# Patient Record
Sex: FEMALE | Race: WHITE | NOT HISPANIC OR LATINO | Employment: UNEMPLOYED | URBAN - METROPOLITAN AREA
[De-identification: names, ages, dates, MRNs, and addresses within clinical notes are randomized per-mention and may not be internally consistent; named-entity substitution may affect disease eponyms.]

---

## 2023-01-16 ENCOUNTER — HOSPITAL ENCOUNTER (INPATIENT)
Facility: HOSPITAL | Age: 53
LOS: 3 days | Discharge: OTHER INSTITUTION | End: 2023-01-19
Attending: EMERGENCY MEDICINE | Admitting: INTERNAL MEDICINE
Payer: COMMERCIAL

## 2023-01-16 DIAGNOSIS — K29.70 GASTRITIS: Primary | ICD-10-CM

## 2023-01-16 DIAGNOSIS — D53.9 MACROCYTIC ANEMIA: ICD-10-CM

## 2023-01-16 DIAGNOSIS — K92.1 GASTROINTESTINAL HEMORRHAGE WITH MELENA: ICD-10-CM

## 2023-01-16 DIAGNOSIS — R19.7 NAUSEA VOMITING AND DIARRHEA: ICD-10-CM

## 2023-01-16 DIAGNOSIS — R42 POSTURAL DIZZINESS: ICD-10-CM

## 2023-01-16 DIAGNOSIS — K92.2 GASTROINTESTINAL HEMORRHAGE, UNSPECIFIED GASTROINTESTINAL HEMORRHAGE TYPE: ICD-10-CM

## 2023-01-16 DIAGNOSIS — R11.2 NAUSEA VOMITING AND DIARRHEA: ICD-10-CM

## 2023-01-16 DIAGNOSIS — K80.11 CALCULUS OF GALLBLADDER WITH CHOLECYSTITIS WITH BILIARY OBSTRUCTION, UNSPECIFIED CHOLECYSTITIS ACUITY: ICD-10-CM

## 2023-01-16 DIAGNOSIS — R74.01 TRANSAMINITIS: ICD-10-CM

## 2023-01-16 PROBLEM — F10.10 ALCOHOL ABUSE: Status: ACTIVE | Noted: 2023-01-16

## 2023-01-16 LAB
ALBUMIN SERPL BCP-MCNC: 2.9 G/DL (ref 3.5–5)
ALP SERPL-CCNC: 274 U/L (ref 46–116)
ALT SERPL W P-5'-P-CCNC: 65 U/L (ref 12–78)
ANION GAP SERPL CALCULATED.3IONS-SCNC: 9 MMOL/L (ref 4–13)
AST SERPL W P-5'-P-CCNC: 233 U/L (ref 5–45)
ATRIAL RATE: 82 BPM
BASOPHILS # BLD AUTO: 0.02 THOUSANDS/ΜL (ref 0–0.1)
BASOPHILS NFR BLD AUTO: 0 % (ref 0–1)
BILIRUB SERPL-MCNC: 1.7 MG/DL (ref 0.2–1)
BUN SERPL-MCNC: 5 MG/DL (ref 5–25)
CALCIUM ALBUM COR SERPL-MCNC: 10.3 MG/DL (ref 8.3–10.1)
CALCIUM SERPL-MCNC: 9.4 MG/DL (ref 8.3–10.1)
CHLORIDE SERPL-SCNC: 100 MMOL/L (ref 96–108)
CO2 SERPL-SCNC: 28 MMOL/L (ref 21–32)
CREAT SERPL-MCNC: 0.59 MG/DL (ref 0.6–1.3)
EOSINOPHIL # BLD AUTO: 0.07 THOUSAND/ΜL (ref 0–0.61)
EOSINOPHIL NFR BLD AUTO: 1 % (ref 0–6)
ERYTHROCYTE [DISTWIDTH] IN BLOOD BY AUTOMATED COUNT: 13.7 % (ref 11.6–15.1)
GFR SERPL CREATININE-BSD FRML MDRD: 104 ML/MIN/1.73SQ M
GLUCOSE SERPL-MCNC: 115 MG/DL (ref 65–140)
HCT VFR BLD AUTO: 32.1 % (ref 34.8–46.1)
HGB BLD-MCNC: 10.4 G/DL (ref 11.5–15.4)
IMM GRANULOCYTES # BLD AUTO: 0.02 THOUSAND/UL (ref 0–0.2)
IMM GRANULOCYTES NFR BLD AUTO: 0 % (ref 0–2)
LIPASE SERPL-CCNC: 280 U/L (ref 73–393)
LYMPHOCYTES # BLD AUTO: 0.88 THOUSANDS/ΜL (ref 0.6–4.47)
LYMPHOCYTES NFR BLD AUTO: 16 % (ref 14–44)
MCH RBC QN AUTO: 34.8 PG (ref 26.8–34.3)
MCHC RBC AUTO-ENTMCNC: 32.4 G/DL (ref 31.4–37.4)
MCV RBC AUTO: 107 FL (ref 82–98)
MONOCYTES # BLD AUTO: 0.69 THOUSAND/ΜL (ref 0.17–1.22)
MONOCYTES NFR BLD AUTO: 12 % (ref 4–12)
NEUTROPHILS # BLD AUTO: 3.98 THOUSANDS/ΜL (ref 1.85–7.62)
NEUTS SEG NFR BLD AUTO: 71 % (ref 43–75)
NRBC BLD AUTO-RTO: 0 /100 WBCS
P AXIS: 75 DEGREES
PLATELET # BLD AUTO: 143 THOUSANDS/UL (ref 149–390)
PMV BLD AUTO: 10.3 FL (ref 8.9–12.7)
POTASSIUM SERPL-SCNC: 3.6 MMOL/L (ref 3.5–5.3)
PR INTERVAL: 138 MS
PROT SERPL-MCNC: 7.8 G/DL (ref 6.4–8.4)
QRS AXIS: 72 DEGREES
QRSD INTERVAL: 82 MS
QT INTERVAL: 384 MS
QTC INTERVAL: 448 MS
RBC # BLD AUTO: 2.99 MILLION/UL (ref 3.81–5.12)
SODIUM SERPL-SCNC: 137 MMOL/L (ref 135–147)
T WAVE AXIS: 74 DEGREES
VENTRICULAR RATE: 82 BPM
WBC # BLD AUTO: 5.66 THOUSAND/UL (ref 4.31–10.16)

## 2023-01-16 PROCEDURE — 82728 ASSAY OF FERRITIN: CPT | Performed by: INTERNAL MEDICINE

## 2023-01-16 PROCEDURE — 85025 COMPLETE CBC W/AUTO DIFF WBC: CPT | Performed by: EMERGENCY MEDICINE

## 2023-01-16 PROCEDURE — 99285 EMERGENCY DEPT VISIT HI MDM: CPT | Performed by: EMERGENCY MEDICINE

## 2023-01-16 PROCEDURE — 99285 EMERGENCY DEPT VISIT HI MDM: CPT

## 2023-01-16 PROCEDURE — C9113 INJ PANTOPRAZOLE SODIUM, VIA: HCPCS | Performed by: EMERGENCY MEDICINE

## 2023-01-16 PROCEDURE — 93005 ELECTROCARDIOGRAM TRACING: CPT

## 2023-01-16 PROCEDURE — 93010 ELECTROCARDIOGRAM REPORT: CPT | Performed by: INTERNAL MEDICINE

## 2023-01-16 PROCEDURE — 96374 THER/PROPH/DIAG INJ IV PUSH: CPT

## 2023-01-16 PROCEDURE — 83550 IRON BINDING TEST: CPT | Performed by: INTERNAL MEDICINE

## 2023-01-16 PROCEDURE — 36415 COLL VENOUS BLD VENIPUNCTURE: CPT

## 2023-01-16 PROCEDURE — 80053 COMPREHEN METABOLIC PANEL: CPT | Performed by: EMERGENCY MEDICINE

## 2023-01-16 PROCEDURE — 83690 ASSAY OF LIPASE: CPT | Performed by: EMERGENCY MEDICINE

## 2023-01-16 PROCEDURE — 83540 ASSAY OF IRON: CPT | Performed by: INTERNAL MEDICINE

## 2023-01-16 RX ORDER — ONDANSETRON 4 MG/1
4 TABLET, ORALLY DISINTEGRATING ORAL ONCE
Status: COMPLETED | OUTPATIENT
Start: 2023-01-16 | End: 2023-01-16

## 2023-01-16 RX ADMIN — SODIUM CHLORIDE 1000 ML: 0.9 INJECTION, SOLUTION INTRAVENOUS at 23:14

## 2023-01-16 RX ADMIN — SODIUM CHLORIDE 80 MG: 9 INJECTION, SOLUTION INTRAVENOUS at 23:27

## 2023-01-16 RX ADMIN — ONDANSETRON 4 MG: 4 TABLET, ORALLY DISINTEGRATING ORAL at 18:05

## 2023-01-17 ENCOUNTER — APPOINTMENT (INPATIENT)
Dept: ULTRASOUND IMAGING | Facility: HOSPITAL | Age: 53
End: 2023-01-17
Payer: COMMERCIAL

## 2023-01-17 ENCOUNTER — APPOINTMENT (INPATIENT)
Dept: GASTROENTEROLOGY | Facility: HOSPITAL | Age: 53
End: 2023-01-17
Payer: COMMERCIAL

## 2023-01-17 ENCOUNTER — ANESTHESIA EVENT (INPATIENT)
Dept: GASTROENTEROLOGY | Facility: HOSPITAL | Age: 53
End: 2023-01-17

## 2023-01-17 ENCOUNTER — ANESTHESIA (INPATIENT)
Dept: GASTROENTEROLOGY | Facility: HOSPITAL | Age: 53
End: 2023-01-17

## 2023-01-17 PROBLEM — I10 ESSENTIAL HYPERTENSION: Status: ACTIVE | Noted: 2023-01-17

## 2023-01-17 PROBLEM — F32.A DEPRESSION: Status: ACTIVE | Noted: 2023-01-17

## 2023-01-17 PROBLEM — R74.01 TRANSAMINITIS: Status: ACTIVE | Noted: 2023-01-17

## 2023-01-17 PROBLEM — L03.119 CELLULITIS OF HAND: Status: ACTIVE | Noted: 2023-01-17

## 2023-01-17 LAB
ALBUMIN SERPL BCP-MCNC: 2.4 G/DL (ref 3.5–5)
ALP SERPL-CCNC: 223 U/L (ref 46–116)
ALT SERPL W P-5'-P-CCNC: 55 U/L (ref 12–78)
ANION GAP SERPL CALCULATED.3IONS-SCNC: 8 MMOL/L (ref 4–13)
AST SERPL W P-5'-P-CCNC: 171 U/L (ref 5–45)
BASOPHILS # BLD AUTO: 0.01 THOUSANDS/ΜL (ref 0–0.1)
BASOPHILS NFR BLD AUTO: 0 % (ref 0–1)
BILIRUB SERPL-MCNC: 1.4 MG/DL (ref 0.2–1)
BUN SERPL-MCNC: 4 MG/DL (ref 5–25)
CALCIUM ALBUM COR SERPL-MCNC: 9.4 MG/DL (ref 8.3–10.1)
CALCIUM SERPL-MCNC: 8.1 MG/DL (ref 8.3–10.1)
CHLORIDE SERPL-SCNC: 106 MMOL/L (ref 96–108)
CO2 SERPL-SCNC: 27 MMOL/L (ref 21–32)
CREAT SERPL-MCNC: 0.55 MG/DL (ref 0.6–1.3)
EOSINOPHIL # BLD AUTO: 0.05 THOUSAND/ΜL (ref 0–0.61)
EOSINOPHIL NFR BLD AUTO: 1 % (ref 0–6)
ERYTHROCYTE [DISTWIDTH] IN BLOOD BY AUTOMATED COUNT: 13.6 % (ref 11.6–15.1)
FERRITIN SERPL-MCNC: 560 NG/ML (ref 8–388)
GFR SERPL CREATININE-BSD FRML MDRD: 107 ML/MIN/1.73SQ M
GLUCOSE SERPL-MCNC: 90 MG/DL (ref 65–140)
HAV IGM SER QL: NORMAL
HBV CORE IGM SER QL: NORMAL
HBV SURFACE AG SER QL: NORMAL
HCT VFR BLD AUTO: 26.4 % (ref 34.8–46.1)
HCV AB SER QL: NORMAL
HGB BLD-MCNC: 10.9 G/DL (ref 11.5–15.4)
HGB BLD-MCNC: 8.6 G/DL (ref 11.5–15.4)
IMM GRANULOCYTES # BLD AUTO: 0.02 THOUSAND/UL (ref 0–0.2)
IMM GRANULOCYTES NFR BLD AUTO: 1 % (ref 0–2)
INR PPP: 0.91 (ref 0.84–1.19)
LYMPHOCYTES # BLD AUTO: 0.78 THOUSANDS/ΜL (ref 0.6–4.47)
LYMPHOCYTES NFR BLD AUTO: 22 % (ref 14–44)
MCH RBC QN AUTO: 34.1 PG (ref 26.8–34.3)
MCHC RBC AUTO-ENTMCNC: 32.6 G/DL (ref 31.4–37.4)
MCV RBC AUTO: 105 FL (ref 82–98)
MONOCYTES # BLD AUTO: 0.5 THOUSAND/ΜL (ref 0.17–1.22)
MONOCYTES NFR BLD AUTO: 14 % (ref 4–12)
NEUTROPHILS # BLD AUTO: 2.12 THOUSANDS/ΜL (ref 1.85–7.62)
NEUTS SEG NFR BLD AUTO: 62 % (ref 43–75)
NRBC BLD AUTO-RTO: 0 /100 WBCS
PLATELET # BLD AUTO: 145 THOUSANDS/UL (ref 149–390)
PMV BLD AUTO: 10.2 FL (ref 8.9–12.7)
POTASSIUM SERPL-SCNC: 3.4 MMOL/L (ref 3.5–5.3)
PROT SERPL-MCNC: 6.5 G/DL (ref 6.4–8.4)
PROTHROMBIN TIME: 12.9 SECONDS (ref 11.6–14.5)
RBC # BLD AUTO: 2.52 MILLION/UL (ref 3.81–5.12)
SODIUM SERPL-SCNC: 141 MMOL/L (ref 135–147)
WBC # BLD AUTO: 3.48 THOUSAND/UL (ref 4.31–10.16)

## 2023-01-17 PROCEDURE — C9113 INJ PANTOPRAZOLE SODIUM, VIA: HCPCS | Performed by: INTERNAL MEDICINE

## 2023-01-17 PROCEDURE — 88305 TISSUE EXAM BY PATHOLOGIST: CPT | Performed by: PATHOLOGY

## 2023-01-17 PROCEDURE — 80053 COMPREHEN METABOLIC PANEL: CPT | Performed by: INTERNAL MEDICINE

## 2023-01-17 PROCEDURE — 85018 HEMOGLOBIN: CPT | Performed by: INTERNAL MEDICINE

## 2023-01-17 PROCEDURE — 0W3P8ZZ CONTROL BLEEDING IN GASTROINTESTINAL TRACT, VIA NATURAL OR ARTIFICIAL OPENING ENDOSCOPIC: ICD-10-PCS | Performed by: INTERNAL MEDICINE

## 2023-01-17 PROCEDURE — 76705 ECHO EXAM OF ABDOMEN: CPT

## 2023-01-17 PROCEDURE — 99223 1ST HOSP IP/OBS HIGH 75: CPT | Performed by: INTERNAL MEDICINE

## 2023-01-17 PROCEDURE — 80074 ACUTE HEPATITIS PANEL: CPT | Performed by: NURSE PRACTITIONER

## 2023-01-17 PROCEDURE — 43239 EGD BIOPSY SINGLE/MULTIPLE: CPT | Performed by: INTERNAL MEDICINE

## 2023-01-17 PROCEDURE — 43255 EGD CONTROL BLEEDING ANY: CPT | Performed by: INTERNAL MEDICINE

## 2023-01-17 PROCEDURE — 0DB98ZX EXCISION OF DUODENUM, VIA NATURAL OR ARTIFICIAL OPENING ENDOSCOPIC, DIAGNOSTIC: ICD-10-PCS | Performed by: INTERNAL MEDICINE

## 2023-01-17 PROCEDURE — 0DB68ZX EXCISION OF STOMACH, VIA NATURAL OR ARTIFICIAL OPENING ENDOSCOPIC, DIAGNOSTIC: ICD-10-PCS | Performed by: INTERNAL MEDICINE

## 2023-01-17 PROCEDURE — 85610 PROTHROMBIN TIME: CPT | Performed by: NURSE PRACTITIONER

## 2023-01-17 PROCEDURE — 0DB78ZX EXCISION OF STOMACH, PYLORUS, VIA NATURAL OR ARTIFICIAL OPENING ENDOSCOPIC, DIAGNOSTIC: ICD-10-PCS | Performed by: INTERNAL MEDICINE

## 2023-01-17 PROCEDURE — 85025 COMPLETE CBC W/AUTO DIFF WBC: CPT | Performed by: INTERNAL MEDICINE

## 2023-01-17 PROCEDURE — NC001 PR NO CHARGE: Performed by: INTERNAL MEDICINE

## 2023-01-17 RX ORDER — FOLIC ACID 1 MG/1
1 TABLET ORAL DAILY
Status: DISCONTINUED | OUTPATIENT
Start: 2023-01-17 | End: 2023-01-19 | Stop reason: HOSPADM

## 2023-01-17 RX ORDER — IBUPROFEN 400 MG/1
400 TABLET ORAL EVERY 6 HOURS PRN
Status: DISCONTINUED | OUTPATIENT
Start: 2023-01-17 | End: 2023-01-18

## 2023-01-17 RX ORDER — LOSARTAN POTASSIUM 25 MG/1
25 TABLET ORAL DAILY
Status: DISCONTINUED | OUTPATIENT
Start: 2023-01-17 | End: 2023-01-17

## 2023-01-17 RX ORDER — LANOLIN ALCOHOL/MO/W.PET/CERES
100 CREAM (GRAM) TOPICAL DAILY
Status: DISCONTINUED | OUTPATIENT
Start: 2023-01-17 | End: 2023-01-19 | Stop reason: HOSPADM

## 2023-01-17 RX ORDER — MIRTAZAPINE 15 MG/1
15 TABLET, FILM COATED ORAL
Status: DISCONTINUED | OUTPATIENT
Start: 2023-01-17 | End: 2023-01-19 | Stop reason: HOSPADM

## 2023-01-17 RX ORDER — CLINDAMYCIN HYDROCHLORIDE 150 MG/1
300 CAPSULE ORAL 4 TIMES DAILY
Status: DISPENSED | OUTPATIENT
Start: 2023-01-17 | End: 2023-01-18

## 2023-01-17 RX ORDER — MULTIVIT-MIN/IRON FUM/FOLIC AC 7.5 MG-4
1 TABLET ORAL DAILY
COMMUNITY

## 2023-01-17 RX ORDER — PANTOPRAZOLE SODIUM 40 MG/10ML
40 INJECTION, POWDER, LYOPHILIZED, FOR SOLUTION INTRAVENOUS EVERY 12 HOURS SCHEDULED
Status: DISCONTINUED | OUTPATIENT
Start: 2023-01-17 | End: 2023-01-19 | Stop reason: HOSPADM

## 2023-01-17 RX ORDER — LIDOCAINE HYDROCHLORIDE 10 MG/ML
INJECTION, SOLUTION EPIDURAL; INFILTRATION; INTRACAUDAL; PERINEURAL AS NEEDED
Status: DISCONTINUED | OUTPATIENT
Start: 2023-01-17 | End: 2023-01-17

## 2023-01-17 RX ORDER — GABAPENTIN 100 MG/1
200 CAPSULE ORAL
Status: DISCONTINUED | OUTPATIENT
Start: 2023-01-17 | End: 2023-01-19 | Stop reason: HOSPADM

## 2023-01-17 RX ORDER — CHLORDIAZEPOXIDE HYDROCHLORIDE 5 MG/1
5 CAPSULE, GELATIN COATED ORAL EVERY 8 HOURS SCHEDULED
Status: DISCONTINUED | OUTPATIENT
Start: 2023-01-17 | End: 2023-01-19 | Stop reason: HOSPADM

## 2023-01-17 RX ORDER — ESCITALOPRAM OXALATE 20 MG/1
20 TABLET ORAL DAILY
COMMUNITY

## 2023-01-17 RX ORDER — GABAPENTIN 100 MG/1
200 CAPSULE ORAL
COMMUNITY

## 2023-01-17 RX ORDER — SODIUM CHLORIDE 9 MG/ML
75 INJECTION, SOLUTION INTRAVENOUS CONTINUOUS
Status: DISCONTINUED | OUTPATIENT
Start: 2023-01-17 | End: 2023-01-17

## 2023-01-17 RX ORDER — POTASSIUM CHLORIDE 14.9 MG/ML
20 INJECTION INTRAVENOUS ONCE
Status: COMPLETED | OUTPATIENT
Start: 2023-01-17 | End: 2023-01-17

## 2023-01-17 RX ORDER — LANOLIN ALCOHOL/MO/W.PET/CERES
3 CREAM (GRAM) TOPICAL
Status: DISCONTINUED | OUTPATIENT
Start: 2023-01-17 | End: 2023-01-19 | Stop reason: HOSPADM

## 2023-01-17 RX ORDER — LANOLIN ALCOHOL/MO/W.PET/CERES
100 CREAM (GRAM) TOPICAL DAILY
COMMUNITY

## 2023-01-17 RX ORDER — MIRTAZAPINE 15 MG/1
15 TABLET, FILM COATED ORAL
COMMUNITY

## 2023-01-17 RX ORDER — VALSARTAN 40 MG/1
40 TABLET ORAL DAILY
COMMUNITY

## 2023-01-17 RX ORDER — PROPOFOL 10 MG/ML
INJECTION, EMULSION INTRAVENOUS AS NEEDED
Status: DISCONTINUED | OUTPATIENT
Start: 2023-01-17 | End: 2023-01-17

## 2023-01-17 RX ORDER — POTASSIUM CHLORIDE 20 MEQ/1
40 TABLET, EXTENDED RELEASE ORAL ONCE
Status: DISCONTINUED | OUTPATIENT
Start: 2023-01-17 | End: 2023-01-17

## 2023-01-17 RX ORDER — ONDANSETRON 2 MG/ML
4 INJECTION INTRAMUSCULAR; INTRAVENOUS EVERY 6 HOURS PRN
Status: DISCONTINUED | OUTPATIENT
Start: 2023-01-17 | End: 2023-01-17

## 2023-01-17 RX ORDER — ONDANSETRON 2 MG/ML
4 INJECTION INTRAMUSCULAR; INTRAVENOUS EVERY 4 HOURS PRN
Status: DISCONTINUED | OUTPATIENT
Start: 2023-01-17 | End: 2023-01-19 | Stop reason: HOSPADM

## 2023-01-17 RX ORDER — CLINDAMYCIN HYDROCHLORIDE 300 MG/1
300 CAPSULE ORAL 4 TIMES DAILY
COMMUNITY
Start: 2023-01-13 | End: 2023-01-19

## 2023-01-17 RX ADMIN — LIDOCAINE HYDROCHLORIDE 50 MG: 10 INJECTION, SOLUTION EPIDURAL; INFILTRATION; INTRACAUDAL; PERINEURAL at 13:27

## 2023-01-17 RX ADMIN — PANTOPRAZOLE SODIUM 40 MG: 40 INJECTION, POWDER, FOR SOLUTION INTRAVENOUS at 21:31

## 2023-01-17 RX ADMIN — ONDANSETRON 4 MG: 2 INJECTION INTRAMUSCULAR; INTRAVENOUS at 04:03

## 2023-01-17 RX ADMIN — FOLIC ACID 1 MG: 1 TABLET ORAL at 08:15

## 2023-01-17 RX ADMIN — Medication 3 MG: at 00:38

## 2023-01-17 RX ADMIN — ONDANSETRON 4 MG: 2 INJECTION INTRAMUSCULAR; INTRAVENOUS at 08:15

## 2023-01-17 RX ADMIN — CLINDAMYCIN HYDROCHLORIDE 300 MG: 150 CAPSULE ORAL at 03:22

## 2023-01-17 RX ADMIN — PROPOFOL 50 MG: 10 INJECTION, EMULSION INTRAVENOUS at 13:32

## 2023-01-17 RX ADMIN — PROPOFOL 70 MG: 10 INJECTION, EMULSION INTRAVENOUS at 13:29

## 2023-01-17 RX ADMIN — CHLORDIAZEPOXIDE HYDROCHLORIDE 5 MG: 5 CAPSULE ORAL at 08:15

## 2023-01-17 RX ADMIN — SODIUM CHLORIDE 8 MG/HR: 9 INJECTION, SOLUTION INTRAVENOUS at 03:06

## 2023-01-17 RX ADMIN — Medication 100 MG: at 08:15

## 2023-01-17 RX ADMIN — SODIUM CHLORIDE 75 ML/HR: 0.9 INJECTION, SOLUTION INTRAVENOUS at 03:06

## 2023-01-17 RX ADMIN — CLINDAMYCIN HYDROCHLORIDE 300 MG: 150 CAPSULE ORAL at 17:15

## 2023-01-17 RX ADMIN — MIRTAZAPINE 15 MG: 15 TABLET, FILM COATED ORAL at 03:22

## 2023-01-17 RX ADMIN — CLINDAMYCIN HYDROCHLORIDE 300 MG: 150 CAPSULE ORAL at 21:31

## 2023-01-17 RX ADMIN — POTASSIUM CHLORIDE 20 MEQ: 14.9 INJECTION, SOLUTION INTRAVENOUS at 09:22

## 2023-01-17 RX ADMIN — PROPOFOL 100 MG: 10 INJECTION, EMULSION INTRAVENOUS at 13:27

## 2023-01-17 RX ADMIN — GABAPENTIN 200 MG: 100 CAPSULE ORAL at 03:22

## 2023-01-17 RX ADMIN — SODIUM CHLORIDE 8 MG/HR: 9 INJECTION, SOLUTION INTRAVENOUS at 12:14

## 2023-01-17 RX ADMIN — PROPOFOL 30 MG: 10 INJECTION, EMULSION INTRAVENOUS at 13:28

## 2023-01-17 RX ADMIN — MULTIPLE VITAMINS W/ MINERALS TAB 1 TABLET: TAB ORAL at 08:15

## 2023-01-17 RX ADMIN — GABAPENTIN 200 MG: 100 CAPSULE ORAL at 21:31

## 2023-01-17 RX ADMIN — CHLORDIAZEPOXIDE HYDROCHLORIDE 5 MG: 5 CAPSULE ORAL at 21:31

## 2023-01-17 RX ADMIN — CHLORDIAZEPOXIDE HYDROCHLORIDE 5 MG: 5 CAPSULE ORAL at 14:38

## 2023-01-17 RX ADMIN — Medication 3 MG: at 21:31

## 2023-01-17 RX ADMIN — MIRTAZAPINE 15 MG: 15 TABLET, FILM COATED ORAL at 21:31

## 2023-01-17 NOTE — CONSULTS
Consultation - GI   Alicia Gottlieb 47 y o  female MRN: [id number]  Unit/Bed#: -01 Encounter: [id number]      Assessment/Plan     1  Nausea and vomiting  1 to 2-week history of anorexia, nausea and vomiting since alcohol cessation 1/10/2023  Usually related to food consumption  Denies dysphagia  No hematemesis  With history of heavy alcohol use concern for esophagitis, Castro's, PUD, gastritis, possible Roxy-Conklin tear  Low suspicion for bleeding esophageal varices   Recommend proceeding with EGD for further evaluation  -Plan for EGD today  -Continue to trend hemoglobin and transfuse to keep greater than 7 0  -Continue Protonix infusion for now  -N p o  for now    2  Diarrhea/melena  Chronic loose stools which patient attributes to regular alcohol use  Now with 2-week history of watery diarrhea and black stools  Denies rectal bleeding or bright red blood  Recent antibiotic use due to left hand trauma-currently on clindamycin  -Stool cultures to rule out C  Difficile  -Plan for EGD today to evaluate for source of bleeding  -Trend hemoglobin, transfuse to keep greater than 7 0    3  Elevated LFTs  4  History of alcohol abuse  LFTs elevated on admission- none prior for comparison  Platelet count slightly decreased, INR pending  Patient denies known liver disease or cirrhosis, no history of hepatitis  Most likely due to recent heavy alcohol use  Plan for work-up to evaluate for cirrhosis  -Check hepatitis panel  -Check abdominal ultrasound  -Continue to trend LFTs      Inpatient consult to gastroenterology  Consult performed by: KAREN Yoo  Consult ordered by: Obi Cuellar PA-C          Physician Requesting Consult: Glenys Seth MD  Reason for Consult / Principal Problem: Nausea, vomiting, diarrhea    HPI: Alicia Gottlieb is a 47y o  year old female with longstanding history of alcohol abuse, depression  Admits to binge drinking, can drink 1-1/2 pints of vodka daily at time    Stop drinking 1/10/2023 and admitted to [place] [address] for alcohol rehab 1/12/2023  Experiencing diarrhea/loose stools chronically which she attributes to alcohol use however over the past 1 month, patient reports liquid watery stools with any food consumption  Also reporting dark black bowel movement and associated lower abdominal cramping  Also experiencing anorexia, frequent nausea with food ingestion and intermittent vomiting of partially digested food  Denies any hematemesis    Yesterday after eating breakfast at [place] [address], she vomited and had dark black liquid stool promptly after eating and was referred to ED  Stool heme positive in the ED  Labs reviewed- hemoglobin initially 10 4, decreased to 8 6 this a m  with elevated   WBCs within normal limits  Platelet count 004  LFTs elevated with , ALT 55, alkaline phosphatase 223, total bilirubin initially 1 7, 1 4 this a m  She denies history of PUD, prior GI bleed or GERD  Denies history of liver disease   Denies regular NSAID use  Recent antibiotic use after left hand trauma requiring sutures -clindamycin since 1/10/2023  Colonoscopy at age 47 was normal, 10-year recall    Review of Systems   Constitutional: Positive for appetite change and unexpected weight change  Reports 25 pound weight loss over the past 4 months   HENT: Negative  Respiratory: Negative  Cardiovascular: Negative  Gastrointestinal: Positive for abdominal pain, diarrhea, nausea and vomiting  Chronic loose stools, reports black stools x1 month   Genitourinary: Negative  Musculoskeletal:        Recent left hand trauma with laceration at the palm and lateral aspect left fifth finger   Neurological: Positive for weakness  Hematological: Negative  Psychiatric/Behavioral: Negative  Historical Information   History reviewed  No pertinent past medical history  History reviewed  No pertinent surgical history    Social History   Social History Substance and Sexual Activity   Alcohol Use Not Currently     Social History     Substance and Sexual Activity   Drug Use Never     Social History     Tobacco Use   Smoking Status Never   Smokeless Tobacco Never     History reviewed  No pertinent family history  Meds/Allergies   Current Facility-Administered Medications   Medication Dose Route Frequency   • chlordiazePOXIDE (LIBRIUM) capsule 5 mg  5 mg Oral Q8H [address]   • clindamycin (CLEOCIN) capsule 300 mg  300 mg Oral 4x Daily   • folic acid (FOLVITE) tablet 1 mg  1 mg Oral Daily   • gabapentin (NEURONTIN) capsule 200 mg  200 mg Oral HS   • ibuprofen (MOTRIN) tablet 400 mg  400 mg Oral Q6H PRN   • melatonin tablet 3 mg  3 mg Oral HS   • mirtazapine (REMERON) tablet 15 mg  15 mg Oral HS   • multivitamin-minerals (CENTRUM) tablet 1 tablet  1 tablet Oral Daily   • ondansetron (ZOFRAN) injection 4 mg  4 mg Intravenous Q4H PRN   • pantoprazole (PROTONIX) 80 mg in sodium chloride 0 9 % 100 mL infusion  8 mg/hr Intravenous Continuous   • potassium chloride 20 mEq IVPB (premix)  20 mEq Intravenous Once   • sodium chloride 0 9 % infusion  75 mL/hr Intravenous Continuous   • thiamine tablet 100 mg  100 mg Oral Daily     Medications Prior to Admission   Medication   • clindamycin (CLEOCIN) 300 MG capsule   • escitalopram (LEXAPRO) 20 mg tablet   • gabapentin (NEURONTIN) 100 mg capsule   • mirtazapine (REMERON) 15 mg tablet   • Multiple Vitamins-Minerals (multivitamin with minerals) tablet   • valsartan (DIOVAN) 40 mg tablet   • thiamine 100 MG tablet       No Known Allergies        Physical Exam   Constitutional: Alert and oriented x3, conversing appropriately, NAD  HENT: WNL  Head: Normocephalic and atraumatic  Eyes: Anicteric  Neck: Normal range of motion  Neck supple  Cardiovascular: Normal rate and regular rhythm    S1 and S2 normal, no murmur rub or gallop  Pulmonary/Chest: Effort normal and breath sounds normal    Abdominal: Soft, nondistended, mild tenderness with palpation at right upper quadrant  Bowel sounds are normal   No BM or diarrhea since admission  Musculoskeletal: Normal range of motion  Extremities:  No edema, recent left hand laceration healing well  Neurological:  alert and oriented to person, place, and time  Skin: Skin is warm and dry  Psychiatric:  normal mood and affect         Lab Results:   Admission on 01/16/2023   Component Date Value   • WBC 01/16/2023 5 66    • RBC 01/16/2023 2 99 (L)    • Hemoglobin 01/16/2023 10 4 (L)    • Hematocrit 01/16/2023 32 1 (L)    • MCV 01/16/2023 107 (H)    • MCH 01/16/2023 34 8 (H)    • MCHC 01/16/2023 32 4    • RDW 01/16/2023 13 7    • MPV 01/16/2023 10 3    • Platelets 77/27/6668 143 (L)    • nRBC 01/16/2023 0    • Neutrophils Relative 01/16/2023 71    • Immat GRANS % 01/16/2023 0    • Lymphocytes Relative 01/16/2023 16    • Monocytes Relative 01/16/2023 12    • Eosinophils Relative 01/16/2023 1    • Basophils Relative 01/16/2023 0    • Neutrophils Absolute 01/16/2023 3 98    • Immature Grans Absolute 01/16/2023 0 02    • Lymphocytes Absolute 01/16/2023 0 88    • Monocytes Absolute 01/16/2023 0 69    • Eosinophils Absolute 01/16/2023 0 07    • Basophils Absolute 01/16/2023 0 02    • Sodium 01/16/2023 137    • Potassium 01/16/2023 3 6    • Chloride 01/16/2023 100    • CO2 01/16/2023 28    • ANION GAP 01/16/2023 9    • BUN 01/16/2023 5    • Creatinine 01/16/2023 0 59 (L)    • Glucose 01/16/2023 115    • Calcium 01/16/2023 9 4    • Corrected Calcium 01/16/2023 10 3 (H)    • AST 01/16/2023 233 (H)    • ALT 01/16/2023 65    • Alkaline Phosphatase 01/16/2023 274 (H)    • Total Protein 01/16/2023 7 8    • Albumin 01/16/2023 2 9 (L)    • Total Bilirubin 01/16/2023 1 70 (H)    • eGFR 01/16/2023 104    • Lipase 01/16/2023 280    • Ventricular Rate 01/16/2023 82    • Atrial Rate 01/16/2023 82    • RI Interval 01/16/2023 138    • QRSD Interval 01/16/2023 82    • QT Interval 01/16/2023 384    • QTC Interval 01/16/2023 448 • P Axis 01/16/2023 75    • QRS Axis 01/16/2023 72    • T Wave Axis 01/16/2023 74    • Ferritin 01/16/2023 560 (H)    • WBC 01/17/2023 3 48 (L)    • RBC 01/17/2023 2 52 (L)    • Hemoglobin 01/17/2023 8 6 (L)    • Hematocrit 01/17/2023 26 4 (L)    • MCV 01/17/2023 105 (H)    • MCH 01/17/2023 34 1    • MCHC 01/17/2023 32 6    • RDW 01/17/2023 13 6    • MPV 01/17/2023 10 2    • Platelets 75/68/5454 145 (L)    • nRBC 01/17/2023 0    • Neutrophils Relative 01/17/2023 62    • Immat GRANS % 01/17/2023 1    • Lymphocytes Relative 01/17/2023 22    • Monocytes Relative 01/17/2023 14 (H)    • Eosinophils Relative 01/17/2023 1    • Basophils Relative 01/17/2023 0    • Neutrophils Absolute 01/17/2023 2 12    • Immature Grans Absolute 01/17/2023 0 02    • Lymphocytes Absolute 01/17/2023 0 78    • Monocytes Absolute 01/17/2023 0 50    • Eosinophils Absolute 01/17/2023 0 05    • Basophils Absolute 01/17/2023 0 01    • Sodium 01/17/2023 141    • Potassium 01/17/2023 3 4 (L)    • Chloride 01/17/2023 106    • CO2 01/17/2023 27    • ANION GAP 01/17/2023 8    • BUN 01/17/2023 4 (L)    • Creatinine 01/17/2023 0 55 (L)    • Glucose 01/17/2023 90    • Calcium 01/17/2023 8 1 (L)    • Corrected Calcium 01/17/2023 9 4    • AST 01/17/2023 171 (H)    • ALT 01/17/2023 55    • Alkaline Phosphatase 01/17/2023 223 (H)    • Total Protein 01/17/2023 6 5    • Albumin 01/17/2023 2 4 (L)    • Total Bilirubin 01/17/2023 1 40 (H)    • eGFR 01/17/2023 107      Imaging Studies: I have personally reviewed pertinent reports  EKG, Pathology, and Other Studies: I have personally reviewed pertinent reports  Counseling / Coordination of Care  Total floor / unit time spent today 45 minutes

## 2023-01-17 NOTE — ASSESSMENT & PLAN NOTE
· Reports ongoing alcohol abuse for approximately 10 years  Stopped drinking on 1/10/2023  Was admitted to [facility] on 1/12  · While there had withdrawal symptoms: Nausea, vomiting, diarrhea, hallucinations (only on 1/14)  · Had received Serax which was stopped on 1/16  Currently without tremors, hallucinations or agitation     · Placed on CIWA  · Thiamine, folic acid, multivitamin  · Encourage continued cessation
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
## 2023-01-17 NOTE — UTILIZATION REVIEW
Initial Clinical Review    Admission: Date/Time/Statement:   Admission Orders (From admission, onward)     Ordered        01/16/23 2335  INPATIENT ADMISSION  Once                      Orders Placed This Encounter   Procedures   • Inpatient Admission     Inpatient order started on 1/16/23     Standing Status:   Standing     Number of Occurrences:   1     Order Specific Question:   Level of Care     Answer:   Med Surg [16]     Order Specific Question:   Estimated length of stay     Answer:   More than 2 Midnights     Order Specific Question:   Certification     Answer:   I certify that inpatient services are medically necessary for this patient for a duration of greater than two midnights  See H&P and MD Progress Notes for additional information about the patient's course of treatment  ED Arrival Information     Expected   -    Arrival   1/16/2023 17:39    Acuity   Urgent            Means of arrival   Ambulance    Escorted by   [facility])    Service   Hospitalist    Admission type   Emergency            Arrival complaint   vomiting           Chief Complaint   Patient presents with   • Vomiting     Pt reports she is at detox at [facility]  Pt c o nausea, vomiting and diarrhea  Pt reports what sent in for evaluation  Pt last alcoholic drink was last Tuesday  Initial Presentation: 47 y o  female from Detox facility to ED via ems admitted inpatient due to GI Bleed/nausea, vomiting & Diarrhea/transaminitis/alcohol abuse  PMH of alcohol abuse, depression, cellulitis of hand on clindamycin, hpt  Presented due to nausea, vomiting and diarrhea starting week prior to arrival   Stopped alcohol 1/10/23 and went into rehab 2 days later  Vomits after eats, stool black and liquid   + dizziness upon standing  Has 20 lb weight loss over the last 2 months  On exam: mucous membranes dry  Abdominal tenderness in epigastric area  Guaiac positive  Anxious    Albumin 2 9  Total bilirubin 1 70  H&h 10 4/32  1  Platelets 831  In the ED given 80 mg IV Protonix, 1 Liter IVF bolus and  Zofran ODT  Plan includes:  Consult GI, trend H&H, IV Protonix drip, CIWA on Librium, thiamine and folic acid  Complete OP clindamycin course  Hold home Cozaar  Continue IVF, antiemetics as needed  Date: 1/17/23    Day 2:  Admits to black stools last months, 25 lb weight loss last 4 months   + weakness  No diarrhea since admission  + nausea requiring IV Zofran  On exam: alert and oriented  Abdomen soft, mild tenderness with palpation at RUQ   K 3 4    Albumin 2 4  Total bilirubin 1 40  Wbc 4 38   H&H 8 6/26  4  platelets 834  IVF and Protonix continue  1/17/23 per GI - Patient with anorexia, nausea and vomiting since stopped drinking alcohol 1/10/23 and relates to food intake  Differential is esophagitis, Castro's, PUD, gastritis, possible Roxy-Conklin tear  Plan is egd, trend hgb, continue Protonix infusion  NPO   Check stool C diff  Suspect elevated LFTs related to alcohol abuse  Check hepatitis panel, abdominal US and trend LFTs  Procedure 1/17/23 egd Normal esophagus  No varices  Erosive gastritis in the antrum  1 bleeding vessel treated with resolution clip with hemostasis achieved  Antral biopsy for H  pylori  Mild duodenitis of the bulb   Recommendation Transition from PPI drip to IV twice daily  Start diet as tolerated    Continue antiemetics as needed  Await biopsies  Repeat hemoglobin in the morning    ED Triage Vitals   Temperature Pulse Respirations Blood Pressure SpO2   01/16/23 1802 01/16/23 1802 01/16/23 1802 01/16/23 1802 01/16/23 1802   98 1 °F (36 7 °C) 89 20 105/90 98 %      Temp Source Heart Rate Source Patient Position - Orthostatic VS BP Location FiO2 (%)   01/17/23 0214 01/16/23 1802 01/16/23 2315 01/17/23 0300 --   Oral Monitor Lying Right arm       Pain Score       01/16/23 1802       8          Wt Readings from Last 1 Encounters:   01/17/23 52 2 kg (115 lb) Additional Vital Signs:   01/17/23 0802 98 1 °F (36 7 °C) 73 12 92/53 64 Abnormal  98 % None (Room air) Lying   01/17/23 0300 98 1 °F (36 7 °C) 73 18 111/65 -- -- -- Lying   01/17/23 0214 98 1 °F (36 7 °C) 73 18 111/65 -- -- -- --   01/17/23 0000 -- 77 -- 108/65 -- 100 % None (Room air) Lying   01/16/23 2315 -- 79 -- 108/63 -- 98 % None (Room air)      CIWA  1/17/23:  0 at 0719    0 at 0300  Pertinent Labs/Diagnostic Test Results:   US abdomen limited   Final Result by Brendon Blackwell MD (01/17 1111)      Cholelithiasis and gallbladder sludge with gallbladder wall thickening and gallbladder wall edema suggesting acute cholecystitis  Workstation performed: [id number]           1/16/23 ecg Normal sinus rhythm   Cannot rule out Anterior infarct , age undetermined   Abnormal ECG   When compared with ECG of 17-JUL-2014 15:14,   Vent   rate has decreased BY  40 BPM   Nonspecific T wave abnormality no longer evident in Inferior leads   Nonspecific T wave abnormality, improved in Anterolateral leads  Results from last 7 days   Lab Units 01/17/23  0605 01/16/23  1817   WBC Thousand/uL 3 48* 5 66   HEMOGLOBIN g/dL 8 6* 10 4*   HEMATOCRIT % 26 4* 32 1*   PLATELETS Thousands/uL 145* 143*   NEUTROS ABS Thousands/µL 2 12 3 98     Results from last 7 days   Lab Units 01/17/23  0605 01/16/23  1817   SODIUM mmol/L 141 137   POTASSIUM mmol/L 3 4* 3 6   CHLORIDE mmol/L 106 100   CO2 mmol/L 27 28   ANION GAP mmol/L 8 9   BUN mg/dL 4* 5   CREATININE mg/dL 0 55* 0 59*   EGFR ml/min/1 73sq m 107 104   CALCIUM mg/dL 8 1* 9 4     Results from last 7 days   Lab Units 01/17/23  0605 01/16/23  1817   AST U/L 171* 233*   ALT U/L 55 65   ALK PHOS U/L 223* 274*   TOTAL PROTEIN g/dL 6 5 7 8   ALBUMIN g/dL 2 4* 2 9*   TOTAL BILIRUBIN mg/dL 1 40* 1 70*     Results from last 7 days   Lab Units 01/17/23  0605 01/16/23  1817   GLUCOSE RANDOM mg/dL 90 115     Results from last 7 days   Lab Units 01/16/23  1817   FERRITIN ng/mL 560* Results from last 7 days   Lab Units 01/16/23  1817   LIPASE u/L 280       ED Treatment:   Medication Administration from 01/16/2023 1738 to 01/17/2023 0209       Date/Time Order Dose Route Action Comments     01/16/2023 1805 EST ondansetron (ZOFRAN-ODT) dispersible tablet 4 mg 4 mg Oral Given --     01/16/2023 2314 EST sodium chloride 0 9 % bolus 1,000 mL 1,000 mL Intravenous New Bag --     01/16/2023 2327 EST pantoprazole (PROTONIX) 80 mg in sodium chloride 0 9 % 100 mL IVPB 80 mg Intravenous New Bag --     01/17/2023 0038 EST melatonin tablet 3 mg 3 mg Oral Given --        History reviewed  No pertinent past medical history  Present on Admission:  **None**      Admitting Diagnosis: Gastritis [K29 70]  Vomiting [R11 10]  Transaminitis [R74 01]  Postural dizziness [R42]  Macrocytic anemia [D53 9]  Gastrointestinal hemorrhage with melena [K92 1]  Gastrointestinal hemorrhage, unspecified gastrointestinal hemorrhage type [K92 2]  Age/Sex: 47 y o  female  Admission Orders:  Scheduled Medications:  chlordiazePOXIDE, 5 mg, Oral, Q8H RAY  clindamycin, 300 mg, Oral, 4x Daily  folic acid, 1 mg, Oral, Daily  gabapentin, 200 mg, Oral, HS  melatonin, 3 mg, Oral, HS  mirtazapine, 15 mg, Oral, HS  multivitamin-minerals, 1 tablet, Oral, Daily  potassium chloride, 20 mEq, Intravenous, Once  thiamine, 100 mg, Oral, Daily    potassium chloride 20 mEq IVPB (premix)  Dose: 20 mEq  Freq:  Once Route: IV  Last Dose: 20 mEq (01/17    Continuous IV Infusions:  pantoprozole (PROTONIX) infusion (Continuous), 8 mg/hr, Intravenous, Continuous  sodium chloride, 75 mL/hr, Intravenous, Continuous      PRN Meds:  ibuprofen, 400 mg, Oral, Q6H PRN  ondansetron, 4 mg, Intravenous, Q4H PRN x 2 1/17/23     CIWA  Continuous pulse oximetry   NPO    IP CONSULT TO GASTROENTEROLOGY      Network Utilization Review Department  ATTENTION: Please call with any questions or concerns to [phone] and carefully listen to the prompts so that you are directed to the right person  All voicemails are confidential   Lia Zaina all requests for admission clinical reviews, approved or denied determinations and any other requests to dedicated fax number below belonging to the campus where the patient is receiving treatment   List of dedicated fax numbers for the Facilities:  [address] DENIALS (Administrative/Medical Necessity) [phone]   [address] (Maternity/NICU/Pediatrics) [phone]   917 Jacki Hudson [phone]   [facility] [address] [phone]   [address] [place] [address] Jhon Torres [facility] 28 [phone]   155 [facility] Debbie Camp [facility] 134 815 [facility] [phone]

## 2023-01-17 NOTE — ASSESSMENT & PLAN NOTE
· , ALT 65, albumin 2 9, total bilirubin 1 70  · Denies abdominal pain  · Known alcohol abuse  · GI consulted  · Continue to monitor on CMP

## 2023-01-17 NOTE — PLAN OF CARE
Problem: Nutrition/Hydration-ADULT  Goal: Nutrient/Hydration intake appropriate for improving, restoring or maintaining nutritional needs  Description: Monitor and assess patient's nutrition/hydration status for malnutrition  Collaborate with interdisciplinary team and initiate plan and interventions as ordered  Monitor patient's weight and dietary intake as ordered or per policy  Utilize nutrition screening tool and intervene as necessary  Determine patient's food preferences and provide high-protein, high-caloric foods as appropriate       INTERVENTIONS:  - Monitor oral intake, urinary output, labs, and treatment plans  - Assess nutrition and hydration status and recommend course of action  - Evaluate amount of meals eaten  - Assist patient with eating if necessary   - Allow adequate time for meals  - Recommend/ encourage appropriate diets, oral nutritional supplements, and vitamin/mineral supplements  - Order, calculate, and assess calorie counts as needed  - Recommend, monitor, and adjust tube feedings and TPN/PPN based on assessed needs  - Assess need for intravenous fluids  - Provide specific nutrition/hydration education as appropriate  - Include patient/family/caregiver in decisions related to nutrition  Outcome: Progressing     Problem: Potential for Falls  Goal: Patient will remain free of falls  Description: INTERVENTIONS:  - Educate patient/family on patient safety including physical limitations  - Instruct patient to call for assistance with activity   - Consult OT/PT to assist with strengthening/mobility   - Keep Call bell within reach  - Keep bed low and locked with side rails adjusted as appropriate  - Keep care items and personal belongings within reach  - Initiate and maintain comfort rounds  - Make Fall Risk Sign visible to staff  - Offer Toileting every 2 Hours, in advance of need  - Initiate/Maintain bed/chair alarm  - Obtain necessary fall risk management equipment: n/a  - Apply yellow socks and bracelet for high fall risk patients  - Consider moving patient to room near nurses station  Outcome: Progressing

## 2023-01-17 NOTE — OP NOTE
EGD IMPRESSION:  Normal esophagus  No varices    Erosive gastritis in the antrum  1 bleeding vessel treated with resolution clip with hemostasis achieved  Antral biopsy for H  pylori    Mild duodenitis of the bulb    RECOMMENDATION:  Transition from PPI drip to IV twice daily  Start diet as tolerated    Continue antiemetics as needed  Await biopsies  Repeat hemoglobin in the morning

## 2023-01-17 NOTE — ANESTHESIA POSTPROCEDURE EVALUATION
Post-Op Assessment Note    CV Status:  Stable  Pain Score: 0    Pain management: adequate     Mental Status:  Awake   Hydration Status:  Euvolemic   PONV Controlled:  None   Airway Patency:  Patent      Post Op Vitals Reviewed: Yes      Staff: CRNA         No notable events documented      BP 93/55 (01/17/23 1338)    Temp      Pulse 88 (01/17/23 1338)   Resp 18 (01/17/23 1338)    SpO2 98 % (01/17/23 1338)

## 2023-01-17 NOTE — ASSESSMENT & PLAN NOTE
· Reports ongoing nausea, vomiting and diarrhea over the past week  · Per patient symptoms began once she stopped drinking alcohol on 1/10  · Vitals WNL, WBC WNL  Creatinine 0 59 and potassium 3 6     · Stool studies  · Antiemetics  · Continue to monitor

## 2023-01-17 NOTE — ED PROVIDER NOTES
History  Chief Complaint   Patient presents with   • Vomiting     Pt reports she is at detox at [facility]  Pt c o nausea, vomiting and diarrhea  Pt reports what sent in for evaluation  Pt last alcoholic drink was last Tuesday  79-year-old female presents for evaluation from [facility] for nausea, vomiting and diarrhea for the past week  Patient states that she had stopped drinking on 1/10/2023 was admitted to [facility] 2 days later  She states that despite feeling hungry, she becomes nauseated and vomits after eating  She denies any abdominal pain with the exception of lower abdominal cramping prior to bowel movements  Bowel movements have been black and liquid in nature  She states that she has been feeling dizzy which typically occurs while standing  No loss of consciousness  No fevers, chills, cough or congestion  No chest pain or shortness of breath  Prior colonoscopy at age 47 which was reported to be normal per patient  No prior endoscopy  Patient reports approximately 20 pound weight loss over the past 2 months which she had attributed to poor diet  None       History reviewed  No pertinent past medical history  History reviewed  No pertinent surgical history  History reviewed  No pertinent family history  I have reviewed and agree with the history as documented  E-Cigarette/Vaping     E-Cigarette/Vaping Substances     Social History     Tobacco Use   • Smoking status: Never   • Smokeless tobacco: Never   Substance Use Topics   • Alcohol use: Not Currently   • Drug use: Never       Review of Systems    Physical Exam  Physical Exam  Vitals and nursing note reviewed  Constitutional:       Appearance: Normal appearance  HENT:      Head: Normocephalic and atraumatic  Mouth/Throat:      Mouth: Mucous membranes are dry  Eyes:      Conjunctiva/sclera: Conjunctivae normal    Cardiovascular:      Rate and Rhythm: Normal rate and regular rhythm  Pulses: Normal pulses  Pulmonary:      Effort: Pulmonary effort is normal  No respiratory distress  Abdominal:      Palpations: Abdomen is soft  Tenderness: There is abdominal tenderness in the epigastric area  There is no guarding or rebound  Genitourinary:     Rectum: Guaiac result positive (insufficient sample; however, trace positive)  No external hemorrhoid or internal hemorrhoid  Skin:     General: Skin is warm and dry  Neurological:      Mental Status: She is alert  Psychiatric:         Mood and Affect: Mood is anxious           Vital Signs  ED Triage Vitals   Temperature Pulse Respirations Blood Pressure SpO2   01/16/23 1802 01/16/23 1802 01/16/23 1802 01/16/23 1802 01/16/23 1802   98 1 °F (36 7 °C) 89 20 105/90 98 %      Temp src Heart Rate Source Patient Position - Orthostatic VS BP Location FiO2 (%)   -- 01/16/23 1802 01/16/23 2315 -- --    Monitor Lying        Pain Score       01/16/23 1802       8           Vitals:    01/16/23 1802 01/16/23 2315   BP: 105/90 108/63   Pulse: 89 79   Patient Position - Orthostatic VS:  Lying         Visual Acuity      ED Medications  Medications   sodium chloride 0 9 % bolus 1,000 mL (1,000 mL Intravenous New Bag 1/16/23 2314)   pantoprazole (PROTONIX) 80 mg in sodium chloride 0 9 % 100 mL IVPB (80 mg Intravenous New Bag 1/16/23 2327)   ondansetron (ZOFRAN-ODT) dispersible tablet 4 mg (4 mg Oral Given 1/16/23 1805)       Diagnostic Studies  Results Reviewed     Procedure Component Value Units Date/Time    Comprehensive metabolic panel [[id number]]  (Abnormal) Collected: 01/16/23 1817    Lab Status: Final result Specimen: Blood from Arm, Right Updated: 01/16/23 1847     Sodium 137 mmol/L      Potassium 3 6 mmol/L      Chloride 100 mmol/L      CO2 28 mmol/L      ANION GAP 9 mmol/L      BUN 5 mg/dL      Creatinine 0 59 mg/dL      Glucose 115 mg/dL      Calcium 9 4 mg/dL      Corrected Calcium 10 3 mg/dL       U/L      ALT 65 U/L      Alkaline Phosphatase 274 U/L      Total Protein 7 8 g/dL      Albumin 2 9 g/dL      Total Bilirubin 1 70 mg/dL      eGFR 104 ml/min/1 73sq m     Narrative:      [place] guidelines for Chronic Kidney Disease (CKD):   •  Stage 1 with normal or high GFR (GFR > 90 mL/min/1 73 square meters)  •  Stage 2 Mild CKD (GFR = 60-89 mL/min/1 73 square meters)  •  Stage 3A Moderate CKD (GFR = 45-59 mL/min/1 73 square meters)  •  Stage 3B Moderate CKD (GFR = 30-44 mL/min/1 73 square meters)  •  Stage 4 Severe CKD (GFR = 15-29 mL/min/1 73 square meters)  •  Stage 5 End Stage CKD (GFR <15 mL/min/1 73 square meters)  Note: GFR calculation is accurate only with a steady state creatinine    Lipase [[id number]]  (Normal) Collected: 01/16/23 1817    Lab Status: Final result Specimen: Blood from Arm, Right Updated: 01/16/23 1847     Lipase 280 u/L     CBC and differential [[id number]]  (Abnormal) Collected: 01/16/23 1817    Lab Status: Final result Specimen: Blood from Arm, Right Updated: 01/16/23 1822     WBC 5 66 Thousand/uL      RBC 2 99 Million/uL      Hemoglobin 10 4 g/dL      Hematocrit 32 1 %       fL      MCH 34 8 pg      MCHC 32 4 g/dL      RDW 13 7 %      MPV 10 3 fL      Platelets 349 Thousands/uL      nRBC 0 /100 WBCs      Neutrophils Relative 71 %      Immat GRANS % 0 %      Lymphocytes Relative 16 %      Monocytes Relative 12 %      Eosinophils Relative 1 %      Basophils Relative 0 %      Neutrophils Absolute 3 98 Thousands/µL      Immature Grans Absolute 0 02 Thousand/uL      Lymphocytes Absolute 0 88 Thousands/µL      Monocytes Absolute 0 69 Thousand/µL      Eosinophils Absolute 0 07 Thousand/µL      Basophils Absolute 0 02 Thousands/µL                  No orders to display              Procedures  ECG 12 Lead Documentation Only    Date/Time: 1/16/2023 6:32 PM  Performed by: Richa Zhu MD  Authorized by: Richa Zhu MD     Indications / Diagnosis:  Dizziness  ECG reviewed by me, the ED Provider: yes    Patient location:  ED  Previous ECG:     Previous ECG:  Unavailable  Interpretation:     Interpretation: normal    Rate:     ECG rate:  82    ECG rate assessment: normal    Rhythm:     Rhythm: sinus rhythm    Ectopy:     Ectopy: none    QRS:     QRS axis:  Normal    QRS intervals:  Normal  Conduction:     Conduction: normal    ST segments:     ST segments:  Normal  T waves:     T waves: inverted      Inverted:  AVL             ED Course  ED Course as of 01/16/23 2336   Mon Jan 16, 2023   2303 Hemoglobin(!): 10 4  13 on 10/20/22; however, 10 9 on 9/20/22 at [facility]   2303 AST(!): 233  394 on 9/21/22 at [facility]  Negative hepatitis panel on 9/21/20 2303 Alkaline Phosphatase(!): 274  232 on 9/21/22 at [place] [address] Making  58-year-old female presents for evaluation of nausea, vomiting and diarrhea associated with early satiety and black liquid stools  Insufficient sample on SAKINA; however, trace heme positive  Anemia on labs  Epigastric tenderness on exam   Suspect gastritis versus gastric ulcer  80 mg IV Protonix  Transaminitis likely secondary to alcohol abuse  Nonreactive hepatitis panel in 2020  Case discussed with GI  Patient admitted for further evaluation and management  Gastritis: complicated acute illness or injury  Macrocytic anemia: complicated acute illness or injury  Postural dizziness: complicated acute illness or injury  Transaminitis: complicated acute illness or injury  Amount and/or Complexity of Data Reviewed  Labs: ordered  Decision-making details documented in ED Course  Risk  Prescription drug management            Disposition  Final diagnoses:   Macrocytic anemia   Postural dizziness   Transaminitis   Gastritis     Time reflects when diagnosis was documented in both MDM as applicable and the Disposition within this note     Time User Action Codes Description Comment    1/16/2023 11:11 PM Clista Potter J Add [D53 9] Macrocytic anemia     1/16/2023 11:11 PM Rhoda, Mae Ruth J Add [R42] Postural dizziness     1/16/2023 11:11 PM Clista Potter J Add [R74 01] Transaminitis     1/16/2023 11:12 PM Clista Potter J Add [K29 70] Gastritis     1/16/2023 11:20 PM Clista Potter J Modify [D53 9] Macrocytic anemia     1/16/2023 11:20 PM Pennelope Moulds Modify [K29 70] Gastritis       ED Disposition     ED Disposition   Admit    Condition   Stable    Date/Time   Mon Jan 16, 2023 11:35 PM    Comment   Case was discussed with TIGRE and the patient's admission status was agreed to be Admission Status: inpatient status to the service of Dr Deepak Drummond   Follow-up Information    None         Patient's Medications    No medications on file       No discharge procedures on file      PDMP Review     None          ED Provider  Electronically Signed by           Deanna Garcia MD  01/16/23 5261

## 2023-01-17 NOTE — ASSESSMENT & PLAN NOTE
· Presents from [facility] due to N/V/D  Diarrhea noted to be black in color  Concern for possible GI bleed  Trace heme positive stools on SAKINA per ED  · Minimal stool output while in the ER  · Hemoglobin 10 4  · Monitor closely  Transfuse if less than 7  · Per patient normal colonoscopy at age 47  · History of alcohol abuse    Denies prior GI bleed history  · NPO/IVF  · Protonix drip  · Consult GI

## 2023-01-17 NOTE — ANESTHESIA PREPROCEDURE EVALUATION
Procedure:  EGD    Relevant Problems   CARDIO   (+) Essential hypertension      GI/HEPATIC   (+) GI bleed      NEURO/PSYCH   (+) Depression      Other   (+) Alcohol abuse   (+) Transaminitis        Physical Exam    Airway    Mallampati score: II  TM Distance: >3 FB  Neck ROM: full     Dental   Comment: Ground down dentition upper and lower,     Cardiovascular      Pulmonary      Other Findings        Anesthesia Plan  ASA Score- 3     Anesthesia Type- IV sedation with anesthesia with ASA Monitors  Additional Monitors:   Airway Plan:           Plan Factors-    Chart reviewed  Existing labs reviewed  Patient summary reviewed  Patient is not a current smoker  Induction- intravenous  Postoperative Plan-     Informed Consent- Anesthetic plan and risks discussed with patient  I personally reviewed this patient with the CRNA  Discussed and agreed on the Anesthesia Plan with the CRNA  Joe Rodriguez
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
## 2023-01-18 PROBLEM — K80.20 CHOLELITHIASES: Status: ACTIVE | Noted: 2023-01-18

## 2023-01-18 LAB
ALBUMIN SERPL BCP-MCNC: 2.5 G/DL (ref 3.5–5)
ALP SERPL-CCNC: 245 U/L (ref 46–116)
ALT SERPL W P-5'-P-CCNC: 63 U/L (ref 12–78)
ANION GAP SERPL CALCULATED.3IONS-SCNC: 5 MMOL/L (ref 4–13)
AST SERPL W P-5'-P-CCNC: 225 U/L (ref 5–45)
BASOPHILS # BLD AUTO: 0.02 THOUSANDS/ΜL (ref 0–0.1)
BASOPHILS NFR BLD AUTO: 1 % (ref 0–1)
BILIRUB SERPL-MCNC: 1.4 MG/DL (ref 0.2–1)
BUN SERPL-MCNC: 3 MG/DL (ref 5–25)
CALCIUM ALBUM COR SERPL-MCNC: 10.1 MG/DL (ref 8.3–10.1)
CALCIUM SERPL-MCNC: 8.9 MG/DL (ref 8.3–10.1)
CHLORIDE SERPL-SCNC: 111 MMOL/L (ref 96–108)
CO2 SERPL-SCNC: 28 MMOL/L (ref 21–32)
CREAT SERPL-MCNC: 0.62 MG/DL (ref 0.6–1.3)
EOSINOPHIL # BLD AUTO: 0.08 THOUSAND/ΜL (ref 0–0.61)
EOSINOPHIL NFR BLD AUTO: 2 % (ref 0–6)
ERYTHROCYTE [DISTWIDTH] IN BLOOD BY AUTOMATED COUNT: 13.9 % (ref 11.6–15.1)
GFR SERPL CREATININE-BSD FRML MDRD: 103 ML/MIN/1.73SQ M
GLUCOSE SERPL-MCNC: 86 MG/DL (ref 65–140)
HCT VFR BLD AUTO: 28.9 % (ref 34.8–46.1)
HGB BLD-MCNC: 9.2 G/DL (ref 11.5–15.4)
HGB BLD-MCNC: 9.2 G/DL (ref 11.5–15.4)
IMM GRANULOCYTES # BLD AUTO: 0.02 THOUSAND/UL (ref 0–0.2)
IMM GRANULOCYTES NFR BLD AUTO: 1 % (ref 0–2)
LYMPHOCYTES # BLD AUTO: 0.95 THOUSANDS/ΜL (ref 0.6–4.47)
LYMPHOCYTES NFR BLD AUTO: 24 % (ref 14–44)
MAGNESIUM SERPL-MCNC: 1.8 MG/DL (ref 1.6–2.6)
MCH RBC QN AUTO: 33.6 PG (ref 26.8–34.3)
MCHC RBC AUTO-ENTMCNC: 31.8 G/DL (ref 31.4–37.4)
MCV RBC AUTO: 106 FL (ref 82–98)
MONOCYTES # BLD AUTO: 0.66 THOUSAND/ΜL (ref 0.17–1.22)
MONOCYTES NFR BLD AUTO: 16 % (ref 4–12)
NEUTROPHILS # BLD AUTO: 2.3 THOUSANDS/ΜL (ref 1.85–7.62)
NEUTS SEG NFR BLD AUTO: 56 % (ref 43–75)
NRBC BLD AUTO-RTO: 0 /100 WBCS
PLATELET # BLD AUTO: 161 THOUSANDS/UL (ref 149–390)
PMV BLD AUTO: 9.9 FL (ref 8.9–12.7)
POTASSIUM SERPL-SCNC: 4.1 MMOL/L (ref 3.5–5.3)
PROT SERPL-MCNC: 7 G/DL (ref 6.4–8.4)
RBC # BLD AUTO: 2.74 MILLION/UL (ref 3.81–5.12)
SODIUM SERPL-SCNC: 144 MMOL/L (ref 135–147)
WBC # BLD AUTO: 4.03 THOUSAND/UL (ref 4.31–10.16)

## 2023-01-18 PROCEDURE — 80053 COMPREHEN METABOLIC PANEL: CPT | Performed by: INTERNAL MEDICINE

## 2023-01-18 PROCEDURE — 99232 SBSQ HOSP IP/OBS MODERATE 35: CPT | Performed by: INTERNAL MEDICINE

## 2023-01-18 PROCEDURE — 99254 IP/OBS CNSLTJ NEW/EST MOD 60: CPT | Performed by: PHYSICIAN ASSISTANT

## 2023-01-18 PROCEDURE — 85025 COMPLETE CBC W/AUTO DIFF WBC: CPT | Performed by: INTERNAL MEDICINE

## 2023-01-18 PROCEDURE — C9113 INJ PANTOPRAZOLE SODIUM, VIA: HCPCS | Performed by: INTERNAL MEDICINE

## 2023-01-18 PROCEDURE — 83735 ASSAY OF MAGNESIUM: CPT | Performed by: INTERNAL MEDICINE

## 2023-01-18 PROCEDURE — 85018 HEMOGLOBIN: CPT | Performed by: INTERNAL MEDICINE

## 2023-01-18 RX ADMIN — Medication 3 MG: at 20:57

## 2023-01-18 RX ADMIN — GABAPENTIN 200 MG: 100 CAPSULE ORAL at 20:57

## 2023-01-18 RX ADMIN — FOLIC ACID 1 MG: 1 TABLET ORAL at 09:00

## 2023-01-18 RX ADMIN — CHLORDIAZEPOXIDE HYDROCHLORIDE 5 MG: 5 CAPSULE ORAL at 14:01

## 2023-01-18 RX ADMIN — CLINDAMYCIN HYDROCHLORIDE 300 MG: 150 CAPSULE ORAL at 09:00

## 2023-01-18 RX ADMIN — MULTIPLE VITAMINS W/ MINERALS TAB 1 TABLET: TAB ORAL at 09:00

## 2023-01-18 RX ADMIN — Medication 100 MG: at 09:00

## 2023-01-18 RX ADMIN — PANTOPRAZOLE SODIUM 40 MG: 40 INJECTION, POWDER, FOR SOLUTION INTRAVENOUS at 09:00

## 2023-01-18 RX ADMIN — CLINDAMYCIN HYDROCHLORIDE 300 MG: 150 CAPSULE ORAL at 17:48

## 2023-01-18 RX ADMIN — CHLORDIAZEPOXIDE HYDROCHLORIDE 5 MG: 5 CAPSULE ORAL at 20:57

## 2023-01-18 RX ADMIN — CLINDAMYCIN HYDROCHLORIDE 300 MG: 150 CAPSULE ORAL at 12:26

## 2023-01-18 RX ADMIN — CHLORDIAZEPOXIDE HYDROCHLORIDE 5 MG: 5 CAPSULE ORAL at 05:32

## 2023-01-18 RX ADMIN — PANTOPRAZOLE SODIUM 40 MG: 40 INJECTION, POWDER, FOR SOLUTION INTRAVENOUS at 20:57

## 2023-01-18 RX ADMIN — MIRTAZAPINE 15 MG: 15 TABLET, FILM COATED ORAL at 20:57
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
## 2023-01-18 NOTE — ASSESSMENT & PLAN NOTE
· Reports ongoing alcohol abuse for approximately 10 years  Stopped drinking on 1/10/2023  Was admitted to [facility] on 1/12  · While there had withdrawal symptoms: Nausea, vomiting, diarrhea, hallucinations (only on 1/14)  · Had received Serax which was stopped on 1/16  Currently without tremors, hallucinations or agitation     · Placed on CIWA  · Thiamine, folic acid, multivitamin  · Continue on Librium and wean  · Encourage continued cessation

## 2023-01-18 NOTE — ASSESSMENT & PLAN NOTE
Ultrasound gallbladder showed "Cholelithiasis and gallbladder sludge with gallbladder wall thickening and gallbladder wall edema suggesting acute cholecystitis"  Patient with transaminitis    Currently denies any abdominal pain  Will request surgical input  Trend CMP

## 2023-01-18 NOTE — ASSESSMENT & PLAN NOTE
· , ALT 65, albumin 2 9, total bilirubin 1 70  · Denies abdominal pain  · Known alcohol abuse  · GI consult appreciated  · Continue to monitor CMP

## 2023-01-18 NOTE — CONSULTS
Consultation - General Surgery   Julio Cesar Ruth 47 y o  female MRN: [id number]  Unit/Bed#: -01 Encounter: [id number]    Assessment/Plan   Abnormal U/S results  -RUQ U/S ordered for elevated LFTs with findings as below:  Cholelithiasis and gallbladder sludge with gallbladder wall thickening and gallbladder wall edema suggesting acute cholecystitis  -no clinical findings to suggest acute cholecystitis  -patient with no RUQ pain, elena diet, no h/o difficulty with fatty meals  -no leukocytosis, VSS  -findings likely 2/2 erosive gastritis and duodenitis  -no indication for surgical intervention  -continue diet as tolerated    Elevated LFTs  -Hepatocellular pattern with AST greater than ALT most likely related to alcohol abuse  -Continue alcohol cessation  -GI follow-up    GI bleed  -Admitted with chronic diarrhea, black stools, nausea,  vomiting and anemia  -EGD yesterday with findings of erosive gastritis with bleeding vessel which was clipped and mild duodenitis  -Continue PPI  -continue to trend H&H  -Okay to advance diet per GI    ETOH abuse  -Patient to return to [facility] for rehab  -Continue abstinence      History of Present Illness     HPI:  Julio Cesar Ruth is a 47 y o  female who presents with longstanding history of alcohol abuse who was recently admitted to [facility] for rehab on 1/12/2023  Patient was transferred to ED for evaluation with 1 week history of nausea, vomiting, and diarrhea  Patient admitted to history of chronic diarrhea but also noted 1 month history of black stools and lower abdominal cramping  2-week history of increased appetite and intermittent vomiting  Work-up revealed anemia  Patient underwent EGD yesterday with findings of erosive gastritis with a bleeding vessel which was able to be clipped mild duodenitis  She does have elevated LFTs on  with a  hepatocellular pattern with AST greater than ALT  This is consistent with alcohol abuse    However a right upper quadrant ultrasound was ordered and was read with findings of cholelithiasis with gallbladder wall thickening and edema  Consulted for these findings and possible acute cholecystitis  Today patient denies any right upper quadrant abdominal pain  She was able to tolerate a regular diet yesterday  No previous history of right upper quadrant pain even with fatty meals  No known history of gallbladder problems  Her mother did have " gallbladder issues"  Consults    Review of Systems   Constitutional: Positive for appetite change  Negative for fever and unexpected weight change  HENT: Negative  Eyes: Negative  Respiratory: Negative  Negative for cough and shortness of breath  Cardiovascular: Negative  Negative for chest pain and palpitations  Gastrointestinal: Positive for abdominal pain, blood in stool, diarrhea, nausea and vomiting  Negative for constipation  Endocrine: Negative  Genitourinary: Negative  Negative for difficulty urinating and dysuria  Musculoskeletal: Negative  Skin: Negative  Negative for rash and wound  Allergic/Immunologic: Negative  Neurological: Negative  Hematological: Negative  Psychiatric/Behavioral: Negative  All other systems reviewed and are negative  Historical Information   History reviewed  No pertinent past medical history  History reviewed  No pertinent surgical history    Social History   Social History     Substance and Sexual Activity   Alcohol Use Not Currently     Social History     Substance and Sexual Activity   Drug Use Never     E-Cigarette/Vaping   • E-Cigarette Use Never User      E-Cigarette/Vaping Substances     Social History     Tobacco Use   Smoking Status Never   Smokeless Tobacco Never     Family History: non-contributory    Meds/Allergies   all current active meds have been reviewed  No Known Allergies    Objective   First Vitals:   Blood Pressure: 105/90 (01/16/23 1802)  Pulse: 89 (01/16/23 1802)  Temperature: 98 1 °F (36 7 °C) (01/16/23 1802)  Temp Source: Oral (01/17/23 0214)  Respirations: 20 (01/16/23 1802)  Height: 5' (152 4 cm) (01/17/23 0214)  Weight - Scale: 52 2 kg (115 lb) (01/17/23 0214)  SpO2: 98 % (01/16/23 1802)    Current Vitals:   Blood Pressure: 95/56 (01/18/23 0729)  Pulse: 77 (01/18/23 0729)  Temperature: 98 °F (36 7 °C) (01/18/23 0729)  Temp Source: Oral (01/18/23 0729)  Respirations: 17 (01/18/23 0729)  Height: 5' (152 4 cm) (01/17/23 0214)  Weight - Scale: 52 2 kg (115 lb) (01/17/23 0214)  SpO2: 98 % (01/18/23 0729)      Intake/Output Summary (Last 24 hours) at 1/18/2023 1203  Last data filed at 1/17/2023 1334  Gross per 24 hour   Intake 200 ml   Output --   Net 200 ml       Invasive Devices     Peripheral Intravenous Line  Duration           Peripheral IV 01/16/23 Left Forearm 1 day    Peripheral IV 01/17/23 Dorsal (posterior); Right Hand 1 day                Physical Exam  Constitutional:       General: She is not in acute distress  Appearance: She is well-developed  She is not diaphoretic  HENT:      Head: Normocephalic and atraumatic  Mouth/Throat:      Pharynx: No oropharyngeal exudate  Eyes:      General: No scleral icterus  Right eye: No discharge  Left eye: No discharge  Neck:      Thyroid: No thyromegaly  Vascular: No JVD  Trachea: No tracheal deviation  Comments: Trachea midline, no JVD  Cardiovascular:      Rate and Rhythm: Normal rate and regular rhythm  Heart sounds: Normal heart sounds  No murmur heard  Pulmonary:      Effort: Pulmonary effort is normal  No respiratory distress  Breath sounds: Normal breath sounds  No wheezing  Abdominal:      General: Bowel sounds are normal  There is no distension  Palpations: Abdomen is soft  Tenderness: There is no abdominal tenderness  Musculoskeletal:         General: No deformity  Normal range of motion  Cervical back: Normal range of motion and neck supple     Skin:     General: Skin is warm and dry  Coloration: Skin is pale  Findings: No rash  Neurological:      Mental Status: She is alert and oriented to person, place, and time  Comments: No focal deficits   Psychiatric:         Behavior: Behavior normal          Lab Results:   I have personally reviewed pertinent lab results  , CBC:   Lab Results   Component Value Date    WBC 4 03 (L) 01/18/2023    HGB 9 2 (L) 01/18/2023    HGB 9 2 (L) 01/18/2023    HCT 28 9 (L) 01/18/2023     (H) 01/18/2023     01/18/2023    MCH 33 6 01/18/2023    MCHC 31 8 01/18/2023    RDW 13 9 01/18/2023    MPV 9 9 01/18/2023    NRBC 0 01/18/2023   , CMP:   Lab Results   Component Value Date    SODIUM 144 01/18/2023    K 4 1 01/18/2023     (H) 01/18/2023    CO2 28 01/18/2023    BUN 3 (L) 01/18/2023    CREATININE 0 62 01/18/2023    CALCIUM 8 9 01/18/2023     (H) 01/18/2023    ALT 63 01/18/2023    ALKPHOS 245 (H) 01/18/2023    EGFR 103 01/18/2023   , Coagulation: No results found for: PT, INR, APTT, Urinalysis: No results found for: COLORU, CLARITYU, SPECGRAV, PHUR, LEUKOCYTESUR, NITRITE, PROTEINUA, GLUCOSEU, KETONESU, BILIRUBINUR, BLOODU, Amylase: No results found for: AMYLASE, Lipase: No results found for: LIPASE  Imaging: I have personally reviewed pertinent reports  EKG, Pathology, and Other Studies: I have personally reviewed pertinent reports  Counseling / Coordination of Care  Total floor / unit time spent today 30 minutes  Greater than 50% of total time was spent with the patient and / or family counseling and / or coordination of care    A description of the counseling / coordination of care    [facility]

## 2023-01-18 NOTE — PLAN OF CARE
Problem: PAIN - ADULT  Goal: Verbalizes/displays adequate comfort level or baseline comfort level  Description: Interventions:  - Encourage patient to monitor pain and request assistance  - Assess pain using appropriate pain scale  - Administer analgesics based on type and severity of pain and evaluate response  - Implement non-pharmacological measures as appropriate and evaluate response  - Consider cultural and social influences on pain and pain management  - Notify physician/advanced practitioner if interventions unsuccessful or patient reports new pain  Outcome: Progressing     Problem: INFECTION - ADULT  Goal: Absence or prevention of progression during hospitalization  Description: INTERVENTIONS:  - Assess and monitor for signs and symptoms of infection  - Monitor lab/diagnostic results  - Monitor all insertion sites, i e  indwelling lines, tubes, and drains  - Monitor endotracheal if appropriate and nasal secretions for changes in amount and color  - [place] appropriate cooling/warming therapies per order  - Administer medications as ordered  - Instruct and encourage patient and family to use good hand hygiene technique  - Identify and instruct in appropriate isolation precautions for identified infection/condition  Outcome: Progressing  Goal: Absence of fever/infection during neutropenic period  Description: INTERVENTIONS:  - Monitor WBC    Outcome: Progressing     Problem: DISCHARGE PLANNING  Goal: Discharge to home or other facility with appropriate resources  Description: INTERVENTIONS:  - Identify barriers to discharge w/patient and caregiver  - Arrange for needed discharge resources and transportation as appropriate  - Identify discharge learning needs (meds, wound care, etc )  - Arrange for interpretive services to assist at discharge as needed  - Refer to Case Management Department for coordinating discharge planning if the patient needs post-hospital services based on physician/advanced practitioner order or complex needs related to functional status, cognitive ability, or social support system  Outcome: Progressing     Problem: Knowledge Deficit  Goal: Patient/family/caregiver demonstrates understanding of disease process, treatment plan, medications, and discharge instructions  Description: Complete learning assessment and assess knowledge base    Interventions:  - Provide teaching at level of understanding  - Provide teaching via preferred learning methods  Outcome: Progressing

## 2023-01-18 NOTE — ASSESSMENT & PLAN NOTE
· Presents from [facility] due to N/V/D  Diarrhea noted to be black in color  Concern for possible GI bleed  Trace heme positive stools on SAKINA per ED  · Minimal stool output while in the ER  · Hemoglobin 10 4  · Monitor closely  Transfuse if less than 7  · Per patient normal colonoscopy at age 47  · History of alcohol abuse  Denies prior GI bleed history  · Patient underwent EGD which showed-" Normal esophagus  No varices  Erosive gastritis in the antrum  1 bleeding vessel treated with resolution clip with hemostasis achieved  Antral biopsy for H  Pylori   Mild duodenitis of the bulb"  · GI switched patient Protonix 40 mg IV twice daily  · On clear liquid diet and advance diet as tolerated  · Avoid NSAIDs

## 2023-01-18 NOTE — MALNUTRITION/BMI
This medical record reflects one or more clinical indicators suggestive of malnutrition and/or morbid obesity  Malnutrition Findings:   Adult Malnutrition type: Chronic illness  Adult Degree of Malnutrition: Other severe protein calorie malnutrition                     360 Statement: Pt presents with severe protein calorie malnutrition due to excessive ETOH intake as evidenced by <75% po intake > 1 month and 14% wt loss in 4 months (9/2/22 130 lbs, 1/18/23 115 lbs)  Treat with Non Ulcergenic diet and Supplements BID  BMI Findings: Body mass index is 22 46 kg/m²  See Nutrition note dated 1/18/23  for additional details  Completed nutrition assessment is viewable in the nutrition documentation

## 2023-01-18 NOTE — ASSESSMENT & PLAN NOTE
· Reports ongoing nausea, vomiting and diarrhea over the past week  · Per patient symptoms began once she stopped drinking alcohol on 1/10  · Vitals WNL, WBC WNL  Creatinine 0 59 and potassium 3 6 on admission  · Stool studies pending    Patient has no further diarrhea since admission  · Antiemetics  · Continue to monitor

## 2023-01-18 NOTE — PROGRESS NOTES
Progress note - Gastroenterology   Yashira Todd 47 y o  female MRN: [id number]  Unit/Bed#: -01 Encounter: [id number]    ASSESSMENT and PLAN  1  Anemia  2  Nausea/vomiting  50-year-old female with long history of alcohol abuse, stopped drinking 1/10 and was admitted to [place] [address] for rehab 1/12/2023   Referred to ED from [place] [address] with 1 week history of nausea, vomiting and diarrhea  Reports chronic diarrhea but now with 1 month history of black stool and lower abdominal cramping  2-week history of nausea, decreased appetite and intermittent vomiting  She underwent EGD yesterday 1/17 which showed erosive gastritis with bleeding vessel which was clipped, mild duodenitis  No further nausea or vomiting today, no bowel movement since admission  Hemoglobin stable at 9 2, was 10 4 on admission, macrocytic  -Continue to trend H&H  -Continue IV pantoprazole 40 mg twice daily  -Okay to advance diet    3  Alcohol abuse  4  Elevated LFTs  Hepatocellular pattern with AST greater than ALT  Abdominal ultrasound shows hepatomegaly without mass or cirrhosis  -Reinforced need for alcohol cessation  -Continue rehab at [place] [address]  -Follow-up LFTs as outpatient in 2-week    Chief Complaint   Patient presents with   • Vomiting     Pt reports she is at detox at [place] [address]  Pt c o nausea, vomiting and diarrhea  Pt reports what sent in for evaluation  Pt last alcoholic drink was last Tuesday  SUBJECTIVE/HPI   EGD 1/17/2023 showed normal esophagus, no varices, erosive gastritis, 1 bleeding vessel treated with clip with hemostasis, mild duodenitis  Was able to tolerate solid foods last night  No further nausea or vomiting  No bowel movement or diarrhea since admission  She denies abdominal pain and no longer tender at right upper quadrant with palpation    Ultrasound of the abdomen showed normal liver but noted to have gallstones and sludge, mild gallbladder wall thickening    BP 95/56 (BP Location: Right arm)   Pulse 77   Temp 98 °F (36 7 °C) (Oral)   Resp 17   Ht 5' (1 524 m)   Wt 52 2 kg (115 lb)   SpO2 98%   BMI 22 46 kg/m²     PHYSICALEXAM  General appearance: alert, comfortable this a m , NAD  Eyes: no icterus   Head: Normocephalic, without obvious abnormality, atraumatic  Lungs: clear to auscultation bilaterally  Heart: regular rate and rhythm, S1, S2 normal, no murmur, click, rub or gallop  Abdomen: soft, nondistended, non-tender with palpation at right upper quadrant today; bowel sounds normal, no BM or diarrhea since admission  Extremities: extremities normal, atraumatic, no cyanosis or edema  Neurologic: Grossly normal, no signs of withdrawal    Lab Results   Component Value Date    GLUCOSE 90 10/01/2015    CALCIUM 8 9 01/18/2023     09/30/2015    K 4 1 01/18/2023    CO2 28 01/18/2023     (H) 01/18/2023    BUN 3 (L) 01/18/2023    CREATININE 0 62 01/18/2023     Lab Results   Component Value Date    WBC 4 03 (L) 01/18/2023    HGB 9 2 (L) 01/18/2023    HGB 9 2 (L) 01/18/2023    HCT 28 9 (L) 01/18/2023     (H) 01/18/2023     01/18/2023     Lab Results   Component Value Date    ALT 63 01/18/2023     (H) 01/18/2023    ALKPHOS 245 (H) 01/18/2023    BILITOT 0 53 09/30/2015       Lab Results   Component Value Date    LIPASE 280 01/16/2023     Lab Results   Component Value Date    FERRITIN 560 (H) 01/16/2023     Lab Results   Component Value Date    INR 0 91 01/17/2023

## 2023-01-18 NOTE — PLAN OF CARE
Problem: Nutrition/Hydration-ADULT  Goal: Nutrient/Hydration intake appropriate for improving, restoring or maintaining nutritional needs  Description: Monitor and assess patient's nutrition/hydration status for malnutrition  Collaborate with interdisciplinary team and initiate plan and interventions as ordered  Monitor patient's weight and dietary intake as ordered or per policy  Utilize nutrition screening tool and intervene as necessary  Determine patient's food preferences and provide high-protein, high-caloric foods as appropriate       INTERVENTIONS:  - Monitor oral intake, urinary output, labs, and treatment plans  - Assess nutrition and hydration status and recommend course of action  - Evaluate amount of meals eaten  - Assist patient with eating if necessary   - Allow adequate time for meals  - Recommend/ encourage appropriate diets, oral nutritional supplements, and vitamin/mineral supplements  - Order, calculate, and assess calorie counts as needed  - Recommend, monitor, and adjust tube feedings and TPN/PPN based on assessed needs  - Assess need for intravenous fluids  - Provide specific nutrition/hydration education as appropriate  - Include patient/family/caregiver in decisions related to nutrition  Outcome: Progressing     Problem: Potential for Falls  Goal: Patient will remain free of falls  Description: INTERVENTIONS:  - Educate patient/family on patient safety including physical limitations  - Instruct patient to call for assistance with activity   - Consult OT/PT to assist with strengthening/mobility   - Keep Call bell within reach  - Keep bed low and locked with side rails adjusted as appropriate  - Keep care items and personal belongings within reach  - Initiate and maintain comfort rounds  - Make Fall Risk Sign visible to staff  - Offer Toileting every 2 Hours, in advance of need  - Initiate/Maintain bed/chair alarm  - Obtain necessary fall risk management equipment: n/a  - Apply yellow socks and bracelet for high fall risk patients  - Consider moving patient to room near nurses station  Outcome: Progressing     Problem: PAIN - ADULT  Goal: Verbalizes/displays adequate comfort level or baseline comfort level  Description: Interventions:  - Encourage patient to monitor pain and request assistance  - Assess pain using appropriate pain scale  - Administer analgesics based on type and severity of pain and evaluate response  - Implement non-pharmacological measures as appropriate and evaluate response  - Consider cultural and social influences on pain and pain management  - Notify physician/advanced practitioner if interventions unsuccessful or patient reports new pain  Outcome: Progressing     Problem: INFECTION - ADULT  Goal: Absence or prevention of progression during hospitalization  Description: INTERVENTIONS:  - Assess and monitor for signs and symptoms of infection  - Monitor lab/diagnostic results  - Monitor all insertion sites, i e  indwelling lines, tubes, and drains  - Monitor endotracheal if appropriate and nasal secretions for changes in amount and color  - [place] appropriate cooling/warming therapies per order  - Administer medications as ordered  - Instruct and encourage patient and family to use good hand hygiene technique  - Identify and instruct in appropriate isolation precautions for identified infection/condition  Outcome: Progressing  Goal: Absence of fever/infection during neutropenic period  Description: INTERVENTIONS:  - Monitor WBC    Outcome: Progressing     Problem: SAFETY ADULT  Goal: Patient will remain free of falls  Description: INTERVENTIONS:  - Educate patient/family on patient safety including physical limitations  - Instruct patient to call for assistance with activity   - Consult OT/PT to assist with strengthening/mobility   - Keep Call bell within reach  - Keep bed low and locked with side rails adjusted as appropriate  - Keep care items and personal belongings within reach  - Initiate and maintain comfort rounds  - Make Fall Risk Sign visible to staff  - Offer Toileting every 2 Hours, in advance of need  - Initiate/Maintain bed/chair alarm  - Obtain necessary fall risk management equipment: n/a  - Apply yellow socks and bracelet for high fall risk patients  - Consider moving patient to room near nurses station  Outcome: Progressing  Goal: Maintain or return to baseline ADL function  Description: INTERVENTIONS:  -  Assess patient's ability to carry out ADLs; assess patient's baseline for ADL function and identify physical deficits which impact ability to perform ADLs (bathing, care of mouth/teeth, toileting, grooming, dressing, etc )  - Assess/evaluate cause of self-care deficits   - Assess range of motion  - Assess patient's mobility; develop plan if impaired  - Assess patient's need for assistive devices and provide as appropriate  - Encourage maximum independence but intervene and supervise when necessary  - Involve family in performance of ADLs  - Assess for home care needs following discharge   - Consider OT consult to assist with ADL evaluation and planning for discharge  - Provide patient education as appropriate  Outcome: Progressing  Goal: Maintains/Returns to pre admission functional level  Description: INTERVENTIONS:  - Perform BMAT or MOVE assessment daily    - Set and communicate daily mobility goal to care team and patient/family/caregiver  - Collaborate with rehabilitation services on mobility goals if consulted  - Perform Range of Motion 3 times a day  - Reposition patient every 2 hours    - Dangle patient 3 times a day  - Stand patient 3 times a day  - Ambulate patient 3 times a day  - Out of bed to chair 3 times a day   - Out of bed for meals 3 times a day  - Out of bed for toileting  - Record patient progress and toleration of activity level   Outcome: Progressing     Problem: DISCHARGE PLANNING  Goal: Discharge to home or other facility with appropriate resources  Description: INTERVENTIONS:  - Identify barriers to discharge w/patient and caregiver  - Arrange for needed discharge resources and transportation as appropriate  - Identify discharge learning needs (meds, wound care, etc )  - Arrange for interpretive services to assist at discharge as needed  - Refer to Case Management Department for coordinating discharge planning if the patient needs post-hospital services based on physician/advanced practitioner order or complex needs related to functional status, cognitive ability, or social support system  Outcome: Progressing     Problem: Knowledge Deficit  Goal: Patient/family/caregiver demonstrates understanding of disease process, treatment plan, medications, and discharge instructions  Description: Complete learning assessment and assess knowledge base    Interventions:  - Provide teaching at level of understanding  - Provide teaching via preferred learning methods  Outcome: Progressing
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
## 2023-01-18 NOTE — ASSESSMENT & PLAN NOTE
· Home regimen: Mirtazapine 15 mg daily at bedtime and Lexapro  · Per [facility] paperwork Lexapro was discontinued on 1/13

## 2023-01-19 VITALS
SYSTOLIC BLOOD PRESSURE: 98 MMHG | OXYGEN SATURATION: 100 % | HEART RATE: 80 BPM | HEIGHT: 60 IN | RESPIRATION RATE: 18 BRPM | BODY MASS INDEX: 22.58 KG/M2 | TEMPERATURE: 98.6 F | WEIGHT: 115 LBS | DIASTOLIC BLOOD PRESSURE: 54 MMHG

## 2023-01-19 PROBLEM — E43 SEVERE PROTEIN-CALORIE MALNUTRITION (HCC): Status: ACTIVE | Noted: 2023-01-19

## 2023-01-19 LAB
ALBUMIN SERPL BCP-MCNC: 2.5 G/DL (ref 3.5–5)
ALBUMIN SERPL BCP-MCNC: 2.8 G/DL (ref 3.5–5)
ALP SERPL-CCNC: 224 U/L (ref 46–116)
ALP SERPL-CCNC: 259 U/L (ref 46–116)
ALT SERPL W P-5'-P-CCNC: 59 U/L (ref 12–78)
ALT SERPL W P-5'-P-CCNC: 61 U/L (ref 12–78)
ANION GAP SERPL CALCULATED.3IONS-SCNC: 7 MMOL/L (ref 4–13)
ANION GAP SERPL CALCULATED.3IONS-SCNC: 8 MMOL/L (ref 4–13)
AST SERPL W P-5'-P-CCNC: 189 U/L (ref 5–45)
AST SERPL W P-5'-P-CCNC: 202 U/L (ref 5–45)
BASOPHILS # BLD AUTO: 0.02 THOUSANDS/ΜL (ref 0–0.1)
BASOPHILS # BLD AUTO: 0.04 THOUSANDS/ÂΜL (ref 0–0.1)
BASOPHILS NFR BLD AUTO: 1 % (ref 0–1)
BASOPHILS NFR BLD AUTO: 1 % (ref 0–1)
BILIRUB SERPL-MCNC: 1 MG/DL (ref 0.2–1)
BILIRUB SERPL-MCNC: 1.2 MG/DL (ref 0.2–1)
BUN SERPL-MCNC: 11 MG/DL (ref 5–25)
BUN SERPL-MCNC: 3 MG/DL (ref 5–25)
CALCIUM ALBUM COR SERPL-MCNC: 10.2 MG/DL (ref 8.3–10.1)
CALCIUM ALBUM COR SERPL-MCNC: 9.9 MG/DL (ref 8.3–10.1)
CALCIUM SERPL-MCNC: 8.9 MG/DL (ref 8.3–10.1)
CALCIUM SERPL-MCNC: 9 MG/DL (ref 8.3–10.1)
CARDIAC TROPONIN I PNL SERPL HS: 5 NG/L
CHLORIDE SERPL-SCNC: 103 MMOL/L (ref 96–108)
CHLORIDE SERPL-SCNC: 106 MMOL/L (ref 96–108)
CO2 SERPL-SCNC: 28 MMOL/L (ref 21–32)
CO2 SERPL-SCNC: 30 MMOL/L (ref 21–32)
CREAT SERPL-MCNC: 0.62 MG/DL (ref 0.6–1.3)
CREAT SERPL-MCNC: 0.63 MG/DL (ref 0.6–1.3)
EOSINOPHIL # BLD AUTO: 0.07 THOUSAND/ΜL (ref 0–0.61)
EOSINOPHIL # BLD AUTO: 0.08 THOUSAND/ÂΜL (ref 0–0.61)
EOSINOPHIL NFR BLD AUTO: 1 % (ref 0–6)
EOSINOPHIL NFR BLD AUTO: 2 % (ref 0–6)
ERYTHROCYTE [DISTWIDTH] IN BLOOD BY AUTOMATED COUNT: 13.5 % (ref 11.6–15.1)
ERYTHROCYTE [DISTWIDTH] IN BLOOD BY AUTOMATED COUNT: 13.7 % (ref 11.6–15.1)
GFR SERPL CREATININE-BSD FRML MDRD: 102 ML/MIN/1.73SQ M
GFR SERPL CREATININE-BSD FRML MDRD: 103 ML/MIN/1.73SQ M
GLUCOSE SERPL-MCNC: 125 MG/DL (ref 65–140)
GLUCOSE SERPL-MCNC: 94 MG/DL (ref 65–140)
HCT VFR BLD AUTO: 27.4 % (ref 34.8–46.1)
HCT VFR BLD AUTO: 31 % (ref 34.8–46.1)
HGB BLD-MCNC: 10 G/DL (ref 11.5–15.4)
HGB BLD-MCNC: 9 G/DL (ref 11.5–15.4)
IMM GRANULOCYTES # BLD AUTO: 0.02 THOUSAND/UL (ref 0–0.2)
IMM GRANULOCYTES # BLD AUTO: 0.03 THOUSAND/UL (ref 0–0.2)
IMM GRANULOCYTES NFR BLD AUTO: 0 % (ref 0–2)
IMM GRANULOCYTES NFR BLD AUTO: 1 % (ref 0–2)
LIPASE SERPL-CCNC: 347 U/L (ref 73–393)
LYMPHOCYTES # BLD AUTO: 0.97 THOUSANDS/ΜL (ref 0.6–4.47)
LYMPHOCYTES # BLD AUTO: 1.58 THOUSANDS/ÂΜL (ref 0.6–4.47)
LYMPHOCYTES NFR BLD AUTO: 22 % (ref 14–44)
LYMPHOCYTES NFR BLD AUTO: 23 % (ref 14–44)
MAGNESIUM SERPL-MCNC: 1.6 MG/DL (ref 1.6–2.6)
MCH RBC QN AUTO: 34.7 PG (ref 26.8–34.3)
MCH RBC QN AUTO: 34.8 PG (ref 26.8–34.3)
MCHC RBC AUTO-ENTMCNC: 32.3 G/DL (ref 31.4–37.4)
MCHC RBC AUTO-ENTMCNC: 32.8 G/DL (ref 31.4–37.4)
MCV RBC AUTO: 106 FL (ref 82–98)
MCV RBC AUTO: 108 FL (ref 82–98)
MONOCYTES # BLD AUTO: 0.9 THOUSAND/ΜL (ref 0.17–1.22)
MONOCYTES # BLD AUTO: 1.4 THOUSAND/ÂΜL (ref 0.17–1.22)
MONOCYTES NFR BLD AUTO: 19 % (ref 4–12)
MONOCYTES NFR BLD AUTO: 21 % (ref 4–12)
NEUTROPHILS # BLD AUTO: 2.33 THOUSANDS/ΜL (ref 1.85–7.62)
NEUTROPHILS # BLD AUTO: 4.15 THOUSANDS/ÂΜL (ref 1.85–7.62)
NEUTS SEG NFR BLD AUTO: 52 % (ref 43–75)
NEUTS SEG NFR BLD AUTO: 57 % (ref 43–75)
NRBC BLD AUTO-RTO: 0 /100 WBCS
NRBC BLD AUTO-RTO: 0 /100 WBCS
PLATELET # BLD AUTO: 181 THOUSANDS/UL (ref 149–390)
PLATELET # BLD AUTO: 236 THOUSANDS/UL (ref 149–390)
PMV BLD AUTO: 10.2 FL (ref 8.9–12.7)
PMV BLD AUTO: 9.9 FL (ref 8.9–12.7)
POTASSIUM SERPL-SCNC: 3.9 MMOL/L (ref 3.5–5.3)
POTASSIUM SERPL-SCNC: 4 MMOL/L (ref 3.5–5.3)
PROT SERPL-MCNC: 7.3 G/DL (ref 6.4–8.4)
PROT SERPL-MCNC: 8.6 G/DL (ref 6.4–8.4)
RBC # BLD AUTO: 2.59 MILLION/UL (ref 3.81–5.12)
RBC # BLD AUTO: 2.87 MILLION/UL (ref 3.81–5.12)
SODIUM SERPL-SCNC: 138 MMOL/L (ref 135–147)
SODIUM SERPL-SCNC: 144 MMOL/L (ref 135–147)
WBC # BLD AUTO: 4.31 THOUSAND/UL (ref 4.31–10.16)
WBC # BLD AUTO: 7.28 THOUSAND/UL (ref 4.31–10.16)

## 2023-01-19 PROCEDURE — 80053 COMPREHEN METABOLIC PANEL: CPT | Performed by: INTERNAL MEDICINE

## 2023-01-19 PROCEDURE — 83735 ASSAY OF MAGNESIUM: CPT | Performed by: INTERNAL MEDICINE

## 2023-01-19 PROCEDURE — 99232 SBSQ HOSP IP/OBS MODERATE 35: CPT | Performed by: INTERNAL MEDICINE

## 2023-01-19 PROCEDURE — 85025 COMPLETE CBC W/AUTO DIFF WBC: CPT | Performed by: INTERNAL MEDICINE

## 2023-01-19 PROCEDURE — C9113 INJ PANTOPRAZOLE SODIUM, VIA: HCPCS | Performed by: INTERNAL MEDICINE

## 2023-01-19 PROCEDURE — 99239 HOSP IP/OBS DSCHRG MGMT >30: CPT | Performed by: INTERNAL MEDICINE

## 2023-01-19 RX ORDER — PANTOPRAZOLE SODIUM 40 MG/1
40 TABLET, DELAYED RELEASE ORAL DAILY
Qty: 30 TABLET | Refills: 0
Start: 2023-01-19 | End: 2023-02-18

## 2023-01-19 RX ADMIN — Medication 100 MG: at 09:10

## 2023-01-19 RX ADMIN — MULTIPLE VITAMINS W/ MINERALS TAB 1 TABLET: TAB ORAL at 09:10

## 2023-01-19 RX ADMIN — CHLORDIAZEPOXIDE HYDROCHLORIDE 5 MG: 5 CAPSULE ORAL at 13:52

## 2023-01-19 RX ADMIN — CHLORDIAZEPOXIDE HYDROCHLORIDE 5 MG: 5 CAPSULE ORAL at 05:43

## 2023-01-19 RX ADMIN — FOLIC ACID 1 MG: 1 TABLET ORAL at 09:11

## 2023-01-19 RX ADMIN — PANTOPRAZOLE SODIUM 40 MG: 40 INJECTION, POWDER, FOR SOLUTION INTRAVENOUS at 09:10

## 2023-01-19 NOTE — ASSESSMENT & PLAN NOTE
· Presents from [facility] due to N/V/D  Diarrhea noted to be black in color  Concern for possible GI bleed  Trace heme positive stools on SAKINA per ED  · Minimal stool output while in the ER  · Hemoglobin 10 4  · Monitor closely  Transfuse if less than 7  · Per patient normal colonoscopy at age 47  · History of alcohol abuse  Denies prior GI bleed history  · Patient underwent EGD which showed-" Normal esophagus  No varices  Erosive gastritis in the antrum  1 bleeding vessel treated with resolution clip with hemostasis achieved  Antral biopsy for H  Pylori  Mild duodenitis of the bulb"  · GI switched patient Protonix 40 mg IV twice daily  · On clear liquid diet and advance diet as tolerated  · Avoid NSAIDs  · On Protonix at discharge    Patient is cleared by GI and will be discharged back to [facility] and follow-up with GI as outpatient

## 2023-01-19 NOTE — PLAN OF CARE
Problem: PAIN - ADULT  Goal: Verbalizes/displays adequate comfort level or baseline comfort level  Description: Interventions:  - Encourage patient to monitor pain and request assistance  - Assess pain using appropriate pain scale  - Administer analgesics based on type and severity of pain and evaluate response  - Implement non-pharmacological measures as appropriate and evaluate response  - Consider cultural and social influences on pain and pain management  - Notify physician/advanced practitioner if interventions unsuccessful or patient reports new pain  Outcome: Progressing     Problem: INFECTION - ADULT  Goal: Absence or prevention of progression during hospitalization  Description: INTERVENTIONS:  - Assess and monitor for signs and symptoms of infection  - Monitor lab/diagnostic results  - Monitor all insertion sites, i e  indwelling lines, tubes, and drains  - Monitor endotracheal if appropriate and nasal secretions for changes in amount and color  - [place] appropriate cooling/warming therapies per order  - Administer medications as ordered  - Instruct and encourage patient and family to use good hand hygiene technique  - Identify and instruct in appropriate isolation precautions for identified infection/condition  Outcome: Progressing     Problem: DISCHARGE PLANNING  Goal: Discharge to home or other facility with appropriate resources  Description: INTERVENTIONS:  - Identify barriers to discharge w/patient and caregiver  - Arrange for needed discharge resources and transportation as appropriate  - Identify discharge learning needs (meds, wound care, etc )  - Arrange for interpretive services to assist at discharge as needed  - Refer to Case Management Department for coordinating discharge planning if the patient needs post-hospital services based on physician/advanced practitioner order or complex needs related to functional status, cognitive ability, or social support system  Outcome: Progressing Problem: Knowledge Deficit  Goal: Patient/family/caregiver demonstrates understanding of disease process, treatment plan, medications, and discharge instructions  Description: Complete learning assessment and assess knowledge base    Interventions:  - Provide teaching at level of understanding  - Provide teaching via preferred learning methods  Outcome: Progressing

## 2023-01-19 NOTE — ASSESSMENT & PLAN NOTE
· Reports ongoing nausea, vomiting and diarrhea over the past week  · Per patient symptoms began once she stopped drinking alcohol on 1/10  · Vitals WNL, WBC WNL  Creatinine 0 59 and potassium 3 6 on admission  · Stool studies pending  Patient has no further diarrhea since admission  · Antiemetics  · Continue to monitor  · Resolved

## 2023-01-19 NOTE — CASE MANAGEMENT
Case Management Progress Note    Patient name Mg Moran  Location /-01 MRN [id number]  : 1970 Date 2023       LOS (days): 3  Geometric Mean LOS (GMLOS) (days): 3 00  Days to GMLOS:0 5        OBJECTIVE:        Current admission status: Inpatient  Preferred Pharmacy:   [address] [place] [address], [address] [place] [id number]  Phone: [phone] Fax: [phone]    Primary Care Provider: Florian Temple DO    Primary Insurance: [place] [employer]  Secondary Insurance:     PROGRESS NOTE: Precert completed for return to [facility] 3 7 level of care  Cm spoke with Mariam Kwan RN at [facility] to call Yuliana Nicholson once pt arrives at [phone] ext [phone]

## 2023-01-19 NOTE — ASSESSMENT & PLAN NOTE
· , ALT 65, albumin 2 9, total bilirubin 1 70  · Denies abdominal pain  · Known alcohol abuse  · GI consult appreciated  · Improving    Outpatient follow-up with GI

## 2023-01-19 NOTE — CASE MANAGEMENT
Case Management Progress Note    [facility]  Location /-06 MRN [id number]  : 1970 Date 2023       LOS (days): 3  Geometric Mean LOS (GMLOS) (days): 3 00  Days to GMLOS:0 4        OBJECTIVE:        Current admission status: Inpatient  Preferred Pharmacy:   [facility] [facility] [address]  Phone: [phone] Fax: [phone]    Primary Care Provider: Dragan Harris DO    Primary Insurance: [place] [employer] FIRST  Secondary Insurance:     PROGRESS NOTE: Cm spoke with pts nurse  After now a second call to give report [employer] is now requesting that the pt be a 3 5 advanced LOC  This was never communicated and had [employer] done so the right LOC would have been obtained the first call to [facility]  Cm spoke with Evens Flower again from [facility] and pt is now 3 5 Advanced LOC for 16 days

## 2023-01-19 NOTE — DISCHARGE INSTR - AVS FIRST PAGE
Follow-up with PCP in 1 week  Follow-up with GI as outpatient  Follow-up with surgery as outpatient for cholelithiasis  Continue on Protonix at discharge  Continue diet as tolerated  Continue alcohol abstinence

## 2023-01-19 NOTE — ASSESSMENT & PLAN NOTE
· Home regimen: Mirtazapine 15 mg daily at bedtime and Lexapro  · Per [facility] paperwork Lexapro was discontinued on 1/13

## 2023-01-19 NOTE — ASSESSMENT & PLAN NOTE
· Reports ongoing alcohol abuse for approximately 10 years  Stopped drinking on 1/10/2023  Was admitted to [place] [address] on 1/12  · While there had withdrawal symptoms: Nausea, vomiting, diarrhea, hallucinations (only on 1/14)  · Had received Serax which was stopped on 1/16  Currently without tremors, hallucinations or agitation     · Placed on CIWA  · Thiamine, folic acid, multivitamin  · Will discontinue Librium  · Encourage continued cessation

## 2023-01-19 NOTE — PROGRESS NOTES
Progress Note - Becky Campos 47 y o  female MRN: [id number]    Unit/Bed#: -01 Encounter: [id number]    Assessment and Plan:    58-year-old female with history of alcohol abuse presenting with with nausea, vomiting, melena found to have anemia secondary to upper GI bleed  1) Melena and blood loss anemia: EGD 1/17 showed erosive gastritis with 1 bleeding vessel treated with clip  Hemostasis was achieved  Hemoglobin remained stable 9 0  She has not required blood transfusion  Vital signs stable  No further signs of overt GI bleeding     -Continue Protonix 40 mg IV twice daily  -Transition to Protonix 40 mg PO daily upon discharge  -Monitor hemoglobin and stool color  -Continue regular nonulcerogenic diet as tolerated  -Follow-up biopsies to rule out H  Pylori    2) Elevated LFTs  3) Alcohol abuse    She presented to the hospital from alcohol rehab and has been sober since 1/10  Bilirubin and liver enzymes elevated on admission, currently improving , ALT 59, alk phos 224, total bili 1 20  Acute hepatitis panel negative  Ultrasound shows hepatomegaly and gallstones but no evidence of biliary obstruction     -Discriminant function low, does not meet criteria for steroids  -Return to [facility] for further rehab upon discharge  -She would like to follow-up in the outpatient GI office    She is stable for discharge from GI standpoint  Please call with questions or concerns  Subjective:     Patient seen and examined at bedside  She denies abdominal pain  She denies nausea and vomiting  She is eating well  She denies melena  Objective:     Vitals: Blood pressure 97/55, pulse 68, temperature 98 °F (36 7 °C), temperature source Oral, resp  rate 17, height 5' (1 524 m), weight 52 2 kg (115 lb), SpO2 99 %  ,Body mass index is 22 46 kg/m²      No intake or output data in the 24 hours ending 01/19/23 0849    Physical Exam:     General Appearance: Alert and oriented x3, appears stated age and cooperative  Lungs: Clear to auscultation bilaterally, no rales or rhonchi, no labored breathing/accessory muscle use  Heart: Regular rate and rhythm, S1, S2 normal, no murmur, click, rub or gallop  Abdomen: Soft, non-tender, non-distended; bowel sounds normal  Extremities: No cyanosis, edema    Invasive Devices     Peripheral Intravenous Line  Duration           Peripheral IV 01/16/23 Left Forearm 2 days    Peripheral IV 01/17/23 Dorsal (posterior); Right Hand 1 day                Lab Results:  Results from last 7 days   Lab Units 01/19/23  0542   WBC Thousand/uL 4 31   HEMOGLOBIN g/dL 9 0*   HEMATOCRIT % 27 4*   PLATELETS Thousands/uL 181   NEUTROS PCT % 52   LYMPHS PCT % 23   MONOS PCT % 21*   EOS PCT % 2     Results from last 7 days   Lab Units 01/19/23  0542   POTASSIUM mmol/L 3 9   CHLORIDE mmol/L 106   CO2 mmol/L 30   BUN mg/dL 3*   CREATININE mg/dL 0 63   CALCIUM mg/dL 9 0   ALK PHOS U/L 224*   ALT U/L 59   AST U/L 189*     Results from last 7 days   Lab Units 01/17/23  1145   INR  0 91     Results from last 7 days   Lab Units 01/16/23  1817   LIPASE u/L 280       Imaging Studies: I have personally reviewed pertinent imaging studies  EGD    Result Date: 1/17/2023  Impression: Normal esophagus  No varices Erosive gastritis in the antrum  1 bleeding vessel treated with resolution clip with hemostasis achieved  Antral biopsy for H  pylori Mild duodenitis of the bulb RECOMMENDATION: Transition from PPI drip to IV twice daily Start diet as tolerated  Continue antiemetics as needed Await biopsies Repeat hemoglobin in the morning   Leigha Carson, DO     US abdomen limited    Result Date: 1/17/2023  Impression: Cholelithiasis and gallbladder sludge with gallbladder wall thickening and gallbladder wall edema suggesting acute cholecystitis   Workstation performed: [id number]
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
## 2023-01-19 NOTE — ASSESSMENT & PLAN NOTE
Malnutrition Findings:   Adult Malnutrition type: Chronic illness  Adult Degree of Malnutrition: Other severe protein calorie malnutrition                     360 Statement: Pt presents with severe protein calorie malnutrition due to excessive ETOH intake as evidenced by <75% po intake > 1 month and 14% wt loss in 4 months (9/2/22 130 lbs, 1/18/23 115 lbs)  Treat with Non Ulcergenic diet and Supplements BID  BMI Findings: Body mass index is 22 46 kg/m²     Nutrition consult and recommendations appreciated
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
## 2023-01-19 NOTE — ASSESSMENT & PLAN NOTE
Ultrasound gallbladder showed "Cholelithiasis and gallbladder sludge with gallbladder wall thickening and gallbladder wall edema suggesting acute cholecystitis"  Patient with transaminitis  Currently denies any abdominal pain  Surgical consult appreciated  As per surgery patient does not have any clinical findings of acute cholecystitis  Outpatient follow-up with them

## 2023-01-20 ENCOUNTER — APPOINTMENT (EMERGENCY)
Dept: CT IMAGING | Facility: HOSPITAL | Age: 53
End: 2023-01-20

## 2023-01-20 ENCOUNTER — HOSPITAL ENCOUNTER (EMERGENCY)
Facility: HOSPITAL | Age: 53
Discharge: HOME | End: 2023-01-20
Attending: EMERGENCY MEDICINE

## 2023-01-20 ENCOUNTER — APPOINTMENT (OUTPATIENT)
Dept: NUCLEAR MEDICINE | Facility: HOSPITAL | Age: 53
End: 2023-01-20

## 2023-01-20 ENCOUNTER — APPOINTMENT (EMERGENCY)
Dept: RADIOLOGY | Facility: HOSPITAL | Age: 53
End: 2023-01-20

## 2023-01-20 VITALS
HEART RATE: 79 BPM | OXYGEN SATURATION: 100 % | SYSTOLIC BLOOD PRESSURE: 111 MMHG | TEMPERATURE: 97.8 F | RESPIRATION RATE: 16 BRPM | DIASTOLIC BLOOD PRESSURE: 62 MMHG

## 2023-01-20 DIAGNOSIS — R10.9 ABDOMINAL PAIN: Primary | ICD-10-CM

## 2023-01-20 LAB
2HR DELTA HS TROPONIN: -1 NG/L
BILIRUB UR QL STRIP: NEGATIVE
CARDIAC TROPONIN I PNL SERPL HS: 4 NG/L
CLARITY UR: CLEAR
COLOR UR: YELLOW
FLUAV RNA RESP QL NAA+PROBE: NEGATIVE
FLUBV RNA RESP QL NAA+PROBE: NEGATIVE
GLUCOSE UR STRIP-MCNC: NEGATIVE MG/DL
HGB UR QL STRIP.AUTO: NEGATIVE
IRON SATN MFR SERPL: 18 % (ref 15–50)
IRON SERPL-MCNC: 36 UG/DL (ref 50–170)
KETONES UR STRIP-MCNC: NEGATIVE MG/DL
LEUKOCYTE ESTERASE UR QL STRIP: NEGATIVE
NITRITE UR QL STRIP: NEGATIVE
PH UR STRIP.AUTO: 7 [PH]
PROT UR STRIP-MCNC: NEGATIVE MG/DL
RSV RNA RESP QL NAA+PROBE: NEGATIVE
SARS-COV-2 RNA RESP QL NAA+PROBE: NEGATIVE
SP GR UR STRIP.AUTO: <1.005 (ref 1–1.03)
TIBC SERPL-MCNC: 200 UG/DL (ref 250–450)
UROBILINOGEN UR STRIP-ACNC: <2 MG/DL

## 2023-01-20 PROCEDURE — 88305 TISSUE EXAM BY PATHOLOGIST: CPT | Performed by: PATHOLOGY

## 2023-01-20 RX ORDER — ONDANSETRON 2 MG/ML
4 INJECTION INTRAMUSCULAR; INTRAVENOUS ONCE
Status: COMPLETED | OUTPATIENT
Start: 2023-01-20 | End: 2023-01-20

## 2023-01-20 RX ORDER — PANTOPRAZOLE SODIUM 40 MG/10ML
40 INJECTION, POWDER, LYOPHILIZED, FOR SOLUTION INTRAVENOUS ONCE
Status: COMPLETED | OUTPATIENT
Start: 2023-01-20 | End: 2023-01-20

## 2023-01-20 RX ADMIN — SODIUM CHLORIDE 1000 ML: 0.9 INJECTION, SOLUTION INTRAVENOUS at 03:03

## 2023-01-20 RX ADMIN — ONDANSETRON 4 MG: 2 INJECTION INTRAMUSCULAR; INTRAVENOUS at 02:42

## 2023-01-20 RX ADMIN — PANTOPRAZOLE SODIUM 40 MG: 40 INJECTION, POWDER, FOR SOLUTION INTRAVENOUS at 02:42

## 2023-01-20 RX ADMIN — IOHEXOL 100 ML: 350 INJECTION, SOLUTION INTRAVENOUS at 03:05

## 2023-01-20 NOTE — ED NOTES
Spoke with [employer] to arrange pickup for patient to return back to facility  Report given       Jennifer Gutierres RN  01/20/23 4254

## 2023-01-20 NOTE — UTILIZATION REVIEW
NOTIFICATION OF ADMISSION DISCHARGE   This is a Notification of Discharge from [facility]  Please be advised that this patient has been discharge from our facility  Below you will find the admission and discharge date and time including the patient’s disposition  UTILIZATION REVIEW CONTACT:  Norma Castano  Utilization   Network Utilization Review Department  Phone: [phone] carefully listen to the prompts  All voicemails are confidential   Email: [email or web address]     ADMISSION INFORMATION  PRESENTATION DATE: 1/16/2023 10:43 PM  OBERVATION ADMISSION DATE:   INPATIENT ADMISSION DATE: 1/16/23 11:35 PM   DISCHARGE DATE: 1/19/2023  4:03 PM   DISPOSITION:Non [facility] SNF/TCU/SNU    IMPORTANT INFORMATION:  Send all requests for admission clinical reviews, approved or denied determinations and any other requests to dedicated fax number below belonging to the campus where the patient is receiving treatment   List of dedicated fax numbers:  [address] DENIALS (Administrative/Medical Necessity) [phone]   [address] (Maternity/NICU/Pediatrics) [phone]   [facility] [phone]   [facility] 87 [phone]   [address] [phone]   220 [facility] [phone] 90 [facility] [phone]   [facility] [phone]   [facility]  [phone]   [facility] [phone]   412 [facility] 850 [facility] [phone]

## 2023-01-20 NOTE — CONSULTS
Consultation - General Surgery   Cristy Saleh 47 y o  female MRN: [id number]  Unit/Bed#: ED 06 Encounter: [id number]    Assessment/Plan     Biliary colic  -Episode of nausea, hot flash, dizziness, fever 101, midline abdominal pain starting about 1 hr after eating dinner last night  Tender RUQ  -Just discharged yesterday following duodenitis and GI bleed that was clipped by gastroenterology  RUQ US at that time showed cholelithiasis and GB sludge w/wall thickening and edema suggesting cholecystitis  Per surgery consult note she had no clinical findings of cholecystitis at that time  -WBC 7  -CT: Cholelithiasis with mild diffuse gallbladder wall thickening  No pericholecystic inflammatory changes  The findings are equivocal for acute cholecystitis  A hepatobiliary scan could be performed for further evaluation   -Discussed with Dr Isaac Souza, who recommended HIDA scan- pending  -NPO  -Pain, nausea control prn    Elevated LFTs  -AST/ALT elevated similar to during last admission, attributed to alcohol abuse  -T bili normal today    ETOH abuse  -Patient to return to [facility] for rehab  -Continue abstinence    History of Present Illness     HPI:  Cristy Saleh is a 47 y o  female who presented to ED with nausea, dizziness, fever of 101, abdominal pain that started about 1 hour after eating ham and cheese last night  She was discharged from the hospital yesterday after being admitted with GI bleed and elevated LFTs  An EGD performed during admission showed erosive gastritis with bleeding vessel that was clipped and mild duodenitis  She is currently residing at [facility] for rehab from alcohol abuse  Also reports headache  Denies chest pain, shortness of breath, vision changes, cough/cold symptoms, sore throat, or other symptoms at this time  She has not had a bowel movement since EGD to assess for bleeding      Inpatient consult to Acute Care Surgery  Consult performed by: Issa Elder PA-C  Consult ordered by: Kim Pino DO          Review of Systems   Constitutional: Positive for chills and fever  Negative for appetite change  Respiratory: Negative for shortness of breath  Cardiovascular: Negative for chest pain  Gastrointestinal: Positive for abdominal pain and nausea  Negative for blood in stool (recent dark stools, found to have GI bleed on endoscope that was clipped, no BM since) and vomiting  Genitourinary: Negative for difficulty urinating and flank pain  Neurological: Positive for dizziness  All other systems reviewed and are negative  Historical Information   History reviewed  No pertinent past medical history  History reviewed  No pertinent surgical history  Social History   Social History     Substance and Sexual Activity   Alcohol Use Not Currently     Social History     Substance and Sexual Activity   Drug Use Never     E-Cigarette/Vaping   • E-Cigarette Use Never User      E-Cigarette/Vaping Substances     Social History     Tobacco Use   Smoking Status Never   Smokeless Tobacco Never     Family History: non-contributory    Meds/Allergies   PTA meds:   Prior to Admission Medications   Prescriptions Last Dose Informant Patient Reported? Taking?    Multiple Vitamins-Minerals (multivitamin with minerals) tablet   Yes No   Sig: Take 1 tablet by mouth daily   clindamycin (CLEOCIN) 300 MG capsule   Yes No   Sig: Take 300 mg by mouth 4 (four) times a day   escitalopram (LEXAPRO) 20 mg tablet   Yes No   Sig: Take 20 mg by mouth daily DC by barbara on 1/13   gabapentin (NEURONTIN) 100 mg capsule   Yes No   Sig: Take 200 mg by mouth daily at bedtime   mirtazapine (REMERON) 15 mg tablet   Yes No   Sig: Take 15 mg by mouth daily at bedtime   pantoprazole (PROTONIX) 40 mg tablet   No No   Sig: Take 1 tablet (40 mg total) by mouth daily   thiamine 100 MG tablet   Yes No   Sig: Take 100 mg by mouth daily   valsartan (DIOVAN) 40 mg tablet   Yes No   Sig: Take 40 mg by mouth daily Facility-Administered Medications: None     No Known Allergies    Objective   First Vitals:   Blood Pressure: 116/58 (01/19/23 2258)  Pulse: 96 (01/19/23 2258)  Temperature: 98 7 °F (37 1 °C) (01/19/23 2258)  Temp Source: Oral (01/19/23 2258)  Respirations: 18 (01/19/23 2258)  SpO2: 100 % (01/19/23 2258)    Current Vitals:   Blood Pressure: 106/54 (01/20/23 0407)  Pulse: 89 (01/20/23 0407)  Temperature: 97 8 °F (36 6 °C) (01/20/23 0407)  Temp Source: Temporal (01/20/23 0407)  Respirations: 17 (01/20/23 0407)  SpO2: 99 % (01/20/23 0407)    No intake or output data in the 24 hours ending 01/20/23 0606    Invasive Devices     Peripheral Intravenous Line  Duration           Peripheral IV 01/20/23 Right Antecubital <1 day                Physical Exam  Vitals reviewed  Constitutional:       General: She is not in acute distress  Appearance: Normal appearance  She is not ill-appearing, toxic-appearing or diaphoretic  HENT:      Head: Normocephalic and atraumatic  Mouth/Throat:      Mouth: Mucous membranes are moist    Eyes:      General: No scleral icterus  Conjunctiva/sclera: Conjunctivae normal    Cardiovascular:      Rate and Rhythm: Normal rate and regular rhythm  Heart sounds: Normal heart sounds  No murmur heard  No friction rub  No gallop  Pulmonary:      Effort: Pulmonary effort is normal  No respiratory distress  Breath sounds: Normal breath sounds  No stridor  No wheezing, rhonchi or rales  Abdominal:      General: Abdomen is flat  Bowel sounds are normal  There is no distension  Palpations: Abdomen is soft  Tenderness: There is abdominal tenderness (RUQ)  There is right CVA tenderness  There is no left CVA tenderness, guarding or rebound  Musculoskeletal:      Right lower leg: No edema  Left lower leg: No edema  Skin:     General: Skin is warm and dry  Neurological:      General: No focal deficit present        Mental Status: She is alert and oriented to person, place, and time  Psychiatric:         Mood and Affect: Mood normal          Behavior: Behavior normal          Thought Content: Thought content normal          Judgment: Judgment normal          Lab Results:   I have personally reviewed pertinent lab results  , CBC:   Lab Results   Component Value Date    WBC 7 28 01/19/2023    HGB 10 0 (L) 01/19/2023    HCT 31 0 (L) 01/19/2023     (H) 01/19/2023     01/19/2023    MCH 34 8 (H) 01/19/2023    MCHC 32 3 01/19/2023    RDW 13 5 01/19/2023    MPV 10 2 01/19/2023    NRBC 0 01/19/2023   , CMP:   Lab Results   Component Value Date    SODIUM 138 01/19/2023    K 4 0 01/19/2023     01/19/2023    CO2 28 01/19/2023    BUN 11 01/19/2023    CREATININE 0 62 01/19/2023    CALCIUM 8 9 01/19/2023     (H) 01/19/2023    ALT 61 01/19/2023    ALKPHOS 259 (H) 01/19/2023    EGFR 103 01/19/2023   , Lipase:   Lab Results   Component Value Date    LIPASE 347 01/19/2023     Imaging: I have personally reviewed pertinent reports  EKG, Pathology, and Other Studies: I have personally reviewed pertinent reports

## 2023-01-20 NOTE — ED CARE HANDOFF
Emergency Department Sign Out Note        Sign out and transfer of care from Dr Adolfo Vela  See Separate Emergency Department note  The patient, Adonis Crandall, was evaluated by the previous provider for abdominal pain  Workup Completed:  Labs at baseline  Evaluated by general surgery  Awaiting HIDA scan  ED Course / Workup Pending (followup): HIDA scan unremarkable  GI follow up  ED Course as of 01/20/23 1134   Fri Jan 20, 2023   1117 Per general surgery, no criteria for admission based on HIDA scan  GI follow up  Procedures  MDM        Disposition  Final diagnoses:   Abdominal pain     Time reflects when diagnosis was documented in both MDM as applicable and the Disposition within this note     Time User Action Codes Description Comment    1/20/2023  5:41 AM Lisa Callahan [R10 9] Abdominal pain       ED Disposition     ED Disposition   Discharge    Condition   Stable    Date/Time   Fri Jan 20, 2023 11:31 AM    Comment   Adonis Crandall discharge to home/self care  Follow-up Information     Follow up With Specialties Details Why Contact Info Additional [address]  [facility] Gastroenterology Schedule an appointment as soon as possible for a visit in 1 week for re-evaluation of your abdominal pain if symptoms do not resolve [address] [facility] [place] [address]  [address] [facility] [place] [phone]     [address] Emergency Department Emergency Medicine Go to  If symptoms worsen [address] [place], [address]  [address] [facility] Emergency Department, [address] [facility]        Patient's Medications   Discharge Prescriptions    No medications on file     No discharge procedures on file  ED Provider  Electronically Signed by     Marie Dhillon MD  01/20/23 8088

## 2023-01-20 NOTE — ED PROVIDER NOTES
History  Chief Complaint   Patient presents with   • Fever - 9 weeks to 74 years     Patient states " I was discharged today and went back to [facility], I woke up about a hour ago with a fever of 101 and arabella been nauseous "     26-year-old female with past medical history of alcohol abuse currently at [facility] was just discharged back to that facility yesterday afternoon presents for reevaluation of fever, lightheadedness and vertigo, nausea and abdominal pain  Patient states that she has been having vertigo sensation for the last few months, today she ate a sandwich and after that became nauseous and felt very unsteady and also was having mid abdominal pain  Patient did have an EGD during her admission on 117, notes for the procedure reviewed appears the patient did have gastritis with a small ulcer with stigmata of recent bleeding which was clipped  She also had an ultrasound of the abdomen which was concerning for cholecystitis and had minimal transaminitis with minimal T bili elevation and normal white blood cell count, surgery evaluated patient and felt that her symptoms were not related to cholecystitis  In the facility she had a temperature of T-max of 103  Currently afebrile in no acute distress  No medications taken prior to arrival          Prior to Admission Medications   Prescriptions Last Dose Informant Patient Reported? Taking?    Multiple Vitamins-Minerals (multivitamin with minerals) tablet   Yes No   Sig: Take 1 tablet by mouth daily   clindamycin (CLEOCIN) 300 MG capsule   Yes No   Sig: Take 300 mg by mouth 4 (four) times a day   escitalopram (LEXAPRO) 20 mg tablet   Yes No   Sig: Take 20 mg by mouth daily DC by barbara on 1/13   gabapentin (NEURONTIN) 100 mg capsule   Yes No   Sig: Take 200 mg by mouth daily at bedtime   mirtazapine (REMERON) 15 mg tablet   Yes No   Sig: Take 15 mg by mouth daily at bedtime   pantoprazole (PROTONIX) 40 mg tablet   No No   Sig: Take 1 tablet (40 mg total) by mouth daily   thiamine 100 MG tablet   Yes No   Sig: Take 100 mg by mouth daily   valsartan (DIOVAN) 40 mg tablet   Yes No   Sig: Take 40 mg by mouth daily      Facility-Administered Medications: None       History reviewed  No pertinent past medical history  History reviewed  No pertinent surgical history  History reviewed  No pertinent family history  I have reviewed and agree with the history as documented  E-Cigarette/Vaping   • E-Cigarette Use Never User      E-Cigarette/Vaping Substances     Social History     Tobacco Use   • Smoking status: Never   • Smokeless tobacco: Never   Vaping Use   • Vaping Use: Never used   Substance Use Topics   • Alcohol use: Not Currently   • Drug use: Never       Review of Systems    Physical Exam  Physical Exam  Vitals and nursing note reviewed  Constitutional:       Appearance: She is well-developed  HENT:      Head: Normocephalic and atraumatic  Eyes:      Pupils: Pupils are equal, round, and reactive to light  Cardiovascular:      Rate and Rhythm: Normal rate and regular rhythm  Heart sounds: No murmur heard  No friction rub  No gallop  Pulmonary:      Effort: Pulmonary effort is normal       Breath sounds: No wheezing or rales  Chest:      Chest wall: No tenderness  Abdominal:      General: There is no distension  Palpations: Abdomen is soft  There is no mass  Tenderness: There is abdominal tenderness  There is no guarding or rebound  Comments: Tenderness in the right upper quadrant with localized guarding   Musculoskeletal:      Cervical back: Normal range of motion and neck supple  Skin:     General: Skin is warm and dry  Neurological:      Mental Status: She is alert and oriented to person, place, and time           Vital Signs  ED Triage Vitals   Temperature Pulse Respirations Blood Pressure SpO2   01/19/23 2258 01/19/23 2258 01/19/23 2258 01/19/23 2258 01/19/23 2258   98 7 °F (37 1 °C) 96 18 116/58 100 % Temp Source Heart Rate Source Patient Position - Orthostatic VS BP Location FiO2 (%)   01/19/23 2258 01/20/23 0259 01/20/23 0259 01/20/23 0259 --   Oral Monitor Sitting Left arm       Pain Score       01/20/23 0259       4           Vitals:    01/19/23 2258 01/20/23 0259 01/20/23 0407 01/20/23 1141   BP: 116/58 92/50 106/54 111/62   Pulse: 96 88 89 79   Patient Position - Orthostatic VS:  Sitting Lying Sitting         Visual Acuity      ED Medications  Medications   ondansetron (ZOFRAN) injection 4 mg (4 mg Intravenous Given 1/20/23 0242)   sodium chloride 0 9 % bolus 1,000 mL (0 mL Intravenous Stopped 1/20/23 1100)   pantoprazole (PROTONIX) injection 40 mg (40 mg Intravenous Given 1/20/23 0242)   iohexol (OMNIPAQUE) 350 MG/ML injection (SINGLE-DOSE) 100 mL (100 mL Intravenous Given 1/20/23 0305)       Diagnostic Studies  Results Reviewed     Procedure Component Value Units Date/Time    UA w Reflex to Microscopic w Reflex to Culture [[id number]]  (Abnormal) Collected: 01/20/23 0411    Lab Status: Final result Specimen: Urine, Clean Catch Updated: 01/20/23 0417     Color, UA Yellow     Clarity, UA Clear     Specific Gravity, UA <1 005     pH, UA 7 0     Leukocytes, UA Negative     Nitrite, UA Negative     Protein, UA Negative mg/dl      Glucose, UA Negative mg/dl      Ketones, UA Negative mg/dl      Urobilinogen, UA <2 0 mg/dl      Bilirubin, UA Negative     Occult Blood, UA Negative    FLU/RSV/COVID - if FLU/RSV clinically relevant [[id number]]  (Normal) Collected: 01/20/23 0242    Lab Status: Final result Specimen: Nares from Nose Updated: 01/20/23 0342     SARS-CoV-2 Negative     INFLUENZA A PCR Negative     INFLUENZA B PCR Negative     RSV PCR Negative    Narrative:      FOR PEDIATRIC PATIENTS - copy/paste COVID Guidelines URL to browser: [email or web address]    SARS-CoV-2 assay is a Nucleic Acid Amplification assay intended for the  qualitative detection of nucleic acid from SARS-CoV-2 in nasopharyngeal  swabs  Results are for the presumptive identification of SARS-CoV-2 RNA  Positive results are indicative of infection with SARS-CoV-2, the virus  causing COVID-19, but do not rule out bacterial infection or co-infection  with other viruses  Laboratories within the [place] and its  territories are required to report all positive results to the appropriate  public health authorities  Negative results do not preclude SARS-CoV-2  infection and should not be used as the sole basis for treatment or other  patient management decisions  Negative results must be combined with  clinical observations, patient history, and epidemiological information  This test has not been FDA cleared or approved  This test has been authorized by FDA under an Emergency Use Authorization  (EUA)  This test is only authorized for the duration of time the  declaration that circumstances exist justifying the authorization of the  emergency use of an in vitro diagnostic tests for detection of SARS-CoV-2  virus and/or diagnosis of COVID-19 infection under section 564(b)(1) of  the Act, 21 U  S C  273DRP-9(I)(9), unless the authorization is terminated  or revoked sooner  The test has been validated but independent review by FDA  and CLIA is pending  Test performed using [employer] GeneXpert: This RT-PCR assay targets N2,  a region unique to SARS-CoV-2  A conserved region in the E-gene was chosen  for pan-Sarbecovirus detection which includes SARS-CoV-2  According to CMS-2020-01-R, this platform meets the definition of high-throughput technology      HS Troponin I 2hr [[id number]]  (Normal) Collected: 01/20/23 0242    Lab Status: Final result Specimen: Blood from Arm, Right Updated: 01/20/23 0336     hs TnI 2hr 4 ng/L      Delta 2hr hsTnI -1 ng/L     Lipase [[id number]]  (Normal) Collected: 01/19/23 2307    Lab Status: Final result Specimen: Blood from Arm, Right Updated: 01/19/23 8349 Lipase 347 u/L     HS Troponin 0hr (reflex protocol) [[id number]]  (Normal) Collected: 01/19/23 2307    Lab Status: Final result Specimen: Blood from Arm, Right Updated: 01/19/23 2335     hs TnI 0hr 5 ng/L     Comprehensive metabolic panel [[id number]]  (Abnormal) Collected: 01/19/23 2307    Lab Status: Final result Specimen: Blood from Arm, Right Updated: 01/19/23 2333     Sodium 138 mmol/L      Potassium 4 0 mmol/L      Chloride 103 mmol/L      CO2 28 mmol/L      ANION GAP 7 mmol/L      BUN 11 mg/dL      Creatinine 0 62 mg/dL      Glucose 125 mg/dL      Calcium 8 9 mg/dL      Corrected Calcium 9 9 mg/dL       U/L      ALT 61 U/L      Alkaline Phosphatase 259 U/L      Total Protein 8 6 g/dL      Albumin 2 8 g/dL      Total Bilirubin 1 00 mg/dL      eGFR 103 ml/min/1 73sq m     Narrative:      [place] guidelines for Chronic Kidney Disease (CKD):   •  Stage 1 with normal or high GFR (GFR > 90 mL/min/1 73 square meters)  •  Stage 2 Mild CKD (GFR = 60-89 mL/min/1 73 square meters)  •  Stage 3A Moderate CKD (GFR = 45-59 mL/min/1 73 square meters)  •  Stage 3B Moderate CKD (GFR = 30-44 mL/min/1 73 square meters)  •  Stage 4 Severe CKD (GFR = 15-29 mL/min/1 73 square meters)  •  Stage 5 End Stage CKD (GFR <15 mL/min/1 73 square meters)  Note: GFR calculation is accurate only with a steady state creatinine    CBC and differential [[id number]]  (Abnormal) Collected: 01/19/23 2307    Lab Status: Final result Specimen: Blood from Arm, Right Updated: 01/19/23 2314     WBC 7 28 Thousand/uL      RBC 2 87 Million/uL      Hemoglobin 10 0 g/dL      Hematocrit 31 0 %       fL      MCH 34 8 pg      MCHC 32 3 g/dL      RDW 13 5 %      MPV 10 2 fL      Platelets 246 Thousands/uL      nRBC 0 /100 WBCs      Neutrophils Relative 57 %      Immat GRANS % 0 %      Lymphocytes Relative 22 %      Monocytes Relative 19 %      Eosinophils Relative 1 %      Basophils Relative 1 %      Neutrophils Absolute 4 15 Thousands/µL      Immature Grans Absolute 0 03 Thousand/uL      Lymphocytes Absolute 1 58 Thousands/µL      Monocytes Absolute 1 40 Thousand/µL      Eosinophils Absolute 0 08 Thousand/µL      Basophils Absolute 0 04 Thousands/µL                  NM hepatobiliary   Final Result by Zain Yip MD (01/20 1122)      1  Visualization of the gallbladder consistent with patent cystic duct  No scintigraphic evidence for acute cholecystitis  2   Patent common bile duct  Workstation performed: [id number]         CT abdomen pelvis with contrast   Final Result by Margarita Chavez MD (01/20 0321)      Cholelithiasis with mild diffuse gallbladder wall thickening  No pericholecystic inflammatory changes  The findings are equivocal for acute cholecystitis  A hepatobiliary scan could be performed for further evaluation  Mildly enlarged fatty liver  Small amount of pelvic free fluid  Workstation performed: [id number]         XR chest portable   ED Interpretation by Becca Flores DO (01/20 0300)   This study was ordered and independently reviewed by me    No acute findings noted         Final Result by Isadora Taylor DO (01/20 1056)      No acute cardiopulmonary disease                    Workstation performed: [id number]                    Procedures  Procedures         ED Course  ED Course as of 01/23/23 2214   Thu Jan 19, 2023   2322 Hemoglobin(!): 10 0  baseline   Fri Jan 20, 2023   0204 WBC: 7 28   0216 AST(!): 202  Transaminitis with AST and alk phos minimally elevated fromLabs 2 days ago, improving T bili   0404 Contacted general surgery  for evaluation given CT findings   0542 Surgery requests HIDA scan prior to making admission decision, afebrile here, in no acute distress                                             Medical Decision Making  1year-old female with abdominal pain, nausea, lightheadedness, vertigo symptoms which have been intermittent for the last 9 months, neurologic exam intact currently no vertigo symptoms  Did have postprandial abdominal pain and nausea, tenderness in the right upper quadrant of the abdomen with recent imaging concerning for cholecystitis we will repeat imaging to evaluate for intra-abdominal pathology, lab work reviewed showing a white blood cell count though increased from 4 to 7  LFTs similar to previous  If concerning imaging finding will talk to surgery for further evaluation    Amount and/or Complexity of Data Reviewed  Labs: ordered  Decision-making details documented in ED Course  Radiology: ordered  Risk  Prescription drug management  Disposition  Final diagnoses:   Abdominal pain     Time reflects when diagnosis was documented in both MDM as applicable and the Disposition within this note     Time User Action Codes Description Comment    1/20/2023  5:41 AM Kareem David Add [R10 9] Abdominal pain       ED Disposition     ED Disposition   Discharge    Condition   Stable    Date/Time   Fri Jan 20, 2023 11:31 AM    Comment   Jorge Warren discharge to home/self care                 Follow-up Information     Follow up With Specialties Details Why Contact Info Additional [address]  [facility] Gastroenterology Schedule an appointment as soon as possible for a visit in 1 week for re-evaluation of your abdominal pain if symptoms do not resolve [address] [place] [phone] [address] [facility] [place] [address], [place] 30[facility]  [phone]     [address] Emergency Department Emergency Medicine Go to  If symptoms worsen [address] [place], [address]  [address] [facility] Emergency Department, [address] [facility], [facility] [address]          Discharge Medication List as of 1/20/2023 11:32 AM CONTINUE these medications which have NOT CHANGED    Details   escitalopram (LEXAPRO) 20 mg tablet Take 20 mg by mouth daily DC by barbara on 1/13, Historical Med      gabapentin (NEURONTIN) 100 mg capsule Take 200 mg by mouth daily at bedtime, Historical Med      mirtazapine (REMERON) 15 mg tablet Take 15 mg by mouth daily at bedtime, Historical Med      Multiple Vitamins-Minerals (multivitamin with minerals) tablet Take 1 tablet by mouth daily, Historical Med      pantoprazole (PROTONIX) 40 mg tablet Take 1 tablet (40 mg total) by mouth daily, Starting Thu 1/19/2023, Until Sat 2/18/2023, No Print      thiamine 100 MG tablet Take 100 mg by mouth daily, Historical Med      valsartan (DIOVAN) 40 mg tablet Take 40 mg by mouth daily, Historical Med         STOP taking these medications       clindamycin (CLEOCIN) 300 MG capsule Comments:   Reason for Stopping:               No discharge procedures on file      PDMP Review       Value Time User    PDMP Reviewed  Yes 1/19/2023  2:33 PM Glenys Seth MD          ED Provider  Electronically Signed by           Tabitha Velasquez DO  01/23/23 6467

## 2023-01-20 NOTE — QUICK NOTE
HIDA scan completed  Discussed with nuclear medicine  Gallbladder visualized  No findings of acute cholecystitis  No indication for surgical admission  If symptoms continue would reach out to internal medicine  Otherwise patient will need follow-up with gastroenterology  No prior history of right upper quadrant pain      Caitlyn Remy

## 2023-01-26 ENCOUNTER — OFFICE VISIT (OUTPATIENT)
Dept: GASTROENTEROLOGY | Facility: CLINIC | Age: 53
End: 2023-01-26

## 2023-01-26 VITALS
WEIGHT: 121 LBS | DIASTOLIC BLOOD PRESSURE: 68 MMHG | SYSTOLIC BLOOD PRESSURE: 98 MMHG | BODY MASS INDEX: 22.84 KG/M2 | HEIGHT: 61 IN

## 2023-01-26 DIAGNOSIS — K25.4 GASTRIC ULCER WITH HEMORRHAGE, UNSPECIFIED CHRONICITY: Primary | ICD-10-CM

## 2023-01-26 DIAGNOSIS — F10.10 ALCOHOL ABUSE: ICD-10-CM

## 2023-01-26 DIAGNOSIS — K29.70 GASTRITIS: ICD-10-CM

## 2023-01-26 DIAGNOSIS — D50.0 BLOOD LOSS ANEMIA: ICD-10-CM

## 2023-01-26 DIAGNOSIS — R79.89 ELEVATED LFTS: ICD-10-CM

## 2023-01-26 RX ORDER — NALTREXONE HYDROCHLORIDE 50 MG/1
TABLET, FILM COATED ORAL
COMMUNITY
Start: 2023-01-13

## 2023-01-26 NOTE — PROGRESS NOTES
[facility] - Outpatient Consultation  Jani Joseph 47 y o  female MRN: [id number]  Encounter: [id number]    ASSESSMENT AND PLAN:      1  Gastric ulcer with hemorrhage, unspecified chronicity  4  Blood loss anemia  5  Gastritis  Found on EGD on 1/17/2023  Likely in the setting of alcohol use and gastritis  Status posttreatment with clipping  On pantoprazole 40 mg once daily  Now with resolved bowel movements  Hemoglobin last was 10 0  We will check hemoglobin 1 more time  Biopsies were negative for H  pylori  No recall recommended  2  Elevated LFTs  3  Alcohol abuse  Likely in the setting of alcohol use  Counseled on alcohol cessation  We will recheck LFTs to trend them down  - Hepatic function panel; Future  - Hepatic function panel      Follow up Appointment: 3 to 6 months    Chief Complaint   Patient presents with   • here to f/u procedure at  [facility] last week       HPI:   Patient is a 48-year-old female past medical history of alcohol abuse, depression who presents for follow-up after hospitalization in January 2023 with nausea vomiting, diarrhea melena and elevated LFTs  Patient had EGD on 1/17 which showed erosive gastritis with 1 bleeding vessel treated with a clip  Biopsies were negative for H  Pylori  stable hemoglobin of 9 0  Was transitioned  pantoprazole 40 mg once daily  Patient had AST of 189 ALT of 59 alk phos of 224T bili of 1 2 with negative acute hepatitis panel  Ultrasound with hepatomegaly and gallstones without any biliary obstruction  Elevated LFTs likely in the setting of alcohol use prior to her hospitalization  Patient stated no reported lightheadedness, dizziness, nausea, vomiting, dysphagia, heartburn, SOB, CP, abdominal pain, changes in bowel movement with diarrhea or constipation, GI bleeding, or unintentional weight loss  Historical Information   No past medical history on file  No past surgical history on file    Social History     Substance and Sexual Activity   Alcohol Use Not Currently     Social History     Substance and Sexual Activity   Drug Use Never     Social History     Tobacco Use   Smoking Status Never   Smokeless Tobacco Never     No family history on file  Meds/Allergies     Current Outpatient Medications:   •  escitalopram (LEXAPRO) 20 mg tablet  •  gabapentin (NEURONTIN) 100 mg capsule  •  mirtazapine (REMERON) 15 mg tablet  •  Multiple Vitamins-Minerals (multivitamin with minerals) tablet  •  naltrexone (REVIA) 50 mg tablet  •  pantoprazole (PROTONIX) 40 mg tablet  •  thiamine 100 MG tablet  •  valsartan (DIOVAN) 40 mg tablet    No Known Allergies    PHYSICAL EXAM:    Blood pressure 98/68, height 5' 1" (1 549 m), weight 54 9 kg (121 lb)  Body mass index is 22 86 kg/m²  General Appearance: NAD, cooperative, alert  Eyes: Anicteric  GI:  Soft, non-tender, non-distended; normal bowel sounds; no masses, no organomegaly   Rectal: Deferred  Musculoskeletal: No edema    Skin:  No jaundice    Lab Results:   Lab Results   Component Value Date    WBC 7 28 01/19/2023    WBC 4 31 01/19/2023    WBC 4 03 (L) 01/18/2023    HGB 10 0 (L) 01/19/2023    HGB 9 0 (L) 01/19/2023    HGB 9 2 (L) 01/18/2023    HGB 9 2 (L) 01/18/2023     (H) 01/19/2023     01/19/2023     01/19/2023     01/18/2023    INR 0 91 01/17/2023    INR 0 90 03/25/2015     Lab Results   Component Value Date     09/30/2015    K 4 0 01/19/2023     01/19/2023    CO2 28 01/19/2023    ANIONGAP 14 (H) 09/30/2015    BUN 11 01/19/2023    CREATININE 0 62 01/19/2023    GLUCOSE 90 10/01/2015    CALCIUM 8 9 01/19/2023    CORRECTEDCA 9 9 01/19/2023     (H) 01/19/2023     (H) 01/19/2023     (H) 01/18/2023    ALT 61 01/19/2023    ALT 59 01/19/2023    ALT 63 01/18/2023    ALKPHOS 259 (H) 01/19/2023    ALKPHOS 224 (H) 01/19/2023    ALKPHOS 245 (H) 01/18/2023    PROT 8 5 (H) 09/30/2015    BILITOT 0 53 09/30/2015    BILITOT 0 3 03/25/2015    BILITOT 0 3 02/06/2015    EGFR 103 01/19/2023     Lab Results   Component Value Date    IRON 36 (L) 01/16/2023    TIBC 200 (L) 01/16/2023    FERRITIN 560 (H) 01/16/2023     Lab Results   Component Value Date    LIPASE 347 01/19/2023       Radiology Results:   EGD    Result Date: 1/17/2023  Narrative: Table formatting from the original result was not included  [id number] [facility] [address] [id number] [phone] DATE OF SERVICE: 1/17/23 PHYSICIAN(S): Attending: Shanita Pagan DO Fellow: No Staff Documented INDICATION: Nausea vomiting and diarrhea, Gastrointestinal hemorrhage with melena POST-OP DIAGNOSIS: See the impression below  PREPROCEDURE: Informed consent was obtained for the procedure, including sedation  Risks of perforation, hemorrhage, adverse drug reaction and aspiration were discussed  The patient was placed in the left lateral decubitus position  Patient was explained about the risks and benefits of the procedure  Risks including but not limited to bleeding, infection, and perforation were explained in detail  Also explained about less than 100% sensitivity with the exam and other alternatives  PROCEDURE: EGD DETAILS OF PROCEDURE: Patient was taken to the procedure room where a time out was performed to confirm correct patient and correct procedure  The patient underwent monitored anesthesia care, which was administered by an anesthesia professional  The patient's blood pressure, heart rate, level of consciousness, respirations and oxygen were monitored throughout the procedure  The scope was advanced to the third part of the duodenum  Retroflexion was performed in the fundus  The patient experienced no blood loss  The procedure was not difficult  The patient tolerated the procedure well  There were no apparent complications   ANESTHESIA INFORMATION: ASA: III Anesthesia Type: IV Sedation with Anesthesia MEDICATIONS: sodium chloride 0 9 % infusion 200 mL*  *From user-documented volume (Totals for administrations occurring from 1319 to 1338 on 01/17/23) FINDINGS: The esophagus appeared normal  Moderate, generalized erythematous and friable mucosa with erosion in the antrum; there was stigmata of recent hemorrhage; performed cold forceps biopsy; placed 1 clip successfully (clip is MRI compatible)  Biopsies obtained to assess for H pylori  Mild edematous and erythematous mucosa in the duodenum; performed cold forceps biopsy  Biopsies obtained to assess for H pylori  SPECIMENS: ID Type Source Tests Collected by Time Destination 1 :  Tissue Stomach TISSUE EXAM Norma Carreon DO 1/17/2023  1:32 PM      Impression: Normal esophagus  No varices Erosive gastritis in the antrum  1 bleeding vessel treated with resolution clip with hemostasis achieved  Antral biopsy for H  pylori Mild duodenitis of the bulb RECOMMENDATION: Transition from PPI drip to IV twice daily Start diet as tolerated  Continue antiemetics as needed Await biopsies Repeat hemoglobin in the morning   Norma Carreon DO     XR chest portable    Result Date: 1/20/2023  Narrative: CHEST INDICATION:  58-year-old female with past medical history of alcohol abuse currently at  was just discharged back to that facility yesterday afternoon presents for reevaluation of fever, lightheadedness, vertigo, nausea and abdominal pain  Patient states she has been having vertigo sensation for the last few months, today she ate a sandwich and after that became nauseous and felt very unsteady and also was having mid abdominal pain  COMPARISON:  Obstruction series 7/25/2006 EXAM PERFORMED/VIEWS:  XR CHEST PORTABLE FINDINGS: Cardiomediastinal silhouette appears unremarkable  The lungs are clear  No pneumothorax or pleural effusion  Osseous structures appear within normal limits for patient age  Impression: No acute cardiopulmonary disease   Workstation performed: [id number]     NM hepatobiliary    Result Date: 1/20/2023  Narrative: HEPATOBILIARY SCAN INDICATION: RUQ pain, equivocal findings of acute cholecystitis on CT  Right upper quadrant pain COMPARISON:  Comparison is made to the gallbladder on coronal CT of abdomen and pelvis dated 1/20/2023 TECHNIQUE:   Following the intravenous administration of 5 2 mCi Tc-99m labeled mebrofenin, dynamic abdominal imaging was obtained in the anterior projection over a 60 minute time period  FINDINGS:  There is prompt, uniform accumulation with normal clearance of the radiopharmaceutical by the liver  There is normal filling of the intrahepatic ducts, common bile duct and gallbladder with normal excretion of the radiopharmaceutical into the duodenum  Impression: 1  Visualization of the gallbladder consistent with patent cystic duct  No scintigraphic evidence for acute cholecystitis  2   Patent common bile duct  Workstation performed: [id number]     US abdomen limited    Result Date: 1/17/2023  Narrative: RIGHT UPPER QUADRANT ULTRASOUND INDICATION:    Elevated LFTs  COMPARISON:  12/23/2008 TECHNIQUE:   Real-time ultrasound of the right upper quadrant was performed with a curvilinear transducer with both volumetric sweeps and still imaging techniques  FINDINGS: PANCREAS:  Visualized portions of the pancreas are within normal limits  AORTA AND IVC:  Visualized portions are normal for patient age  LIVER: Size:  Enlarged  The liver measures 18 1 cm in the midclavicular line  Contour:  Surface contour is smooth  Parenchyma:  Echogenicity and echotexture are within normal limits  Limited imaging of the main portal vein shows it to be patent and hepatopetal  No liver mass identified  BILIARY: Cholelithiasis and gallbladder sludge is noted  Gallbladder wall thickening is noted  Gallbladder wall edema is present  No focal tenderness was elicited  No intrahepatic biliary dilatation  CBD measures 2 0 mm  No choledocholithiasis   KIDNEY: Right kidney measures 9 9 SAG x 4 3 AP x 4 6 TRV cm  Volume 102 3 mL Kidney within normal limits  ASCITES:   None  Impression: Cholelithiasis and gallbladder sludge with gallbladder wall thickening and gallbladder wall edema suggesting acute cholecystitis  Workstation performed: [id number]     CT abdomen pelvis with contrast    Result Date: 1/20/2023  Narrative: CT ABDOMEN AND PELVIS WITH IV CONTRAST INDICATION:   Abdominal pain, acute, nonlocalized abnormal US of abdomen, fever  COMPARISON:  CT abdomen pelvis dated December 23, 2008  TECHNIQUE:  CT examination of the abdomen and pelvis was performed  Axial, sagittal, and coronal 2D reformatted images were created from the source data and submitted for interpretation  Radiation dose length product (DLP) for this visit:  455 08 mGy-cm   This examination, like all CT scans performed in the [facility], was performed utilizing techniques to minimize radiation dose exposure, including the use of iterative  reconstruction and automated exposure control  IV Contrast:  100 mL of iohexol (OMNIPAQUE) Enteric Contrast:  Enteric contrast was not administered  FINDINGS: ABDOMEN LOWER CHEST:  Atelectatic changes are noted at the lung bases  LIVER/BILIARY TREE:  Liver is diffusely decreased in density consistent with fatty change  No CT evidence of suspicious hepatic mass  Normal hepatic contours  No biliary dilatation  There is a stable subcentimeter parenchymal calcification at the anterior hepatic dome  The liver is enlarged measuring 21 3 cm in craniocaudad dimension  GALLBLADDER:  There are gallstone(s) within the gallbladder, without pericholecystic inflammatory changes  There is mild diffuse gallbladder wall thickening  SPLEEN:  There is a 9 mm cyst at the anterior spleen  Another 6 mm cyst is noted at the inferior pole of the spleen  PANCREAS:  Unremarkable  ADRENAL GLANDS:  Unremarkable  KIDNEYS/URETERS:  No hydronephrosis or urinary tract calculus    One or more sharply circumscribed subcentimeter renal hypodensities are present, too small to accurately characterize, and statistically most likely benign findings  According to recent literature (Radiology 2019) no further workup of these findings is recommended  STOMACH AND BOWEL:  Unremarkable  APPENDIX:  No findings to suggest appendicitis  ABDOMINOPELVIC CAVITY:  There is a small volume of free pelvic fluid, likely physiologic given the patient's age and gender  No pneumoperitoneum  No lymphadenopathy  VESSELS:  Unremarkable for patient's age  PELVIS REPRODUCTIVE ORGANS:  Unremarkable for patient's age  URINARY BLADDER:  Unremarkable  ABDOMINAL WALL/INGUINAL REGIONS:  Unremarkable  OSSEOUS STRUCTURES:  No acute fracture or destructive osseous lesion  Impression: Cholelithiasis with mild diffuse gallbladder wall thickening  No pericholecystic inflammatory changes  The findings are equivocal for acute cholecystitis  A hepatobiliary scan could be performed for further evaluation  Mildly enlarged fatty liver  Small amount of pelvic free fluid   Workstation performed: [id number]

## 2023-05-25 ENCOUNTER — OFFICE VISIT (OUTPATIENT)
Dept: GASTROENTEROLOGY | Facility: CLINIC | Age: 53
End: 2023-05-25

## 2023-05-25 VITALS
HEIGHT: 62 IN | DIASTOLIC BLOOD PRESSURE: 62 MMHG | WEIGHT: 115 LBS | SYSTOLIC BLOOD PRESSURE: 110 MMHG | BODY MASS INDEX: 21.16 KG/M2

## 2023-05-25 DIAGNOSIS — D62 ACUTE BLOOD LOSS ANEMIA: ICD-10-CM

## 2023-05-25 DIAGNOSIS — K80.20 CALCULUS OF GALLBLADDER WITHOUT CHOLECYSTITIS WITHOUT OBSTRUCTION: ICD-10-CM

## 2023-05-25 DIAGNOSIS — K29.91 GASTROINTESTINAL HEMORRHAGE ASSOCIATED WITH GASTRODUODENITIS: ICD-10-CM

## 2023-05-25 DIAGNOSIS — R94.31 LONG QT INTERVAL: ICD-10-CM

## 2023-05-25 DIAGNOSIS — R79.89 LFT ELEVATION: ICD-10-CM

## 2023-05-25 DIAGNOSIS — Z11.59 SCREENING FOR VIRAL DISEASE: ICD-10-CM

## 2023-05-25 DIAGNOSIS — K27.9 PEPTIC ULCER DISEASE: Primary | ICD-10-CM

## 2023-05-25 DIAGNOSIS — Z12.11 COLON CANCER SCREENING: ICD-10-CM

## 2023-05-25 DIAGNOSIS — F10.21 ALCOHOL USE DISORDER, MODERATE, IN EARLY REMISSION (HCC): ICD-10-CM

## 2023-05-25 PROBLEM — F10.10 ALCOHOL ABUSE: Status: RESOLVED | Noted: 2023-01-16 | Resolved: 2023-05-25

## 2023-05-25 RX ORDER — PANTOPRAZOLE SODIUM 40 MG/1
40 TABLET, DELAYED RELEASE ORAL DAILY
Qty: 30 TABLET | Refills: 3 | Status: SHIPPED | OUTPATIENT
Start: 2023-05-25 | End: 2023-06-24

## 2023-05-25 RX ORDER — TRAZODONE HYDROCHLORIDE 100 MG/1
100 TABLET ORAL
COMMUNITY
Start: 2023-05-10

## 2023-05-25 NOTE — PATIENT INSTRUCTIONS
[facility] - Outpatient Follow-up Note  Nicolasa Painting 46 y o  female MRN: [id number]  Encounter: [id number]    ASSESSMENT AND PLAN:      1  Peptic ulcer disease  ---With a history of bleeding peptic ulcer disease  Patient had significant drop in hemoglobin and melena in January of this year  Should have follow-up examination  Dropped her hemoglobin to the 9 g range  No further melena  She has been off pantoprazole for about the last 2 months without significant GI symptoms  We will plan on EGD in the near future  Exam to be done at the hospital been history of long QT syndrome  - EGD; Future  - pantoprazole (PROTONIX) 40 mg tablet; Take 1 tablet (40 mg total) by mouth daily  Dispense: 30 tablet; Refill: 3    2  Gastrointestinal hemorrhage associated with gastroduodenitis  --Further episodes of bleeding    3  Acute blood loss anemia  --Not had lab work since her hospitalization  Recheck CBC  Patient does not have excessive fatigue    - CBC and differential; Future  - Iron Panel (Includes Ferritin, Iron Sat%, Iron, and TIBC); Future  - Ferritin; Future  -Does look pale    Blood work was obtained post visit and hemoglobin noted to be abnormally low at 7 4  MCV normal at 95-ferritin normal iron panel pending  -We will repeat labs and check stool for occult blood  -Begin iron and obtain hematology referral  -Arrange for EGD as soon as possible  -Begin iron - 325 mg daily OTC      4  Colon cancer screening  --- Reports up-to-date with colonoscopy screening  Last examination at age 46 years ago at [address] for GI health  Told that she did not need to come back for 10 years    5  LFT elevation--with AST greater than ALT while in the hospital   This would be consistent with alcohol related injury  Needs repeat LFTs    Will review records to see if she has had hepatitis screening  -Dissipate improvement of her liver function studies given abstinence from alcohol at this time    -Post office visit LFTs AST 92 ALT 18 bilirubin 0 7  -Much improved compared to LFTs in January but still abnormal  -Check hepatitis profile- smooth muscle antibody ect         6  Alcohol use disorder, moderate, in early remission [facility])  --Patient has been sober over the last 5 months  Continues in remission  Receiving counseling and doing quite well    7  Long QT interval  --Follows with cardiology [facility]  Had previous syncopal episode  This was about 2 years ago    Will do her follow-up EGD in the hospital setting      Followup Appointment: 3-4 mo

## 2023-05-25 NOTE — H&P (VIEW-ONLY)
[facility] - Outpatient Follow-up Note  Angle Powell 46 y o  female MRN: [id number]  Encounter: [id number]    ASSESSMENT AND PLAN:      1  Peptic ulcer disease  ---With a history of bleeding peptic ulcer disease  Patient had significant drop in hemoglobin and melena in January of this year  Should have follow-up examination  Dropped her hemoglobin to the 9 g range  No further melena  She has been off pantoprazole for about the last 2 months without significant GI symptoms  We will plan on EGD in the near future  Exam to be done at the hospital been history of long QT syndrome  - EGD; Future  - pantoprazole (PROTONIX) 40 mg tablet; Take 1 tablet (40 mg total) by mouth daily  Dispense: 30 tablet; Refill: 3    2  Gastrointestinal hemorrhage associated with gastroduodenitis  --Further episodes of bleeding    3  Acute blood loss anemia  --Not had lab work since her hospitalization  Recheck CBC  Patient does not have excessive fatigue    - CBC and differential; Future  - Iron Panel (Includes Ferritin, Iron Sat%, Iron, and TIBC); Future  - Ferritin; Future  -Does look pale    Blood work was obtained post visit and hemoglobin noted to be abnormally low at 7 4  MCV normal at 95-ferritin normal iron panel pending  -We will repeat labs and check stool for occult blood  -Begin iron and obtain hematology referral  -Arrange for EGD as soon as possible  -Begin iron - 325 mg daily OTC        4  Colon cancer screening    --- Reports up-to-date with colonoscopy screening  Last examination at age 46 years ago [address] for GI health  Told that she did not need to come back for 10 years    5  LFT elevation--  with AST greater than ALT while in the hospital   This would be consistent with alcohol related injury  Needs repeat LFTs    Will review records to see if she has had hepatitis screening  -Dissipate improvement of her liver function studies given abstinence from alcohol at this time    -Post office visit LFTs AST 92 ALT 18 bilirubin 0 7  -Much improved compared to LFTs in January but still abnormal  -Check hepatitis profile- smooth muscle antibody ect         6  Alcohol use disorder, moderate, in early remission [facility])  --Patient has been sober over the last 5 months  Continues in remission  Receiving counseling and doing quite well    7  Long QT interval  --Follows with cardiology [facility]  Had previous syncopal episode  This was about 2 years ago  Will do her follow-up EGD in the hospital setting    8  Cholelithiasis -patient with abdominal pain when she presented in January to the hospital with GI bleeding and surgery was consulted for cholelithiasis and thickened gallbladder wall  Ultimately it was decided she did not have acute cholecystitis and surgery was not recommended          Followup Appointment: 3-4 mo   ______________________________________________________________________    Chief Complaint   Patient presents with   • Follow-up     HPI:  The patient returns to the office for routine follow-up posthospitalization for acute GI bleeding  Patient was in alcohol rehab but then experienced melena and weakness  She was discovered to have a low hemoglobin but did not require transfusion  Her hemoglobin nadired around the 9 to 10 g range  She did have a small gastric ulcer with stigmata of bleeding which was treated with Endo Clip with good hemostasis  She experienced no further bleeding  Patient had significant elevation of her transaminases with her AST up over 200  She also had concomitant abdominal pain nausea and vomiting  Gallbladder imaging revealed cholelithiasis and a thickened gallbladder and concern for acute cholecystitis  Surgery was consulted and eventually a HIDA scan was performed which showed visualization of the gallbladder so it was felt that she did not have acute cholecystitis    Imaging also was revealing for hepatomegaly and mild fatty infiltration of the liver  Patient had been sent back to rehab subsequently  She had been maintained on Protonix but has been off the medication  She does not take nonsteroidal anti-inflammatories  She has been off the Protonix for about 2 to 3 months  Patient reports she does generally feel well  She has not had follow-up lab studies since January  Patient does have a history of alcohol abuse disorder  She is in remission at this time having not had a drink since January  She is on receiving naltrexone but is on not on this medication presently  Also pertinent with respect to patient's history is that she has a history of a long T interview and had episodes of syncope  She follows with the [facility] for this problem      Historical Information   Past Medical History:   Diagnosis Date   • Alcoholism ([facility] [address] ) 10/1/2010   • Fatty liver 01/20/2023   • Gallstones    • Long Q-T syndrome      Past Surgical History:   Procedure Laterality Date   • COLONOSCOPY  4/29/2019    Clear   • TUBAL LIGATION  07/14/2005   • UPPER GASTROINTESTINAL ENDOSCOPY  1/20/2023    Burst ulcer     Social History     Substance and Sexual Activity   Alcohol Use Never    Comment: stopped January 2023     Social History     Substance and Sexual Activity   Drug Use Never     Social History     Tobacco Use   Smoking Status Never   Smokeless Tobacco Never     Family History   Problem Relation Age of Onset   • Cancer Mother          Current Outpatient Medications:   •  escitalopram (LEXAPRO) 20 mg tablet  •  gabapentin (NEURONTIN) 100 mg capsule  •  mirtazapine (REMERON) 15 mg tablet  •  Multiple Vitamins-Minerals (multivitamin with minerals) tablet  •  pantoprazole (PROTONIX) 40 mg tablet  •  valsartan (DIOVAN) 40 mg tablet  •  naltrexone (REVIA) 50 mg tablet  •  thiamine 100 MG tablet  •  traZODone (DESYREL) 100 mg tablet  No Known Allergies  Reviewed medications and allergies and updated as indicated    PHYSICAL "EXAM:    Blood pressure 110/62, height 5' 2\" (1 575 m), weight 52 2 kg (115 lb)  Body mass index is 21 03 kg/m²  General Appearance: NAD, cooperative, alert-thin  Eyes: Anicteric, conjunctiva pale  ENT:  Normocephalic, atraumatic, normal mucosa  Neck:  Supple, symmetrical, trachea midline  Resp:  Clear to auscultation bilaterally; no rales, rhonchi or wheezing; respirations unlabored   CV:  S1 S2, Regular rate and rhythm; no murmur, rub, or gallop  GI:  Soft, non-tender, non-distended; normal bowel sounds; no masses, no organomegaly   Rectal: Deferred  Musculoskeletal: No cyanosis, clubbing or edema  Normal ROM    Skin:  No jaundice, rashes, or lesions   Heme/Lymph: No palpable cervical lymphadenopathy  Psych: Normal affect, good eye contact  Neuro: No gross deficits, AAOx3    Lab Results:   Lab Results   Component Value Date    HCT 22 6 (L) 05/26/2023    HGB 7 4 (L) 05/26/2023    MCV 95 05/26/2023     05/26/2023    WBC 8 1 05/26/2023     Lab Results   Component Value Date    ALKPHOS 259 (H) 01/19/2023    ALT 18 05/26/2023    ANIONGAP 14 (H) 09/30/2015    AST 92 (H) 05/26/2023    BILITOT 0 53 09/30/2015    BUN 14 05/26/2023    CALCIUM 8 9 01/19/2023     05/26/2023    CO2 21 05/26/2023    CORRECTEDCA 9 9 01/19/2023    CREATININE 0 57 05/26/2023    EGFR 108 05/26/2023    GLUCOSE 90 10/01/2015    K 4 0 05/26/2023     09/30/2015    PROT 8 5 (H) 09/30/2015     Lab Results   Component Value Date    FERRITIN 48 05/26/2023    IRON 36 (L) 01/16/2023    TIBC 200 (L) 01/16/2023     Lab Results   Component Value Date    LIPASE 347 01/19/2023       "

## 2023-05-25 NOTE — PROGRESS NOTES
[facility] - Outpatient Follow-up Note  Winter Caldwell 46 y o  female MRN: [id number]  Encounter: [id number]    ASSESSMENT AND PLAN:      1  Peptic ulcer disease  ---With a history of bleeding peptic ulcer disease  Patient had significant drop in hemoglobin and melena in January of this year  Should have follow-up examination  Dropped her hemoglobin to the 9 g range  No further melena  She has been off pantoprazole for about the last 2 months without significant GI symptoms  We will plan on EGD in the near future  Exam to be done at the hospital been history of long QT syndrome  - EGD; Future  - pantoprazole (PROTONIX) 40 mg tablet; Take 1 tablet (40 mg total) by mouth daily  Dispense: 30 tablet; Refill: 3    2  Gastrointestinal hemorrhage associated with gastroduodenitis  --Further episodes of bleeding    3  Acute blood loss anemia  --Not had lab work since her hospitalization  Recheck CBC  Patient does not have excessive fatigue    - CBC and differential; Future  - Iron Panel (Includes Ferritin, Iron Sat%, Iron, and TIBC); Future  - Ferritin; Future  -Does look pale    Blood work was obtained post visit and hemoglobin noted to be abnormally low at 7 4  MCV normal at 95-ferritin normal iron panel pending  -We will repeat labs and check stool for occult blood  -Begin iron and obtain hematology referral  -Arrange for EGD as soon as possible  -Begin iron - 325 mg daily OTC        4  Colon cancer screening    --- Reports up-to-date with colonoscopy screening  Last examination at age 46 years ago at [address] for GI health  Told that she did not need to come back for 10 years    5  LFT elevation--  with AST greater than ALT while in the hospital   This would be consistent with alcohol related injury  Needs repeat LFTs    Will review records to see if she has had hepatitis screening  -Dissipate improvement of her liver function studies given abstinence from alcohol at this time    -Post office visit LFTs AST 92 ALT 18 bilirubin 0 7  -Much improved compared to LFTs in January but still abnormal  -Check hepatitis profile- smooth muscle antibody ect         6  Alcohol use disorder, moderate, in early remission [facility])  --Patient has been sober over the last 5 months  Continues in remission  Receiving counseling and doing quite well    7  Long QT interval  --Follows with cardiology [facility]  Had previous syncopal episode  This was about 2 years ago  Will do her follow-up EGD in the hospital setting    8  Cholelithiasis -patient with abdominal pain when she presented in January to the hospital with GI bleeding and surgery was consulted for cholelithiasis and thickened gallbladder wall  Ultimately it was decided she did not have acute cholecystitis and surgery was not recommended          Followup Appointment: 3-4 mo   ______________________________________________________________________    Chief Complaint   Patient presents with   • Follow-up     HPI:  The patient returns to the office for routine follow-up posthospitalization for acute GI bleeding  Patient was in alcohol rehab but then experienced melena and weakness  She was discovered to have a low hemoglobin but did not require transfusion  Her hemoglobin nadired around the 9 to 10 g range  She did have a small gastric ulcer with stigmata of bleeding which was treated with Endo Clip with good hemostasis  She experienced no further bleeding  Patient had significant elevation of her transaminases with her AST up over 200  She also had concomitant abdominal pain nausea and vomiting  Gallbladder imaging revealed cholelithiasis and a thickened gallbladder and concern for acute cholecystitis  Surgery was consulted and eventually a HIDA scan was performed which showed visualization of the gallbladder so it was felt that she did not have acute cholecystitis    Imaging also was revealing for hepatomegaly and mild fatty infiltration of the liver  Patient had been sent back to rehab subsequently  She had been maintained on Protonix but has been off the medication  She does not take nonsteroidal anti-inflammatories  She has been off the Protonix for about 2 to 3 months  Patient reports she does generally feel well  She has not had follow-up lab studies since January  Patient does have a history of alcohol abuse disorder  She is in remission at this time having not had a drink since January  She is on receiving naltrexone but is on not on this medication presently  Also pertinent with respect to patient's history is that she has a history of a long T interview and had episodes of syncope  She follows with the [facility] for this problem      Historical Information   Past Medical History:   Diagnosis Date   • Alcoholism ([address] ) 10/1/2010   • Fatty liver 01/20/2023   • Gallstones    • Long Q-T syndrome      Past Surgical History:   Procedure Laterality Date   • COLONOSCOPY  4/29/2019    Clear   • TUBAL LIGATION  07/14/2005   • UPPER GASTROINTESTINAL ENDOSCOPY  1/20/2023    Burst ulcer     Social History     Substance and Sexual Activity   Alcohol Use Never    Comment: stopped January 2023     Social History     Substance and Sexual Activity   Drug Use Never     Social History     Tobacco Use   Smoking Status Never   Smokeless Tobacco Never     Family History   Problem Relation Age of Onset   • Cancer Mother          Current Outpatient Medications:   •  escitalopram (LEXAPRO) 20 mg tablet  •  gabapentin (NEURONTIN) 100 mg capsule  •  mirtazapine (REMERON) 15 mg tablet  •  Multiple Vitamins-Minerals (multivitamin with minerals) tablet  •  pantoprazole (PROTONIX) 40 mg tablet  •  valsartan (DIOVAN) 40 mg tablet  •  naltrexone (REVIA) 50 mg tablet  •  thiamine 100 MG tablet  •  traZODone (DESYREL) 100 mg tablet  No Known Allergies  Reviewed medications and allergies and updated as indicated    PHYSICAL "EXAM:    Blood pressure 110/62, height 5' 2\" (1 575 m), weight 52 2 kg (115 lb)  Body mass index is 21 03 kg/m²  General Appearance: NAD, cooperative, alert-thin  Eyes: Anicteric, conjunctiva pale  ENT:  Normocephalic, atraumatic, normal mucosa  Neck:  Supple, symmetrical, trachea midline  Resp:  Clear to auscultation bilaterally; no rales, rhonchi or wheezing; respirations unlabored   CV:  S1 S2, Regular rate and rhythm; no murmur, rub, or gallop  GI:  Soft, non-tender, non-distended; normal bowel sounds; no masses, no organomegaly   Rectal: Deferred  Musculoskeletal: No cyanosis, clubbing or edema  Normal ROM    Skin:  No jaundice, rashes, or lesions   Heme/Lymph: No palpable cervical lymphadenopathy  Psych: Normal affect, good eye contact  Neuro: No gross deficits, AAOx3    Lab Results:   Lab Results   Component Value Date    HCT 22 6 (L) 05/26/2023    HGB 7 4 (L) 05/26/2023    MCV 95 05/26/2023     05/26/2023    WBC 8 1 05/26/2023     Lab Results   Component Value Date    ALKPHOS 259 (H) 01/19/2023    ALT 18 05/26/2023    ANIONGAP 14 (H) 09/30/2015    AST 92 (H) 05/26/2023    BILITOT 0 53 09/30/2015    BUN 14 05/26/2023    CALCIUM 8 9 01/19/2023     05/26/2023    CO2 21 05/26/2023    CORRECTEDCA 9 9 01/19/2023    CREATININE 0 57 05/26/2023    EGFR 108 05/26/2023    GLUCOSE 90 10/01/2015    K 4 0 05/26/2023     09/30/2015    PROT 8 5 (H) 09/30/2015     Lab Results   Component Value Date    FERRITIN 48 05/26/2023    IRON 36 (L) 01/16/2023    TIBC 200 (L) 01/16/2023     Lab Results   Component Value Date    LIPASE 347 01/19/2023       "

## 2023-05-26 ENCOUNTER — TELEPHONE (OUTPATIENT)
Age: 53
End: 2023-05-26

## 2023-05-26 NOTE — TELEPHONE ENCOUNTER
Scheduled date of EGD(as of today): 7/27/2023  Physician performing EGD:  Dr Willie Gloria  Location of EGD: [facility]  Instructions reviewed with patient by: Gave pt instructions packet  Clearances: n/a

## 2023-05-27 LAB
ALBUMIN SERPL-MCNC: 3.4 G/DL (ref 3.8–4.9)
ALBUMIN/GLOB SERPL: 0.7 {RATIO} (ref 1.2–2.2)
ALP SERPL-CCNC: 155 IU/L (ref 44–121)
ALT SERPL-CCNC: 18 IU/L (ref 0–32)
AST SERPL-CCNC: 92 IU/L (ref 0–40)
BASOPHILS # BLD AUTO: 0 X10E3/UL (ref 0–0.2)
BASOPHILS NFR BLD AUTO: 0 %
BILIRUB SERPL-MCNC: 0.7 MG/DL (ref 0–1.2)
BUN SERPL-MCNC: 14 MG/DL (ref 6–24)
BUN/CREAT SERPL: 25 (ref 9–23)
CALCIUM SERPL-MCNC: 9.1 MG/DL (ref 8.7–10.2)
CHLORIDE SERPL-SCNC: 101 MMOL/L (ref 96–106)
CO2 SERPL-SCNC: 21 MMOL/L (ref 20–29)
CREAT SERPL-MCNC: 0.57 MG/DL (ref 0.57–1)
EGFR: 108 ML/MIN/1.73
EOSINOPHIL # BLD AUTO: 0 X10E3/UL (ref 0–0.4)
EOSINOPHIL NFR BLD AUTO: 0 %
ERYTHROCYTE [DISTWIDTH] IN BLOOD BY AUTOMATED COUNT: 14.8 % (ref 11.7–15.4)
FERRITIN SERPL-MCNC: 48 NG/ML (ref 15–150)
GLOBULIN SER-MCNC: 5.2 G/DL (ref 1.5–4.5)
GLUCOSE SERPL-MCNC: 129 MG/DL (ref 70–99)
HCT VFR BLD AUTO: 22.6 % (ref 34–46.6)
HGB BLD-MCNC: 7.4 G/DL (ref 11.1–15.9)
IMM GRANULOCYTES # BLD: 0 X10E3/UL (ref 0–0.1)
IMM GRANULOCYTES NFR BLD: 0 %
LYMPHOCYTES # BLD AUTO: 1 X10E3/UL (ref 0.7–3.1)
LYMPHOCYTES NFR BLD AUTO: 12 %
MCH RBC QN AUTO: 31.2 PG (ref 26.6–33)
MCHC RBC AUTO-ENTMCNC: 32.7 G/DL (ref 31.5–35.7)
MCV RBC AUTO: 95 FL (ref 79–97)
MONOCYTES # BLD AUTO: 0.5 X10E3/UL (ref 0.1–0.9)
MONOCYTES NFR BLD AUTO: 6 %
NEUTROPHILS # BLD AUTO: 6.6 X10E3/UL (ref 1.4–7)
NEUTROPHILS NFR BLD AUTO: 82 %
PLATELET # BLD AUTO: 354 X10E3/UL (ref 150–450)
POTASSIUM SERPL-SCNC: 4 MMOL/L (ref 3.5–5.2)
PROT SERPL-MCNC: 8.6 G/DL (ref 6–8.5)
RBC # BLD AUTO: 2.37 X10E6/UL (ref 3.77–5.28)
SODIUM SERPL-SCNC: 137 MMOL/L (ref 134–144)
WBC # BLD AUTO: 8.1 X10E3/UL (ref 3.4–10.8)

## 2023-05-30 ENCOUNTER — TELEPHONE (OUTPATIENT)
Dept: GASTROENTEROLOGY | Facility: CLINIC | Age: 53
End: 2023-05-30

## 2023-05-30 ENCOUNTER — TELEPHONE (OUTPATIENT)
Dept: HEMATOLOGY ONCOLOGY | Facility: CLINIC | Age: 53
End: 2023-05-30

## 2023-05-30 DIAGNOSIS — D64.9 ANEMIA, UNSPECIFIED TYPE: Primary | ICD-10-CM

## 2023-05-30 NOTE — TELEPHONE ENCOUNTER
I just spoke to the patient and verified her appt date, time and location  She also knows to have labs done prior to this appointment

## 2023-05-30 NOTE — TELEPHONE ENCOUNTER
With severe anemia  Referring to hematology for possible IV iron  Iron panel still pending  Repeat CBC and reticulocyte count  Patient with dyspnea on exertion  No overt bleeding  We will get her in for EGD ASAP

## 2023-05-30 NOTE — TELEPHONE ENCOUNTER
Left detailed message letting patient know that we are ordering an iron panel, b12, and folate to be drawn prior to her appointment on 6/6  Left my teams number in case she needs to call me back directly

## 2023-05-30 NOTE — TELEPHONE ENCOUNTER
I called Kentrell Quiroz in response to a referral that was received for patient to establish care with Hematology  Outreach was made to schedule a consultation     I left a voicemail explaining the reason for my call and advised patient to call [facility] at [phone]  Another attempt will be made to contact patient

## 2023-05-30 NOTE — TELEPHONE ENCOUNTER
5/24/2018    [address] [place] [address] Gerri  1938  [id number]        Assessment  Resolved urinary retention      Discussion  We discussed her postvoid residual of 200 cc in the office today  This was performed approximately 2 hr after her last void  The patient is comfortable with her voiding pattern  I recommend follow-up in 6 months with an additional postvoid residual assessment  The patient and her daughter were instructed to call sooner if she is unable to urinate  History of Present Illness  [de-identified] y o  female with a history of a recent hospitalization where she went into urinary retention  She was evaluated by Dr Deidra Brizuela  She returns in follow-up today  Her Fisher catheter has been removed  She states she is voiding well with an adequate urinary stream and she feels that she empties to completion  In the office today are PVR was 200 cc, however, the patient had not voided for 2 hr prior to the measurement  Her daughter is present with her in the office today  She denies seen gross hematuria  AUA Symptom Score      Review of Systems  Review of Systems   Constitutional: Negative  HENT: Negative  Eyes: Negative  Respiratory: Negative  Cardiovascular: Negative  Gastrointestinal: Negative  Endocrine: Negative  Genitourinary:        Resolved retention   Musculoskeletal: Negative  Skin: Negative  Allergic/Immunologic: Negative  Neurological: Negative  Hematological: Negative  Psychiatric/Behavioral: Negative            Past Medical History  Past Medical History:   Diagnosis Date    Asthma     CAD (coronary artery disease) of artery bypass graft     Cardiac disease     Dementia     Depression     Diabetes mellitus (HCC)     Hyperlipidemia     Hypertension     MI [facility]myocardial infarction) ([facility] [address]     Neuropathy     BRANT (obstructive sleep apnea)        Past Social History  Past Surgical History:   Procedure Laterality Date    APPENDECTOMY      CATARACT Called patient and LVM with Consult appointment date time and location  LVM with hope line # provided if the patient needed to reschedule the appointment  EXTRACTION      EGD AND COLONOSCOPY         Past Family History  Family History   Problem Relation Age of Onset    Diabetes Mother     Cancer Sister     Cancer Brother        Past Social history  Social History     Social History    Marital status: /Civil Union     Spouse name: N/A    Number of children: N/A    Years of education: N/A     Occupational History    Not on file       Social History Main Topics    Smoking status: Never Smoker    Smokeless tobacco: Never Used    Alcohol use No    Drug use: No    Sexual activity: Not on file     Other Topics Concern    Not on file     Social History Narrative    No narrative on file       Current Medications  Current Outpatient Prescriptions   Medication Sig Dispense Refill    albuterol (PROVENTIL HFA,VENTOLIN HFA) 90 mcg/act inhaler Inhale 2 puffs every 4 (four) hours as needed for wheezing or shortness of breath 1 Inhaler 0    amLODIPine (NORVASC) 5 mg tablet TAKE 1 TABLET TWICE DAILY 180 tablet 3    aspirin 81 mg chewable tablet Chew 1 tablet (81 mg total) daily 30 tablet 0    atorvastatin (LIPITOR) 40 mg tablet TAKE 1 TABLET BY MOUTH EVERY DAY 30 tablet 11    calcium carbonate-vitamin D (OSCAL-D) 500 mg-200 units per tablet Take 1 tablet by mouth 2 (two) times a day with meals 60 tablet 0    docusate sodium (COLACE) 100 mg capsule Take 1 capsule (100 mg total) by mouth 2 (two) times a day 10 capsule 0    furosemide (LASIX) 40 mg tablet Take 1 5 tablets (60 mg total) by mouth daily for 30 days 45 tablet 0    Insulin Disposable Pump (V-GO 20) KIT V-Go 20 KIT  Use  daily as directed   Quantity: 3;  Refills: 3      Irma Vazquez ;  Start 14-Jan-2016  Active  30 Kit Box      Insulin Lispro (HUMALOG) 100 UNIT/ML SOCT Inject 20 Units under the skin      isosorbide mononitrate (IMDUR) 60 mg 24 hr tablet Take by mouth      losartan (COZAAR) 100 MG tablet Take by mouth      metoprolol tartrate (LOPRESSOR) 25 mg tablet Take by mouth      nitroglycerin (NITROSTAT) 0 4 mg SL tablet Place under the tongue      ONE TOUCH ULTRA TEST test strip       pantoprazole (PROTONIX) 40 mg tablet Take by mouth      senna (SENOKOT) 8 6 mg Take 1 tablet (8 6 mg total) by mouth daily 120 each 0     No current facility-administered medications for this visit  Allergies  Allergies   Allergen Reactions    Ace Inhibitors     Chlorpromazine     Latex     Lisinopril     Namenda [Memantine]     Penicillins     Propoxyphene     Stadol [Butorphanol]        Past Medical History, Social History, Family History, medications and allergies were reviewed  Vitals  Vitals:    05/24/18 0952   BP: 128/76   BP Location: Left arm   Patient Position: Sitting   Cuff Size: Adult   Pulse: 70   Weight: 90 7 kg (200 lb)   Height: 5' 8" (1 727 m)       Physical Exam  Physical Exam  On examination she is in no acute distress  Her abdomen is soft nontender nondistended  The bladder is nonpalpable  Skin is warm  Extremities without edema  Neurologic is grossly intact and nonfocal   Gait is slow with the assistance of a walker    Affect    Normal       Results  No results found for: PSA  Lab Results   Component Value Date    GLUCOSE 199 (H) 04/28/2018    CALCIUM 8 6 04/28/2018     04/28/2018    K 4 0 04/28/2018    CO2 33 (H) 04/28/2018    CL 99 (L) 04/28/2018    BUN 38 (H) 04/28/2018    CREATININE 1 08 04/28/2018     Lab Results   Component Value Date    WBC 10 19 (H) 04/24/2018    HGB 12 5 04/24/2018    HCT 36 9 04/24/2018    MCV 88 04/24/2018     04/24/2018         Office Urine Dip  No results found for this or any previous visit (from the past 1 hour(s)) ]

## 2023-05-31 ENCOUNTER — HOSPITAL ENCOUNTER (OUTPATIENT)
Dept: GASTROENTEROLOGY | Facility: HOSPITAL | Age: 53
Setting detail: OUTPATIENT SURGERY
Discharge: HOME | End: 2023-05-31
Attending: INTERNAL MEDICINE | Admitting: INTERNAL MEDICINE
Payer: COMMERCIAL

## 2023-05-31 ENCOUNTER — ANESTHESIA (OUTPATIENT)
Dept: GASTROENTEROLOGY | Facility: HOSPITAL | Age: 53
End: 2023-05-31

## 2023-05-31 ENCOUNTER — ANESTHESIA EVENT (OUTPATIENT)
Dept: GASTROENTEROLOGY | Facility: HOSPITAL | Age: 53
End: 2023-05-31

## 2023-05-31 VITALS
RESPIRATION RATE: 16 BRPM | TEMPERATURE: 98.2 F | SYSTOLIC BLOOD PRESSURE: 108 MMHG | DIASTOLIC BLOOD PRESSURE: 56 MMHG | OXYGEN SATURATION: 100 % | HEART RATE: 73 BPM

## 2023-05-31 DIAGNOSIS — K27.9 PEPTIC ULCER DISEASE: ICD-10-CM

## 2023-05-31 DIAGNOSIS — I85.00 IDIOPATHIC ESOPHAGEAL VARICES WITHOUT BLEEDING (HCC): Primary | ICD-10-CM

## 2023-05-31 PROBLEM — D64.9 ANEMIA: Status: ACTIVE | Noted: 2023-05-31

## 2023-05-31 PROCEDURE — 88342 IMHCHEM/IMCYTCHM 1ST ANTB: CPT | Performed by: SPECIALIST

## 2023-05-31 PROCEDURE — 43239 EGD BIOPSY SINGLE/MULTIPLE: CPT | Performed by: INTERNAL MEDICINE

## 2023-05-31 PROCEDURE — 88305 TISSUE EXAM BY PATHOLOGIST: CPT | Performed by: SPECIALIST

## 2023-05-31 RX ORDER — PROPOFOL 10 MG/ML
INJECTION, EMULSION INTRAVENOUS AS NEEDED
Status: DISCONTINUED | OUTPATIENT
Start: 2023-05-31 | End: 2023-05-31

## 2023-05-31 RX ORDER — BIOTIN 10 MG
10 TABLET ORAL DAILY
COMMUNITY

## 2023-05-31 RX ORDER — LANOLIN ALCOHOL/MO/W.PET/CERES
400 CREAM (GRAM) TOPICAL DAILY
COMMUNITY
End: 2023-06-06 | Stop reason: SDUPTHER

## 2023-05-31 RX ORDER — SODIUM CHLORIDE, SODIUM LACTATE, POTASSIUM CHLORIDE, CALCIUM CHLORIDE 600; 310; 30; 20 MG/100ML; MG/100ML; MG/100ML; MG/100ML
INJECTION, SOLUTION INTRAVENOUS CONTINUOUS PRN
Status: DISCONTINUED | OUTPATIENT
Start: 2023-05-31 | End: 2023-05-31

## 2023-05-31 RX ORDER — NADOLOL 20 MG/1
40 TABLET ORAL DAILY
Status: DISCONTINUED | OUTPATIENT
Start: 2023-05-31 | End: 2023-05-31

## 2023-05-31 RX ORDER — NADOLOL 20 MG/1
20 TABLET ORAL DAILY
Qty: 30 TABLET | Refills: 5 | Status: SHIPPED | OUTPATIENT
Start: 2023-05-31

## 2023-05-31 RX ORDER — PHENOL 1.4 %
10 AEROSOL, SPRAY (ML) MUCOUS MEMBRANE
COMMUNITY

## 2023-05-31 RX ORDER — FERROUS SULFATE 325(65) MG
325 TABLET ORAL
COMMUNITY

## 2023-05-31 RX ADMIN — PROPOFOL 50 MG: 10 INJECTION, EMULSION INTRAVENOUS at 11:35

## 2023-05-31 RX ADMIN — PROPOFOL 100 MG: 10 INJECTION, EMULSION INTRAVENOUS at 11:33

## 2023-05-31 RX ADMIN — SODIUM CHLORIDE, SODIUM LACTATE, POTASSIUM CHLORIDE, AND CALCIUM CHLORIDE: .6; .31; .03; .02 INJECTION, SOLUTION INTRAVENOUS at 11:18

## 2023-05-31 RX ADMIN — PROPOFOL 100 MG: 10 INJECTION, EMULSION INTRAVENOUS at 11:29

## 2023-05-31 NOTE — ANESTHESIA PREPROCEDURE EVALUATION
Procedure:  EGD    Relevant Problems   CARDIO   (+) Essential hypertension      GI/HEPATIC   (+) GI bleed      HEMATOLOGY   (+) Anemia      NEURO/PSYCH   (+) Depression      Digestive   (+) Nausea vomiting and diarrhea      Other   (+) Severe protein-calorie malnutrition (HCC)        Physical Exam    Airway    Mallampati score: II  TM Distance: >3 FB  Neck ROM: full     Dental       Cardiovascular      Pulmonary      Other Findings        Anesthesia Plan  ASA Score- 3     Anesthesia Type- IV sedation with anesthesia with ASA Monitors  Additional Monitors:   Airway Plan:           Plan Factors-Exercise tolerance (METS): >4 METS  Chart reviewed  EKG reviewed  Imaging results reviewed  Existing labs reviewed  Patient summary reviewed  Patient is not a current smoker  Induction- intravenous  Postoperative Plan-     Informed Consent- Anesthetic plan and risks discussed with patient  I personally reviewed this patient with the CRNA  Discussed and agreed on the Anesthesia Plan with the CRNA  Deny Barkley

## 2023-05-31 NOTE — ANESTHESIA POSTPROCEDURE EVALUATION
Post-Op Assessment Note    CV Status:  Stable  Pain Score: 0    Pain management: adequate     Mental Status:  Alert and awake   Hydration Status:  Euvolemic   PONV Controlled:  Controlled   Airway Patency:  Patent      Post Op Vitals Reviewed: Yes      Staff: CRNA         No notable events documented      BP   98/56   Temp   98   Pulse  80   Resp   16   SpO2   100

## 2023-05-31 NOTE — TELEPHONE ENCOUNTER
Spoke to patient  She will be getting labs done  Also gave her directions to our office  No other needs at this time

## 2023-05-31 NOTE — INTERVAL H&P NOTE
H&P reviewed  After examining the patient I find no changes in the patients condition since the H&P had been written      Vitals:    05/31/23 1059   BP: 106/51   Pulse: 79   Resp: 18   Temp: 98 2 °F (36 8 °C)   SpO2: 100%

## 2023-06-01 ENCOUNTER — PREP FOR PROCEDURE (OUTPATIENT)
Dept: GASTROENTEROLOGY | Facility: CLINIC | Age: 53
End: 2023-06-01

## 2023-06-01 ENCOUNTER — TELEPHONE (OUTPATIENT)
Dept: GASTROENTEROLOGY | Facility: CLINIC | Age: 53
End: 2023-06-01

## 2023-06-01 DIAGNOSIS — Z12.11 COLON CANCER SCREENING: Primary | ICD-10-CM

## 2023-06-01 DIAGNOSIS — D62 ACUTE BLOOD LOSS ANEMIA: ICD-10-CM

## 2023-06-01 DIAGNOSIS — Z12.11 SCREENING FOR COLON CANCER: ICD-10-CM

## 2023-06-01 NOTE — TELEPHONE ENCOUNTER
Scheduled date of COLONOSCOPY(as of today):06/14/2023  Physician performing COLONOSCOPY: [facility]  Location of COLONOSCOPY:[facility]  Instructions reviewed with patient by:TK EMAILED COLYTE  Clearances: NO
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
## 2023-06-04 LAB
ALBUMIN SERPL-MCNC: 3 G/DL (ref 3.8–4.9)
ALP SERPL-CCNC: 159 IU/L (ref 44–121)
ALT SERPL-CCNC: 23 IU/L (ref 0–32)
AST SERPL-CCNC: 145 IU/L (ref 0–40)
BASOPHILS # BLD AUTO: 0 X10E3/UL (ref 0–0.2)
BASOPHILS # BLD AUTO: 0.1 X10E3/UL (ref 0–0.2)
BASOPHILS NFR BLD AUTO: 1 %
BASOPHILS NFR BLD AUTO: 1 %
BILIRUB DIRECT SERPL-MCNC: 0.22 MG/DL (ref 0–0.4)
BILIRUB SERPL-MCNC: 0.5 MG/DL (ref 0–1.2)
EOSINOPHIL # BLD AUTO: 0.1 X10E3/UL (ref 0–0.4)
EOSINOPHIL # BLD AUTO: 0.1 X10E3/UL (ref 0–0.4)
EOSINOPHIL NFR BLD AUTO: 1 %
EOSINOPHIL NFR BLD AUTO: 1 %
ERYTHROCYTE [DISTWIDTH] IN BLOOD BY AUTOMATED COUNT: 14.4 % (ref 11.7–15.4)
ERYTHROCYTE [DISTWIDTH] IN BLOOD BY AUTOMATED COUNT: 14.6 % (ref 11.7–15.4)
FERRITIN SERPL-MCNC: 66 NG/ML (ref 15–150)
FOLATE SERPL-MCNC: >20 NG/ML
HCT VFR BLD AUTO: 23.7 % (ref 34–46.6)
HCT VFR BLD AUTO: 24 % (ref 34–46.6)
HGB BLD-MCNC: 7.8 G/DL (ref 11.1–15.9)
HGB BLD-MCNC: 8 G/DL (ref 11.1–15.9)
IMM GRANULOCYTES # BLD: 0 X10E3/UL (ref 0–0.1)
IMM GRANULOCYTES # BLD: 0 X10E3/UL (ref 0–0.1)
IMM GRANULOCYTES NFR BLD: 0 %
IMM GRANULOCYTES NFR BLD: 0 %
IRON SATN MFR SERPL: 34 % (ref 15–55)
IRON SERPL-MCNC: 85 UG/DL (ref 27–159)
LYMPHOCYTES # BLD AUTO: 1.6 X10E3/UL (ref 0.7–3.1)
LYMPHOCYTES # BLD AUTO: 1.6 X10E3/UL (ref 0.7–3.1)
LYMPHOCYTES NFR BLD AUTO: 19 %
LYMPHOCYTES NFR BLD AUTO: 19 %
MCH RBC QN AUTO: 31 PG (ref 26.6–33)
MCH RBC QN AUTO: 31.3 PG (ref 26.6–33)
MCHC RBC AUTO-ENTMCNC: 32.9 G/DL (ref 31.5–35.7)
MCHC RBC AUTO-ENTMCNC: 33.3 G/DL (ref 31.5–35.7)
MCV RBC AUTO: 94 FL (ref 79–97)
MCV RBC AUTO: 94 FL (ref 79–97)
MONOCYTES # BLD AUTO: 0.5 X10E3/UL (ref 0.1–0.9)
MONOCYTES # BLD AUTO: 0.6 X10E3/UL (ref 0.1–0.9)
MONOCYTES NFR BLD AUTO: 6 %
MONOCYTES NFR BLD AUTO: 7 %
NEUTROPHILS # BLD AUTO: 6.1 X10E3/UL (ref 1.4–7)
NEUTROPHILS # BLD AUTO: 6.3 X10E3/UL (ref 1.4–7)
NEUTROPHILS NFR BLD AUTO: 72 %
NEUTROPHILS NFR BLD AUTO: 73 %
PLATELET # BLD AUTO: 293 X10E3/UL (ref 150–450)
PLATELET # BLD AUTO: 311 X10E3/UL (ref 150–450)
PROT SERPL-MCNC: 7.7 G/DL (ref 6–8.5)
RBC # BLD AUTO: 2.52 X10E6/UL (ref 3.77–5.28)
RBC # BLD AUTO: 2.56 X10E6/UL (ref 3.77–5.28)
TIBC SERPL-MCNC: 249 UG/DL (ref 250–450)
UIBC SERPL-MCNC: 164 UG/DL (ref 131–425)
VIT B12 SERPL-MCNC: 603 PG/ML (ref 232–1245)
WBC # BLD AUTO: 8.4 X10E3/UL (ref 3.4–10.8)
WBC # BLD AUTO: 8.6 X10E3/UL (ref 3.4–10.8)

## 2023-06-05 LAB
ACTIN IGG SERPL-ACNC: 12 UNITS (ref 0–19)
MITOCHONDRIA M2 IGG SER-ACNC: <20 UNITS (ref 0–20)

## 2023-06-05 PROCEDURE — 88342 IMHCHEM/IMCYTCHM 1ST ANTB: CPT | Performed by: SPECIALIST

## 2023-06-05 PROCEDURE — 88305 TISSUE EXAM BY PATHOLOGIST: CPT | Performed by: SPECIALIST

## 2023-06-05 NOTE — RESULT ENCOUNTER NOTE
Called the patient and reviewed labs - seeing Hematology for consult tomorrow -- patient didn't get instructions on heme testing stools -please assist thanks

## 2023-06-06 ENCOUNTER — TELEPHONE (OUTPATIENT)
Age: 53
End: 2023-06-06

## 2023-06-06 ENCOUNTER — CONSULT (OUTPATIENT)
Age: 53
End: 2023-06-06

## 2023-06-06 VITALS
DIASTOLIC BLOOD PRESSURE: 49 MMHG | OXYGEN SATURATION: 96 % | HEART RATE: 76 BPM | SYSTOLIC BLOOD PRESSURE: 104 MMHG | TEMPERATURE: 98.1 F | RESPIRATION RATE: 18 BRPM | HEIGHT: 62 IN | WEIGHT: 115 LBS | BODY MASS INDEX: 21.16 KG/M2

## 2023-06-06 DIAGNOSIS — K92.2 GASTROINTESTINAL HEMORRHAGE, UNSPECIFIED GASTROINTESTINAL HEMORRHAGE TYPE: ICD-10-CM

## 2023-06-06 DIAGNOSIS — D62 ACUTE BLOOD LOSS ANEMIA: ICD-10-CM

## 2023-06-06 DIAGNOSIS — E43 SEVERE PROTEIN-CALORIE MALNUTRITION (HCC): ICD-10-CM

## 2023-06-06 DIAGNOSIS — D64.9 ANEMIA, UNSPECIFIED TYPE: Primary | ICD-10-CM

## 2023-06-06 DIAGNOSIS — D50.0 IRON DEFICIENCY ANEMIA DUE TO CHRONIC BLOOD LOSS: ICD-10-CM

## 2023-06-06 RX ORDER — GABAPENTIN 100 MG/1
200 CAPSULE ORAL DAILY
COMMUNITY
Start: 2022-12-26

## 2023-06-06 RX ORDER — FOLIC ACID 1 MG/1
1 TABLET ORAL DAILY
Qty: 30 TABLET | Refills: 1 | Status: SHIPPED | OUTPATIENT
Start: 2023-06-06

## 2023-06-06 RX ORDER — SODIUM CHLORIDE 9 MG/ML
20 INJECTION, SOLUTION INTRAVENOUS ONCE
OUTPATIENT
Start: 2023-06-13

## 2023-06-06 RX ORDER — CYANOCOBALAMIN/FOLIC ACID 1MG-400MCG
TABLET, SUBLINGUAL SUBLINGUAL
COMMUNITY
Start: 2023-05-13

## 2023-06-06 RX ORDER — NALTREXONE 380 MG
KIT INTRAMUSCULAR
COMMUNITY
Start: 2023-06-01

## 2023-06-06 RX ORDER — CYANOCOBALAMIN 1000 UG/ML
1000 INJECTION, SOLUTION INTRAMUSCULAR; SUBCUTANEOUS ONCE
OUTPATIENT
Start: 2023-06-13 | End: 2023-06-06

## 2023-06-20 ENCOUNTER — HOSPITAL ENCOUNTER (OUTPATIENT)
Dept: INFUSION CENTER | Facility: HOSPITAL | Age: 53
Discharge: HOME | End: 2023-06-20
Attending: INTERNAL MEDICINE
Payer: COMMERCIAL

## 2023-06-20 VITALS
HEART RATE: 68 BPM | TEMPERATURE: 98.2 F | RESPIRATION RATE: 16 BRPM | SYSTOLIC BLOOD PRESSURE: 98 MMHG | OXYGEN SATURATION: 100 % | DIASTOLIC BLOOD PRESSURE: 52 MMHG

## 2023-06-20 DIAGNOSIS — K92.2 GASTROINTESTINAL HEMORRHAGE, UNSPECIFIED GASTROINTESTINAL HEMORRHAGE TYPE: ICD-10-CM

## 2023-06-20 DIAGNOSIS — E43 SEVERE PROTEIN-CALORIE MALNUTRITION (HCC): Primary | ICD-10-CM

## 2023-06-20 DIAGNOSIS — D64.9 ANEMIA, UNSPECIFIED TYPE: ICD-10-CM

## 2023-06-20 PROCEDURE — 96365 THER/PROPH/DIAG IV INF INIT: CPT

## 2023-06-20 PROCEDURE — 96372 THER/PROPH/DIAG INJ SC/IM: CPT

## 2023-06-20 RX ORDER — CYANOCOBALAMIN 1000 UG/ML
1000 INJECTION, SOLUTION INTRAMUSCULAR; SUBCUTANEOUS ONCE
Status: COMPLETED | OUTPATIENT
Start: 2023-06-20 | End: 2023-06-20

## 2023-06-20 RX ORDER — SODIUM CHLORIDE 9 MG/ML
20 INJECTION, SOLUTION INTRAVENOUS ONCE
Status: CANCELLED | OUTPATIENT
Start: 2023-06-23

## 2023-06-20 RX ORDER — SODIUM CHLORIDE 9 MG/ML
20 INJECTION, SOLUTION INTRAVENOUS ONCE
Status: COMPLETED | OUTPATIENT
Start: 2023-06-20 | End: 2023-06-20

## 2023-06-20 RX ORDER — CYANOCOBALAMIN 1000 UG/ML
1000 INJECTION, SOLUTION INTRAMUSCULAR; SUBCUTANEOUS ONCE
Status: CANCELLED | OUTPATIENT
Start: 2023-06-27 | End: 2023-06-21

## 2023-06-20 RX ADMIN — CYANOCOBALAMIN 1000 MCG: 1000 INJECTION, SOLUTION INTRAMUSCULAR at 14:05

## 2023-06-20 RX ADMIN — IRON SUCROSE 200 MG: 20 INJECTION, SOLUTION INTRAVENOUS at 14:03

## 2023-06-20 RX ADMIN — SODIUM CHLORIDE 20 ML/HR: 9 INJECTION, SOLUTION INTRAVENOUS at 14:03

## 2023-06-20 NOTE — PLAN OF CARE
Problem: Knowledge Deficit  Goal: Patient/family/caregiver demonstrates understanding of disease process, treatment plan, medications, and discharge instructions  Description: Complete learning assessment and assess knowledge base    Interventions:  - Provide teaching at level of understanding  - Provide teaching via preferred learning methods  6/20/2023 1530 by Rasheeda Islas RN  Outcome: Progressing  6/20/2023 1439 by Rasheeda Islas RN  Outcome: Progressing

## 2023-06-23 ENCOUNTER — HOSPITAL ENCOUNTER (OUTPATIENT)
Dept: INFUSION CENTER | Facility: HOSPITAL | Age: 53
End: 2023-06-23
Attending: INTERNAL MEDICINE
Payer: COMMERCIAL

## 2023-06-23 VITALS
RESPIRATION RATE: 14 BRPM | HEART RATE: 65 BPM | SYSTOLIC BLOOD PRESSURE: 97 MMHG | OXYGEN SATURATION: 100 % | DIASTOLIC BLOOD PRESSURE: 67 MMHG | TEMPERATURE: 97.7 F

## 2023-06-23 DIAGNOSIS — D64.9 ANEMIA, UNSPECIFIED TYPE: ICD-10-CM

## 2023-06-23 DIAGNOSIS — E43 SEVERE PROTEIN-CALORIE MALNUTRITION (HCC): Primary | ICD-10-CM

## 2023-06-23 DIAGNOSIS — K92.2 GASTROINTESTINAL HEMORRHAGE, UNSPECIFIED GASTROINTESTINAL HEMORRHAGE TYPE: ICD-10-CM

## 2023-06-23 PROCEDURE — 96365 THER/PROPH/DIAG IV INF INIT: CPT

## 2023-06-23 RX ORDER — SODIUM CHLORIDE 9 MG/ML
20 INJECTION, SOLUTION INTRAVENOUS ONCE
Status: COMPLETED | OUTPATIENT
Start: 2023-06-23 | End: 2023-06-23

## 2023-06-23 RX ORDER — SODIUM CHLORIDE 9 MG/ML
20 INJECTION, SOLUTION INTRAVENOUS ONCE
Status: CANCELLED | OUTPATIENT
Start: 2023-06-27

## 2023-06-23 RX ORDER — CYANOCOBALAMIN 1000 UG/ML
1000 INJECTION, SOLUTION INTRAMUSCULAR; SUBCUTANEOUS ONCE
Status: CANCELLED | OUTPATIENT
Start: 2023-06-27 | End: 2023-06-27

## 2023-06-23 RX ADMIN — SODIUM CHLORIDE 20 ML/HR: 9 INJECTION, SOLUTION INTRAVENOUS at 14:30

## 2023-06-23 RX ADMIN — IRON SUCROSE 200 MG: 20 INJECTION, SOLUTION INTRAVENOUS at 14:33

## 2023-06-25 ENCOUNTER — TELEPHONE (OUTPATIENT)
Dept: GASTROENTEROLOGY | Facility: CLINIC | Age: 53
End: 2023-06-25

## 2023-06-25 NOTE — TELEPHONE ENCOUNTER
----- Message from Ambrocio Estrella sent at 6/1/2023  1:27 PM EDT -----  Regarding: FW: Rx for Protonix  Contact: [phone]    ----- Message -----  From: Jil Mason  Sent: 6/1/2023   1:23 PM EDT  To: #  Subject: Rx for Protonix                                  Ok will stay on Rx and I did start the Nadolol yesterday  Will wait to hear from you office    Have a great weekend  Brigitte Simons

## 2023-06-25 NOTE — TELEPHONE ENCOUNTER
It doesn't look like patient was scheduled for a colonoscopy -please arrange    --  Thanks -need to do at the hospital - hx of cardiac arrhythmia     TINO

## 2023-06-26 NOTE — TELEPHONE ENCOUNTER
Dr Mae Fraga was scheduled to have colon done 6/14-had to cancel  Has had three deaths in her family, she broke her kneecap and is currently having infusions done   She does intend to reschedule but has had a lot going on

## 2023-06-27 ENCOUNTER — HOSPITAL ENCOUNTER (OUTPATIENT)
Dept: INFUSION CENTER | Facility: HOSPITAL | Age: 53
Discharge: HOME | End: 2023-06-27
Attending: INTERNAL MEDICINE
Payer: COMMERCIAL

## 2023-06-27 ENCOUNTER — TELEPHONE (OUTPATIENT)
Age: 53
End: 2023-06-27

## 2023-06-27 VITALS — TEMPERATURE: 99.2 F | OXYGEN SATURATION: 99 % | HEART RATE: 70 BPM

## 2023-06-27 DIAGNOSIS — E43 SEVERE PROTEIN-CALORIE MALNUTRITION (HCC): Primary | ICD-10-CM

## 2023-06-27 DIAGNOSIS — D64.9 ANEMIA, UNSPECIFIED TYPE: ICD-10-CM

## 2023-06-27 DIAGNOSIS — K92.2 GASTROINTESTINAL HEMORRHAGE, UNSPECIFIED GASTROINTESTINAL HEMORRHAGE TYPE: ICD-10-CM

## 2023-06-27 RX ORDER — SODIUM CHLORIDE 9 MG/ML
20 INJECTION, SOLUTION INTRAVENOUS ONCE
Status: COMPLETED | OUTPATIENT
Start: 2023-06-27 | End: 2023-06-27

## 2023-06-27 RX ORDER — CYANOCOBALAMIN 1000 UG/ML
1000 INJECTION, SOLUTION INTRAMUSCULAR; SUBCUTANEOUS ONCE
Status: COMPLETED | OUTPATIENT
Start: 2023-06-27 | End: 2023-06-27

## 2023-06-27 RX ORDER — CYANOCOBALAMIN 1000 UG/ML
1000 INJECTION, SOLUTION INTRAMUSCULAR; SUBCUTANEOUS ONCE
Status: CANCELLED | OUTPATIENT
Start: 2023-07-06 | End: 2023-06-30

## 2023-06-27 RX ORDER — SODIUM CHLORIDE 9 MG/ML
20 INJECTION, SOLUTION INTRAVENOUS ONCE
Status: CANCELLED | OUTPATIENT
Start: 2023-06-29

## 2023-06-27 RX ADMIN — SODIUM CHLORIDE 20 ML/HR: 9 INJECTION, SOLUTION INTRAVENOUS at 14:35

## 2023-06-27 RX ADMIN — IRON SUCROSE 200 MG: 20 INJECTION, SOLUTION INTRAVENOUS at 14:36

## 2023-06-27 RX ADMIN — CYANOCOBALAMIN 1000 MCG: 1000 INJECTION, SOLUTION INTRAMUSCULAR at 14:38

## 2023-06-27 NOTE — PROGRESS NOTES
Patient venofer infusion completed without complication  B12 shot given without complication  Patient declined AVS at this time and confirmed next appointment  Patient discharged in stable condition to home via ambulation

## 2023-06-27 NOTE — TELEPHONE ENCOUNTER
Patient missed first 2 scheduled infusion, please add them to the end of her schedule, Nicole wants her to have all 8  Thank you

## 2023-06-29 ENCOUNTER — HOSPITAL ENCOUNTER (OUTPATIENT)
Dept: INFUSION CENTER | Facility: HOSPITAL | Age: 53
Discharge: HOME | End: 2023-06-29
Attending: INTERNAL MEDICINE
Payer: COMMERCIAL

## 2023-06-29 VITALS
OXYGEN SATURATION: 96 % | HEART RATE: 72 BPM | RESPIRATION RATE: 16 BRPM | DIASTOLIC BLOOD PRESSURE: 52 MMHG | TEMPERATURE: 96.8 F | SYSTOLIC BLOOD PRESSURE: 107 MMHG

## 2023-06-29 DIAGNOSIS — K92.2 GASTROINTESTINAL HEMORRHAGE, UNSPECIFIED GASTROINTESTINAL HEMORRHAGE TYPE: ICD-10-CM

## 2023-06-29 DIAGNOSIS — D64.9 ANEMIA, UNSPECIFIED TYPE: ICD-10-CM

## 2023-06-29 DIAGNOSIS — E43 SEVERE PROTEIN-CALORIE MALNUTRITION (HCC): Primary | ICD-10-CM

## 2023-06-29 PROCEDURE — 96365 THER/PROPH/DIAG IV INF INIT: CPT

## 2023-06-29 RX ORDER — SODIUM CHLORIDE 9 MG/ML
20 INJECTION, SOLUTION INTRAVENOUS ONCE
Status: CANCELLED | OUTPATIENT
Start: 2023-07-06

## 2023-06-29 RX ORDER — CYANOCOBALAMIN 1000 UG/ML
1000 INJECTION, SOLUTION INTRAMUSCULAR; SUBCUTANEOUS ONCE
Status: CANCELLED | OUTPATIENT
Start: 2023-07-06 | End: 2023-07-04

## 2023-06-29 RX ORDER — SODIUM CHLORIDE 9 MG/ML
20 INJECTION, SOLUTION INTRAVENOUS ONCE
Status: COMPLETED | OUTPATIENT
Start: 2023-06-29 | End: 2023-06-29

## 2023-06-29 RX ADMIN — SODIUM CHLORIDE 20 ML/HR: 9 INJECTION, SOLUTION INTRAVENOUS at 15:09

## 2023-06-29 RX ADMIN — IRON SUCROSE 200 MG: 20 INJECTION, SOLUTION INTRAVENOUS at 15:10

## 2023-06-30 ENCOUNTER — TELEPHONE (OUTPATIENT)
Dept: HEMATOLOGY ONCOLOGY | Facility: CLINIC | Age: 53
End: 2023-06-30

## 2023-06-30 ENCOUNTER — PREP FOR PROCEDURE (OUTPATIENT)
Age: 53
End: 2023-06-30

## 2023-06-30 DIAGNOSIS — K27.9 PEPTIC ULCER DISEASE: Primary | ICD-10-CM

## 2023-06-30 DIAGNOSIS — E43 SEVERE PROTEIN-CALORIE MALNUTRITION (HCC): Primary | ICD-10-CM

## 2023-07-06 ENCOUNTER — HOSPITAL ENCOUNTER (OUTPATIENT)
Dept: INFUSION CENTER | Facility: HOSPITAL | Age: 53
Discharge: HOME | End: 2023-07-06
Attending: INTERNAL MEDICINE

## 2023-07-06 ENCOUNTER — TELEPHONE (OUTPATIENT)
Dept: HEMATOLOGY ONCOLOGY | Facility: CLINIC | Age: 53
End: 2023-07-06

## 2023-07-06 NOTE — TELEPHONE ENCOUNTER
Call Transfer   Who are you speaking with? Patient   If it is not the patient, are they listed on an active communication consent form? N/A   Who is the patients HemOnc/SurgOnc provider? n/a   What is the reason for this call? Pt cannot make her infusion appt   Person/Department that the call was transferred to? Time that call was transferred? [facility] infusion    Your call will be transferred now. If you receive a voicemail, please leave a detailed message and a member of the team will return your call as soon as possible. Did you relay this information to the caller?   N/A

## 2023-07-11 ENCOUNTER — HOSPITAL ENCOUNTER (OUTPATIENT)
Dept: INFUSION CENTER | Facility: HOSPITAL | Age: 53
Discharge: HOME | End: 2023-07-11
Attending: INTERNAL MEDICINE
Payer: COMMERCIAL

## 2023-07-11 VITALS
RESPIRATION RATE: 16 BRPM | SYSTOLIC BLOOD PRESSURE: 112 MMHG | OXYGEN SATURATION: 100 % | DIASTOLIC BLOOD PRESSURE: 69 MMHG | HEART RATE: 62 BPM | TEMPERATURE: 97.2 F

## 2023-07-11 DIAGNOSIS — E43 SEVERE PROTEIN-CALORIE MALNUTRITION (HCC): Primary | ICD-10-CM

## 2023-07-11 DIAGNOSIS — K92.2 GASTROINTESTINAL HEMORRHAGE, UNSPECIFIED GASTROINTESTINAL HEMORRHAGE TYPE: ICD-10-CM

## 2023-07-11 DIAGNOSIS — D64.9 ANEMIA, UNSPECIFIED TYPE: ICD-10-CM

## 2023-07-11 PROCEDURE — 96372 THER/PROPH/DIAG INJ SC/IM: CPT

## 2023-07-11 PROCEDURE — 96365 THER/PROPH/DIAG IV INF INIT: CPT

## 2023-07-11 RX ORDER — CYANOCOBALAMIN 1000 UG/ML
1000 INJECTION, SOLUTION INTRAMUSCULAR; SUBCUTANEOUS ONCE
Status: CANCELLED | OUTPATIENT
Start: 2023-07-19 | End: 2023-07-13

## 2023-07-11 RX ORDER — CYANOCOBALAMIN 1000 UG/ML
1000 INJECTION, SOLUTION INTRAMUSCULAR; SUBCUTANEOUS ONCE
Status: COMPLETED | OUTPATIENT
Start: 2023-07-11 | End: 2023-07-11

## 2023-07-11 RX ORDER — SODIUM CHLORIDE 9 MG/ML
20 INJECTION, SOLUTION INTRAVENOUS ONCE
Status: CANCELLED | OUTPATIENT
Start: 2023-07-13

## 2023-07-11 RX ORDER — SODIUM CHLORIDE 9 MG/ML
20 INJECTION, SOLUTION INTRAVENOUS ONCE
Status: COMPLETED | OUTPATIENT
Start: 2023-07-11 | End: 2023-07-11

## 2023-07-11 RX ADMIN — SODIUM CHLORIDE 20 ML/HR: 9 INJECTION, SOLUTION INTRAVENOUS at 14:31

## 2023-07-11 RX ADMIN — IRON SUCROSE 200 MG: 20 INJECTION, SOLUTION INTRAVENOUS at 14:29

## 2023-07-11 RX ADMIN — CYANOCOBALAMIN 1000 MCG: 1000 INJECTION, SOLUTION INTRAMUSCULAR at 14:29

## 2023-07-11 NOTE — PROGRESS NOTES
Patient infusion completed without complications and K17 injection administered. Patient discharged in stable condition home. Discharge paperwork and education were declined. Patient ambulated off unit at this time.

## 2023-07-13 ENCOUNTER — HOSPITAL ENCOUNTER (OUTPATIENT)
Dept: INFUSION CENTER | Facility: HOSPITAL | Age: 53
Discharge: HOME | End: 2023-07-13
Attending: INTERNAL MEDICINE
Payer: COMMERCIAL

## 2023-07-13 ENCOUNTER — TELEPHONE (OUTPATIENT)
Dept: GASTROENTEROLOGY | Facility: CLINIC | Age: 53
End: 2023-07-13

## 2023-07-13 VITALS
DIASTOLIC BLOOD PRESSURE: 56 MMHG | RESPIRATION RATE: 16 BRPM | SYSTOLIC BLOOD PRESSURE: 103 MMHG | OXYGEN SATURATION: 99 % | HEART RATE: 77 BPM | TEMPERATURE: 98.6 F

## 2023-07-13 DIAGNOSIS — E43 SEVERE PROTEIN-CALORIE MALNUTRITION (HCC): Primary | ICD-10-CM

## 2023-07-13 DIAGNOSIS — K92.2 GASTROINTESTINAL HEMORRHAGE, UNSPECIFIED GASTROINTESTINAL HEMORRHAGE TYPE: ICD-10-CM

## 2023-07-13 DIAGNOSIS — D64.9 ANEMIA, UNSPECIFIED TYPE: ICD-10-CM

## 2023-07-13 PROCEDURE — 96365 THER/PROPH/DIAG IV INF INIT: CPT

## 2023-07-13 RX ORDER — SODIUM CHLORIDE 9 MG/ML
20 INJECTION, SOLUTION INTRAVENOUS ONCE
Status: CANCELLED | OUTPATIENT
Start: 2023-07-19

## 2023-07-13 RX ORDER — CYANOCOBALAMIN 1000 UG/ML
1000 INJECTION, SOLUTION INTRAMUSCULAR; SUBCUTANEOUS ONCE
Status: CANCELLED | OUTPATIENT
Start: 2023-07-19 | End: 2023-07-18

## 2023-07-13 RX ORDER — SODIUM CHLORIDE 9 MG/ML
20 INJECTION, SOLUTION INTRAVENOUS ONCE
Status: COMPLETED | OUTPATIENT
Start: 2023-07-13 | End: 2023-07-13

## 2023-07-13 RX ADMIN — IRON SUCROSE 200 MG: 20 INJECTION, SOLUTION INTRAVENOUS at 14:22

## 2023-07-13 RX ADMIN — SODIUM CHLORIDE 20 ML/HR: 9 INJECTION, SOLUTION INTRAVENOUS at 14:21

## 2023-07-13 NOTE — PROGRESS NOTES
Pt tolerated venofer infusion with no adverse reactions. Iv removed. Pt left ambulatory with steady gait.  Declined AVS

## 2023-07-13 NOTE — TELEPHONE ENCOUNTER
Scheduled date of COLONOSCOPY(as of today):8/24/2023  Physician performing COLONOSCOPY:Dr Jason Saenz  Location of :[facility]  Instructions reviewed with patient by:TIN  Clearances: no

## 2023-07-19 ENCOUNTER — HOSPITAL ENCOUNTER (OUTPATIENT)
Dept: INFUSION CENTER | Facility: HOSPITAL | Age: 53
Discharge: HOME | End: 2023-07-19
Attending: INTERNAL MEDICINE
Payer: COMMERCIAL

## 2023-07-19 VITALS
SYSTOLIC BLOOD PRESSURE: 149 MMHG | DIASTOLIC BLOOD PRESSURE: 76 MMHG | TEMPERATURE: 96.9 F | HEART RATE: 80 BPM | OXYGEN SATURATION: 98 % | RESPIRATION RATE: 16 BRPM

## 2023-07-19 DIAGNOSIS — D64.9 ANEMIA, UNSPECIFIED TYPE: ICD-10-CM

## 2023-07-19 DIAGNOSIS — K92.2 GASTROINTESTINAL HEMORRHAGE, UNSPECIFIED GASTROINTESTINAL HEMORRHAGE TYPE: ICD-10-CM

## 2023-07-19 DIAGNOSIS — E43 SEVERE PROTEIN-CALORIE MALNUTRITION (HCC): Primary | ICD-10-CM

## 2023-07-19 RX ORDER — CYANOCOBALAMIN 1000 UG/ML
1000 INJECTION, SOLUTION INTRAMUSCULAR; SUBCUTANEOUS ONCE
Status: COMPLETED | OUTPATIENT
Start: 2023-07-19 | End: 2023-07-19

## 2023-07-19 RX ORDER — SODIUM CHLORIDE 9 MG/ML
20 INJECTION, SOLUTION INTRAVENOUS ONCE
Status: COMPLETED | OUTPATIENT
Start: 2023-07-19 | End: 2023-07-19

## 2023-07-19 RX ORDER — SODIUM CHLORIDE 9 MG/ML
20 INJECTION, SOLUTION INTRAVENOUS ONCE
Status: CANCELLED | OUTPATIENT
Start: 2023-07-21

## 2023-07-19 RX ORDER — CYANOCOBALAMIN 1000 UG/ML
1000 INJECTION, SOLUTION INTRAMUSCULAR; SUBCUTANEOUS ONCE
Status: CANCELLED | OUTPATIENT
Start: 2023-07-20 | End: 2023-07-20

## 2023-07-19 RX ADMIN — CYANOCOBALAMIN 1000 MCG: 1000 INJECTION, SOLUTION INTRAMUSCULAR at 14:39

## 2023-07-19 RX ADMIN — IRON SUCROSE 200 MG: 20 INJECTION, SOLUTION INTRAVENOUS at 14:35

## 2023-07-19 RX ADMIN — SODIUM CHLORIDE 20 ML/HR: 9 INJECTION, SOLUTION INTRAVENOUS at 14:35

## 2023-07-19 NOTE — PROGRESS NOTES
Pt tolerated venofer infusion with no adverse reactions. Iv did infiltrate during flush. Iv was removed and pressure dressing applied. Pt left ambulatory with steady gait.  Declined AVS

## 2023-07-21 ENCOUNTER — HOSPITAL ENCOUNTER (OUTPATIENT)
Dept: INFUSION CENTER | Facility: HOSPITAL | Age: 53
End: 2023-07-21
Attending: INTERNAL MEDICINE
Payer: COMMERCIAL

## 2023-07-21 VITALS
OXYGEN SATURATION: 97 % | DIASTOLIC BLOOD PRESSURE: 66 MMHG | RESPIRATION RATE: 14 BRPM | HEART RATE: 89 BPM | TEMPERATURE: 98.6 F | SYSTOLIC BLOOD PRESSURE: 110 MMHG

## 2023-07-21 DIAGNOSIS — K92.2 GASTROINTESTINAL HEMORRHAGE, UNSPECIFIED GASTROINTESTINAL HEMORRHAGE TYPE: ICD-10-CM

## 2023-07-21 DIAGNOSIS — D64.9 ANEMIA, UNSPECIFIED TYPE: ICD-10-CM

## 2023-07-21 DIAGNOSIS — E43 SEVERE PROTEIN-CALORIE MALNUTRITION (HCC): Primary | ICD-10-CM

## 2023-07-21 PROCEDURE — 96365 THER/PROPH/DIAG IV INF INIT: CPT

## 2023-07-21 RX ORDER — SODIUM CHLORIDE 9 MG/ML
20 INJECTION, SOLUTION INTRAVENOUS ONCE
Status: COMPLETED | OUTPATIENT
Start: 2023-07-21 | End: 2023-07-21

## 2023-07-21 RX ORDER — SODIUM CHLORIDE 9 MG/ML
20 INJECTION, SOLUTION INTRAVENOUS ONCE
Status: CANCELLED | OUTPATIENT
Start: 2023-07-26

## 2023-07-21 RX ADMIN — IRON SUCROSE 200 MG: 20 INJECTION, SOLUTION INTRAVENOUS at 14:53

## 2023-07-21 RX ADMIN — SODIUM CHLORIDE 20 ML/HR: 9 INJECTION, SOLUTION INTRAVENOUS at 14:53

## 2023-07-21 NOTE — PROGRESS NOTES
Pt tolerated venofer infusion without difficulty. PIV removed and bandage applied. Pt aware of follow up with hematology. Left ambulatory for d/c.

## 2023-08-07 ENCOUNTER — TELEPHONE (OUTPATIENT)
Dept: GASTROENTEROLOGY | Facility: CLINIC | Age: 53
End: 2023-08-07

## 2023-08-07 DIAGNOSIS — D64.9 ANEMIA, UNSPECIFIED TYPE: ICD-10-CM

## 2023-08-07 RX ORDER — FOLIC ACID 1 MG/1
1000 TABLET ORAL DAILY
Qty: 30 TABLET | Refills: 1 | Status: SHIPPED | OUTPATIENT
Start: 2023-08-07

## 2023-08-07 NOTE — TELEPHONE ENCOUNTER
Procedure confirmed  Colonoscopy     Via: Proterroblanco    Instructions given: Given to Patient at Visit     Prep Given: Golytely    Call the office if there are any questions.

## 2023-08-14 ENCOUNTER — TELEPHONE (OUTPATIENT)
Dept: GASTROENTEROLOGY | Facility: CLINIC | Age: 53
End: 2023-08-14

## 2023-08-14 NOTE — TELEPHONE ENCOUNTER
Procedure confirmed  Colonoscopy     Via: Voice mail    Instructions given: Given to Patient at Visit     Prep Given: Golytely    Call the office if there are any questions.

## 2023-08-30 ENCOUNTER — DOCUMENTATION (OUTPATIENT)
Dept: HEMATOLOGY ONCOLOGY | Facility: CLINIC | Age: 53
End: 2023-08-30

## 2023-08-30 NOTE — PROGRESS NOTES
Oncology Finance Advocacy Intake and Intervention  Oncology Finance Counselor/Advocate placed call to patient. This writer informed patient that this writer is here to assist patient with billing questions, financial assistance, payment/payment plans, quotes, copayment assistance, insurance optimization, and insurance navigation. This writer conducted a thorough benefit review of copayment, deductible, and out of pocket cost. This information is documented below and has been reviewed with patient. Copayment: N/A  Deductible: N/A  Out of Pocket Cost: N/A  Insurance optimization (Limited benefit vs self-pay): N/A  Patient assistance status: N/A  Free Drug Applications: N/A  Interventions:  Pt has active [address] medicaid  [facility] to go over her benefits  Got her voicemail left her a message to call me back        Information above was review thoroughly with patient and patient was advise of possible assistance programs/interventions. If any question arise patient can contact this writer at below information. This information was given to patient at time of contact. Ethan Vasquez  Phone:[phone]  Email: [email or web address]

## 2023-09-06 ENCOUNTER — TELEPHONE (OUTPATIENT)
Age: 53
End: 2023-09-06

## 2023-09-06 ENCOUNTER — TELEPHONE (OUTPATIENT)
Dept: GASTROENTEROLOGY | Facility: CLINIC | Age: 53
End: 2023-09-06

## 2023-09-06 NOTE — TELEPHONE ENCOUNTER
Pt called to r/s colonoscopy. Colonoscopy rescheduled for 11/27- pt needs hospital setting. Sent golytely instructions on [place].

## 2023-09-07 ENCOUNTER — TELEPHONE (OUTPATIENT)
Dept: HEMATOLOGY ONCOLOGY | Facility: CLINIC | Age: 53
End: 2023-09-07

## 2023-09-07 RX ORDER — NALOXONE HYDROCHLORIDE 4 MG/.1ML
SPRAY NASAL
COMMUNITY
Start: 2023-08-07

## 2023-09-07 RX ORDER — AMOXICILLIN 500 MG/1
CAPSULE ORAL
COMMUNITY
Start: 2023-09-05

## 2023-09-07 RX ORDER — CHLORHEXIDINE GLUCONATE 0.12 MG/ML
RINSE ORAL
COMMUNITY
Start: 2023-09-05

## 2023-09-07 NOTE — TELEPHONE ENCOUNTER
Appointment Change  Cancel, Reschedule, Change to Virtual      Who are you speaking with? Patient   If it is not the patient, is the caller listed on the communication consent form? N/A   Which provider is the appointment scheduled with? KAREN Arredondo   When was the original appointment scheduled? Please list date and time 9/12/23 at 1:00pm   At which location is the appointment scheduled to take place? [place]   Was the appointment rescheduled? Was the appointment changed from an in person visit to a virtual visit? If so, please list the details of the change. 9/25/23 at 11:00am   What is the reason for the appointment change? Patient had a schedule conflict and needed to reschedule. Was STAR transport scheduled? No   Does STAR transport need to be scheduled for the new visit (if applicable) No   Does the patient need an infusion appointment rescheduled? No   Does the patient have an upcoming infusion appointment scheduled? If so, when? No   Is the patient undergoing chemotherapy? No   For appointments cancelled with less than 24 hours:  Was the no-show policy reviewed?  Yes

## 2023-09-08 ENCOUNTER — OFFICE VISIT (OUTPATIENT)
Age: 53
End: 2023-09-08
Payer: COMMERCIAL

## 2023-09-08 VITALS
DIASTOLIC BLOOD PRESSURE: 66 MMHG | WEIGHT: 120.2 LBS | HEIGHT: 61 IN | BODY MASS INDEX: 22.69 KG/M2 | SYSTOLIC BLOOD PRESSURE: 112 MMHG

## 2023-09-08 DIAGNOSIS — Z12.11 COLON CANCER SCREENING: ICD-10-CM

## 2023-09-08 DIAGNOSIS — K70.30 ALCOHOLIC CIRRHOSIS OF LIVER WITHOUT ASCITES (HCC): ICD-10-CM

## 2023-09-08 DIAGNOSIS — E53.8 VITAMIN B12 DEFICIENCY: ICD-10-CM

## 2023-09-08 DIAGNOSIS — D62 ACUTE BLOOD LOSS ANEMIA: Primary | ICD-10-CM

## 2023-09-08 DIAGNOSIS — I85.10 SECONDARY ESOPHAGEAL VARICES WITHOUT BLEEDING (HCC): ICD-10-CM

## 2023-09-08 DIAGNOSIS — D50.9 IRON DEFICIENCY ANEMIA, UNSPECIFIED IRON DEFICIENCY ANEMIA TYPE: ICD-10-CM

## 2023-09-08 PROCEDURE — 99214 OFFICE O/P EST MOD 30 MIN: CPT | Performed by: INTERNAL MEDICINE

## 2023-09-08 NOTE — PATIENT INSTRUCTIONS
Marquita Navarrete [facility] - Outpatient Follow-up Note  Yun Chris 46 y.o. female MRN: [id number]  Encounter: [id number]    ASSESSMENT AND PLAN:      1. Acute blood loss anemia  --Acute blood loss anemia has resolved. Etiology was unclear. Patient did have an upper endoscopy earlier this year x2  -Bleeding could have been from peptic disease. Ulcer had resolved on endoscopy in late May. She did have grade 2 esophageal varices however. Colonoscopy last performed about 4 years ago  -During hospitalization in January patient's hemoglobin was in the 9 g range on recheck in May and had dropped to 7.4  -Most recent hemoglobin 10.6 this week. Patient completed course of iron infusions earlier this summer  -Patient canceled colonoscopy which was scheduled for August with me because she had entered rehab again for alcohol use disorder    -Reschedule colonoscopy next available. Appears to be open appointment in mid October  -Exam scheduled for the hospital as patient does have a history of long QT syndrome and previous syncope        2. Alcoholic cirrhosis of liver without ascites ([address])  --Patient with probable cirrhosis on the basis of having esophageal varices and low platelet count.  -Screen for [address] every 6 months with ultrasound. Check other labs    - US abdomen complete; Future  - AFP tumor marker; Future  - Protime-INR; Future    4. Colon cancer screening  --Will be done for diagnostic purposes. Patient's last colonoscopy 4 years ago    5. Secondary esophageal varices without bleeding (HCC)  --Continues on nadolol daily- repeat EGD in 2024    6. Iron deficiency anemia, unspecified iron deficiency anemia type  --Has been seeing hematology. Has an appointment next week. Most recent hemoglobin 10.6 with normal MCV. Probably will be rechecking iron panel  Follows with HANNA Valladares     7. Vitamin B12 deficiency  --Has been getting supplemental B12 previously.   Again follow-up with hematology      Followup Appointment: 6 mo

## 2023-09-08 NOTE — PROGRESS NOTES
Marquita Navarrete [facility] - Outpatient Follow-up Note  Yudi Grullon 46 y.o. female MRN: [id number]  Encounter: [id number]    ASSESSMENT AND PLAN:      1. Acute blood loss anemia  --Acute blood loss anemia has resolved. Etiology was unclear. Patient did have an upper endoscopy earlier this year x2  -Bleeding could have been from peptic disease. Ulcer had resolved on endoscopy in late May. She did have grade 2 esophageal varices however. Colonoscopy last performed about 4 years ago  -During hospitalization in January patient's hemoglobin was in the 9 g range on recheck in May and had dropped to 7.4  -Most recent hemoglobin 10.6 this week. Patient completed course of iron infusions earlier this summer  -Patient canceled colonoscopy which was scheduled for August with me because she had entered rehab again for alcohol use disorder    -Reschedule colonoscopy next available. Appears to be open appointment in mid October  -Exam scheduled for the hospital as patient does have a history of long QT syndrome and previous syncope        2. Alcoholic cirrhosis of liver without ascites ([address])  --Patient with probable cirrhosis on the basis of having esophageal varices and low platelet count.  -Screen for [address] every 6 months with ultrasound. Check other labs    - US abdomen complete; Future  - AFP tumor marker; Future  - Protime-INR; Future    4. Colon cancer screening  --Will be done for diagnostic purposes. Patient's last colonoscopy 4 years ago    5. Secondary esophageal varices without bleeding (HCC)  --Continues on nadolol daily- repeat EGD in 2024    6. Iron deficiency anemia, unspecified iron deficiency anemia type  --Has been seeing hematology. Has an appointment next week. Most recent hemoglobin 10.6 with normal MCV. Probably will be rechecking iron panel  Follows with HANNA Lewis     7. Vitamin B12 deficiency  --Has been getting supplemental B12 previously.   Again follow-up with hematology      Followup Appointment: 6 mo   ______________________________________________________________________    Chief Complaint   Patient presents with   • Follow-up     Pt is not currently having any GI symptoms. Follow up from EGD and colonoscopy. HPI: Patient returns to the office for follow-up. We saw the patient back in May after hospitalization for GI bleeding in January 2023  She had a bleeding ulcer at that time which was treated with an Endo Clip hemoglobin at that time was in the 9 g range. I saw her for follow-up in May and patient was complaining of some fatigue and palpitations. Follow-up hemoglobin was 7.4. At that time patient had no overt bleeding. Follow-up endoscopy was subsequently arranged and revealed grade 2 varices without stigmata of bleeding. Colonoscopy was subsequently scheduled but patient could have a relapse of her alcohol use disorder and had to cancel the appointment which was last month. She is doing well now and has been out of rehab for 2 weeks. She is having active counseling. Overall feels well from a GI point of view. She completed iron infusions and B12 injections earlier this summer. Her hemoglobin came up to 10.6 in August and repeat hemoglobin on September 6 was 10.6 again. MCV was over 100. Overall patient is feeling well. There is no recent history of black stool or rectal bleeding.   We had scheduled the patient for colonoscopy at the hospital secondary to history of long QT syndrome    Historical Information   Past Medical History:   Diagnosis Date   • Alcoholism ([address] ) 10/1/2010   • Fatty liver 01/20/2023   • Gallstones    • Long Q-T syndrome      Past Surgical History:   Procedure Laterality Date   • COLONOSCOPY  4/29/2019    Clear   • TUBAL LIGATION  07/14/2005   • UPPER GASTROINTESTINAL ENDOSCOPY  1/20/2023    Burst ulcer     Social History     Substance and Sexual Activity   Alcohol Use Never    Comment: stopped January 2023     Social History Substance and Sexual Activity   Drug Use Never     Social History     Tobacco Use   Smoking Status Never   • Passive exposure: Never   Smokeless Tobacco Never     Family History   Problem Relation Age of Onset   • Cancer Mother          Current Outpatient Medications:   •  amoxicillin (AMOXIL) 500 mg capsule  •  Apple Cider Vinegar 188 MG CAPS  •  Biotin 10 MG TABS  •  chlorhexidine (PERIDEX) 0.12 % solution  •  escitalopram (LEXAPRO) 20 mg tablet  •  folic acid (FOLVITE) 1 mg tablet  •  gabapentin (NEURONTIN) 100 mg capsule  •  Melatonin 10 MG TABS  •  mirtazapine (REMERON) 15 mg tablet  •  Multiple Vitamins-Minerals (multivitamin with minerals) tablet  •  nadolol (CORGARD) 20 mg tablet  •  traZODone (DESYREL) 100 mg tablet  •  valsartan (DIOVAN) 40 mg tablet  •  Cobalamin Combinations (B-12) 1000-400 MCG SUBL  •  cyanocobalamin (VITAMIN B-12) 100 MCG tablet  •  ferrous sulfate 325 (65 Fe) mg tablet  •  gabapentin (NEURONTIN) 100 mg capsule  •  naloxone (NARCAN) 4 mg/0.1 mL nasal spray  •  pantoprazole (PROTONIX) 40 mg tablet  •  polyethylene glycol (GOLYTELY) 4000 mL solution  •  Vivitrol 380 MG SUSR  No Known Allergies  Reviewed medications and allergies and updated as indicated    PHYSICAL EXAM:    Blood pressure 112/66, height 5' 1" (1.549 m), weight 54.5 kg (120 lb 3.2 oz). Body mass index is 22.71 kg/m². General Appearance: NAD, cooperative, alert  Eyes: Anicteric, conjunctiva pink  ENT:  Normocephalic, atraumatic, normal mucosa. Neck:  Supple, symmetrical, trachea midline  Resp:  Clear to auscultation bilaterally; no rales, rhonchi or wheezing; respirations unlabored   CV:  S1 S2, Regular rate and rhythm; no murmur, rub, or gallop. GI:  Soft, non-tender, non-distended; normal bowel sounds; no masses, no organomegaly   Rectal: Deferred  Musculoskeletal: No cyanosis, clubbing . Normal ROM. --Positive for pedal edema slightly greater on the right than the left 1+  Skin:  No jaundice, rashes, or lesions Heme/Lymph: No palpable cervical lymphadenopathy  Psych: Normal affect, good eye contact  Neuro: No gross deficits, AAOx3      Lab work 9/6/2023 [facility] hemoglobin 10.6 hematocrit 32 2  platelet count 046 liver function studies bilirubin 0.8 alk phos 124 AST 90 ALT 28 albumin 3.6

## 2023-09-08 NOTE — LETTER
September 8, 2023     Lauri Samayoa MD  [address]    Patient: Tylor Hughes   YOB: 1970   Date of Visit: 9/8/2023       Dear Dr. Jim Connell: Thank you for referring Tylor Hughes to me for evaluation. Below are my notes for this consultation. If you have questions, please do not hesitate to call me. I look forward to following your patient along with you. Sincerely,        Rosales Garcia MD        CC: KAREN Browne MD  9/8/2023  5:57 PM  Sign when Signing Visit  [address] [facility] - Outpatient Follow-up Note  Tylor Hughes 46 y.o. female MRN: [id number]  Encounter: [id number]    ASSESSMENT AND PLAN:      1. Acute blood loss anemia  --Acute blood loss anemia has resolved. Etiology was unclear. Patient did have an upper endoscopy earlier this year x2  -Bleeding could have been from peptic disease. Ulcer had resolved on endoscopy in late May. She did have grade 2 esophageal varices however. Colonoscopy last performed about 4 years ago  -During hospitalization in January patient's hemoglobin was in the 9 g range on recheck in May and had dropped to 7.4  -Most recent hemoglobin 10.6 this week. Patient completed course of iron infusions earlier this summer  -Patient canceled colonoscopy which was scheduled for August with me because she had entered rehab again for alcohol use disorder    -Reschedule colonoscopy next available. Appears to be open appointment in mid October  -Exam scheduled for the hospital as patient does have a history of long QT syndrome and previous syncope        2. Alcoholic cirrhosis of liver without ascites ([address])  --Patient with probable cirrhosis on the basis of having esophageal varices and low platelet count.  -Screen for [address] every 6 months with ultrasound. Check other labs    - US abdomen complete; Future  - AFP tumor marker; Future  - Protime-INR; Future    4. Colon cancer screening  --Will be done for diagnostic purposes. Patient's last colonoscopy 4 years ago    5. Secondary esophageal varices without bleeding (HCC)  --Continues on nadolol daily- repeat EGD in 2024    6. Iron deficiency anemia, unspecified iron deficiency anemia type  --Has been seeing hematology. Has an appointment next week. Most recent hemoglobin 10.6 with normal MCV. Probably will be rechecking iron panel  Follows with NP Kameron Loya     7. Vitamin B12 deficiency  --Has been getting supplemental B12 previously. Again follow-up with hematology      Followup Appointment: 6 mo   ______________________________________________________________________    Chief Complaint   Patient presents with   • Follow-up     Pt is not currently having any GI symptoms. Follow up from EGD and colonoscopy. HPI: Patient returns to the office for follow-up. We saw the patient back in May after hospitalization for GI bleeding in January 2023  She had a bleeding ulcer at that time which was treated with an Endo Clip hemoglobin at that time was in the 9 g range. I saw her for follow-up in May and patient was complaining of some fatigue and palpitations. Follow-up hemoglobin was 7.4. At that time patient had no overt bleeding. Follow-up endoscopy was subsequently arranged and revealed grade 2 varices without stigmata of bleeding. Colonoscopy was subsequently scheduled but patient could have a relapse of her alcohol use disorder and had to cancel the appointment which was last month. She is doing well now and has been out of rehab for 2 weeks. She is having active counseling. Overall feels well from a GI point of view. She completed iron infusions and B12 injections earlier this summer. Her hemoglobin came up to 10.6 in August and repeat hemoglobin on September 6 was 10.6 again. MCV was over 100. Overall patient is feeling well. There is no recent history of black stool or rectal bleeding.   We had scheduled the patient for colonoscopy at the hospital secondary to history of long QT syndrome    Historical Information   Past Medical History:   Diagnosis Date   • Alcoholism ([address]) 10/1/2010   • Fatty liver 01/20/2023   • Gallstones    • Long Q-T syndrome      Past Surgical History:   Procedure Laterality Date   • COLONOSCOPY  4/29/2019    Clear   • TUBAL LIGATION  07/14/2005   • UPPER GASTROINTESTINAL ENDOSCOPY  1/20/2023    Burst ulcer     Social History     Substance and Sexual Activity   Alcohol Use Never    Comment: stopped January 2023     Social History     Substance and Sexual Activity   Drug Use Never     Social History     Tobacco Use   Smoking Status Never   • Passive exposure: Never   Smokeless Tobacco Never     Family History   Problem Relation Age of Onset   • Cancer Mother          Current Outpatient Medications:   •  amoxicillin (AMOXIL) 500 mg capsule  •  Apple Cider Vinegar 188 MG CAPS  •  Biotin 10 MG TABS  •  chlorhexidine (PERIDEX) 0.12 % solution  •  escitalopram (LEXAPRO) 20 mg tablet  •  folic acid (FOLVITE) 1 mg tablet  •  gabapentin (NEURONTIN) 100 mg capsule  •  Melatonin 10 MG TABS  •  mirtazapine (REMERON) 15 mg tablet  •  Multiple Vitamins-Minerals (multivitamin with minerals) tablet  •  nadolol (CORGARD) 20 mg tablet  •  traZODone (DESYREL) 100 mg tablet  •  valsartan (DIOVAN) 40 mg tablet  •  Cobalamin Combinations (B-12) 1000-400 MCG SUBL  •  cyanocobalamin (VITAMIN B-12) 100 MCG tablet  •  ferrous sulfate 325 (65 Fe) mg tablet  •  gabapentin (NEURONTIN) 100 mg capsule  •  naloxone (NARCAN) 4 mg/0.1 mL nasal spray  •  pantoprazole (PROTONIX) 40 mg tablet  •  polyethylene glycol (GOLYTELY) 4000 mL solution  •  Vivitrol 380 MG SUSR  No Known Allergies  Reviewed medications and allergies and updated as indicated    PHYSICAL EXAM:    Blood pressure 112/66, height 5' 1" (1.549 m), weight 54.5 kg (120 lb 3.2 oz). Body mass index is 22.71 kg/m².   General Appearance: NAD, cooperative, alert  Eyes: Anicteric, conjunctiva pink  ENT:  Normocephalic, atraumatic, normal mucosa. Neck:  Supple, symmetrical, trachea midline  Resp:  Clear to auscultation bilaterally; no rales, rhonchi or wheezing; respirations unlabored   CV:  S1 S2, Regular rate and rhythm; no murmur, rub, or gallop. GI:  Soft, non-tender, non-distended; normal bowel sounds; no masses, no organomegaly   Rectal: Deferred  Musculoskeletal: No cyanosis, clubbing . Normal ROM. --Positive for pedal edema slightly greater on the right than the left 1+  Skin:  No jaundice, rashes, or lesions   Heme/Lymph: No palpable cervical lymphadenopathy  Psych: Normal affect, good eye contact  Neuro: No gross deficits, AAOx3      Lab work 9/6/2023 [facility] hemoglobin 10.6 hematocrit 32 2  platelet count 700 liver function studies bilirubin 0.8 alk phos 124 AST 90 ALT 28 albumin 3.6

## 2023-09-08 NOTE — LETTER
September 8, 2023     Dennis Smith MD  [address]    Patient: Kd May   YOB: 1970   Date of Visit: 9/8/2023       Dear Dr. Story Form: Thank you for referring Kd May to me for evaluation. Below are my notes for this consultation. If you have questions, please do not hesitate to call me. I look forward to following your patient along with you. Sincerely,        Alhaji Jason MD        CC: KAREN Peterson MD  9/8/2023  5:55 PM  Incomplete  [address] [facility] - Outpatient Follow-up Note  Kd May 46 y.o. female MRN: [id number]  Encounter: [id number]    ASSESSMENT AND PLAN:      1. Acute blood loss anemia  --Acute blood loss anemia has resolved. Etiology was unclear. Patient did have an upper endoscopy earlier this year x2  -Bleeding could have been from peptic disease. Ulcer had resolved on endoscopy in late May. She did have grade 2 esophageal varices however. Colonoscopy last performed about 4 years ago  -During hospitalization in January patient's hemoglobin was in the 9 g range on recheck in May and had dropped to 7.4  -Most recent hemoglobin 10.6 this week. Patient completed course of iron infusions earlier this summer  -Patient canceled colonoscopy which was scheduled for August with me because she had entered rehab again for alcohol use disorder    -Reschedule colonoscopy next available. Appears to be open appointment in mid October  -Exam scheduled for the hospital as patient does have a history of long QT syndrome and previous syncope        2. Alcoholic cirrhosis of liver without ascites ([address])  --Patient with probable cirrhosis on the basis of having esophageal varices and low platelet count.  -Screen for [address] every 6 months with ultrasound. Check other labs    - US abdomen complete; Future  - AFP tumor marker; Future  - Protime-INR; Future    4.  Colon cancer screening  --Will be done for diagnostic purposes. Patient's last colonoscopy 4 years ago    5. Secondary esophageal varices without bleeding (HCC)  --Continues on nadolol daily- repeat EGD in 2024    6. Iron deficiency anemia, unspecified iron deficiency anemia type  --Has been seeing hematology. Has an appointment next week. Most recent hemoglobin 10.6 with normal MCV. Probably will be rechecking iron panel  Follows with HANNA Dutta     7. Vitamin B12 deficiency  --Has been getting supplemental B12 previously. Again follow-up with hematology      Followup Appointment: 6 mo   ______________________________________________________________________    Chief Complaint   Patient presents with   • Follow-up     Pt is not currently having any GI symptoms. Follow up from EGD and colonoscopy. HPI: Patient returns to the office for follow-up. We saw the patient back in May after hospitalization for GI bleeding in January 2023  She had a bleeding ulcer at that time which was treated with an Endo Clip hemoglobin at that time was in the 9 g range. I saw her for follow-up in May and patient was complaining of some fatigue and palpitations. Follow-up hemoglobin was 7.4. At that time patient had no overt bleeding. Follow-up endoscopy was subsequently arranged and revealed grade 2 varices without stigmata of bleeding. Colonoscopy was subsequently scheduled but patient could have a relapse of her alcohol use disorder and had to cancel the appointment which was last month. She is doing well now and has been out of rehab for 2 weeks. She is having active counseling. Overall feels well from a GI point of view. She completed iron infusions and B12 injections earlier this summer. Her hemoglobin came up to 10.6 in August and repeat hemoglobin on September 6 was 10.6 again. MCV was over 100. Overall patient is feeling well. There is no recent history of black stool or rectal bleeding.   We had scheduled the patient for colonoscopy at the hospital secondary to history of long QT syndrome    Historical Information   Past Medical History:   Diagnosis Date   • Alcoholism ([address]) 10/1/2010   • Fatty liver 01/20/2023   • Gallstones    • Long Q-T syndrome      Past Surgical History:   Procedure Laterality Date   • COLONOSCOPY  4/29/2019    Clear   • TUBAL LIGATION  07/14/2005   • UPPER GASTROINTESTINAL ENDOSCOPY  1/20/2023    Burst ulcer     Social History     Substance and Sexual Activity   Alcohol Use Never    Comment: stopped January 2023     Social History     Substance and Sexual Activity   Drug Use Never     Social History     Tobacco Use   Smoking Status Never   • Passive exposure: Never   Smokeless Tobacco Never     Family History   Problem Relation Age of Onset   • Cancer Mother          Current Outpatient Medications:   •  amoxicillin (AMOXIL) 500 mg capsule  •  Apple Cider Vinegar 188 MG CAPS  •  Biotin 10 MG TABS  •  chlorhexidine (PERIDEX) 0.12 % solution  •  escitalopram (LEXAPRO) 20 mg tablet  •  folic acid (FOLVITE) 1 mg tablet  •  gabapentin (NEURONTIN) 100 mg capsule  •  Melatonin 10 MG TABS  •  mirtazapine (REMERON) 15 mg tablet  •  Multiple Vitamins-Minerals (multivitamin with minerals) tablet  •  nadolol (CORGARD) 20 mg tablet  •  traZODone (DESYREL) 100 mg tablet  •  valsartan (DIOVAN) 40 mg tablet  •  Cobalamin Combinations (B-12) 1000-400 MCG SUBL  •  cyanocobalamin (VITAMIN B-12) 100 MCG tablet  •  ferrous sulfate 325 (65 Fe) mg tablet  •  gabapentin (NEURONTIN) 100 mg capsule  •  naloxone (NARCAN) 4 mg/0.1 mL nasal spray  •  pantoprazole (PROTONIX) 40 mg tablet  •  polyethylene glycol (GOLYTELY) 4000 mL solution  •  Vivitrol 380 MG SUSR  No Known Allergies  Reviewed medications and allergies and updated as indicated    PHYSICAL EXAM:    Blood pressure 112/66, height 5' 1" (1.549 m), weight 54.5 kg (120 lb 3.2 oz). Body mass index is 22.71 kg/m².   General Appearance: NAD, cooperative, alert  Eyes: Anicteric, conjunctiva pink  ENT:  Normocephalic, atraumatic, normal mucosa. Neck:  Supple, symmetrical, trachea midline  Resp:  Clear to auscultation bilaterally; no rales, rhonchi or wheezing; respirations unlabored   CV:  S1 S2, Regular rate and rhythm; no murmur, rub, or gallop. GI:  Soft, non-tender, non-distended; normal bowel sounds; no masses, no organomegaly   Rectal: Deferred  Musculoskeletal: No cyanosis, clubbing . Normal ROM. --Positive for pedal edema slightly greater on the right than the left 1+  Skin:  No jaundice, rashes, or lesions   Heme/Lymph: No palpable cervical lymphadenopathy  Psych: Normal affect, good eye contact  Neuro: No gross deficits, AAOx3      Lab work 9/6/2023 [facility] hemoglobin 10.6 hematocrit 32 2  platelet count 922 liver function studies bilirubin 0.8 alk phos 124 AST 90 ALT 28 albumin 3.6    Fernanda Marrero MD  9/8/2023  5:14 PM  Sign when Signing Visit  [address] [place] [address] [facility] - Outpatient Follow-up Note  Paulina Mckeon 46 y.o. female MRN: [id number]  Encounter: [id number]    ASSESSMENT AND PLAN:      1. Acute blood loss anemia  --Acute blood loss anemia has resolved. Etiology was unclear. Patient did have an upper endoscopy earlier this year x2  -Bleeding could have been from peptic disease. Ulcer had resolved on endoscopy in late May. She did have grade 2 esophageal varices however. Colonoscopy last performed about 4 years ago    - Colonoscopy        2. Alcoholic cirrhosis of liver without ascites ([address])  --Patient with probable cirrhosis on the basis of having esophageal varices and low platelet count.  -Screen for [address] every 6 months with ultrasound. Check other labs    - US abdomen complete; Future  - AFP tumor marker; Future  - Protime-INR; Future    4. Colon cancer screening  --Will be done for diagnostic purposes. Patient's last colonoscopy 4 years ago    5.  Secondary esophageal varices without bleeding (HCC)  --Continues on nadolol daily    6. Iron deficiency anemia, unspecified iron deficiency anemia type  --Has been seeing hematology. Has an appointment next week. Most recent hemoglobin 10.6 with normal MCV. Probably will be rechecking iron panel    7. Vitamin B12 deficiency  --Has been getting supplemental B12 previously. Again follow-up with hematology      Followup Appointment: 6 mo   ______________________________________________________________________    Chief Complaint   Patient presents with   • Follow-up     Pt is not currently having any GI symptoms. Follow up from EGD and colonoscopy.      HPI:  ***    Historical Information   Past Medical History:   Diagnosis Date   • Alcoholism ([address]) 10/1/2010   • Fatty liver 01/20/2023   • Gallstones    • Long Q-T syndrome      Past Surgical History:   Procedure Laterality Date   • COLONOSCOPY  4/29/2019    Clear   • TUBAL LIGATION  07/14/2005   • UPPER GASTROINTESTINAL ENDOSCOPY  1/20/2023    Burst ulcer     Social History     Substance and Sexual Activity   Alcohol Use Never    Comment: stopped January 2023     Social History     Substance and Sexual Activity   Drug Use Never     Social History     Tobacco Use   Smoking Status Never   • Passive exposure: Never   Smokeless Tobacco Never     Family History   Problem Relation Age of Onset   • Cancer Mother          Current Outpatient Medications:   •  amoxicillin (AMOXIL) 500 mg capsule  •  Apple Cider Vinegar 188 MG CAPS  •  Biotin 10 MG TABS  •  chlorhexidine (PERIDEX) 0.12 % solution  •  escitalopram (LEXAPRO) 20 mg tablet  •  folic acid (FOLVITE) 1 mg tablet  •  gabapentin (NEURONTIN) 100 mg capsule  •  Melatonin 10 MG TABS  •  mirtazapine (REMERON) 15 mg tablet  •  Multiple Vitamins-Minerals (multivitamin with minerals) tablet  •  nadolol (CORGARD) 20 mg tablet  •  traZODone (DESYREL) 100 mg tablet  •  valsartan (DIOVAN) 40 mg tablet  •  Cobalamin Combinations (B-12) 1000-400 MCG SUBL  •  cyanocobalamin (VITAMIN B-12) 100 MCG tablet  • ferrous sulfate 325 (65 Fe) mg tablet  •  gabapentin (NEURONTIN) 100 mg capsule  •  naloxone (NARCAN) 4 mg/0.1 mL nasal spray  •  pantoprazole (PROTONIX) 40 mg tablet  •  polyethylene glycol (GOLYTELY) 4000 mL solution  •  Vivitrol 380 MG SUSR  No Known Allergies  Reviewed medications and allergies and updated as indicated    PHYSICAL EXAM:    Blood pressure 112/66, height 5' 1" (1.549 m), weight 54.5 kg (120 lb 3.2 oz). Body mass index is 22.71 kg/m². General Appearance: NAD, cooperative, alert  Eyes: Anicteric, PERRLA, EOMI  ENT:  Normocephalic, atraumatic, normal mucosa. Neck:  Supple, symmetrical, trachea midline  Resp:  Clear to auscultation bilaterally; no rales, rhonchi or wheezing; respirations unlabored   CV:  S1 S2, Regular rate and rhythm; no murmur, rub, or gallop. GI:  Soft, non-tender, non-distended; normal bowel sounds; no masses, no organomegaly   Rectal: Deferred  Musculoskeletal: No cyanosis, clubbing or edema. Normal ROM.   Skin:  No jaundice, rashes, or lesions   Heme/Lymph: No palpable cervical lymphadenopathy  Psych: Normal affect, good eye contact  Neuro: No gross deficits, AAOx3    Lab Results:   Lab Results   Component Value Date    WBC 8.4 06/03/2023    HGB 8.0 (L) 06/03/2023    HCT 24.0 (L) 06/03/2023    MCV 94 06/03/2023     06/03/2023     Lab Results   Component Value Date     09/30/2015    K 4.0 05/26/2023     05/26/2023    CO2 21 05/26/2023    ANIONGAP 14 (H) 09/30/2015    BUN 14 05/26/2023    CREATININE 0.57 05/26/2023    GLUCOSE 90 10/01/2015    CALCIUM 8.9 01/19/2023    CORRECTEDCA 9.9 01/19/2023     (H) 06/03/2023    ALT 23 06/03/2023    ALKPHOS 259 (H) 01/19/2023    PROT 8.5 (H) 09/30/2015    BILITOT 0.53 09/30/2015    EGFR 108 05/26/2023     Lab Results   Component Value Date    IRON 85 06/03/2023    TIBC 249 (L) 06/03/2023    FERRITIN 66 06/03/2023     Lab Results   Component Value Date    LIPASE 347 01/19/2023       Radiology Results:   No results found.

## 2023-09-10 LAB
AFP-TM SERPL-MCNC: 6.1 NG/ML (ref 0–9.2)
INR PPP: 1.1 (ref 0.9–1.2)
PROTHROMBIN TIME: 11.8 SEC (ref 9.1–12)

## 2023-09-11 LAB
ALBUMIN SERPL-MCNC: 3.6 G/DL (ref 3.8–4.9)
ALBUMIN/GLOB SERPL: 0.8 {RATIO} (ref 1.2–2.2)
ALP SERPL-CCNC: 124 IU/L (ref 44–121)
ALT SERPL-CCNC: 28 IU/L (ref 0–32)
AST SERPL-CCNC: 90 IU/L (ref 0–40)
BASOPHILS # BLD AUTO: 0 X10E3/UL (ref 0–0.2)
BASOPHILS NFR BLD AUTO: 1 %
BILIRUB SERPL-MCNC: 0.8 MG/DL (ref 0–1.2)
BUN SERPL-MCNC: 17 MG/DL (ref 6–24)
BUN/CREAT SERPL: 23 (ref 9–23)
CALCIUM SERPL-MCNC: 9.4 MG/DL (ref 8.7–10.2)
CHLORIDE SERPL-SCNC: 105 MMOL/L (ref 96–106)
CO2 SERPL-SCNC: 23 MMOL/L (ref 20–29)
CREAT SERPL-MCNC: 0.74 MG/DL (ref 0.57–1)
EGFR: 96 ML/MIN/1.73
EOSINOPHIL # BLD AUTO: 0.2 X10E3/UL (ref 0–0.4)
EOSINOPHIL NFR BLD AUTO: 3 %
ERYTHROCYTE [DISTWIDTH] IN BLOOD BY AUTOMATED COUNT: 13.9 % (ref 11.7–15.4)
GLOBULIN SER-MCNC: 4.7 G/DL (ref 1.5–4.5)
GLUCOSE SERPL-MCNC: 125 MG/DL (ref 70–99)
HCT VFR BLD AUTO: 32.2 % (ref 34–46.6)
HGB BLD-MCNC: 10.6 G/DL (ref 11.1–15.9)
IMM GRANULOCYTES # BLD: 0 X10E3/UL (ref 0–0.1)
IMM GRANULOCYTES NFR BLD: 0 %
LYMPHOCYTES # BLD AUTO: 1.3 X10E3/UL (ref 0.7–3.1)
LYMPHOCYTES NFR BLD AUTO: 20 %
MCH RBC QN AUTO: 33.3 PG (ref 26.6–33)
MCHC RBC AUTO-ENTMCNC: 32.9 G/DL (ref 31.5–35.7)
MCV RBC AUTO: 101 FL (ref 79–97)
METHYLMALONATE SERPL-SCNC: 228 NMOL/L (ref 0–378)
MONOCYTES # BLD AUTO: 0.5 X10E3/UL (ref 0.1–0.9)
MONOCYTES NFR BLD AUTO: 8 %
NEUTROPHILS # BLD AUTO: 4.3 X10E3/UL (ref 1.4–7)
NEUTROPHILS NFR BLD AUTO: 68 %
PLATELET # BLD AUTO: 162 X10E3/UL (ref 150–450)
POTASSIUM SERPL-SCNC: 4.7 MMOL/L (ref 3.5–5.2)
PROT SERPL-MCNC: 8.3 G/DL (ref 6–8.5)
RBC # BLD AUTO: 3.18 X10E6/UL (ref 3.77–5.28)
SODIUM SERPL-SCNC: 139 MMOL/L (ref 134–144)
WBC # BLD AUTO: 6.3 X10E3/UL (ref 3.4–10.8)

## 2023-09-17 ENCOUNTER — HOSPITAL ENCOUNTER (OUTPATIENT)
Dept: ULTRASOUND IMAGING | Facility: HOSPITAL | Age: 53
Discharge: HOME | End: 2023-09-17
Attending: INTERNAL MEDICINE
Payer: COMMERCIAL

## 2023-09-17 DIAGNOSIS — K70.30 ALCOHOLIC CIRRHOSIS OF LIVER WITHOUT ASCITES (HCC): ICD-10-CM

## 2023-09-17 PROCEDURE — 76700 US EXAM ABDOM COMPLETE: CPT

## 2023-09-17 NOTE — LETTER
[address] [place] [address]  [address] [facility] [address]      September 26, 2023    MRN: [id number]     Phone: [phone]     Dear MsVaensa Manzo recently had a(n) Ultrasound performed on 9/17/2023 at  [address] [place] [address] that was requested by Marialuisa Cullen MD. The study was reviewed by a radiologist, which is a physician who specializes in medical imaging. The radiologist issued a report describing his or her findings. In that report there was a finding that the radiologist felt warranted further discussion with your health care provider and that discussion would be beneficial to you. The results were sent to Marialuisa Cullen MD on 09/21/2023  5:13 PM. We recommend that you contact Marialuisa Cullen MD at [phone] or set up an appointment to discuss the results of the imaging test. If you have already heard from Marialuisa Cullen MD regarding the results of your study, you can disregard this letter. This letter is not meant to alarm you, but intended to encourage you to follow-up on your results with the provider that sent you for the imaging study. In addition, we have enclosed answers to frequently asked questions by other patients who have also received a letter to review results with their health care provider (see page two). Thank you for choosing [address] [place] [address] for your medical imaging needs. FREQUENTLY ASKED QUESTIONS    Why am I receiving this letter? [address] [place] [address] requires us to notify patients who have findings on imaging exams that may require more testing or follow-up with a health professional within the next 3 months. How serious is the finding on the imaging test?  This letter is sent to all patients who may need follow-up or more testing within the next 3 months. Receiving this letter does not necessarily mean you have a life-threatening imaging finding or disease. Recommendations in the radiologist’s imaging report are general in nature and it is up to your healthcare provider to say whether those recommendations make sense for your situation. You are strongly encouraged to talk to your health care provider about the results and ask whether additional steps need to be taken. Where can I get a copy of the final report for my recent radiology exam?  To get a full copy of the report you can access your records online at [email or web address] or please contact [facility] Medical Records Department at [phone] Monday through Friday between 8 am and 6 pm.         What do I need to do now? Please contact your health care provider who requested the imaging study to discuss what further actions (if any) are needed. You may have already heard from (your ordering provider) in regard to this test in which case you can disregard this letter. NOTICE IN ACCORDANCE WITH THE [place] STATE “PATIENT TEST RESULT INFORMATION ACT OF 2018”    You are receiving this notice as a result of a determination by your diagnostic imaging service that further discussions of your test results are warranted and would be beneficial to you. The complete results of your test or tests have been or will be sent to the health care practitioner that ordered the test or tests. It is recommended that you contact your health care practitioner to discuss your results as soon as possible.

## 2023-09-21 ENCOUNTER — TELEPHONE (OUTPATIENT)
Dept: GASTROENTEROLOGY | Facility: CLINIC | Age: 53
End: 2023-09-21

## 2023-09-21 NOTE — TELEPHONE ENCOUNTER
Notifed that ultrasound showed cirrhosis and some pericholecystic fluid. Looks like she is not having pain from the note. Just ordered for [address] surveillance. I did not notify the patient we will defer to Dr. Shelli David.

## 2023-09-22 NOTE — RESULT ENCOUNTER NOTE
I called the patient and reviewed ultrasound results. Studies remarkable for cirrhosis with trace ascites and splenomegaly. Also signs of portal hypertension. No lesions. Will need an ultrasound every 6 months. Reviewed with patient importance of complete abstinence from alcohol-. Medication to help with cravings may be helpful and I have advised her to review with her mental health provider.

## 2023-09-23 NOTE — PROGRESS NOTES
HEMATOLOGY / [address] [place] [address] FOLLOW UP NOTE    Primary Care Provider: Dennis Smith MD  Referring Provider:    MRN: [id number]  : 1970    Reason for Encounter: Follow-up for anemia    Interval History: Patient returns for follow-up visit. She was last seen in  and set up for IV iron and B12 replacement. She completed iron infusions on 2023. Most recent CBC with differential shows great improvement in her numbers. Hemoglobin 11, MCV 99. White count, platelet count and differential are normal  Iron panel was not drawn. However I do believe she received benefit from infusions since her hemoglobin improved from a low of 7.4-11    She continues to follow closely with GI. Overall, she reports she feels well. Denies any abnormal bleeding. No abdominal pain or discomfort. Energy levels have improved. No longer experiencing significant fatigue, palpitations. Denies chest pain, shortness of breath, lightheadedness or dizziness    REVIEW OF SYSTEMS:  Please note that a 14-point review of systems was performed to include Constitutional, HEENT, Respiratory, CVS, GI, , Musculoskeletal, Integumentary, Neurologic, Rheumatologic, Endocrinologic, Psychiatric, Lymphatic, and Hematologic/Oncologic systems were reviewed and are negative unless otherwise stated in HPI. Positive and negative findings pertinent to this evaluation are incorporated into the history of present illness. ECOG PS: 0    PROBLEM LIST:  Patient Active Problem List   Diagnosis   • Nausea vomiting and diarrhea   • GI bleed   • Cellulitis of hand   • Depression   • Essential hypertension   • Transaminitis   • Cholelithiases   • Severe protein-calorie malnutrition (HCC)   • Anemia       Assessment / Plan:  1. Iron deficiency anemia due to chronic blood loss      Patient is a joelle 23-year-old female who is referred to hematology by her gastroenterologist for management of anemia, iron deficiency.   At time of consultation it was suspected her anemia was multifactorial and likely secondary to bone marrow suppression from alcohol abuse, malnutrition from lack of eating, GI blood loss  She was treated with a course of parenteral iron replacement and injectable B12. Most recent blood work was done without iron panel or B12 levels. Her CBC did demonstrate great improvement in her hemoglobin and she reports she is feeling significantly better  She is agreeable to having iron panel and B12 levels checked so I can see where she is at. I will call her with results. She is taking daily L19 and folic acid and should continue. I will see her back in 4 months with repeat blood work. She is instructed to call anytime with questions or concerns and if noted to have drop in her counts I will gladly see her sooner. Patient is in agreement with this plan of care    I spent 25 minutes on chart review, face to face counseling time, coordination of care and documentation. Past Medical History:   has a past medical history of Alcoholism ([facility]) (10/1/2010), Fatty liver (01/20/2023), Gallstones, and Long Q-T syndrome. PAST SURGICAL HISTORY:   has a past surgical history that includes Tubal ligation (07/14/2005); Upper gastrointestinal endoscopy (1/20/2023); and Colonoscopy (4/29/2019).     CURRENT MEDICATIONS  Current Outpatient Medications   Medication Sig Dispense Refill   • Apple Cider Vinegar 188 MG CAPS Take by mouth     • Biotin 10 MG TABS Take 10 tablets by mouth daily     • chlorhexidine (PERIDEX) 0.12 % solution      • escitalopram (LEXAPRO) 20 mg tablet Take 20 mg by mouth daily DC by barbara on 0/41     • folic acid (FOLVITE) 1 mg tablet TAKE 1 TABLET BY MOUTH EVERY DAY 30 tablet 1   • gabapentin (NEURONTIN) 100 mg capsule Take 200 mg by mouth daily     • Melatonin 10 MG TABS Take 10 mg by mouth daily at bedtime as needed     • mirtazapine (REMERON) 15 mg tablet Take 15 mg by mouth daily at bedtime     • Multiple Vitamins-Minerals (multivitamin with minerals) tablet Take 1 tablet by mouth daily     • nadolol (CORGARD) 20 mg tablet Take 1 tablet (20 mg total) by mouth daily 30 tablet 5   • traZODone (DESYREL) 100 mg tablet Take 100 mg by mouth daily at bedtime     • valsartan (DIOVAN) 40 mg tablet Take 40 mg by mouth daily     • amoxicillin (AMOXIL) 500 mg capsule      • Cobalamin Combinations (B-12) 1000-400 MCG SUBL      • cyanocobalamin (VITAMIN B-12) 100 MCG tablet Take 100 mcg by mouth daily     • ferrous sulfate 325 (65 Fe) mg tablet Take 325 mg by mouth daily with breakfast     • gabapentin (NEURONTIN) 100 mg capsule Take 200 mg by mouth daily at bedtime     • naloxone (NARCAN) 4 mg/0.1 mL nasal spray  (Patient not taking: Reported on 9/8/2023)     • pantoprazole (PROTONIX) 40 mg tablet Take 1 tablet (40 mg total) by mouth daily 30 tablet 3   • polyethylene glycol (GOLYTELY) 4000 mL solution As directed (Patient not taking: Reported on 9/8/2023) 4000 mL 0   • Vivitrol 380 MG SUSR        No current facility-administered medications for this visit. [unfilled]    SOCIAL HISTORY:   reports that she has never smoked. She has never been exposed to tobacco smoke. She has never used smokeless tobacco. She reports that she does not drink alcohol and does not use drugs. FAMILY HISTORY:  family history includes Cancer in her mother. ALLERGIES:  has No Known Allergies. Physical Exam:  Vital Signs:   Visit Vitals  /74 (BP Location: Left arm, Patient Position: Sitting, Cuff Size: Standard)   Pulse 65   Temp 98.1 °F (36.7 °C) (Temporal)   Resp 14   Ht 5' 1" (1.549 m)   Wt 56.2 kg (124 lb)   SpO2 99%   BMI 23.43 kg/m²   OB Status Postmenopausal   Smoking Status Never   BSA 1.54 m²     Body mass index is 23.43 kg/m². Body surface area is 1.54 meters squared. Physical Exam  Constitutional:       General: She is not in acute distress. Appearance: Normal appearance. HENT:      Head: Normocephalic and atraumatic.    Eyes: General: No scleral icterus. Right eye: No discharge. Left eye: No discharge. Conjunctiva/sclera: Conjunctivae normal.   Cardiovascular:      Rate and Rhythm: Normal rate and regular rhythm. Pulmonary:      Effort: Pulmonary effort is normal. No respiratory distress. Breath sounds: Normal breath sounds. Abdominal:      General: Bowel sounds are normal. There is no distension. Palpations: Abdomen is soft. There is no mass. Tenderness: There is no abdominal tenderness. Musculoskeletal:         General: Normal range of motion. Lymphadenopathy:      Cervical: No cervical adenopathy. Upper Body:      Right upper body: No supraclavicular, axillary or pectoral adenopathy. Left upper body: No supraclavicular, axillary or pectoral adenopathy. Skin:     General: Skin is warm and dry. Neurological:      General: No focal deficit present. Mental Status: She is alert and oriented to person, place, and time.    Psychiatric:         Mood and Affect: Mood normal.         Behavior: Behavior normal.         Labs:  Lab Results   Component Value Date    WBC 6.3 09/06/2023    HGB 10.6 (L) 09/06/2023    HCT 32.2 (L) 09/06/2023     (H) 09/06/2023     09/06/2023     Lab Results   Component Value Date     09/30/2015    SODIUM 139 09/06/2023    K 4.7 09/06/2023     09/06/2023    CO2 23 09/06/2023    ANIONGAP 14 (H) 09/30/2015    AGAP 7 01/19/2023    BUN 17 09/06/2023    CREATININE 0.74 09/06/2023    GLUC 125 (H) 09/06/2023    CALCIUM 8.9 01/19/2023    AST 90 (H) 09/06/2023    ALT 28 09/06/2023    ALKPHOS 259 (H) 01/19/2023    PROT 8.5 (H) 09/30/2015    TP 8.3 09/06/2023    BILITOT 0.53 09/30/2015    TBILI 0.8 09/06/2023    EGFR 96 09/06/2023

## 2023-09-25 ENCOUNTER — OFFICE VISIT (OUTPATIENT)
Age: 53
End: 2023-09-25
Payer: COMMERCIAL

## 2023-09-25 VITALS
WEIGHT: 124 LBS | BODY MASS INDEX: 23.41 KG/M2 | SYSTOLIC BLOOD PRESSURE: 122 MMHG | HEART RATE: 65 BPM | RESPIRATION RATE: 14 BRPM | TEMPERATURE: 98.1 F | HEIGHT: 61 IN | OXYGEN SATURATION: 99 % | DIASTOLIC BLOOD PRESSURE: 74 MMHG

## 2023-09-25 DIAGNOSIS — D50.0 IRON DEFICIENCY ANEMIA DUE TO CHRONIC BLOOD LOSS: Primary | ICD-10-CM

## 2023-09-25 PROCEDURE — 99213 OFFICE O/P EST LOW 20 MIN: CPT | Performed by: NURSE PRACTITIONER

## 2023-10-02 DIAGNOSIS — K27.9 PEPTIC ULCER DISEASE: ICD-10-CM

## 2023-10-02 RX ORDER — PANTOPRAZOLE SODIUM 40 MG/1
40 TABLET, DELAYED RELEASE ORAL DAILY
Qty: 30 TABLET | Refills: 3 | Status: SHIPPED | OUTPATIENT
Start: 2023-10-02

## 2023-10-22 LAB
FERRITIN SERPL-MCNC: 717 NG/ML (ref 15–150)
IRON SATN MFR SERPL: 34 % (ref 15–55)
IRON SERPL-MCNC: 71 UG/DL (ref 27–159)
TIBC SERPL-MCNC: 206 UG/DL (ref 250–450)
UIBC SERPL-MCNC: 135 UG/DL (ref 131–425)

## 2023-10-24 ENCOUNTER — TELEPHONE (OUTPATIENT)
Age: 53
End: 2023-10-24

## 2023-10-24 NOTE — TELEPHONE ENCOUNTER
Scheduled date of colonoscopy (as of today):12/12/2023  Physician performing colonoscopy:Dr. Vickie Jolly  Location of colonoscopy:[facility]

## 2023-10-26 LAB — METHYLMALONATE SERPL-SCNC: 325 NMOL/L (ref 0–378)

## 2023-11-17 ENCOUNTER — TELEPHONE (OUTPATIENT)
Dept: GASTROENTEROLOGY | Facility: CLINIC | Age: 53
End: 2023-11-17

## 2023-11-17 DIAGNOSIS — K70.30 ALCOHOLIC CIRRHOSIS OF LIVER WITHOUT ASCITES (HCC): Primary | ICD-10-CM

## 2023-11-17 NOTE — TELEPHONE ENCOUNTER
Patient contacted the office  asking if she can go on Vivitrol with her liver issue at the suggestion of her therapist- will repeat her LFT's and confer with Hepatology

## 2023-12-02 DIAGNOSIS — D64.9 ANEMIA, UNSPECIFIED TYPE: ICD-10-CM

## 2023-12-03 RX ORDER — FOLIC ACID 1 MG/1
1000 TABLET ORAL DAILY
Qty: 30 TABLET | Refills: 1 | Status: SHIPPED | OUTPATIENT
Start: 2023-12-03

## 2023-12-05 ENCOUNTER — DOCUMENTATION (OUTPATIENT)
Dept: GASTROENTEROLOGY | Facility: CLINIC | Age: 53
End: 2023-12-05

## 2024-01-09 LAB
ALBUMIN SERPL-MCNC: 3.7 G/DL (ref 3.8–4.9)
ALBUMIN/GLOB SERPL: 0.9 {RATIO} (ref 1.2–2.2)
ALP SERPL-CCNC: 208 IU/L (ref 44–121)
ALT SERPL-CCNC: 42 IU/L (ref 0–32)
AST SERPL-CCNC: 135 IU/L (ref 0–40)
BILIRUB SERPL-MCNC: 1.2 MG/DL (ref 0–1.2)
BUN SERPL-MCNC: 22 MG/DL (ref 6–24)
BUN/CREAT SERPL: 29 (ref 9–23)
CALCIUM SERPL-MCNC: 9.1 MG/DL (ref 8.7–10.2)
CHLORIDE SERPL-SCNC: 102 MMOL/L (ref 96–106)
CO2 SERPL-SCNC: 25 MMOL/L (ref 20–29)
CREAT SERPL-MCNC: 0.76 MG/DL (ref 0.57–1)
EGFR: 93 ML/MIN/1.73
GLOBULIN SER-MCNC: 4.3 G/DL (ref 1.5–4.5)
GLUCOSE SERPL-MCNC: 92 MG/DL (ref 70–99)
INR PPP: 1.2 (ref 0.9–1.2)
POTASSIUM SERPL-SCNC: 3.5 MMOL/L (ref 3.5–5.2)
PROT SERPL-MCNC: 8 G/DL (ref 6–8.5)
PROTHROMBIN TIME: 13 SEC (ref 9.1–12)
SODIUM SERPL-SCNC: 140 MMOL/L (ref 134–144)

## 2024-01-10 ENCOUNTER — OFFICE VISIT (OUTPATIENT)
Age: 54
End: 2024-01-10
Payer: COMMERCIAL

## 2024-01-10 ENCOUNTER — TELEPHONE (OUTPATIENT)
Age: 54
End: 2024-01-10

## 2024-01-10 VITALS
HEIGHT: 60 IN | DIASTOLIC BLOOD PRESSURE: 70 MMHG | SYSTOLIC BLOOD PRESSURE: 118 MMHG | BODY MASS INDEX: 22.97 KG/M2 | WEIGHT: 117 LBS

## 2024-01-10 DIAGNOSIS — Z12.11 COLON CANCER SCREENING: ICD-10-CM

## 2024-01-10 DIAGNOSIS — I45.81 LONG Q-T SYNDROME: ICD-10-CM

## 2024-01-10 DIAGNOSIS — F10.21 ALCOHOL USE DISORDER, SEVERE, IN EARLY REMISSION (HCC): ICD-10-CM

## 2024-01-10 DIAGNOSIS — K70.30 ALCOHOLIC CIRRHOSIS OF LIVER WITHOUT ASCITES (HCC): Primary | ICD-10-CM

## 2024-01-10 DIAGNOSIS — E83.42 HYPOMAGNESEMIA: ICD-10-CM

## 2024-01-10 DIAGNOSIS — I85.11 SECONDARY ESOPHAGEAL VARICES WITH BLEEDING (HCC): ICD-10-CM

## 2024-01-10 PROCEDURE — 99214 OFFICE O/P EST MOD 30 MIN: CPT | Performed by: INTERNAL MEDICINE

## 2024-01-10 RX ORDER — LANOLIN ALCOHOL/MO/W.PET/CERES
400 CREAM (GRAM) TOPICAL ONCE
Qty: 30 TABLET | Refills: 2 | Status: SHIPPED | OUTPATIENT
Start: 2024-01-10 | End: 2024-01-10

## 2024-01-10 RX ORDER — LANOLIN ALCOHOL/MO/W.PET/CERES
CREAM (GRAM) TOPICAL
COMMUNITY
Start: 2023-12-07 | End: 2024-01-10 | Stop reason: SDUPTHER

## 2024-01-10 NOTE — PATIENT INSTRUCTIONS
[facility]ly [facility] - Outpatient Follow-up Note  Yvette Titus 53 y.o. female MRN: [id number]  Encounter: [id number]    ASSESSMENT AND PLAN:      1. Alcoholic cirrhosis of liver without ascites (HCC)  --Patient with documented alcoholic cirrhosis.  Recently complicated by esophageal variceal bleed.  Occurred after patient been drinking about a week or so.  She relapsed after feelings with respect to the anniversary of her father's death.  Did have varices with stigmata of bleeding and had successful banding.  -Was at [facility].  Probably had hepatic encephalopathy and was getting lactulose/    - Comprehensive metabolic panel; Future  - Ambulatory Referral to Hepatology; Future  - US abdomen complete; Future  - AFP tumor marker; Future    -Patient had success in the past with Vivitrol.  Some concern about hepatic injury however in patients with advanced liver disease.  Will refer to hepatology Dr. Wiley for evaluation.  I believe the medicine could benefit the patient but would be reluctant given potential adverse effects      2. Colon cancer screening  --Schedule colonoscopy near future.  Will give MiraLAX prep.  Should be done at the hospital.  Patient does have a standing order.  Had to be canceled this fall secondary to recent hospitalization    3. Secondary esophageal varices with bleeding (HCC)  --Will schedule EGD early March upper [place].  See if patient needs banding.  Continue blockade in the meantime  - EGD; Future    4. Hypomagnesemia  -Hypomagnesemia while in the hospital.  Recheck levels.  Have renewed prescription.  May be able to discontinue  - Magnesium; Future  - Magnesium    5. Alcohol use disorder, severe, in early remission (HCC)  --Ongoing counseling-does seem to benefit from pharmacologic agent to help with her cravings for alcohol.  Please see above with respect to consultation of hepatology      Followup Appointment:  4 mo

## 2024-01-10 NOTE — LETTER
January 11, 2024       No Recipients    Patient: Yvette Titus   YOB: 1970   Date of Visit: 1/10/2024       Dear Dr. Robertson:    Thank you for referring Yvette Titus to me for evaluation. Below are my notes for this consultation.    If you have questions, please do not hesitate to call me. I look forward to following your patient along with you.         Sincerely,        Jorge Hays MD        CC:   No Recipients    Jorge Hays MD  1/11/2024 12:51 AM  [facility] - Outpatient Follow-up Note  Yvette Titus 53 y.o. female MRN: [id number]  Encounter: [id number]    ASSESSMENT AND PLAN:      1. Alcoholic cirrhosis of liver without ascites (HCC)  --Patient with documented alcoholic cirrhosis.  Recently complicated by esophageal variceal bleed.  Occurred after patient been drinking about a week or so.  She relapsed after feelings with respect to the anniversary of her father's death.  Did have varices with stigmata of bleeding and had successful banding.  -Was at [facility].  Probably had hepatic encephalopathy and was getting lactulose/    - Comprehensive metabolic panel; Future  - Ambulatory Referral to Hepatology; Future  - US abdomen complete; Future  - AFP tumor marker; Future    -Patient had success in the past with Vivitrol.  Some concern about hepatic injury however in patients with advanced liver disease.  Will refer to hepatology Dr. Wiley for evaluation.  I believe the medicine could benefit the patient but would be reluctant given potential adverse effects      2. Colon cancer screening  --Schedule colonoscopy near future.  Will give MiraLAX prep.  Should be done at the hospital.  Patient does have a standing order.  Had to be canceled this fall secondary to recent hospitalization    3. Secondary esophageal varices with bleeding (HCC)  --Will schedule EGD early March upper [place].  See if patient needs banding.  Continue blockade in the meantime  -  EGD; Future    4. Hypomagnesemia  -Hypomagnesemia while in the hospital.  Recheck levels.  Have renewed prescription.  May be able to discontinue  - Magnesium; Future  - Magnesium    5. Alcohol use disorder, severe, in early remission (HCC)  --Ongoing counseling-does seem to benefit from pharmacologic agent to help with her cravings for alcohol.  Please see above with respect to consultation of hepatology    6.  Long QT syndrome--previously followed by [facility] [place]  -History of a syncopal episode 2 to 3 years ago.      Followup Appointment:  4 mo   ______________________________________________________________________    Chief Complaint   Patient presents with   • Hosptial Follow up     Pt was hospitalized at [facility], had EGD in December. She is doing well, no bleeding or trouble swallowing.      HPI: Patient returns to the office for GI follow-up.  She has a history of alcoholic cirrhosis.  Patient was seen by our group in the hospital in January 2023 when she presented with acute GI bleeding.  Patient did not have esophageal varices at that time but had erosive antral gastritis with a bleeding vessel which was treated with a clip.  Follow-up upper endoscopy in May was revealing for gastritis and small or grade 2 of esophageal varices without stigmata of bleeding.  Patient was placed on nadolol at that time for secondary prophylaxis.  Patient has had ongoing struggles with history of alcohol abuse.  She has had a couple of relapses in the past year most recently in November.  After about a week of father's death patient tried to detox herself.  She subsequently had problems with nausea vomiting of coffee-ground type material.  Upper endoscopy did reveal esophageal varices which was subsequently banded.  Evidently patient also had some problems with symptomatic   encephalopathy related to the bleeding is given lactulose enemas.  Patient previously did fairly well on Vivitrol that  helped control her urges to drink.  Patient's mental health provider asked if this would be an option to treat her again but there was concerns with respect to the status of her liver and potential deleterious effects on the liver with this medication.  Patient is undergoing ongoing therapy.  Her rudd with alcoholism continues to be somewhat of a struggle although she has been sober since her hospitalization.  Patient last spring had significant low hemoglobin counts and did well with IV iron.  Most recent hemoglobin is 9.7.  She is due to follow-up with hematology.  Patient has not had colonoscopy to this date.  This had been scheduled for the summer but then had to be rescheduled for the fall at around the time when the patient had her GI bleed.  Patient reveals no problems with abdominal or peripheral swelling.  She denies abdominal pain.  No digestive problems presently.  Mental status is good.  Patient was discharged from [facility] on supplemental magnesium.  She wants to know if she should continue these medication.    Historical Information  Past Medical History:   Diagnosis Date   • Alcoholism (HCC) 10/1/2010   • Fatty liver 01/20/2023   • Gallstones    • Long Q-T syndrome      Past Surgical History:   Procedure Laterality Date   • COLONOSCOPY  4/29/2019    Clear   • TUBAL LIGATION  07/14/2005   • UPPER GASTROINTESTINAL ENDOSCOPY  1/20/2023    Burst ulcer     Social History     Substance and Sexual Activity   Alcohol Use Never    Comment: stopped January 2023     Social History     Substance and Sexual Activity   Drug Use Never     Social History     Tobacco Use   Smoking Status Never   • Passive exposure: Never   Smokeless Tobacco Never     Family History   Problem Relation Age of Onset   • Cancer Mother          Current Outpatient Medications:   •  Apple Cider Vinegar 188 MG CAPS  •  Biotin 10 MG TABS  •  CYANOCOBALAMIN PO  •  escitalopram (LEXAPRO) 20 mg tablet  •  folic acid (FOLVITE) 1 mg tablet  •   gabapentin (NEURONTIN) 100 mg capsule  •  magnesium Oxide (MAG-OX) 400 mg TABS  •  Melatonin 10 MG TABS  •  mirtazapine (REMERON) 15 mg tablet  •  Multiple Vitamins-Minerals (multivitamin with minerals) tablet  •  nadolol (CORGARD) 20 mg tablet  •  pantoprazole (PROTONIX) 40 mg tablet  •  traZODone (DESYREL) 100 mg tablet  •  valsartan (DIOVAN) 40 mg tablet  •  amoxicillin (AMOXIL) 500 mg capsule  •  chlorhexidine (PERIDEX) 0.12 % solution  •  Cobalamin Combinations (B-12) 1000-400 MCG SUBL  •  cyanocobalamin (VITAMIN B-12) 100 MCG tablet  •  gabapentin (NEURONTIN) 100 mg capsule  •  naloxone (NARCAN) 4 mg/0.1 mL nasal spray  •  Vivitrol 380 MG SUSR  No Known Allergies  Reviewed medications and allergies and updated as indicated    PHYSICAL EXAM:    Blood pressure 118/70, height 5' (1.524 m), weight 53.1 kg (117 lb). Body mass index is 22.85 kg/m².  General Appearance: NAD, cooperative, alert  Eyes: Anicteric, conjunctiva pink  ENT:  Normocephalic, atraumatic, normal mucosa.    Neck:  Supple, symmetrical, trachea midline  Resp:  Clear to auscultation bilaterally; no rales, rhonchi or wheezing; respirations unlabored   CV:  S1 S2, Regular rate and rhythm; no murmur, rub, or gallop.  GI:  Soft, non-tender, non-distended; normal bowel sounds; no masses, no organomegaly   Rectal: Deferred  Musculoskeletal: No cyanosis, clubbing or edema. Normal ROM.  Skin:  No jaundice, rashes, or lesions   Heme/Lymph: No palpable cervical lymphadenopathy  Psych: Normal affect, good eye contact  Neuro: No gross deficits, AAOx3    Lab Results:   Lab Results   Component Value Date    WBC 6.3 09/06/2023    HGB 10.6 (L) 09/06/2023    HCT 32.2 (L) 09/06/2023     (H) 09/06/2023     09/06/2023     Lab Results   Component Value Date     09/30/2015    K 3.5 01/08/2024     01/08/2024    CO2 25 01/08/2024    ANIONGAP 14 (H) 09/30/2015    BUN 22 01/08/2024    CREATININE 0.76 01/08/2024    GLUCOSE 90 10/01/2015    CALCIUM  8.9 01/19/2023    CORRECTEDCA 9.9 01/19/2023     (H) 01/08/2024    ALT 42 (H) 01/08/2024    ALKPHOS 259 (H) 01/19/2023    PROT 8.5 (H) 09/30/2015    BILITOT 0.53 09/30/2015    EGFR 93 01/08/2024       Radiology Results:   No results found.      Jorge Hays MD  1/10/2024  5:29 PM  Sign when Signing Visit  [facility] - Outpatient Follow-up Note  Yvette Titus 53 y.o. female MRN: [id number]  Encounter: [id number]    ASSESSMENT AND PLAN:      1. Alcoholic cirrhosis of liver without ascites (HCC)  --Patient with documented alcoholic cirrhosis.  Recently complicated by esophageal variceal bleed.  Occurred after patient been drinking about a week or so.  She relapsed after feelings with respect to the anniversary of her father's death.  Did have varices with stigmata of bleeding and had successful banding.  -Was at [facility].  Probably had hepatic encephalopathy and was getting lactulose/    - Comprehensive metabolic panel; Future  - Ambulatory Referral to Hepatology; Future  - US abdomen complete; Future  - AFP tumor marker; Future    -Patient had success in the past with Vivitrol.  Some concern about hepatic injury however in patients with advanced liver disease.  Will refer to hepatology Dr. Wiley for evaluation.  I believe the medicine could benefit the patient but would be reluctant given potential adverse effects      2. Colon cancer screening  --Schedule colonoscopy near future.  Will give MiraLAX prep.  Should be done at the hospital.  Patient does have a standing order.  Had to be canceled this fall secondary to recent hospitalization    3. Secondary esophageal varices with bleeding (HCC)  --Will schedule EGD early March [place].  See if patient needs banding.  Continue blockade in the meantime  - EGD; Future    4. Hypomagnesemia  -Hypomagnesemia while in the hospital.  Recheck levels.  Have renewed prescription.  May be able to discontinue  - Magnesium;  Future  - Magnesium    5. Alcohol use disorder, severe, in early remission (HCC)  --Ongoing counseling-does seem to benefit from pharmacologic agent to help with her cravings for alcohol.  Please see above with respect to consultation of hepatology      Followup Appointment:  4 mo   ______________________________________________________________________    Chief Complaint   Patient presents with   • Hosptial Follow up     Pt was hospitalized at [facility], had EGD in December. She is doing well, no bleeding or trouble swallowing.      HPI:  ***    Historical Information  Past Medical History:   Diagnosis Date   • Alcoholism (HCC) 10/1/2010   • Fatty liver 01/20/2023   • Gallstones    • Long Q-T syndrome      Past Surgical History:   Procedure Laterality Date   • COLONOSCOPY  4/29/2019    Clear   • TUBAL LIGATION  07/14/2005   • UPPER GASTROINTESTINAL ENDOSCOPY  1/20/2023    Burst ulcer     Social History     Substance and Sexual Activity   Alcohol Use Never    Comment: stopped January 2023     Social History     Substance and Sexual Activity   Drug Use Never     Social History     Tobacco Use   Smoking Status Never   • Passive exposure: Never   Smokeless Tobacco Never     Family History   Problem Relation Age of Onset   • Cancer Mother          Current Outpatient Medications:   •  Apple Cider Vinegar 188 MG CAPS  •  Biotin 10 MG TABS  •  CYANOCOBALAMIN PO  •  escitalopram (LEXAPRO) 20 mg tablet  •  folic acid (FOLVITE) 1 mg tablet  •  gabapentin (NEURONTIN) 100 mg capsule  •  magnesium Oxide (MAG-OX) 400 mg TABS  •  Melatonin 10 MG TABS  •  mirtazapine (REMERON) 15 mg tablet  •  Multiple Vitamins-Minerals (multivitamin with minerals) tablet  •  nadolol (CORGARD) 20 mg tablet  •  pantoprazole (PROTONIX) 40 mg tablet  •  traZODone (DESYREL) 100 mg tablet  •  valsartan (DIOVAN) 40 mg tablet  •  amoxicillin (AMOXIL) 500 mg capsule  •  chlorhexidine (PERIDEX) 0.12 % solution  •  Cobalamin Combinations (B-12) 1000-400  MCG SUBL  •  cyanocobalamin (VITAMIN B-12) 100 MCG tablet  •  gabapentin (NEURONTIN) 100 mg capsule  •  naloxone (NARCAN) 4 mg/0.1 mL nasal spray  •  Vivitrol 380 MG SUSR  No Known Allergies  Reviewed medications and allergies and updated as indicated    PHYSICAL EXAM:    Blood pressure 118/70, height 5' (1.524 m), weight 53.1 kg (117 lb). Body mass index is 22.85 kg/m².  General Appearance: NAD, cooperative, alert  Eyes: Anicteric, conjunctiva pink  ENT:  Normocephalic, atraumatic, normal mucosa.    Neck:  Supple, symmetrical, trachea midline  Resp:  Clear to auscultation bilaterally; no rales, rhonchi or wheezing; respirations unlabored   CV:  S1 S2, Regular rate and rhythm; no murmur, rub, or gallop.  GI:  Soft, non-tender, non-distended; normal bowel sounds; no masses, no organomegaly   Rectal: Deferred  Musculoskeletal: No cyanosis, clubbing or edema. Normal ROM.  Skin:  No jaundice, rashes, or lesions   Heme/Lymph: No palpable cervical lymphadenopathy  Psych: Normal affect, good eye contact  Neuro: No gross deficits, AAOx3    Lab Results:   Lab Results   Component Value Date    WBC 6.3 09/06/2023    HGB 10.6 (L) 09/06/2023    HCT 32.2 (L) 09/06/2023     (H) 09/06/2023     09/06/2023     Lab Results   Component Value Date     09/30/2015    K 3.5 01/08/2024     01/08/2024    CO2 25 01/08/2024    ANIONGAP 14 (H) 09/30/2015    BUN 22 01/08/2024    CREATININE 0.76 01/08/2024    GLUCOSE 90 10/01/2015    CALCIUM 8.9 01/19/2023    CORRECTEDCA 9.9 01/19/2023     (H) 01/08/2024    ALT 42 (H) 01/08/2024    ALKPHOS 259 (H) 01/19/2023    PROT 8.5 (H) 09/30/2015    BILITOT 0.53 09/30/2015    EGFR 93 01/08/2024       Radiology Results:   No results found.

## 2024-01-10 NOTE — LETTER
January 11, 2024     Alberto Robertson MD  [address]  [address]  [facility] [address]    Patient: Yvette Titus   YOB: 1970   Date of Visit: 1/10/2024       Dear Dr. Robertson:    Thank you for referring Yvette Titus to me for evaluation. Below are my notes for this consultation.    If you have questions, please do not hesitate to call me. I look forward to following your patient along with you.         Sincerely,        Jorge Hays MD        CC: MD Jorge Magallanes MD  1/11/2024 12:51 AM  Incomplete  [facility] - Outpatient Follow-up Note  Yvette Titus 53 y.o. female MRN: [id number]  Encounter: [id number]    ASSESSMENT AND PLAN:      1. Alcoholic cirrhosis of liver without ascites (HCC)  --Patient with documented alcoholic cirrhosis.  Recently complicated by esophageal variceal bleed.  Occurred after patient been drinking about a week or so.  She relapsed after feelings with respect to the anniversary of her father's death.  Did have varices with stigmata of bleeding and had successful banding.  -Was at [facility].  Probably had hepatic encephalopathy and was getting lactulose/    - Comprehensive metabolic panel; Future  - Ambulatory Referral to Hepatology; Future  - US abdomen complete; Future  - AFP tumor marker; Future    -Patient had success in the past with Vivitrol.  Some concern about hepatic injury however in patients with advanced liver disease.  Will refer to hepatology Dr. Wiley for evaluation.  I believe the medicine could benefit the patient but would be reluctant given potential adverse effects      2. Colon cancer screening  --Schedule colonoscopy near future.  Will give MiraLAX prep.  Should be done at the hospital.  Patient does have a standing order.  Had to be canceled this fall secondary to recent hospitalization    3. Secondary esophageal varices with bleeding (HCC)  --Will schedule EGD early March [facility].   See if patient needs banding.  Continue blockade in the meantime  - EGD; Future    4. Hypomagnesemia  -Hypomagnesemia while in the hospital.  Recheck levels.  Have renewed prescription.  May be able to discontinue  - Magnesium; Future  - Magnesium    5. Alcohol use disorder, severe, in early remission (HCC)  --Ongoing counseling-does seem to benefit from pharmacologic agent to help with her cravings for alcohol.  Please see above with respect to consultation of hepatology    6.  Long QT syndrome--previously followed by [facility] [place]  -History of a syncopal episode 2 to 3 years ago.      Followup Appointment:  4 mo   ______________________________________________________________________    Chief Complaint   Patient presents with   • Hosptial Follow up     Pt was hospitalized at [facility], had EGD in December. She is doing well, no bleeding or trouble swallowing.      HPI: Patient returns to the office for GI follow-up.  She has a history of alcoholic cirrhosis.  Patient was seen by our group in the hospital in January 2023 when she presented with acute GI bleeding.  Patient did not have esophageal varices at that time but had erosive antral gastritis with a bleeding vessel which was treated with a clip.  Follow-up upper endoscopy in May was revealing for gastritis and small or grade 2 of esophageal varices without stigmata of bleeding.  Patient was placed on nadolol at that time for secondary prophylaxis.  Patient has had ongoing struggles with history of alcohol abuse.  She has had a couple of relapses in the past year most recently in November.  After about a week of father's death patient tried to detox herself.  She subsequently had problems with nausea vomiting of coffee-ground type material.  Upper endoscopy did reveal esophageal varices which was subsequently banded.  Evidently patient also had some problems with symptomatic   encephalopathy related to the bleeding is given  lactulose enemas.  Patient previously did fairly well on Vivitrol that helped control her urges to drink.  Patient's mental health provider asked if this would be an option to treat her again but there was concerns with respect to the status of her liver and potential deleterious effects on the liver with this medication.  Patient is undergoing ongoing therapy.  Her rudd with alcoholism continues to be somewhat of a struggle although she has been sober since her hospitalization.  Patient last spring had significant low hemoglobin counts and did well with IV iron.  Most recent hemoglobin is 9.7.  She is due to follow-up with hematology.  Patient has not had colonoscopy to this date.  This had been scheduled for the summer but then had to be rescheduled for the fall at around the time when the patient had her GI bleed.  Patient reveals no problems with abdominal or peripheral swelling.  She denies abdominal pain.  No digestive problems presently.  Mental status is good.  Patient was discharged from [place] on supplemental magnesium.  She wants to know if she should continue these medication.    Historical Information  Past Medical History:   Diagnosis Date   • Alcoholism (HCC) 10/1/2010   • Fatty liver 01/20/2023   • Gallstones    • Long Q-T syndrome      Past Surgical History:   Procedure Laterality Date   • COLONOSCOPY  4/29/2019    Clear   • TUBAL LIGATION  07/14/2005   • UPPER GASTROINTESTINAL ENDOSCOPY  1/20/2023    Burst ulcer     Social History     Substance and Sexual Activity   Alcohol Use Never    Comment: stopped January 2023     Social History     Substance and Sexual Activity   Drug Use Never     Social History     Tobacco Use   Smoking Status Never   • Passive exposure: Never   Smokeless Tobacco Never     Family History   Problem Relation Age of Onset   • Cancer Mother          Current Outpatient Medications:   •  Apple Cider Vinegar 188 MG CAPS  •  Biotin 10 MG TABS  •  CYANOCOBALAMIN PO  •   escitalopram (LEXAPRO) 20 mg tablet  •  folic acid (FOLVITE) 1 mg tablet  •  gabapentin (NEURONTIN) 100 mg capsule  •  magnesium Oxide (MAG-OX) 400 mg TABS  •  Melatonin 10 MG TABS  •  mirtazapine (REMERON) 15 mg tablet  •  Multiple Vitamins-Minerals (multivitamin with minerals) tablet  •  nadolol (CORGARD) 20 mg tablet  •  pantoprazole (PROTONIX) 40 mg tablet  •  traZODone (DESYREL) 100 mg tablet  •  valsartan (DIOVAN) 40 mg tablet  •  amoxicillin (AMOXIL) 500 mg capsule  •  chlorhexidine (PERIDEX) 0.12 % solution  •  Cobalamin Combinations (B-12) 1000-400 MCG SUBL  •  cyanocobalamin (VITAMIN B-12) 100 MCG tablet  •  gabapentin (NEURONTIN) 100 mg capsule  •  naloxone (NARCAN) 4 mg/0.1 mL nasal spray  •  Vivitrol 380 MG SUSR  No Known Allergies  Reviewed medications and allergies and updated as indicated    PHYSICAL EXAM:    Blood pressure 118/70, height 5' (1.524 m), weight 53.1 kg (117 lb). Body mass index is 22.85 kg/m².  General Appearance: NAD, cooperative, alert  Eyes: Anicteric, conjunctiva pink  ENT:  Normocephalic, atraumatic, normal mucosa.    Neck:  Supple, symmetrical, trachea midline  Resp:  Clear to auscultation bilaterally; no rales, rhonchi or wheezing; respirations unlabored   CV:  S1 S2, Regular rate and rhythm; no murmur, rub, or gallop.  GI:  Soft, non-tender, non-distended; normal bowel sounds; no masses, no organomegaly   Rectal: Deferred  Musculoskeletal: No cyanosis, clubbing or edema. Normal ROM.  Skin:  No jaundice, rashes, or lesions   Heme/Lymph: No palpable cervical lymphadenopathy  Psych: Normal affect, good eye contact  Neuro: No gross deficits, AAOx3    Lab Results:   Lab Results   Component Value Date    WBC 6.3 09/06/2023    HGB 10.6 (L) 09/06/2023    HCT 32.2 (L) 09/06/2023     (H) 09/06/2023     09/06/2023     Lab Results   Component Value Date     09/30/2015    K 3.5 01/08/2024     01/08/2024    CO2 25 01/08/2024    ANIONGAP 14 (H) 09/30/2015    BUN 22  01/08/2024    CREATININE 0.76 01/08/2024    GLUCOSE 90 10/01/2015    CALCIUM 8.9 01/19/2023    CORRECTEDCA 9.9 01/19/2023     (H) 01/08/2024    ALT 42 (H) 01/08/2024    ALKPHOS 259 (H) 01/19/2023    PROT 8.5 (H) 09/30/2015    BILITOT 0.53 09/30/2015    EGFR 93 01/08/2024       Radiology Results:   No results found.      Jorge Hays MD  1/10/2024  5:29 PM  Sign when Signing Visit  [facility] - Outpatient Follow-up Note  Yvette Titus 53 y.o. female MRN: [id number]  Encounter: [id number]    ASSESSMENT AND PLAN:      1. Alcoholic cirrhosis of liver without ascites (HCC)  --Patient with documented alcoholic cirrhosis.  Recently complicated by esophageal variceal bleed.  Occurred after patient been drinking about a week or so.  She relapsed after feelings with respect to the anniversary of her father's death.  Did have varices with stigmata of bleeding and had successful banding.  -Was at [facility].  Probably had hepatic encephalopathy and was getting lactulose/    - Comprehensive metabolic panel; Future  - Ambulatory Referral to Hepatology; Future  - US abdomen complete; Future  - AFP tumor marker; Future    -Patient had success in the past with Vivitrol.  Some concern about hepatic injury however in patients with advanced liver disease.  Will refer to hepatology Dr. Wiley for evaluation.  I believe the medicine could benefit the patient but would be reluctant given potential adverse effects      2. Colon cancer screening  --Schedule colonoscopy near future.  Will give MiraLAX prep.  Should be done at the hospital.  Patient does have a standing order.  Had to be canceled this fall secondary to recent hospitalization    3. Secondary esophageal varices with bleeding (HCC)  --Will schedule EGD early March upper [place].  See if patient needs banding.  Continue blockade in the meantime  - EGD; Future    4. Hypomagnesemia  -Hypomagnesemia while in the hospital.  Recheck  levels.  Have renewed prescription.  May be able to discontinue  - Magnesium; Future  - Magnesium    5. Alcohol use disorder, severe, in early remission (HCC)  --Ongoing counseling-does seem to benefit from pharmacologic agent to help with her cravings for alcohol.  Please see above with respect to consultation of hepatology      Followup Appointment:  4 mo   ______________________________________________________________________    Chief Complaint   Patient presents with   • Hosptial Follow up     Pt was hospitalized at [facility], had EGD in December. She is doing well, no bleeding or trouble swallowing.      HPI:  ***    Historical Information  Past Medical History:   Diagnosis Date   • Alcoholism (HCC) 10/1/2010   • Fatty liver 01/20/2023   • Gallstones    • Long Q-T syndrome      Past Surgical History:   Procedure Laterality Date   • COLONOSCOPY  4/29/2019    Clear   • TUBAL LIGATION  07/14/2005   • UPPER GASTROINTESTINAL ENDOSCOPY  1/20/2023    Burst ulcer     Social History     Substance and Sexual Activity   Alcohol Use Never    Comment: stopped January 2023     Social History     Substance and Sexual Activity   Drug Use Never     Social History     Tobacco Use   Smoking Status Never   • Passive exposure: Never   Smokeless Tobacco Never     Family History   Problem Relation Age of Onset   • Cancer Mother          Current Outpatient Medications:   •  Apple Cider Vinegar 188 MG CAPS  •  Biotin 10 MG TABS  •  CYANOCOBALAMIN PO  •  escitalopram (LEXAPRO) 20 mg tablet  •  folic acid (FOLVITE) 1 mg tablet  •  gabapentin (NEURONTIN) 100 mg capsule  •  magnesium Oxide (MAG-OX) 400 mg TABS  •  Melatonin 10 MG TABS  •  mirtazapine (REMERON) 15 mg tablet  •  Multiple Vitamins-Minerals (multivitamin with minerals) tablet  •  nadolol (CORGARD) 20 mg tablet  •  pantoprazole (PROTONIX) 40 mg tablet  •  traZODone (DESYREL) 100 mg tablet  •  valsartan (DIOVAN) 40 mg tablet  •  amoxicillin (AMOXIL) 500 mg capsule  •   chlorhexidine (PERIDEX) 0.12 % solution  •  Cobalamin Combinations (B-12) 1000-400 MCG SUBL  •  cyanocobalamin (VITAMIN B-12) 100 MCG tablet  •  gabapentin (NEURONTIN) 100 mg capsule  •  naloxone (NARCAN) 4 mg/0.1 mL nasal spray  •  Vivitrol 380 MG SUSR  No Known Allergies  Reviewed medications and allergies and updated as indicated    PHYSICAL EXAM:    Blood pressure 118/70, height 5' (1.524 m), weight 53.1 kg (117 lb). Body mass index is 22.85 kg/m².  General Appearance: NAD, cooperative, alert  Eyes: Anicteric, conjunctiva pink  ENT:  Normocephalic, atraumatic, normal mucosa.    Neck:  Supple, symmetrical, trachea midline  Resp:  Clear to auscultation bilaterally; no rales, rhonchi or wheezing; respirations unlabored   CV:  S1 S2, Regular rate and rhythm; no murmur, rub, or gallop.  GI:  Soft, non-tender, non-distended; normal bowel sounds; no masses, no organomegaly   Rectal: Deferred  Musculoskeletal: No cyanosis, clubbing or edema. Normal ROM.  Skin:  No jaundice, rashes, or lesions   Heme/Lymph: No palpable cervical lymphadenopathy  Psych: Normal affect, good eye contact  Neuro: No gross deficits, AAOx3    Lab Results:   Lab Results   Component Value Date    WBC 6.3 09/06/2023    HGB 10.6 (L) 09/06/2023    HCT 32.2 (L) 09/06/2023     (H) 09/06/2023     09/06/2023     Lab Results   Component Value Date     09/30/2015    K 3.5 01/08/2024     01/08/2024    CO2 25 01/08/2024    ANIONGAP 14 (H) 09/30/2015    BUN 22 01/08/2024    CREATININE 0.76 01/08/2024    GLUCOSE 90 10/01/2015    CALCIUM 8.9 01/19/2023    CORRECTEDCA 9.9 01/19/2023     (H) 01/08/2024    ALT 42 (H) 01/08/2024    ALKPHOS 259 (H) 01/19/2023    PROT 8.5 (H) 09/30/2015    BILITOT 0.53 09/30/2015    EGFR 93 01/08/2024       Radiology Results:   No results found.

## 2024-01-10 NOTE — LETTER
January 11, 2024     Alberto Robertson MD  [address]  [address]  [facility] [address]    Patient: Yvette Titus   YOB: 1970   Date of Visit: 1/10/2024       Dear Dr. Robertson:    Thank you for referring Yvette Titus to me for evaluation. Below are my notes for this consultation.    If you have questions, please do not hesitate to call me. I look forward to following your patient along with you.         Sincerely,        Jorge Hays MD        CC: MD Jorge Magallanes MD  1/11/2024 12:52 AM  Sign when Signing Visit  [facility] - Outpatient Follow-up Note  Yvette Titus 53 y.o. female MRN: [id number]  Encounter: [id number]    ASSESSMENT AND PLAN:      1. Alcoholic cirrhosis of liver without ascites (HCC)  --Patient with documented alcoholic cirrhosis.  Recently complicated by esophageal variceal bleed.  Occurred after patient been drinking about a week or so.  She relapsed after feelings with respect to the anniversary of her father's death.  Did have varices with stigmata of bleeding and had successful banding.  -Was at [facility].  Probably had hepatic encephalopathy and was getting lactulose/    - Comprehensive metabolic panel; Future  - Ambulatory Referral to Hepatology; Future  - US abdomen complete; Future  - AFP tumor marker; Future    -Patient had success in the past with Vivitrol.  Some concern about hepatic injury however in patients with advanced liver disease.  Will refer to hepatology Dr. Wiley for evaluation.  I believe the medicine could benefit the patient but would be reluctant given potential adverse effects      2. Colon cancer screening  --Schedule colonoscopy near future.  Will give MiraLAX prep.  Should be done at the hospital.  Patient does have a standing order.  Had to be canceled this fall secondary to recent hospitalization    3. Secondary esophageal varices with bleeding (HCC)  --Will schedule EGD early March  upper [place].  See if patient needs banding.  Continue blockade in the meantime  - EGD; Future    4. Hypomagnesemia  -Hypomagnesemia while in the hospital.  Recheck levels.  Have renewed prescription.  May be able to discontinue  - Magnesium; Future  - Magnesium    5. Alcohol use disorder, severe, in early remission (HCC)  --Ongoing counseling-does seem to benefit from pharmacologic agent to help with her cravings for alcohol.  Please see above with respect to consultation of hepatology    6.  Long QT syndrome--previously followed by [facility] [place]  -History of a syncopal episode 2 to 3 years ago.      Followup Appointment:  4 mo   ______________________________________________________________________    Chief Complaint   Patient presents with   • Hosptial Follow up     Pt was hospitalized at [facility], had EGD in December. She is doing well, no bleeding or trouble swallowing.      HPI: Patient returns to the office for GI follow-up.  She has a history of alcoholic cirrhosis.  Patient was seen by our group in the hospital in January 2023 when she presented with acute GI bleeding.  Patient did not have esophageal varices at that time but had erosive antral gastritis with a bleeding vessel which was treated with a clip.  Follow-up upper endoscopy in May was revealing for gastritis and small or grade 2 of esophageal varices without stigmata of bleeding.  Patient was placed on nadolol at that time for secondary prophylaxis.  Patient has had ongoing struggles with history of alcohol abuse.  She has had a couple of relapses in the past year most recently in November.  After about a week of father's death patient tried to detox herself.  She subsequently had problems with nausea vomiting of coffee-ground type material.  Upper endoscopy did reveal esophageal varices which was subsequently banded.  Evidently patient also had some problems with symptomatic   encephalopathy related to the bleeding is  given lactulose enemas.  Patient previously did fairly well on Vivitrol that helped control her urges to drink.  Patient's mental health provider asked if this would be an option to treat her again but there was concerns with respect to the status of her liver and potential deleterious effects on the liver with this medication.  Patient is undergoing ongoing therapy.  Her rudd with alcoholism continues to be somewhat of a struggle although she has been sober since her hospitalization.  Patient last spring had significant low hemoglobin counts and did well with IV iron.  Most recent hemoglobin is 9.7.  She is due to follow-up with hematology.  Patient has not had colonoscopy to this date.  This had been scheduled for the summer but then had to be rescheduled for the fall at around the time when the patient had her GI bleed.  Patient reveals no problems with abdominal or peripheral swelling.  She denies abdominal pain.  No digestive problems presently.  Mental status is good.  Patient was discharged from [place] on supplemental magnesium.  She wants to know if she should continue these medication.    Historical Information  Past Medical History:   Diagnosis Date   • Alcoholism (HCC) 10/1/2010   • Fatty liver 01/20/2023   • Gallstones    • Long Q-T syndrome      Past Surgical History:   Procedure Laterality Date   • COLONOSCOPY  4/29/2019    Clear   • TUBAL LIGATION  07/14/2005   • UPPER GASTROINTESTINAL ENDOSCOPY  1/20/2023    Burst ulcer     Social History     Substance and Sexual Activity   Alcohol Use Never    Comment: stopped January 2023     Social History     Substance and Sexual Activity   Drug Use Never     Social History     Tobacco Use   Smoking Status Never   • Passive exposure: Never   Smokeless Tobacco Never     Family History   Problem Relation Age of Onset   • Cancer Mother          Current Outpatient Medications:   •  Apple Cider Vinegar 188 MG CAPS  •  Biotin 10 MG TABS  •  CYANOCOBALAMIN PO  •   escitalopram (LEXAPRO) 20 mg tablet  •  folic acid (FOLVITE) 1 mg tablet  •  gabapentin (NEURONTIN) 100 mg capsule  •  magnesium Oxide (MAG-OX) 400 mg TABS  •  Melatonin 10 MG TABS  •  mirtazapine (REMERON) 15 mg tablet  •  Multiple Vitamins-Minerals (multivitamin with minerals) tablet  •  nadolol (CORGARD) 20 mg tablet  •  pantoprazole (PROTONIX) 40 mg tablet  •  traZODone (DESYREL) 100 mg tablet  •  valsartan (DIOVAN) 40 mg tablet  •  amoxicillin (AMOXIL) 500 mg capsule  •  chlorhexidine (PERIDEX) 0.12 % solution  •  Cobalamin Combinations (B-12) 1000-400 MCG SUBL  •  cyanocobalamin (VITAMIN B-12) 100 MCG tablet  •  gabapentin (NEURONTIN) 100 mg capsule  •  naloxone (NARCAN) 4 mg/0.1 mL nasal spray  •  Vivitrol 380 MG SUSR  No Known Allergies  Reviewed medications and allergies and updated as indicated    PHYSICAL EXAM:    Blood pressure 118/70, height 5' (1.524 m), weight 53.1 kg (117 lb). Body mass index is 22.85 kg/m².  General Appearance: NAD, cooperative, alert  Eyes: Anicteric, conjunctiva pink  ENT:  Normocephalic, atraumatic, normal mucosa.    Neck:  Supple, symmetrical, trachea midline  Resp:  Clear to auscultation bilaterally; no rales, rhonchi or wheezing; respirations unlabored   CV:  S1 S2, Regular rate and rhythm; no murmur, rub, or gallop.  GI:  Soft, non-tender, non-distended; normal bowel sounds; no masses, no organomegaly   Rectal: Deferred  Musculoskeletal: No cyanosis, clubbing or edema. Normal ROM.  Skin:  No jaundice, rashes, or lesions   Heme/Lymph: No palpable cervical lymphadenopathy  Psych: Normal affect, good eye contact  Neuro: No gross deficits, AAOx3    Lab Results:   Lab Results   Component Value Date    WBC 6.3 09/06/2023    HGB 10.6 (L) 09/06/2023    HCT 32.2 (L) 09/06/2023     (H) 09/06/2023     09/06/2023     Lab Results   Component Value Date     09/30/2015    K 3.5 01/08/2024     01/08/2024    CO2 25 01/08/2024    ANIONGAP 14 (H) 09/30/2015    BUN 22  01/08/2024    CREATININE 0.76 01/08/2024    GLUCOSE 90 10/01/2015    CALCIUM 8.9 01/19/2023    CORRECTEDCA 9.9 01/19/2023     (H) 01/08/2024    ALT 42 (H) 01/08/2024    ALKPHOS 259 (H) 01/19/2023    PROT 8.5 (H) 09/30/2015    BILITOT 0.53 09/30/2015    EGFR 93 01/08/2024       Radiology Results:   No results found.

## 2024-01-10 NOTE — PROGRESS NOTES
[facility] - Outpatient Follow-up Note  Yvette Titus 53 y.o. female MRN: [id number]  Encounter: [id number]    ASSESSMENT AND PLAN:      1. Alcoholic cirrhosis of liver without ascites (HCC)  --Patient with documented alcoholic cirrhosis.  Recently complicated by esophageal variceal bleed.  Occurred after patient been drinking about a week or so.  She relapsed after feelings with respect to the anniversary of her father's death.  Did have varices with stigmata of bleeding and had successful banding.  -Was at [facility].  Probably had hepatic encephalopathy and was getting lactulose/    - Comprehensive metabolic panel; Future  - Ambulatory Referral to Hepatology; Future  - US abdomen complete; Future  - AFP tumor marker; Future    -Patient had success in the past with Vivitrol.  Some concern about hepatic injury however in patients with advanced liver disease.  Will refer to hepatology Dr. Wiley for evaluation.  I believe the medicine could benefit the patient but would be reluctant given potential adverse effects      2. Colon cancer screening  --Schedule colonoscopy near future.  Will give MiraLAX prep.  Should be done at the hospital.  Patient does have a standing order.  Had to be canceled this fall secondary to recent hospitalization    3. Secondary esophageal varices with bleeding (HCC)  --Will schedule EGD early March upper [place].  See if patient needs banding.  Continue blockade in the meantime  - EGD; Future    4. Hypomagnesemia  -Hypomagnesemia while in the hospital.  Recheck levels.  Have renewed prescription.  May be able to discontinue  - Magnesium; Future  - Magnesium    5. Alcohol use disorder, severe, in early remission (HCC)  --Ongoing counseling-does seem to benefit from pharmacologic agent to help with her cravings for alcohol.  Please see above with respect to consultation of hepatology    6.  Long QT syndrome--previously followed by [facility]  -History of a syncopal episode 2 to 3 years ago.      Followup Appointment:  4 mo   ______________________________________________________________________    Chief Complaint   Patient presents with    Hosptial Follow up     Pt was hospitalized at [facility], had EGD in December. She is doing well, no bleeding or trouble swallowing.      HPI: Patient returns to the office for GI follow-up.  She has a history of alcoholic cirrhosis.  Patient was seen by our group in the hospital in January 2023 when she presented with acute GI bleeding.  Patient did not have esophageal varices at that time but had erosive antral gastritis with a bleeding vessel which was treated with a clip.  Follow-up upper endoscopy in May was revealing for gastritis and small or grade 2 of esophageal varices without stigmata of bleeding.  Patient was placed on nadolol at that time for secondary prophylaxis.  Patient has had ongoing struggles with history of alcohol abuse.  She has had a couple of relapses in the past year most recently in November.  After about a week of father's death patient tried to detox herself.  She subsequently had problems with nausea vomiting of coffee-ground type material.  Upper endoscopy did reveal esophageal varices which was subsequently banded.  Evidently patient also had some problems with symptomatic   encephalopathy related to the bleeding is given lactulose enemas.  Patient previously did fairly well on Vivitrol that helped control her urges to drink.  Patient's mental health provider asked if this would be an option to treat her again but there was concerns with respect to the status of her liver and potential deleterious effects on the liver with this medication.  Patient is undergoing ongoing therapy.  Her rudd with alcoholism continues to be somewhat of a struggle although she has been sober since her hospitalization.  Patient last spring had significant low hemoglobin counts and did  well with IV iron.  Most recent hemoglobin is 9.7.  She is due to follow-up with hematology.  Patient has not had colonoscopy to this date.  This had been scheduled for the summer but then had to be rescheduled for the fall at around the time when the patient had her GI bleed.  Patient reveals no problems with abdominal or peripheral swelling.  She denies abdominal pain.  No digestive problems presently.  Mental status is good.  Patient was discharged from [facility] on supplemental magnesium.  She wants to know if she should continue these medication.    Historical Information   Past Medical History:   Diagnosis Date    Alcoholism (HCC) 10/1/2010    Fatty liver 01/20/2023    Gallstones     Long Q-T syndrome      Past Surgical History:   Procedure Laterality Date    COLONOSCOPY  4/29/2019    Clear    TUBAL LIGATION  07/14/2005    UPPER GASTROINTESTINAL ENDOSCOPY  1/20/2023    Burst ulcer     Social History     Substance and Sexual Activity   Alcohol Use Never    Comment: stopped January 2023     Social History     Substance and Sexual Activity   Drug Use Never     Social History     Tobacco Use   Smoking Status Never    Passive exposure: Never   Smokeless Tobacco Never     Family History   Problem Relation Age of Onset    Cancer Mother          Current Outpatient Medications:     Apple Cider Vinegar 188 MG CAPS    Biotin 10 MG TABS    CYANOCOBALAMIN PO    escitalopram (LEXAPRO) 20 mg tablet    folic acid (FOLVITE) 1 mg tablet    gabapentin (NEURONTIN) 100 mg capsule    magnesium Oxide (MAG-OX) 400 mg TABS    Melatonin 10 MG TABS    mirtazapine (REMERON) 15 mg tablet    Multiple Vitamins-Minerals (multivitamin with minerals) tablet    nadolol (CORGARD) 20 mg tablet    pantoprazole (PROTONIX) 40 mg tablet    traZODone (DESYREL) 100 mg tablet    valsartan (DIOVAN) 40 mg tablet    amoxicillin (AMOXIL) 500 mg capsule    chlorhexidine (PERIDEX) 0.12 % solution    Cobalamin Combinations (B-12) 1000-400 MCG SUBL     cyanocobalamin (VITAMIN B-12) 100 MCG tablet    gabapentin (NEURONTIN) 100 mg capsule    naloxone (NARCAN) 4 mg/0.1 mL nasal spray    Vivitrol 380 MG SUSR  No Known Allergies  Reviewed medications and allergies and updated as indicated    PHYSICAL EXAM:    Blood pressure 118/70, height 5' (1.524 m), weight 53.1 kg (117 lb). Body mass index is 22.85 kg/m².  General Appearance: NAD, cooperative, alert  Eyes: Anicteric, conjunctiva pink  ENT:  Normocephalic, atraumatic, normal mucosa.    Neck:  Supple, symmetrical, trachea midline  Resp:  Clear to auscultation bilaterally; no rales, rhonchi or wheezing; respirations unlabored   CV:  S1 S2, Regular rate and rhythm; no murmur, rub, or gallop.  GI:  Soft, non-tender, non-distended; normal bowel sounds; no masses, no organomegaly   Rectal: Deferred  Musculoskeletal: No cyanosis, clubbing or edema. Normal ROM.  Skin:  No jaundice, rashes, or lesions   Heme/Lymph: No palpable cervical lymphadenopathy  Psych: Normal affect, good eye contact  Neuro: No gross deficits, AAOx3    Lab Results:   Lab Results   Component Value Date    WBC 6.3 09/06/2023    HGB 10.6 (L) 09/06/2023    HCT 32.2 (L) 09/06/2023     (H) 09/06/2023     09/06/2023     Lab Results   Component Value Date     09/30/2015    K 3.5 01/08/2024     01/08/2024    CO2 25 01/08/2024    ANIONGAP 14 (H) 09/30/2015    BUN 22 01/08/2024    CREATININE 0.76 01/08/2024    GLUCOSE 90 10/01/2015    CALCIUM 8.9 01/19/2023    CORRECTEDCA 9.9 01/19/2023     (H) 01/08/2024    ALT 42 (H) 01/08/2024    ALKPHOS 259 (H) 01/19/2023    PROT 8.5 (H) 09/30/2015    BILITOT 0.53 09/30/2015    EGFR 93 01/08/2024       Radiology Results:   No results found.

## 2024-01-10 NOTE — LETTER
January 11, 2024     Alberto Robertson MD  [address]  [address]  [facility] [address]    Patient: Yvette Titus   YOB: 1970   Date of Visit: 1/10/2024       Dear Dr. Robertson:    Thank you for referring Yvette Titus to me for evaluation. Below are my notes for this consultation.    If you have questions, please do not hesitate to call me. I look forward to following your patient along with you.         Sincerely,        Jorge Hays MD        CC: No Recipients    Jorge Hays MD  1/11/2024 12:51 AM  Incomplete  [facility] - Outpatient Follow-up Note  Yvette Titus 53 y.o. female MRN: [id number]  Encounter: [id number]    ASSESSMENT AND PLAN:      1. Alcoholic cirrhosis of liver without ascites (HCC)  --Patient with documented alcoholic cirrhosis.  Recently complicated by esophageal variceal bleed.  Occurred after patient been drinking about a week or so.  She relapsed after feelings with respect to the anniversary of her father's death.  Did have varices with stigmata of bleeding and had successful banding.  -Was at [facility].  Probably had hepatic encephalopathy and was getting lactulose/    - Comprehensive metabolic panel; Future  - Ambulatory Referral to Hepatology; Future  - US abdomen complete; Future  - AFP tumor marker; Future    -Patient had success in the past with Vivitrol.  Some concern about hepatic injury however in patients with advanced liver disease.  Will refer to hepatology Dr. Wiley for evaluation.  I believe the medicine could benefit the patient but would be reluctant given potential adverse effects      2. Colon cancer screening  --Schedule colonoscopy near future.  Will give MiraLAX prep.  Should be done at the hospital.  Patient does have a standing order.  Had to be canceled this fall secondary to recent hospitalization    3. Secondary esophageal varices with bleeding (HCC)  --Will schedule EGD early March [facility].  See  if patient needs banding.  Continue blockade in the meantime  - EGD; Future    4. Hypomagnesemia  -Hypomagnesemia while in the hospital.  Recheck levels.  Have renewed prescription.  May be able to discontinue  - Magnesium; Future  - Magnesium    5. Alcohol use disorder, severe, in early remission (HCC)  --Ongoing counseling-does seem to benefit from pharmacologic agent to help with her cravings for alcohol.  Please see above with respect to consultation of hepatology    6.  Long QT syndrome--previously followed by [facility] [place]  -History of a syncopal episode 2 to 3 years ago.      Followup Appointment:  4 mo   ______________________________________________________________________    Chief Complaint   Patient presents with   • Hosptial Follow up     Pt was hospitalized at [facility], had EGD in December. She is doing well, no bleeding or trouble swallowing.      HPI: Patient returns to the office for GI follow-up.  She has a history of alcoholic cirrhosis.  Patient was seen by our group in the hospital in January 2023 when she presented with acute GI bleeding.  Patient did not have esophageal varices at that time but had erosive antral gastritis with a bleeding vessel which was treated with a clip.  Follow-up upper endoscopy in May was revealing for gastritis and small or grade 2 of esophageal varices without stigmata of bleeding.  Patient was placed on nadolol at that time for secondary prophylaxis.  Patient has had ongoing struggles with history of alcohol abuse.  She has had a couple of relapses in the past year most recently in November.  After about a week of father's death patient tried to detox herself.  She subsequently had problems with nausea vomiting of coffee-ground type material.  Upper endoscopy did reveal esophageal varices which was subsequently banded.  Evidently patient also had some problems with symptomatic   encephalopathy related to the bleeding is given lactulose  enemas.  Patient previously did fairly well on Vivitrol that helped control her urges to drink.  Patient's mental health provider asked if this would be an option to treat her again but there was concerns with respect to the status of her liver and potential deleterious effects on the liver with this medication.  Patient is undergoing ongoing therapy.  Her rudd with alcoholism continues to be somewhat of a struggle although she has been sober since her hospitalization.  Patient last spring had significant low hemoglobin counts and did well with IV iron.  Most recent hemoglobin is 9.7.  She is due to follow-up with hematology.  Patient has not had colonoscopy to this date.  This had been scheduled for the summer but then had to be rescheduled for the fall at around the time when the patient had her GI bleed.  Patient reveals no problems with abdominal or peripheral swelling.  She denies abdominal pain.  No digestive problems presently.  Mental status is good.  Patient was discharged from [facility] on supplemental magnesium.  She wants to know if she should continue these medication.    Historical Information  Past Medical History:   Diagnosis Date   • Alcoholism (HCC) 10/1/2010   • Fatty liver 01/20/2023   • Gallstones    • Long Q-T syndrome      Past Surgical History:   Procedure Laterality Date   • COLONOSCOPY  4/29/2019    Clear   • TUBAL LIGATION  07/14/2005   • UPPER GASTROINTESTINAL ENDOSCOPY  1/20/2023    Burst ulcer     Social History     Substance and Sexual Activity   Alcohol Use Never    Comment: stopped January 2023     Social History     Substance and Sexual Activity   Drug Use Never     Social History     Tobacco Use   Smoking Status Never   • Passive exposure: Never   Smokeless Tobacco Never     Family History   Problem Relation Age of Onset   • Cancer Mother          Current Outpatient Medications:   •  Apple Cider Vinegar 188 MG CAPS  •  Biotin 10 MG TABS  •  CYANOCOBALAMIN PO  •  escitalopram  (LEXAPRO) 20 mg tablet  •  folic acid (FOLVITE) 1 mg tablet  •  gabapentin (NEURONTIN) 100 mg capsule  •  magnesium Oxide (MAG-OX) 400 mg TABS  •  Melatonin 10 MG TABS  •  mirtazapine (REMERON) 15 mg tablet  •  Multiple Vitamins-Minerals (multivitamin with minerals) tablet  •  nadolol (CORGARD) 20 mg tablet  •  pantoprazole (PROTONIX) 40 mg tablet  •  traZODone (DESYREL) 100 mg tablet  •  valsartan (DIOVAN) 40 mg tablet  •  amoxicillin (AMOXIL) 500 mg capsule  •  chlorhexidine (PERIDEX) 0.12 % solution  •  Cobalamin Combinations (B-12) 1000-400 MCG SUBL  •  cyanocobalamin (VITAMIN B-12) 100 MCG tablet  •  gabapentin (NEURONTIN) 100 mg capsule  •  naloxone (NARCAN) 4 mg/0.1 mL nasal spray  •  Vivitrol 380 MG SUSR  No Known Allergies  Reviewed medications and allergies and updated as indicated    PHYSICAL EXAM:    Blood pressure 118/70, height 5' (1.524 m), weight 53.1 kg (117 lb). Body mass index is 22.85 kg/m².  General Appearance: NAD, cooperative, alert  Eyes: Anicteric, conjunctiva pink  ENT:  Normocephalic, atraumatic, normal mucosa.    Neck:  Supple, symmetrical, trachea midline  Resp:  Clear to auscultation bilaterally; no rales, rhonchi or wheezing; respirations unlabored   CV:  S1 S2, Regular rate and rhythm; no murmur, rub, or gallop.  GI:  Soft, non-tender, non-distended; normal bowel sounds; no masses, no organomegaly   Rectal: Deferred  Musculoskeletal: No cyanosis, clubbing or edema. Normal ROM.  Skin:  No jaundice, rashes, or lesions   Heme/Lymph: No palpable cervical lymphadenopathy  Psych: Normal affect, good eye contact  Neuro: No gross deficits, AAOx3    Lab Results:   Lab Results   Component Value Date    WBC 6.3 09/06/2023    HGB 10.6 (L) 09/06/2023    HCT 32.2 (L) 09/06/2023     (H) 09/06/2023     09/06/2023     Lab Results   Component Value Date     09/30/2015    K 3.5 01/08/2024     01/08/2024    CO2 25 01/08/2024    ANIONGAP 14 (H) 09/30/2015    BUN 22 01/08/2024     CREATININE 0.76 01/08/2024    GLUCOSE 90 10/01/2015    CALCIUM 8.9 01/19/2023    CORRECTEDCA 9.9 01/19/2023     (H) 01/08/2024    ALT 42 (H) 01/08/2024    ALKPHOS 259 (H) 01/19/2023    PROT 8.5 (H) 09/30/2015    BILITOT 0.53 09/30/2015    EGFR 93 01/08/2024       Radiology Results:   No results found.      Jorge Hays MD  1/10/2024  5:29 PM  Sign when Signing Visit  [facility] - Outpatient Follow-up Note  Yvette Titus 53 y.o. female MRN: [id number]  Encounter: [id number]    ASSESSMENT AND PLAN:      1. Alcoholic cirrhosis of liver without ascites (HCC)  --Patient with documented alcoholic cirrhosis.  Recently complicated by esophageal variceal bleed.  Occurred after patient been drinking about a week or so.  She relapsed after feelings with respect to the anniversary of her father's death.  Did have varices with stigmata of bleeding and had successful banding.  -Was at [facility].  Probably had hepatic encephalopathy and was getting lactulose/    - Comprehensive metabolic panel; Future  - Ambulatory Referral to Hepatology; Future  - US abdomen complete; Future  - AFP tumor marker; Future    -Patient had success in the past with Vivitrol.  Some concern about hepatic injury however in patients with advanced liver disease.  Will refer to hepatology Dr. Wiley for evaluation.  I believe the medicine could benefit the patient but would be reluctant given potential adverse effects      2. Colon cancer screening  --Schedule colonoscopy near future.  Will give MiraLAX prep.  Should be done at the hospital.  Patient does have a standing order.  Had to be canceled this fall secondary to recent hospitalization    3. Secondary esophageal varices with bleeding (HCC)  --Will schedule EGD early March upper [place].  See if patient needs banding.  Continue blockade in the meantime  - EGD; Future    4. Hypomagnesemia  -Hypomagnesemia while in the hospital.  Recheck levels.  Have  renewed prescription.  May be able to discontinue  - Magnesium; Future  - Magnesium    5. Alcohol use disorder, severe, in early remission (HCC)  --Ongoing counseling-does seem to benefit from pharmacologic agent to help with her cravings for alcohol.  Please see above with respect to consultation of hepatology      Followup Appointment:  4 mo   ______________________________________________________________________    Chief Complaint   Patient presents with   • Hosptial Follow up     Pt was hospitalized at [facility], had EGD in December. She is doing well, no bleeding or trouble swallowing.      HPI:  ***    Historical Information  Past Medical History:   Diagnosis Date   • Alcoholism (HCC) 10/1/2010   • Fatty liver 01/20/2023   • Gallstones    • Long Q-T syndrome      Past Surgical History:   Procedure Laterality Date   • COLONOSCOPY  4/29/2019    Clear   • TUBAL LIGATION  07/14/2005   • UPPER GASTROINTESTINAL ENDOSCOPY  1/20/2023    Burst ulcer     Social History     Substance and Sexual Activity   Alcohol Use Never    Comment: stopped January 2023     Social History     Substance and Sexual Activity   Drug Use Never     Social History     Tobacco Use   Smoking Status Never   • Passive exposure: Never   Smokeless Tobacco Never     Family History   Problem Relation Age of Onset   • Cancer Mother          Current Outpatient Medications:   •  Apple Cider Vinegar 188 MG CAPS  •  Biotin 10 MG TABS  •  CYANOCOBALAMIN PO  •  escitalopram (LEXAPRO) 20 mg tablet  •  folic acid (FOLVITE) 1 mg tablet  •  gabapentin (NEURONTIN) 100 mg capsule  •  magnesium Oxide (MAG-OX) 400 mg TABS  •  Melatonin 10 MG TABS  •  mirtazapine (REMERON) 15 mg tablet  •  Multiple Vitamins-Minerals (multivitamin with minerals) tablet  •  nadolol (CORGARD) 20 mg tablet  •  pantoprazole (PROTONIX) 40 mg tablet  •  traZODone (DESYREL) 100 mg tablet  •  valsartan (DIOVAN) 40 mg tablet  •  amoxicillin (AMOXIL) 500 mg capsule  •  chlorhexidine  (PERIDEX) 0.12 % solution  •  Cobalamin Combinations (B-12) 1000-400 MCG SUBL  •  cyanocobalamin (VITAMIN B-12) 100 MCG tablet  •  gabapentin (NEURONTIN) 100 mg capsule  •  naloxone (NARCAN) 4 mg/0.1 mL nasal spray  •  Vivitrol 380 MG SUSR  No Known Allergies  Reviewed medications and allergies and updated as indicated    PHYSICAL EXAM:    Blood pressure 118/70, height 5' (1.524 m), weight 53.1 kg (117 lb). Body mass index is 22.85 kg/m².  General Appearance: NAD, cooperative, alert  Eyes: Anicteric, conjunctiva pink  ENT:  Normocephalic, atraumatic, normal mucosa.    Neck:  Supple, symmetrical, trachea midline  Resp:  Clear to auscultation bilaterally; no rales, rhonchi or wheezing; respirations unlabored   CV:  S1 S2, Regular rate and rhythm; no murmur, rub, or gallop.  GI:  Soft, non-tender, non-distended; normal bowel sounds; no masses, no organomegaly   Rectal: Deferred  Musculoskeletal: No cyanosis, clubbing or edema. Normal ROM.  Skin:  No jaundice, rashes, or lesions   Heme/Lymph: No palpable cervical lymphadenopathy  Psych: Normal affect, good eye contact  Neuro: No gross deficits, AAOx3    Lab Results:   Lab Results   Component Value Date    WBC 6.3 09/06/2023    HGB 10.6 (L) 09/06/2023    HCT 32.2 (L) 09/06/2023     (H) 09/06/2023     09/06/2023     Lab Results   Component Value Date     09/30/2015    K 3.5 01/08/2024     01/08/2024    CO2 25 01/08/2024    ANIONGAP 14 (H) 09/30/2015    BUN 22 01/08/2024    CREATININE 0.76 01/08/2024    GLUCOSE 90 10/01/2015    CALCIUM 8.9 01/19/2023    CORRECTEDCA 9.9 01/19/2023     (H) 01/08/2024    ALT 42 (H) 01/08/2024    ALKPHOS 259 (H) 01/19/2023    PROT 8.5 (H) 09/30/2015    BILITOT 0.53 09/30/2015    EGFR 93 01/08/2024       Radiology Results:   No results found.

## 2024-01-11 PROBLEM — F10.21 ALCOHOL USE DISORDER, SEVERE, IN EARLY REMISSION (HCC): Status: ACTIVE | Noted: 2023-01-16

## 2024-01-14 DIAGNOSIS — D64.9 ANEMIA, UNSPECIFIED TYPE: ICD-10-CM

## 2024-01-14 RX ORDER — FOLIC ACID 1 MG/1
1000 TABLET ORAL DAILY
Qty: 21 TABLET | Refills: 2 | Status: SHIPPED | OUTPATIENT
Start: 2024-01-14

## 2024-01-18 ENCOUNTER — TELEPHONE (OUTPATIENT)
Dept: HEMATOLOGY ONCOLOGY | Facility: CLINIC | Age: 54
End: 2024-01-18

## 2024-01-18 ENCOUNTER — TELEPHONE (OUTPATIENT)
Dept: GASTROENTEROLOGY | Facility: CLINIC | Age: 54
End: 2024-01-18

## 2024-01-18 LAB
ALBUMIN SERPL-MCNC: 3.7 G/DL (ref 3.8–4.9)
ALBUMIN/GLOB SERPL: 0.9 {RATIO} (ref 1.2–2.2)
ALP SERPL-CCNC: 123 IU/L (ref 44–121)
ALT SERPL-CCNC: 25 IU/L (ref 0–32)
AST SERPL-CCNC: 67 IU/L (ref 0–40)
BASOPHILS # BLD AUTO: 0 X10E3/UL (ref 0–0.2)
BASOPHILS NFR BLD AUTO: 1 %
BILIRUB SERPL-MCNC: 1.3 MG/DL (ref 0–1.2)
BUN SERPL-MCNC: 14 MG/DL (ref 6–24)
BUN/CREAT SERPL: 23 (ref 9–23)
CALCIUM SERPL-MCNC: 9.4 MG/DL (ref 8.7–10.2)
CHLORIDE SERPL-SCNC: 105 MMOL/L (ref 96–106)
CO2 SERPL-SCNC: 25 MMOL/L (ref 20–29)
CREAT SERPL-MCNC: 0.62 MG/DL (ref 0.57–1)
EGFR: 106 ML/MIN/1.73
EOSINOPHIL # BLD AUTO: 0.1 X10E3/UL (ref 0–0.4)
EOSINOPHIL NFR BLD AUTO: 2 %
ERYTHROCYTE [DISTWIDTH] IN BLOOD BY AUTOMATED COUNT: 14.4 % (ref 11.7–15.4)
GLOBULIN SER-MCNC: 4.3 G/DL (ref 1.5–4.5)
GLUCOSE SERPL-MCNC: 92 MG/DL (ref 70–99)
HCT VFR BLD AUTO: 27.7 % (ref 34–46.6)
HGB BLD-MCNC: 9.5 G/DL (ref 11.1–15.9)
IMM GRANULOCYTES # BLD: 0 X10E3/UL (ref 0–0.1)
IMM GRANULOCYTES NFR BLD: 0 %
LYMPHOCYTES # BLD AUTO: 1.1 X10E3/UL (ref 0.7–3.1)
LYMPHOCYTES NFR BLD AUTO: 26 %
MCH RBC QN AUTO: 32.9 PG (ref 26.6–33)
MCHC RBC AUTO-ENTMCNC: 34.3 G/DL (ref 31.5–35.7)
MCV RBC AUTO: 96 FL (ref 79–97)
METHYLMALONATE SERPL-SCNC: 202 NMOL/L (ref 0–378)
MONOCYTES # BLD AUTO: 0.7 X10E3/UL (ref 0.1–0.9)
MONOCYTES NFR BLD AUTO: 17 %
NEUTROPHILS # BLD AUTO: 2.3 X10E3/UL (ref 1.4–7)
NEUTROPHILS NFR BLD AUTO: 54 %
PLATELET # BLD AUTO: 128 X10E3/UL (ref 150–450)
POTASSIUM SERPL-SCNC: 4.3 MMOL/L (ref 3.5–5.2)
PROT SERPL-MCNC: 8 G/DL (ref 6–8.5)
RBC # BLD AUTO: 2.89 X10E6/UL (ref 3.77–5.28)
SODIUM SERPL-SCNC: 142 MMOL/L (ref 134–144)
WBC # BLD AUTO: 4.2 X10E3/UL (ref 3.4–10.8)

## 2024-01-18 NOTE — TELEPHONE ENCOUNTER
Called patient, lvm. Called [place] (pt's significant other), spoke to Yvette, scheduled OV with Dr. Pat Hollingsworth on 3/12 (Dr. Wiley in hep clinic).

## 2024-01-18 NOTE — TELEPHONE ENCOUNTER
----- Message from Karen Wiley MD sent at 1/11/2024  3:31 PM EST -----  Chas Cordero,    Can you please contact the patient to schedule an appointment with me for decompensated cirrhosis (NPV 60 minutes, [facility]). Thanks!    Karen    ----- Message -----  From: Jorge Hays MD  Sent: 1/11/2024  12:53 AM EST  To: Karen Wiley MD

## 2024-01-18 NOTE — TELEPHONE ENCOUNTER
Appointment Change  Cancel, Reschedule, Change to Virtual      Who are you speaking with? Patient   If it is not the patient, is the caller listed on the communication consent form? Yes   Which provider is the appointment scheduled with? KAREN Zarate   When was the original appointment scheduled?    Please list date and time 1/25   At which location is the appointment scheduled to take place? [place]   Was the appointment rescheduled?     Was the appointment changed from an in person visit to a virtual visit?    If so, please list the details of the change. 3/6 11am   What is the reason for the appointment change? Sched conflict       Was STAR transport scheduled? No   Does STAR transport need to be scheduled for the new visit (if applicable) No   Does the patient need an infusion appointment rescheduled? No   Does the patient have an upcoming infusion appointment scheduled? If so, when? No   Is the patient undergoing chemotherapy? No   For appointments cancelled with less than 24 hours:  Was the no-show policy reviewed? Yes

## 2024-02-07 ENCOUNTER — TELEPHONE (OUTPATIENT)
Dept: GASTROENTEROLOGY | Facility: CLINIC | Age: 54
End: 2024-02-07

## 2024-02-07 NOTE — TELEPHONE ENCOUNTER
Sent pt a [employer] message asking pt to call and confirm pt received egd instructions for egd scheduled for 3/7/24 at [facility]

## 2024-02-07 NOTE — TELEPHONE ENCOUNTER
Scheduled date of EGD(as of today):03/07/2024  Physician performing EGD:dr beltran  Location of EGD:[place]  Instructions reviewed with patient by:not done at ov  Clearances: n    Sent message via portal for pt to call and confirm pt received egd instructions at ov

## 2024-02-12 ENCOUNTER — TELEPHONE (OUTPATIENT)
Dept: HEMATOLOGY ONCOLOGY | Facility: CLINIC | Age: 54
End: 2024-02-12

## 2024-02-12 NOTE — TELEPHONE ENCOUNTER
Appointment Change  Cancel, Reschedule, Change to Virtual      Who are you speaking with? Patient   If it is not the patient, is the caller listed on the communication consent form? N/A   Which provider is the appointment scheduled with? KAREN Zarate   When was the original appointment scheduled?    Please list date and time 3/6/24 11am   At which location is the appointment scheduled to take place? [place]   Was the appointment rescheduled?     Was the appointment changed from an in person visit to a virtual visit?    If so, please list the details of the change. 4/8/24 6650   What is the reason for the appointment change? Appt conflict       Was [facility] transport scheduled? N/A   Does STAR transport need to be scheduled for the new visit (if applicable) N/A   Does the patient need an infusion appointment rescheduled? N/A   Does the patient have an upcoming infusion appointment scheduled? If so, when? No   Is the patient undergoing chemotherapy? N/A   For appointments cancelled with less than 24 hours:  Was the no-show policy reviewed? N/A

## 2024-02-19 ENCOUNTER — TELEPHONE (OUTPATIENT)
Age: 54
End: 2024-02-19

## 2024-02-19 NOTE — TELEPHONE ENCOUNTER
Was on the phone with pt significant other when she asked about her self and when the blood work needed to be done that was given at 1/10 office visit. I do not see mention of when so pt will get done when she can.

## 2024-02-22 ENCOUNTER — TELEPHONE (OUTPATIENT)
Age: 54
End: 2024-02-22

## 2024-02-22 NOTE — TELEPHONE ENCOUNTER
Spoke with office and advised that patient did  not get CT done yet.  Faxed last office note and CAT scan from 11/29/2023.

## 2024-02-22 NOTE — TELEPHONE ENCOUNTER
Patients GI provider:  Dr. Hays    Number to return call: [phone]    Reason for call: Neymar from Dr. Arroyo's office calling requesting pt's last imaging records and OV notes.     Scheduled procedure/appointment date if applicable: Appt. 3/7/24

## 2024-03-01 ENCOUNTER — ANESTHESIA EVENT (INPATIENT)
Dept: PERIOP | Facility: HOSPITAL | Age: 54
DRG: 280 | End: 2024-03-01
Payer: COMMERCIAL

## 2024-03-01 ENCOUNTER — APPOINTMENT (INPATIENT)
Dept: RADIOLOGY | Facility: HOSPITAL | Age: 54
DRG: 280 | End: 2024-03-01
Payer: COMMERCIAL

## 2024-03-01 ENCOUNTER — ANESTHESIA (OUTPATIENT)
Dept: ANESTHESIOLOGY | Facility: HOSPITAL | Age: 54
End: 2024-03-01

## 2024-03-01 ENCOUNTER — ANESTHESIA (INPATIENT)
Dept: PERIOP | Facility: HOSPITAL | Age: 54
DRG: 280 | End: 2024-03-01
Payer: COMMERCIAL

## 2024-03-01 ENCOUNTER — APPOINTMENT (EMERGENCY)
Dept: CT IMAGING | Facility: HOSPITAL | Age: 54
DRG: 280 | End: 2024-03-01
Payer: COMMERCIAL

## 2024-03-01 ENCOUNTER — APPOINTMENT (INPATIENT)
Dept: PERIOP | Facility: HOSPITAL | Age: 54
DRG: 280 | End: 2024-03-01
Attending: INTERNAL MEDICINE
Payer: COMMERCIAL

## 2024-03-01 ENCOUNTER — ANESTHESIA EVENT (OUTPATIENT)
Dept: ANESTHESIOLOGY | Facility: HOSPITAL | Age: 54
End: 2024-03-01

## 2024-03-01 ENCOUNTER — HOSPITAL ENCOUNTER (INPATIENT)
Facility: HOSPITAL | Age: 54
LOS: 6 days | Discharge: HOME | DRG: 280 | End: 2024-03-07
Attending: EMERGENCY MEDICINE | Admitting: INTERNAL MEDICINE
Payer: COMMERCIAL

## 2024-03-01 DIAGNOSIS — R19.7 NAUSEA VOMITING AND DIARRHEA: ICD-10-CM

## 2024-03-01 DIAGNOSIS — R45.851 SUICIDAL IDEATION: ICD-10-CM

## 2024-03-01 DIAGNOSIS — K27.9 PEPTIC ULCER DISEASE: ICD-10-CM

## 2024-03-01 DIAGNOSIS — R11.2 NAUSEA VOMITING AND DIARRHEA: ICD-10-CM

## 2024-03-01 DIAGNOSIS — K92.0 HEMATEMESIS: Primary | ICD-10-CM

## 2024-03-01 DIAGNOSIS — Z78.9 ALCOHOL USE: ICD-10-CM

## 2024-03-01 DIAGNOSIS — R45.851 SUICIDAL THOUGHTS: ICD-10-CM

## 2024-03-01 DIAGNOSIS — K92.2 GI BLEED: ICD-10-CM

## 2024-03-01 PROBLEM — E83.42 HYPOMAGNESEMIA: Status: ACTIVE | Noted: 2024-03-01

## 2024-03-01 PROBLEM — E87.1 HYPONATREMIA: Status: ACTIVE | Noted: 2024-03-01

## 2024-03-01 PROBLEM — F10.10 ALCOHOL ABUSE: Status: RESOLVED | Noted: 2023-01-16 | Resolved: 2024-03-01

## 2024-03-01 PROBLEM — R65.10 SIRS (SYSTEMIC INFLAMMATORY RESPONSE SYNDROME) (HCC): Status: ACTIVE | Noted: 2024-03-01

## 2024-03-01 PROBLEM — J96.00 ACUTE RESPIRATORY FAILURE (HCC): Status: ACTIVE | Noted: 2024-03-01

## 2024-03-01 PROBLEM — I45.81 LONG Q-T SYNDROME: Status: ACTIVE | Noted: 2024-03-01

## 2024-03-01 PROBLEM — E87.6 HYPOKALEMIA: Status: ACTIVE | Noted: 2024-03-01

## 2024-03-01 PROBLEM — K70.30 ALCOHOLIC CIRRHOSIS (HCC): Status: ACTIVE | Noted: 2024-03-01

## 2024-03-01 LAB
ABO GROUP BLD BPU: NORMAL
ABO GROUP BLD BPU: NORMAL
ABO GROUP BLD: NORMAL
ABO GROUP BLD: NORMAL
ALBUMIN SERPL BCP-MCNC: 4.4 G/DL (ref 3.5–5)
ALP SERPL-CCNC: 161 U/L (ref 34–104)
ALT SERPL W P-5'-P-CCNC: 53 U/L (ref 7–52)
AMPHETAMINES SERPL QL SCN: NEGATIVE
ANION GAP SERPL CALCULATED.3IONS-SCNC: 18 MMOL/L
ANION GAP SERPL CALCULATED.3IONS-SCNC: 24 MMOL/L
APAP SERPL-MCNC: <2 UG/ML (ref 10–20)
APTT 1H NP PPP: 34 SECONDS (ref 24–36)
APTT IMM NP PPP: 33 SECONDS (ref 24–36)
APTT PPP: 39 SECONDS (ref 23–37)
APTT PPP: 40 SECONDS (ref 23–37)
APTT PPP: 41 SECONDS (ref 23–37)
AST SERPL W P-5'-P-CCNC: 242 U/L (ref 13–39)
ATRIAL RATE: 108 BPM
BACTERIA UR QL AUTO: ABNORMAL /HPF
BARBITURATES UR QL: NEGATIVE
BASE EX.OXY STD BLDV CALC-SCNC: 93.8 % (ref 60–80)
BASE EXCESS BLDV CALC-SCNC: -2.2 MMOL/L
BASOPHILS # BLD MANUAL: 0.34 THOUSAND/UL (ref 0–0.1)
BASOPHILS NFR MAR MANUAL: 3 % (ref 0–1)
BENZODIAZ UR QL: NEGATIVE
BILIRUB SERPL-MCNC: 1.84 MG/DL (ref 0.2–1)
BILIRUB UR QL STRIP: NEGATIVE
BLD GP AB SCN SERPL QL: NEGATIVE
BPU ID: NORMAL
BPU ID: NORMAL
BUN SERPL-MCNC: 4 MG/DL (ref 5–25)
BUN SERPL-MCNC: 7 MG/DL (ref 5–25)
CALCIUM SERPL-MCNC: 7.7 MG/DL (ref 8.4–10.2)
CALCIUM SERPL-MCNC: 9.6 MG/DL (ref 8.4–10.2)
CHLORIDE SERPL-SCNC: 81 MMOL/L (ref 96–108)
CHLORIDE SERPL-SCNC: 92 MMOL/L (ref 96–108)
CLARITY UR: CLEAR
CO2 SERPL-SCNC: 21 MMOL/L (ref 21–32)
CO2 SERPL-SCNC: 21 MMOL/L (ref 21–32)
COCAINE UR QL: NEGATIVE
COLOR UR: YELLOW
CREAT SERPL-MCNC: 0.43 MG/DL (ref 0.6–1.3)
CREAT SERPL-MCNC: 0.61 MG/DL (ref 0.6–1.3)
CROSSMATCH: NORMAL
CROSSMATCH: NORMAL
EOSINOPHIL # BLD MANUAL: 0.79 THOUSAND/UL (ref 0–0.4)
EOSINOPHIL NFR BLD MANUAL: 7 % (ref 0–6)
ERYTHROCYTE [DISTWIDTH] IN BLOOD BY AUTOMATED COUNT: 15.6 % (ref 11.6–15.1)
ETHANOL SERPL-MCNC: 261 MG/DL
ETHANOL SERPL-MCNC: 275 MG/DL
FIBRINOGEN PPP-MCNC: 195 MG/DL (ref 207–520)
FOLATE SERPL-MCNC: >22.3 NG/ML
GFR SERPL CREATININE-BSD FRML MDRD: 103 ML/MIN/1.73SQ M
GFR SERPL CREATININE-BSD FRML MDRD: 115 ML/MIN/1.73SQ M
GLUCOSE SERPL-MCNC: 131 MG/DL (ref 65–140)
GLUCOSE SERPL-MCNC: 151 MG/DL (ref 65–140)
GLUCOSE UR STRIP-MCNC: NEGATIVE MG/DL
HCO3 BLDV-SCNC: 21 MMOL/L (ref 24–30)
HCT VFR BLD AUTO: 29.7 % (ref 34.8–46.1)
HGB BLD-MCNC: 10 G/DL (ref 11.5–15.4)
HGB BLD-MCNC: 8.6 G/DL (ref 11.5–15.4)
HGB UR QL STRIP.AUTO: ABNORMAL
HYALINE CASTS #/AREA URNS LPF: ABNORMAL /LPF
INR PPP: 1.3 (ref 0.84–1.19)
INR PPP: 1.3 (ref 0.84–1.19)
KETONES UR STRIP-MCNC: NEGATIVE MG/DL
LACTATE SERPL-SCNC: 2.8 MMOL/L (ref 0.5–2)
LACTATE SERPL-SCNC: 4.7 MMOL/L (ref 0.5–2)
LEUKOCYTE ESTERASE UR QL STRIP: NEGATIVE
LIPASE SERPL-CCNC: 129 U/L (ref 11–82)
LYMPHOCYTES # BLD AUTO: 2.37 THOUSAND/UL (ref 0.6–4.47)
LYMPHOCYTES # BLD AUTO: 21 % (ref 14–44)
MAGNESIUM SERPL-MCNC: 1.4 MG/DL (ref 1.9–2.7)
MAGNESIUM SERPL-MCNC: 1.9 MG/DL (ref 1.9–2.7)
MCH RBC QN AUTO: 31.8 PG (ref 26.8–34.3)
MCHC RBC AUTO-ENTMCNC: 33.7 G/DL (ref 31.4–37.4)
MCV RBC AUTO: 95 FL (ref 82–98)
METHADONE UR QL: NEGATIVE
MONOCYTES # BLD AUTO: 0.56 THOUSAND/UL (ref 0–1.22)
MONOCYTES NFR BLD: 5 % (ref 4–12)
MUCOUS THREADS UR QL AUTO: ABNORMAL
NEUTROPHILS # BLD MANUAL: 7.23 THOUSAND/UL (ref 1.85–7.62)
NEUTS BAND NFR BLD MANUAL: 1 % (ref 0–8)
NEUTS SEG NFR BLD AUTO: 63 % (ref 43–75)
NITRITE UR QL STRIP: NEGATIVE
NON-SQ EPI CELLS URNS QL MICRO: ABNORMAL /HPF
O2 CT BLDV-SCNC: 13.8 ML/DL
OPIATES UR QL SCN: POSITIVE
OSMOLALITY UR/SERPL-RTO: 345 MMOL/KG (ref 282–298)
OXYCODONE+OXYMORPHONE UR QL SCN: NEGATIVE
P AXIS: 80 DEGREES
PCO2 BLDV: 30.9 MM HG (ref 42–50)
PCP UR QL: NEGATIVE
PH BLDV: 7.45 [PH] (ref 7.3–7.4)
PH UR STRIP.AUTO: 6 [PH]
PLATELET # BLD AUTO: 186 THOUSANDS/UL (ref 149–390)
PLATELET # BLD AUTO: 252 THOUSANDS/UL (ref 149–390)
PLATELET BLD QL SMEAR: ADEQUATE
PMV BLD AUTO: 9 FL (ref 8.9–12.7)
PMV BLD AUTO: 9 FL (ref 8.9–12.7)
PO2 BLDV: 83.7 MM HG (ref 35–45)
POTASSIUM SERPL-SCNC: 3.4 MMOL/L (ref 3.5–5.3)
POTASSIUM SERPL-SCNC: 3.5 MMOL/L (ref 3.5–5.3)
PR INTERVAL: 148 MS
PROT SERPL-MCNC: 10.1 G/DL (ref 6.4–8.4)
PROT UR STRIP-MCNC: ABNORMAL MG/DL
PROTHROMBIN TIME: 16.6 SECONDS (ref 11.6–14.5)
PROTHROMBIN TIME: 16.7 SECONDS (ref 11.6–14.5)
PROTHROMBIN TIME: 17.5 SECONDS (ref 11.6–14.5)
PT 1H NP PPP: 15.1 SEC (ref 12–14.3)
PT IMM NP PPP: 14.4 SECONDS (ref 12–14.3)
QRS AXIS: 78 DEGREES
QRSD INTERVAL: 88 MS
QT INTERVAL: 400 MS
QTC INTERVAL: 536 MS
RBC # BLD AUTO: 3.14 MILLION/UL (ref 3.81–5.12)
RBC #/AREA URNS AUTO: ABNORMAL /HPF
RBC MORPH BLD: NORMAL
RH BLD: POSITIVE
RH BLD: POSITIVE
SALICYLATES SERPL-MCNC: <5 MG/DL (ref 3–20)
SODIUM 24H UR-SCNC: 81 MOL/L
SODIUM SERPL-SCNC: 126 MMOL/L (ref 135–147)
SODIUM SERPL-SCNC: 131 MMOL/L (ref 135–147)
SP GR UR STRIP.AUTO: 1.01 (ref 1–1.03)
SPECIMEN EXPIRATION DATE: NORMAL
T WAVE AXIS: 71 DEGREES
THC UR QL: NEGATIVE
THROMBIN TIME MIXING INCUBATED: 20.8 SECONDS (ref 14.7–18.4)
THROMBIN TIME MIXING ROOM TEMP: 19.8 SECONDS (ref 14.7–18.4)
THROMBIN TIME: 22.7 SECONDS (ref 14.7–18.4)
THROMBIN TIME: 22.8 SECONDS (ref 14.7–18.4)
UNIT DISPENSE STATUS: NORMAL
UNIT DISPENSE STATUS: NORMAL
UNIT PRODUCT CODE: NORMAL
UNIT PRODUCT CODE: NORMAL
UNIT PRODUCT VOLUME: 350 ML
UNIT PRODUCT VOLUME: 350 ML
UNIT RH: NORMAL
UNIT RH: NORMAL
UROBILINOGEN UR STRIP-ACNC: <2 MG/DL
VENTRICULAR RATE: 108 BPM
VIT B12 SERPL-MCNC: 647 PG/ML (ref 180–914)
WBC # BLD AUTO: 11.29 THOUSAND/UL (ref 4.31–10.16)
WBC #/AREA URNS AUTO: ABNORMAL /HPF

## 2024-03-01 PROCEDURE — 83735 ASSAY OF MAGNESIUM: CPT | Performed by: PHYSICIAN ASSISTANT

## 2024-03-01 PROCEDURE — 96376 TX/PRO/DX INJ SAME DRUG ADON: CPT

## 2024-03-01 PROCEDURE — 81001 URINALYSIS AUTO W/SCOPE: CPT | Performed by: EMERGENCY MEDICINE

## 2024-03-01 PROCEDURE — 96365 THER/PROPH/DIAG IV INF INIT: CPT

## 2024-03-01 PROCEDURE — C9113 INJ PANTOPRAZOLE SODIUM, VIA: HCPCS | Performed by: EMERGENCY MEDICINE

## 2024-03-01 PROCEDURE — 82607 VITAMIN B-12: CPT | Performed by: PHYSICIAN ASSISTANT

## 2024-03-01 PROCEDURE — 36415 COLL VENOUS BLD VENIPUNCTURE: CPT | Performed by: EMERGENCY MEDICINE

## 2024-03-01 PROCEDURE — 96368 THER/DIAG CONCURRENT INF: CPT

## 2024-03-01 PROCEDURE — 74178 CT ABD&PLV WO CNTR FLWD CNTR: CPT

## 2024-03-01 PROCEDURE — 85384 FIBRINOGEN ACTIVITY: CPT | Performed by: EMERGENCY MEDICINE

## 2024-03-01 PROCEDURE — 86850 RBC ANTIBODY SCREEN: CPT | Performed by: EMERGENCY MEDICINE

## 2024-03-01 PROCEDURE — 85018 HEMOGLOBIN: CPT | Performed by: PHYSICIAN ASSISTANT

## 2024-03-01 PROCEDURE — 83935 ASSAY OF URINE OSMOLALITY: CPT | Performed by: PHYSICIAN ASSISTANT

## 2024-03-01 PROCEDURE — 85611 PROTHROMBIN TEST: CPT | Performed by: EMERGENCY MEDICINE

## 2024-03-01 PROCEDURE — 80053 COMPREHEN METABOLIC PANEL: CPT | Performed by: EMERGENCY MEDICINE

## 2024-03-01 PROCEDURE — 84300 ASSAY OF URINE SODIUM: CPT | Performed by: PHYSICIAN ASSISTANT

## 2024-03-01 PROCEDURE — 85732 THROMBOPLASTIN TIME PARTIAL: CPT | Performed by: EMERGENCY MEDICINE

## 2024-03-01 PROCEDURE — 83605 ASSAY OF LACTIC ACID: CPT | Performed by: PHYSICIAN ASSISTANT

## 2024-03-01 PROCEDURE — 99291 CRITICAL CARE FIRST HOUR: CPT | Performed by: EMERGENCY MEDICINE

## 2024-03-01 PROCEDURE — 93005 ELECTROCARDIOGRAM TRACING: CPT

## 2024-03-01 PROCEDURE — 96375 TX/PRO/DX INJ NEW DRUG ADDON: CPT

## 2024-03-01 PROCEDURE — 87040 BLOOD CULTURE FOR BACTERIA: CPT | Performed by: PHYSICIAN ASSISTANT

## 2024-03-01 PROCEDURE — 80143 DRUG ASSAY ACETAMINOPHEN: CPT | Performed by: EMERGENCY MEDICINE

## 2024-03-01 PROCEDURE — 0DJ08ZZ INSPECTION OF UPPER INTESTINAL TRACT, VIA NATURAL OR ARTIFICIAL OPENING ENDOSCOPIC: ICD-10-PCS | Performed by: INTERNAL MEDICINE

## 2024-03-01 PROCEDURE — 80179 DRUG ASSAY SALICYLATE: CPT | Performed by: EMERGENCY MEDICINE

## 2024-03-01 PROCEDURE — 82746 ASSAY OF FOLIC ACID SERUM: CPT | Performed by: PHYSICIAN ASSISTANT

## 2024-03-01 PROCEDURE — 82077 ASSAY SPEC XCP UR&BREATH IA: CPT | Performed by: EMERGENCY MEDICINE

## 2024-03-01 PROCEDURE — 71045 X-RAY EXAM CHEST 1 VIEW: CPT

## 2024-03-01 PROCEDURE — 96366 THER/PROPH/DIAG IV INF ADDON: CPT

## 2024-03-01 PROCEDURE — 80307 DRUG TEST PRSMV CHEM ANLYZR: CPT | Performed by: EMERGENCY MEDICINE

## 2024-03-01 PROCEDURE — 99291 CRITICAL CARE FIRST HOUR: CPT | Performed by: INTERNAL MEDICINE

## 2024-03-01 PROCEDURE — 85730 THROMBOPLASTIN TIME PARTIAL: CPT | Performed by: EMERGENCY MEDICINE

## 2024-03-01 PROCEDURE — 80048 BASIC METABOLIC PNL TOTAL CA: CPT | Performed by: PHYSICIAN ASSISTANT

## 2024-03-01 PROCEDURE — 85610 PROTHROMBIN TIME: CPT | Performed by: EMERGENCY MEDICINE

## 2024-03-01 PROCEDURE — 86920 COMPATIBILITY TEST SPIN: CPT

## 2024-03-01 PROCEDURE — 85027 COMPLETE CBC AUTOMATED: CPT | Performed by: EMERGENCY MEDICINE

## 2024-03-01 PROCEDURE — 93010 ELECTROCARDIOGRAM REPORT: CPT | Performed by: INTERNAL MEDICINE

## 2024-03-01 PROCEDURE — 83930 ASSAY OF BLOOD OSMOLALITY: CPT | Performed by: PHYSICIAN ASSISTANT

## 2024-03-01 PROCEDURE — 94760 N-INVAS EAR/PLS OXIMETRY 1: CPT

## 2024-03-01 PROCEDURE — 85049 AUTOMATED PLATELET COUNT: CPT | Performed by: EMERGENCY MEDICINE

## 2024-03-01 PROCEDURE — 82805 BLOOD GASES W/O2 SATURATION: CPT | Performed by: PHYSICIAN ASSISTANT

## 2024-03-01 PROCEDURE — 85007 BL SMEAR W/DIFF WBC COUNT: CPT | Performed by: EMERGENCY MEDICINE

## 2024-03-01 PROCEDURE — 83690 ASSAY OF LIPASE: CPT | Performed by: EMERGENCY MEDICINE

## 2024-03-01 PROCEDURE — C9113 INJ PANTOPRAZOLE SODIUM, VIA: HCPCS | Performed by: PHYSICIAN ASSISTANT

## 2024-03-01 PROCEDURE — 83735 ASSAY OF MAGNESIUM: CPT | Performed by: EMERGENCY MEDICINE

## 2024-03-01 PROCEDURE — 85670 THROMBIN TIME PLASMA: CPT | Performed by: EMERGENCY MEDICINE

## 2024-03-01 PROCEDURE — 94002 VENT MGMT INPAT INIT DAY: CPT

## 2024-03-01 PROCEDURE — 86900 BLOOD TYPING SEROLOGIC ABO: CPT | Performed by: EMERGENCY MEDICINE

## 2024-03-01 PROCEDURE — 86901 BLOOD TYPING SEROLOGIC RH(D): CPT | Performed by: EMERGENCY MEDICINE

## 2024-03-01 PROCEDURE — 99285 EMERGENCY DEPT VISIT HI MDM: CPT

## 2024-03-01 RX ORDER — DEXAMETHASONE SODIUM PHOSPHATE 10 MG/ML
INJECTION, SOLUTION INTRAMUSCULAR; INTRAVENOUS AS NEEDED
Status: DISCONTINUED | OUTPATIENT
Start: 2024-03-01 | End: 2024-03-01

## 2024-03-01 RX ORDER — MORPHINE SULFATE 4 MG/ML
4 INJECTION, SOLUTION INTRAMUSCULAR; INTRAVENOUS ONCE
Status: COMPLETED | OUTPATIENT
Start: 2024-03-01 | End: 2024-03-01

## 2024-03-01 RX ORDER — ONDANSETRON 2 MG/ML
4 INJECTION INTRAMUSCULAR; INTRAVENOUS ONCE
Status: COMPLETED | OUTPATIENT
Start: 2024-03-01 | End: 2024-03-01

## 2024-03-01 RX ORDER — SODIUM CHLORIDE, SODIUM GLUCONATE, SODIUM ACETATE, POTASSIUM CHLORIDE, MAGNESIUM CHLORIDE, SODIUM PHOSPHATE, DIBASIC, AND POTASSIUM PHOSPHATE .53; .5; .37; .037; .03; .012; .00082 G/100ML; G/100ML; G/100ML; G/100ML; G/100ML; G/100ML; G/100ML
1000 INJECTION, SOLUTION INTRAVENOUS ONCE
Status: COMPLETED | OUTPATIENT
Start: 2024-03-01 | End: 2024-03-02

## 2024-03-01 RX ORDER — POTASSIUM CHLORIDE 14.9 MG/ML
20 INJECTION INTRAVENOUS
Status: COMPLETED | OUTPATIENT
Start: 2024-03-01 | End: 2024-03-01

## 2024-03-01 RX ORDER — FENTANYL CITRATE 50 UG/ML
INJECTION, SOLUTION INTRAMUSCULAR; INTRAVENOUS AS NEEDED
Status: DISCONTINUED | OUTPATIENT
Start: 2024-03-01 | End: 2024-03-01

## 2024-03-01 RX ORDER — PROPOFOL 10 MG/ML
5-50 INJECTION, EMULSION INTRAVENOUS
Status: DISCONTINUED | OUTPATIENT
Start: 2024-03-01 | End: 2024-03-03

## 2024-03-01 RX ORDER — FENTANYL CITRATE 50 UG/ML
100 INJECTION, SOLUTION INTRAMUSCULAR; INTRAVENOUS ONCE
Status: COMPLETED | OUTPATIENT
Start: 2024-03-01 | End: 2024-03-01

## 2024-03-01 RX ORDER — LORAZEPAM 2 MG/ML
1 INJECTION INTRAMUSCULAR ONCE
Status: COMPLETED | OUTPATIENT
Start: 2024-03-01 | End: 2024-03-01

## 2024-03-01 RX ORDER — FENTANYL CITRATE 50 UG/ML
50 INJECTION, SOLUTION INTRAMUSCULAR; INTRAVENOUS
Status: DISCONTINUED | OUTPATIENT
Start: 2024-03-01 | End: 2024-03-04

## 2024-03-01 RX ORDER — MIDAZOLAM HYDROCHLORIDE 2 MG/2ML
2 INJECTION, SOLUTION INTRAMUSCULAR; INTRAVENOUS ONCE
Status: COMPLETED | OUTPATIENT
Start: 2024-03-01 | End: 2024-03-01

## 2024-03-01 RX ORDER — CEFTRIAXONE 1 G/50ML
1000 INJECTION, SOLUTION INTRAVENOUS EVERY 24 HOURS
Status: COMPLETED | OUTPATIENT
Start: 2024-03-01 | End: 2024-03-05

## 2024-03-01 RX ORDER — CEFTRIAXONE 1 G/50ML
1000 INJECTION, SOLUTION INTRAVENOUS EVERY 24 HOURS
Status: DISCONTINUED | OUTPATIENT
Start: 2024-03-01 | End: 2024-03-01

## 2024-03-01 RX ORDER — FENTANYL CITRATE 50 UG/ML
50 INJECTION, SOLUTION INTRAMUSCULAR; INTRAVENOUS ONCE
Status: COMPLETED | OUTPATIENT
Start: 2024-03-01 | End: 2024-03-01

## 2024-03-01 RX ORDER — CHLORHEXIDINE GLUCONATE ORAL RINSE 1.2 MG/ML
15 SOLUTION DENTAL EVERY 12 HOURS SCHEDULED
Status: DISCONTINUED | OUTPATIENT
Start: 2024-03-01 | End: 2024-03-05

## 2024-03-01 RX ORDER — DEXMEDETOMIDINE HYDROCHLORIDE 4 UG/ML
.1-.7 INJECTION, SOLUTION INTRAVENOUS
Status: DISCONTINUED | OUTPATIENT
Start: 2024-03-01 | End: 2024-03-01

## 2024-03-01 RX ORDER — PROPOFOL 10 MG/ML
INJECTION, EMULSION INTRAVENOUS AS NEEDED
Status: DISCONTINUED | OUTPATIENT
Start: 2024-03-01 | End: 2024-03-01

## 2024-03-01 RX ORDER — CHLORHEXIDINE GLUCONATE ORAL RINSE 1.2 MG/ML
15 SOLUTION DENTAL EVERY 12 HOURS SCHEDULED
Status: DISCONTINUED | OUTPATIENT
Start: 2024-03-01 | End: 2024-03-01

## 2024-03-01 RX ORDER — LIDOCAINE HYDROCHLORIDE 10 MG/ML
INJECTION, SOLUTION EPIDURAL; INFILTRATION; INTRACAUDAL; PERINEURAL AS NEEDED
Status: DISCONTINUED | OUTPATIENT
Start: 2024-03-01 | End: 2024-03-01

## 2024-03-01 RX ORDER — MAGNESIUM SULFATE HEPTAHYDRATE 40 MG/ML
2 INJECTION, SOLUTION INTRAVENOUS ONCE
Status: COMPLETED | OUTPATIENT
Start: 2024-03-01 | End: 2024-03-01

## 2024-03-01 RX ORDER — FENTANYL CITRATE 50 UG/ML
INJECTION, SOLUTION INTRAMUSCULAR; INTRAVENOUS
Status: COMPLETED
Start: 2024-03-01 | End: 2024-03-01

## 2024-03-01 RX ORDER — SUCCINYLCHOLINE/SOD CL,ISO/PF 100 MG/5ML
SYRINGE (ML) INTRAVENOUS AS NEEDED
Status: DISCONTINUED | OUTPATIENT
Start: 2024-03-01 | End: 2024-03-01

## 2024-03-01 RX ORDER — ONDANSETRON 2 MG/ML
INJECTION INTRAMUSCULAR; INTRAVENOUS AS NEEDED
Status: DISCONTINUED | OUTPATIENT
Start: 2024-03-01 | End: 2024-03-01

## 2024-03-01 RX ORDER — FENTANYL CITRATE-0.9 % NACL/PF 10 MCG/ML
150 PLASTIC BAG, INJECTION (ML) INTRAVENOUS CONTINUOUS
Status: DISCONTINUED | OUTPATIENT
Start: 2024-03-01 | End: 2024-03-02

## 2024-03-01 RX ORDER — SODIUM CHLORIDE 9 MG/ML
INJECTION, SOLUTION INTRAVENOUS CONTINUOUS PRN
Status: DISCONTINUED | OUTPATIENT
Start: 2024-03-01 | End: 2024-03-01

## 2024-03-01 RX ORDER — PANTOPRAZOLE SODIUM 40 MG/10ML
40 INJECTION, POWDER, LYOPHILIZED, FOR SOLUTION INTRAVENOUS EVERY 12 HOURS SCHEDULED
Status: DISCONTINUED | OUTPATIENT
Start: 2024-03-01 | End: 2024-03-07 | Stop reason: HOSPADM

## 2024-03-01 RX ADMIN — FOLIC ACID 1 MG: 5 INJECTION, SOLUTION INTRAMUSCULAR; INTRAVENOUS; SUBCUTANEOUS at 13:20

## 2024-03-01 RX ADMIN — PROPOFOL 35 MCG/KG/MIN: 10 INJECTION, EMULSION INTRAVENOUS at 21:10

## 2024-03-01 RX ADMIN — THIAMINE HYDROCHLORIDE 100 MG: 100 INJECTION, SOLUTION INTRAMUSCULAR; INTRAVENOUS at 14:04

## 2024-03-01 RX ADMIN — FENTANYL CITRATE 100 MCG: 50 INJECTION, SOLUTION INTRAMUSCULAR; INTRAVENOUS at 15:49

## 2024-03-01 RX ADMIN — CEFTRIAXONE 1000 MG: 1 INJECTION, SOLUTION INTRAVENOUS at 17:46

## 2024-03-01 RX ADMIN — MIDAZOLAM 2 MG: 1 INJECTION INTRAMUSCULAR; INTRAVENOUS at 23:44

## 2024-03-01 RX ADMIN — Medication 100 MG: at 15:00

## 2024-03-01 RX ADMIN — ONDANSETRON 4 MG: 2 INJECTION INTRAMUSCULAR; INTRAVENOUS at 11:48

## 2024-03-01 RX ADMIN — FENTANYL CITRATE 50 MCG: 50 INJECTION INTRAMUSCULAR; INTRAVENOUS at 15:38

## 2024-03-01 RX ADMIN — PROPOFOL 200 MG: 10 INJECTION, EMULSION INTRAVENOUS at 15:00

## 2024-03-01 RX ADMIN — FENTANYL CITRATE 50 MCG: 50 INJECTION INTRAMUSCULAR; INTRAVENOUS at 23:21

## 2024-03-01 RX ADMIN — SODIUM CHLORIDE, SODIUM GLUCONATE, SODIUM ACETATE, POTASSIUM CHLORIDE, MAGNESIUM CHLORIDE, SODIUM PHOSPHATE, DIBASIC, AND POTASSIUM PHOSPHATE 1000 ML: .53; .5; .37; .037; .03; .012; .00082 INJECTION, SOLUTION INTRAVENOUS at 23:42

## 2024-03-01 RX ADMIN — SODIUM CHLORIDE: 0.9 INJECTION, SOLUTION INTRAVENOUS at 14:56

## 2024-03-01 RX ADMIN — IOHEXOL 80 ML: 350 INJECTION, SOLUTION INTRAVENOUS at 12:29

## 2024-03-01 RX ADMIN — FENTANYL CITRATE 50 MCG: 50 INJECTION INTRAMUSCULAR; INTRAVENOUS at 12:07

## 2024-03-01 RX ADMIN — SODIUM CHLORIDE 80 MG: 9 INJECTION, SOLUTION INTRAVENOUS at 12:07

## 2024-03-01 RX ADMIN — Medication 50 MCG/HR: at 16:05

## 2024-03-01 RX ADMIN — DEXAMETHASONE SODIUM PHOSPHATE 10 MG: 10 INJECTION, SOLUTION INTRAMUSCULAR; INTRAVENOUS at 15:04

## 2024-03-01 RX ADMIN — DEXMEDETOMIDINE HYDROCHLORIDE 0.2 MCG/KG/HR: 4 INJECTION, SOLUTION INTRAVENOUS at 16:41

## 2024-03-01 RX ADMIN — LORAZEPAM 1 MG: 2 INJECTION INTRAMUSCULAR; INTRAVENOUS at 16:39

## 2024-03-01 RX ADMIN — PROPOFOL 50 MCG/KG/MIN: 10 INJECTION, EMULSION INTRAVENOUS at 15:36

## 2024-03-01 RX ADMIN — POTASSIUM CHLORIDE 20 MEQ: 14.9 INJECTION, SOLUTION INTRAVENOUS at 16:36

## 2024-03-01 RX ADMIN — LIDOCAINE HYDROCHLORIDE 50 MG: 10 INJECTION, SOLUTION EPIDURAL; INFILTRATION; INTRACAUDAL; PERINEURAL at 15:00

## 2024-03-01 RX ADMIN — FENTANYL CITRATE 50 MCG: 50 INJECTION INTRAMUSCULAR; INTRAVENOUS at 16:00

## 2024-03-01 RX ADMIN — ONDANSETRON 4 MG: 2 INJECTION INTRAMUSCULAR; INTRAVENOUS at 15:04

## 2024-03-01 RX ADMIN — FENTANYL CITRATE 100 MCG: 50 INJECTION INTRAMUSCULAR; INTRAVENOUS at 15:49

## 2024-03-01 RX ADMIN — CHLORHEXIDINE GLUCONATE 15 ML: 1.2 SOLUTION ORAL at 17:46

## 2024-03-01 RX ADMIN — FENTANYL CITRATE 50 MCG: 50 INJECTION INTRAMUSCULAR; INTRAVENOUS at 16:22

## 2024-03-01 RX ADMIN — POTASSIUM CHLORIDE 20 MEQ: 14.9 INJECTION, SOLUTION INTRAVENOUS at 18:57

## 2024-03-01 RX ADMIN — FENTANYL CITRATE 50 MCG: 50 INJECTION INTRAMUSCULAR; INTRAVENOUS at 22:02

## 2024-03-01 RX ADMIN — MORPHINE SULFATE 4 MG: 4 INJECTION INTRAVENOUS at 12:56

## 2024-03-01 RX ADMIN — MAGNESIUM SULFATE HEPTAHYDRATE 2 G: 2 INJECTION, SOLUTION INTRAVENOUS at 16:46

## 2024-03-01 RX ADMIN — ONDANSETRON 4 MG: 2 INJECTION INTRAMUSCULAR; INTRAVENOUS at 13:45

## 2024-03-01 RX ADMIN — PANTOPRAZOLE SODIUM 40 MG: 40 INJECTION, POWDER, FOR SOLUTION INTRAVENOUS at 21:18

## 2024-03-01 RX ADMIN — SODIUM CHLORIDE 1000 ML: 0.9 INJECTION, SOLUTION INTRAVENOUS at 13:47

## 2024-03-01 RX ADMIN — SODIUM CHLORIDE 8 MG/HR: 9 INJECTION, SOLUTION INTRAVENOUS at 12:55

## 2024-03-01 RX ADMIN — SODIUM CHLORIDE 1000 ML: 0.9 INJECTION, SOLUTION INTRAVENOUS at 12:04

## 2024-03-01 RX ADMIN — FENTANYL CITRATE 50 MCG: 50 INJECTION INTRAMUSCULAR; INTRAVENOUS at 15:36

## 2024-03-01 RX ADMIN — OCTREOTIDE ACETATE 25 MCG/HR: 500 INJECTION, SOLUTION INTRAVENOUS; SUBCUTANEOUS at 16:11

## 2024-03-01 RX ADMIN — CHLORHEXIDINE GLUCONATE 15 ML: 1.2 SOLUTION ORAL at 21:18

## 2024-03-01 RX ADMIN — PHENOBARBITAL SODIUM 260 MG: 130 INJECTION INTRAMUSCULAR at 17:12

## 2024-03-01 RX ADMIN — FENTANYL CITRATE 50 MCG: 50 INJECTION INTRAMUSCULAR; INTRAVENOUS at 23:17

## 2024-03-01 RX ADMIN — PROPOFOL 50 MCG/KG/MIN: 10 INJECTION, EMULSION INTRAVENOUS at 15:52

## 2024-03-01 NOTE — SEPSIS NOTE
Sepsis Note   Yvette Titus 53 y.o. female MRN: [id number]  Unit/Bed#: -01 Encounter: [id number]  SIRS: tachycardia, tachypnea. SIRS unlikely secondary to infection. SIRS liekly secondary to acute GIB.     Initial Sepsis Screening       Row Name 03/01/24 1629                Is the patient's history suggestive of a new or worsening infection? No  -SB                  User Key  (r) = Recorded By, (t) = Taken By, (c) = Cosigned By      Initials Name Provider Type    SB Radha Brownlee, PA-C Physician Assistant                        There is no height or weight on file to calculate BMI.  Wt Readings from Last 1 Encounters:   01/10/24 53.1 kg (117 lb)        Ideal body weight: 45.5 kg (100 lb 4.9 oz)  Adjusted ideal body weight: 48.5 kg (106 lb 15.8 oz)

## 2024-03-01 NOTE — ASSESSMENT & PLAN NOTE
Returning to the ICU intubated following EGD  Check CXR  Check vbg  Sedation: Propofol  Titrate to maintain RASS-1 to 0  Wean FiO2 and PEEP to maintain spO2 > 92%

## 2024-03-01 NOTE — ASSESSMENT & PLAN NOTE
Baseline anemia. Follows with Heme/Onc outpatient  Takes B12 and folic acid supplementation outpatient  Continue thiamine supplementation  Hold oral B12 while NPO  Now with acute GIB. Hgb stable  Type and screen ordered  2 u blood prepared  Trend hgb and maintain hgb > 7

## 2024-03-01 NOTE — ED PROVIDER NOTES
"History  Chief Complaint   Patient presents with    Vomiting Blood     Pt states that within the last hour \"I threw up blood. I'm trying to get over my alcohol withdraw and started throwing up blood. I have esophageal varices\" Last drink 2 hours ago.      History from patient and medical records.  Brought in by her counselor from [facility] with concern for vomiting blood.  Patient states she had 2 episodes but each episode had large amount of dark red blood she has epigastric pain.  She has been having a drinking binge over the last 5 days with vodka and then when she ran out of that she started drinking the prescribed mouthwash she was given for her dental issues.  She also had some wine.  She has suicidal thoughts, no plan.  She said she drank a bottle of it.  History of esophageal varices and alcohol use.        Prior to Admission Medications   Prescriptions Last Dose Informant Patient Reported? Taking?   Apple Cider Vinegar 188 MG CAPS  Self Yes No   Sig: Take by mouth   Biotin 10 MG TABS  Self Yes No   Sig: Take 10 tablets by mouth daily   CYANOCOBALAMIN PO   Yes No   Sig: Take 1 capsule by mouth daily   Cobalamin Combinations (B-12) 1000-400 MCG SUBL  Self Yes No   Melatonin 10 MG TABS  Self Yes No   Sig: Take 10 mg by mouth daily at bedtime as needed   Multiple Vitamins-Minerals (multivitamin with minerals) tablet  Self Yes No   Sig: Take 1 tablet by mouth daily   Vivitrol 380 MG SUSR  Self Yes No   amoxicillin (AMOXIL) 500 mg capsule  Self Yes No   chlorhexidine (PERIDEX) 0.12 % solution  Self Yes No   cyanocobalamin (VITAMIN B-12) 100 MCG tablet  Self Yes No   Sig: Take 100 mcg by mouth daily   escitalopram (LEXAPRO) 20 mg tablet  Self Yes No   Sig: Take 20 mg by mouth daily DC by barbara on 1/13   folic acid (FOLVITE) 1 mg tablet   No No   Sig: TAKE 1 TABLET BY MOUTH EVERY DAY   gabapentin (NEURONTIN) 100 mg capsule  Self Yes No   Sig: Take 200 mg by mouth daily at bedtime   gabapentin (NEURONTIN) 100 " mg capsule  Self Yes No   Sig: Take 200 mg by mouth daily   magnesium Oxide (MAG-OX) 400 mg TABS   No No   Sig: Take 1 tablet (400 mg total) by mouth once for 1 dose   mirtazapine (REMERON) 15 mg tablet  Self Yes No   Sig: Take 15 mg by mouth daily at bedtime   nadolol (CORGARD) 20 mg tablet  Self No No   Sig: Take 1 tablet (20 mg total) by mouth daily   naloxone (NARCAN) 4 mg/0.1 mL nasal spray  Self Yes No   pantoprazole (PROTONIX) 40 mg tablet  Self No No   Sig: TAKE 1 TABLET BY MOUTH EVERY DAY   traZODone (DESYREL) 100 mg tablet  Self Yes No   Sig: Take 100 mg by mouth daily at bedtime   valsartan (DIOVAN) 40 mg tablet  Self Yes No   Sig: Take 40 mg by mouth daily      Facility-Administered Medications: None       Past Medical History:   Diagnosis Date    Alcoholism (HCC) 10/1/2010    Fatty liver 01/20/2023    Gallstones     Long Q-T syndrome        Past Surgical History:   Procedure Laterality Date    COLONOSCOPY  4/29/2019    Clear    TUBAL LIGATION  07/14/2005    UPPER GASTROINTESTINAL ENDOSCOPY  1/20/2023    Burst ulcer       Family History   Problem Relation Age of Onset    Cancer Mother      I have reviewed and agree with the history as documented.    E-Cigarette/Vaping    E-Cigarette Use Never User      E-Cigarette/Vaping Substances    Nicotine No     THC No     CBD No     Flavoring No     Other No     Unknown No      Social History     Tobacco Use    Smoking status: Never     Passive exposure: Never    Smokeless tobacco: Never   Vaping Use    Vaping status: Never Used   Substance Use Topics    Alcohol use: Yes     Comment: pint of vodka and a bottle of wine    Drug use: Never       Review of Systems   Constitutional:  Negative for chills and fever.   HENT:  Negative for ear pain and sore throat.    Eyes:  Negative for pain and visual disturbance.   Respiratory:  Negative for cough and shortness of breath.    Cardiovascular:  Negative for chest pain and palpitations.   Gastrointestinal:  Positive for  abdominal pain, nausea and vomiting.   Genitourinary:  Negative for dysuria and hematuria.   Musculoskeletal:  Negative for arthralgias and back pain.   Skin:  Negative for color change and rash.   Neurological:  Negative for seizures and syncope.   Psychiatric/Behavioral:  Positive for suicidal ideas. The patient is nervous/anxious.    All other systems reviewed and are negative.      Physical Exam  Physical Exam  Vitals and nursing note reviewed.   Constitutional:       General: She is in acute distress.      Appearance: She is well-developed. She is ill-appearing and diaphoretic.   HENT:      Head: Normocephalic and atraumatic.   Eyes:      Extraocular Movements: Extraocular movements intact.      Conjunctiva/sclera: Conjunctivae normal.      Pupils: Pupils are equal, round, and reactive to light.   Cardiovascular:      Rate and Rhythm: Regular rhythm. Tachycardia present.      Heart sounds: No murmur heard.  Pulmonary:      Effort: Pulmonary effort is normal. No respiratory distress.      Breath sounds: Normal breath sounds.   Abdominal:      Palpations: Abdomen is soft.      Tenderness: There is abdominal tenderness in the epigastric area. There is no guarding or rebound.   Musculoskeletal:         General: No swelling.      Cervical back: Neck supple.   Skin:     General: Skin is warm.      Capillary Refill: Capillary refill takes less than 2 seconds.   Neurological:      General: No focal deficit present.      Mental Status: She is alert and oriented to person, place, and time.   Psychiatric:         Mood and Affect: Mood is anxious. Affect is tearful.         Thought Content: Thought content includes suicidal ideation.         Vital Signs  ED Triage Vitals   Temperature Pulse Respirations Blood Pressure SpO2   03/01/24 1128 03/01/24 1128 03/01/24 1128 03/01/24 1128 03/01/24 1128   98.8 °F (37.1 °C) (!) 133 22 (!) 173/77 99 %      Temp Source Heart Rate Source Patient Position - Orthostatic VS BP Location  FiO2 (%)   03/01/24 1128 03/01/24 1128 03/01/24 1128 03/01/24 1128 03/01/24 1601   Temporal Monitor Sitting Right arm 40      Pain Score       03/01/24 1128       No Pain           Vitals:    03/01/24 1128 03/01/24 1257 03/01/24 1400 03/01/24 1453   BP: (!) 173/77 141/73 143/62 166/79   Pulse: (!) 133  (!) 129 (!) 123   Patient Position - Orthostatic VS: Sitting            Visual Acuity      ED Medications  Medications   pantoprazole (PROTONIX) 80 mg in sodium chloride 0.9 % 100 mL IVPB (0 mg Intravenous Stopped 3/1/24 1256)     Followed by   pantoprazole (PROTONIX) 80 mg in sodium chloride 0.9 % 100 mL infusion (8 mg/hr Intravenous New Bag 3/1/24 1255)   magnesium sulfate 2 g/50 mL IVPB (premix) 2 g (has no administration in time range)   chlorhexidine (PERIDEX) 0.12 % oral rinse 15 mL (has no administration in time range)   potassium chloride 20 mEq IVPB (premix) (20 mEq Intravenous New Bag 3/1/24 1636)   octreotide (SandoSTATIN) 500 mcg in sodium chloride 0.9 % 250 mL infusion (25 mcg/hr Intravenous New Bag 3/1/24 1611)   folic acid 1 mg, thiamine (VITAMIN B1) 100 mg in sodium chloride 0.9 % 100 mL IV piggyback (has no administration in time range)   cefTRIAXone (ROCEPHIN) IVPB (premix in dextrose) 1,000 mg 50 mL (has no administration in time range)   chlorhexidine (PERIDEX) 0.12 % oral rinse 15 mL (has no administration in time range)   fentanyl citrate (PF) 100 MCG/2ML 50 mcg (50 mcg Intravenous Given 3/1/24 1622)   propofol (DIPRIVAN) 1000 mg in 100 mL infusion (premix) (50 mcg/kg/min × 53.1 kg Intravenous New Bag 3/1/24 1552)   fentaNYL 1000 mcg in sodium chloride 0.9% 100mL infusion (50 mcg/hr Intravenous New Bag 3/1/24 1605)   dexmedeTOMIDine (Precedex) 400 mcg in sodium chloride 0.9% 100 mL (0.2 mcg/kg/hr × 53.1 kg Intravenous New Bag 3/1/24 0503)   PHENobarbital 260 mg in sodium chloride 0.9 % 100 mL IVPB (has no administration in time range)   ondansetron (ZOFRAN) injection 4 mg (4 mg Intravenous Given  "3/1/24 1148)   sodium chloride 0.9 % bolus 1,000 mL (0 mL Intravenous Stopped 3/1/24 1342)   fentanyl citrate (PF) 100 MCG/2ML 50 mcg (50 mcg Intravenous Given 3/1/24 1207)   iohexol (OMNIPAQUE) 350 MG/ML injection (SINGLE-DOSE) 80 mL (80 mL Intravenous Given 3/1/24 1229)   morphine injection 4 mg (4 mg Intravenous Given 3/1/24 1256)   sodium chloride 0.9 % bolus 1,000 mL (1,000 mL Intravenous New Bag 3/1/24 1347)   thiamine (VITAMIN B1) 100 mg in sodium chloride 0.9 % 50 mL IVPB (0 mg Intravenous Stopped 3/1/24 1436)   folic acid 1 mg in sodium chloride 0.9 % 50 mL IVPB (0 mg Intravenous Stopped 3/1/24 1401)   ondansetron (ZOFRAN) injection 4 mg (4 mg Intravenous Given 3/1/24 1345)   fentanyl citrate (PF) 100 MCG/2ML 100 mcg (100 mcg Intravenous Given 3/1/24 1549)   LORazepam (ATIVAN) injection 1 mg (1 mg Intravenous Given 3/1/24 1639)       Diagnostic Studies  Results Reviewed       Procedure Component Value Units Date/Time    Sodium, urine, random [[id number]]     Lab Status: No result Specimen: Urine     Osmolality, urine [[id number]]     Lab Status: No result Specimen: Urine     Osmolality-\"If this is regarding a toxic alcohol, STOP. Test is not routinely indicated. Please consult medical  on call for further guidance.\" [[id number]]     Lab Status: No result Specimen: Blood     Basic metabolic panel [[id number]]     Lab Status: No result Specimen: Blood     Lactic acid, plasma (w/reflex if result > 2.0) [[id number]]     Lab Status: No result Specimen: Blood     Blood culture [[id number]]     Lab Status: No result Specimen: Blood     Blood culture [[id number]]     Lab Status: No result Specimen: Blood     Vitamin B12 [[id number]]     Lab Status: No result Specimen: Blood     Folate [[id number]]     Lab Status: No result Specimen: Blood     Fibrinogen [[id number]]  (Abnormal) Collected: 03/01/24 1256    Lab Status: Final result Specimen: Blood from Arm, Left Updated: 03/01/24 1334     Fibrinogen 195 mg/dL     " Protime-INR [[id number]]  (Abnormal) Collected: 03/01/24 1256    Lab Status: Final result Specimen: Blood from Arm, Left Updated: 03/01/24 1323     Protime 16.7 seconds      INR 1.30    APTT [[id number]]  (Abnormal) Collected: 03/01/24 1256    Lab Status: Final result Specimen: Blood from Arm, Left Updated: 03/01/24 1323     PTT 40 seconds     Platelet count [[id number]]  (Normal) Collected: 03/01/24 1256    Lab Status: Final result Specimen: Blood from Arm, Left Updated: 03/01/24 1305     Platelets 186 Thousands/uL      MPV 9.0 fL     Equal mix, APTT [[id number]] Collected: 03/01/24 1256    Lab Status: In process Specimen: Blood from Arm, Left Updated: 03/01/24 1302    Thrombin Time Mixing Study [[id number]] Collected: 03/01/24 1256    Lab Status: In process Specimen: Blood from Arm, Left Updated: 03/01/24 1302    Equal mix, PT / INR [[id number]] Collected: 03/01/24 1256    Lab Status: In process Specimen: Blood from Arm, Left Updated: 03/01/24 1302    Thrombin time [[id number]] Collected: 03/01/24 1256    Lab Status: In process Specimen: Blood from Arm, Left Updated: 03/01/24 1302    RBC Morphology Reflex Test [[id number]] Collected: 03/01/24 1146    Lab Status: Final result Specimen: Blood from Arm, Right Updated: 03/01/24 1301    Magnesium [[id number]]  (Abnormal) Collected: 03/01/24 1146    Lab Status: Final result Specimen: Blood from Arm, Right Updated: 03/01/24 1249     Magnesium 1.4 mg/dL     Salicylate level [[id number]]  (Normal) Collected: 03/01/24 1202    Lab Status: Final result Specimen: Blood from Arm, Right Updated: 03/01/24 1242     Salicylate Lvl <5 mg/dL     Acetaminophen level-If concentration is detectable, please discuss with medical  on call. [[id number]]  (Abnormal) Collected: 03/01/24 1202    Lab Status: Final result Specimen: Blood from Arm, Right Updated: 03/01/24 1242     Acetaminophen Level <2 ug/mL     Ethanol [[id number]]  (Abnormal) Collected: 03/01/24 1202    Lab Status: Final  result Specimen: Blood from Arm, Right Updated: 03/01/24 1229     Ethanol Lvl 261 mg/dL     CBC and differential [[id number]]  (Abnormal) Collected: 03/01/24 1146    Lab Status: Final result Specimen: Blood from Arm, Right Updated: 03/01/24 1229     WBC 11.29 Thousand/uL      RBC 3.14 Million/uL      Hemoglobin 10.0 g/dL      Hematocrit 29.7 %      MCV 95 fL      MCH 31.8 pg      MCHC 33.7 g/dL      RDW 15.6 %      MPV 9.0 fL      Platelets 252 Thousands/uL     Narrative:      This is an appended report.  These results have been appended to a previously verified report.    Manual Differential(PHLEBS Do Not Order) [[id number]]  (Abnormal) Collected: 03/01/24 1146    Lab Status: Final result Specimen: Blood from Arm, Right Updated: 03/01/24 1229     Segmented % 63 %      Bands % 1 %      Lymphocytes % 21 %      Monocytes % 5 %      Eosinophils, % 7 %      Basophils % 3 %      Absolute Neutrophils 7.23 Thousand/uL      Lymphocytes Absolute 2.37 Thousand/uL      Monocytes Absolute 0.56 Thousand/uL      Eosinophils Absolute 0.79 Thousand/uL      Basophils Absolute 0.34 Thousand/uL      Total Counted --     RBC Morphology Normal     Platelet Estimate Adequate    Protime-INR [[id number]]  (Abnormal) Collected: 03/01/24 1202    Lab Status: Final result Specimen: Blood from Arm, Right Updated: 03/01/24 1223     Protime 16.6 seconds      INR 1.30    APTT [[id number]]  (Abnormal) Collected: 03/01/24 1202    Lab Status: Final result Specimen: Blood from Arm, Right Updated: 03/01/24 1223     PTT 39 seconds     Ethanol [[id number]]  (Abnormal) Collected: 03/01/24 1146    Lab Status: Final result Specimen: Blood from Arm, Right Updated: 03/01/24 1215     Ethanol Lvl 275 mg/dL     Comprehensive metabolic panel [[id number]]  (Abnormal) Collected: 03/01/24 1146    Lab Status: Final result Specimen: Blood from Arm, Right Updated: 03/01/24 1213     Sodium 126 mmol/L      Potassium 3.4 mmol/L      Chloride 81 mmol/L      CO2 21 mmol/L       ANION GAP 24 mmol/L      BUN 7 mg/dL      Creatinine 0.61 mg/dL      Glucose 151 mg/dL      Calcium 9.6 mg/dL       U/L      ALT 53 U/L      Alkaline Phosphatase 161 U/L      Total Protein 10.1 g/dL      Albumin 4.4 g/dL      Total Bilirubin 1.84 mg/dL      eGFR 103 ml/min/1.73sq m     Narrative:      [employer] guidelines for Chronic Kidney Disease (CKD):     Stage 1 with normal or high GFR (GFR > 90 mL/min/1.73 square meters)    Stage 2 Mild CKD (GFR = 60-89 mL/min/1.73 square meters)    Stage 3A Moderate CKD (GFR = 45-59 mL/min/1.73 square meters)    Stage 3B Moderate CKD (GFR = 30-44 mL/min/1.73 square meters)    Stage 4 Severe CKD (GFR = 15-29 mL/min/1.73 square meters)    Stage 5 End Stage CKD (GFR <15 mL/min/1.73 square meters)  Note: GFR calculation is accurate only with a steady state creatinine    Lipase [[id number]]  (Abnormal) Collected: 03/01/24 1146    Lab Status: Final result Specimen: Blood from Arm, Right Updated: 03/01/24 1213     Lipase 129 u/L     Rapid drug screen, urine [[id number]]     Lab Status: No result Specimen: Urine     UA w Reflex to Microscopic w Reflex to Culture [[id number]]     Lab Status: No result Specimen: Urine                    CT high volume bleeding scan abdomen pelvis   Final Result by Sergio Wilson MD (03/01 1331)      1.  No CT evidence of active high-volume gastrointestinal hemorrhage.   2.  Cirrhosis with paraesophageal, gastric, and umbilical varices.         Workstation performed: [id number]         XR chest portable ICU    (Results Pending)              Procedures  ECG 12 Lead Documentation Only    Date/Time: 3/1/2024 4:21 PM    Performed by: Luis Carlos Mcrae DO  Authorized by: Luis Carlos Mcrae DO    Indications / Diagnosis:  Vomiting blood  ECG reviewed by me, the ED Provider: yes    Patient location:  ED  Previous ECG:     Previous ECG:  Unavailable  Interpretation:     Interpretation: non-specific    Rate:     ECG rate:  119    ECG rate  assessment: tachycardic    Rhythm:     Rhythm: sinus tachycardia    Ectopy:     Ectopy: none    QRS:     QRS axis:  Normal    QRS intervals:  Normal  Conduction:     Conduction: normal    ST segments:     ST segments:  Normal  T waves:     T waves: normal    Comments:      This EKG was interpreted by me.  CriticalCare Time    Date/Time: 3/1/2024 4:38 PM    Performed by: Luis Carlos Mcrae DO  Authorized by: Luis Carlos Mcrae DO    Critical care provider statement:     Critical care time (minutes):  30    Critical care time was exclusive of:  Separately billable procedures and treating other patients and teaching time    Critical care was necessary to treat or prevent imminent or life-threatening deterioration of the following conditions:  Circulatory failure, dehydration, hepatic failure, CNS failure or compromise and toxidrome    Critical care was time spent personally by me on the following activities:  Obtaining history from patient or surrogate, development of treatment plan with patient or surrogate, discussions with consultants, evaluation of patient's response to treatment, examination of patient, review of old charts, re-evaluation of patient's condition, ordering and review of radiographic studies, ordering and review of laboratory studies and ordering and performing treatments and interventions           ED Course                            Initial Sepsis Screening       Row Name 03/01/24 1629                Is the patient's history suggestive of a new or worsening infection? No  -SB                  User Key  (r) = Recorded By, (t) = Taken By, (c) = Cosigned By      Initials Name Provider Type    MARIYA Brownlee PA-C Physician Assistant                                  Medical Decision Making  Diff includes acute alcohol intoxication, in addition to bleeding esophageal varices, less likely abd perforation, other toxic alcohols.  Patient having active hematemesis and unstable vital signs with tachy in ER -  concern for hemorrhage as well.    Amount and/or Complexity of Data Reviewed  Labs: ordered.  Radiology: ordered.    Risk  Prescription drug management.  Decision regarding hospitalization.             Disposition  Final diagnoses:   Hematemesis   Suicidal thoughts   Alcohol use   GI bleed     Time reflects when diagnosis was documented in both MDM as applicable and the Disposition within this note       Time User Action Codes Description Comment    3/1/2024 12:37 PM Luis Carlos Mcrae [K92.0] Hematemesis     3/1/2024 12:37 PM Luis Carlos Mcrae [R45.851] Suicidal thoughts     3/1/2024 12:37 PM Luis Carlos Mcrae [Z78.9] Alcohol use     3/1/2024  1:21 PM Luis Carlos Mcrae [K92.2] GI bleed           ED Disposition       ED Disposition   Admit    Condition   Stable    Date/Time   Fri Mar 1, 2024  2:02 PM    Comment   Case was discussed with Mary Ellen and the patient's admission status was agreed to be Admission Status: inpatient status to the service of Dr. CECY Carr .               Follow-up Information    None         Current Discharge Medication List        CONTINUE these medications which have NOT CHANGED    Details   amoxicillin (AMOXIL) 500 mg capsule       Apple Cider Vinegar 188 MG CAPS Take by mouth      Biotin 10 MG TABS Take 10 tablets by mouth daily      chlorhexidine (PERIDEX) 0.12 % solution       Cobalamin Combinations (B-12) 1000-400 MCG SUBL       !! cyanocobalamin (VITAMIN B-12) 100 MCG tablet Take 100 mcg by mouth daily      !! CYANOCOBALAMIN PO Take 1 capsule by mouth daily      escitalopram (LEXAPRO) 20 mg tablet Take 20 mg by mouth daily DC by barbara on 1/13      folic acid (FOLVITE) 1 mg tablet TAKE 1 TABLET BY MOUTH EVERY DAY  Qty: 21 tablet, Refills: 2    Associated Diagnoses: Anemia, unspecified type      !! gabapentin (NEURONTIN) 100 mg capsule Take 200 mg by mouth daily at bedtime      !! gabapentin (NEURONTIN) 100 mg capsule Take 200 mg by mouth daily      magnesium Oxide (MAG-OX) 400 mg TABS  Take 1 tablet (400 mg total) by mouth once for 1 dose  Qty: 30 tablet, Refills: 2    Associated Diagnoses: Hypomagnesemia      Melatonin 10 MG TABS Take 10 mg by mouth daily at bedtime as needed      mirtazapine (REMERON) 15 mg tablet Take 15 mg by mouth daily at bedtime      Multiple Vitamins-Minerals (multivitamin with minerals) tablet Take 1 tablet by mouth daily      nadolol (CORGARD) 20 mg tablet Take 1 tablet (20 mg total) by mouth daily  Qty: 30 tablet, Refills: 5    Associated Diagnoses: Idiopathic esophageal varices without bleeding (HCC)      naloxone (NARCAN) 4 mg/0.1 mL nasal spray       pantoprazole (PROTONIX) 40 mg tablet TAKE 1 TABLET BY MOUTH EVERY DAY  Qty: 30 tablet, Refills: 3    Associated Diagnoses: Peptic ulcer disease      traZODone (DESYREL) 100 mg tablet Take 100 mg by mouth daily at bedtime      valsartan (DIOVAN) 40 mg tablet Take 40 mg by mouth daily      Vivitrol 380 MG SUSR        !! - Potential duplicate medications found. Please discuss with provider.          No discharge procedures on file.    PDMP Review         Value Time User    PDMP Reviewed  Yes 1/19/2023  2:33 PM Alli Baxter MD            ED Provider  Electronically Signed by             Luis Carlos Mcrae DO  03/01/24 7805

## 2024-03-01 NOTE — ASSESSMENT & PLAN NOTE
Hx of alcoholic cirrhosis with varices  Follows with GI outpatient  Patient states she has been drinking heavily over the past several days. Typically drinks wine and vodka but recently ran out of alcohol. She has been drinking prescribed mouthwash that is 16% alcohol. Last drink just prior to arrival to ED  Ethanol 275 upon admission  [facility] protocol ordered  Seizure precautions  MELD 22  DF normal  GI following

## 2024-03-01 NOTE — ANESTHESIA POSTPROCEDURE EVALUATION
Post-Op Assessment Note    CV Status:  Stable  Pain Score: 0    Pain management: adequate       Post-procedure mental status: sedated.   Hydration Status:  Euvolemic   PONV Controlled:  Controlled   Airway Patency:  Patent (ETT in situ)  Airway: intubated     Post Op Vitals Reviewed: Yes    No anethesia notable event occurred.    Staff: CRNA           BP      Temp      Pulse     Resp      SpO2 100 % (03/01/24 1601)

## 2024-03-01 NOTE — CONSULTS
"Consultation - [facility]     Yvette Titus 53 y.o. female MRN: [id number]  Unit/Bed#: FRANK Encounter: [id number]    Inpatient consult to gastroenterology  Consult performed by: Anupama Blank MD  Consult ordered by: Kesha Tilley PA-C          ASSESSMENT and PLAN    Hematemesis and acute blood loss anemia -presentation most consistent with acute variceal bleed secondary to esophageal varices from alcoholic cirrhosis.  May have underlying portal gastropathy.  Rule out peptic ulcer with bleed.  N.p.o.  IV PPI  IV octreotide  Empiric ceftriaxone  Follow H&H and INR  Arrange for urgent EGD    Acute alcoholic intoxication/alcohol withdrawal -patient acknowledges binge drinking.  Last drink earlier this morning.  Now with abdominal pain, retching, shakiness and hematemesis.  MELD Na 22.  DF-18  Trend LFTs  Alcohol withdrawal protocol  No indication for steroids with discriminant function of 18    Alcoholic cirrhosis - complicated by varices, coagulopathy, hepatic encephalopathy    Chief Complaint   Patient presents with    Vomiting Blood     Pt states that within the last hour \"I threw up blood. I'm trying to get over my alcohol withdraw and started throwing up blood. I have esophageal varices\" Last drink 2 hours ago.        Physician Requesting Consult: Veda Carr DO    HPI  Yvette Titus is a 53 y.o. year old female presents with abdominal pain nausea and hematemesis.  Admits to recurrent alcohol binges.  Last drink this morning at 10 AM.  Has had dark stools recently.  In ED had witnessed hematemesis of red blood and clots.  Complains of epigastric pain and persistent nausea.  Acknowledges shakiness.  Denies loss of consciousness.  Acknowledges history of alcoholism and alcoholic cirrhosis.  On review of presenting labs hemoglobin 10, alcohol level 275, INR 1.3 and PTT 40, platelets 252    Historical Information   Past Medical History:   Diagnosis Date    Alcoholism (HCC) 10/1/2010    Fatty " liver 01/20/2023    Gallstones     Long Q-T syndrome      Past Surgical History:   Procedure Laterality Date    COLONOSCOPY  4/29/2019    Clear    TUBAL LIGATION  07/14/2005    UPPER GASTROINTESTINAL ENDOSCOPY  1/20/2023    Burst ulcer     Social History   Social History     Substance and Sexual Activity   Alcohol Use Yes    Comment: pint of vodka and a bottle of wine     Social History     Substance and Sexual Activity   Drug Use Never     Social History     Tobacco Use   Smoking Status Never    Passive exposure: Never   Smokeless Tobacco Never     Family History   Problem Relation Age of Onset    Cancer Mother        Meds/Allergies     Current Facility-Administered Medications   Medication Dose Route Frequency    cefTRIAXone (ROCEPHIN) IVPB (premix in dextrose) 1,000 mg 50 mL  1,000 mg Intravenous Q24H    chlorhexidine (PERIDEX) 0.12 % oral rinse 15 mL  15 mL Mouth/Throat Q12H RAY    [START ON 3/2/2024] folic acid 1 mg, thiamine (VITAMIN B1) 100 mg in sodium chloride 0.9 % 100 mL IV piggyback   Intravenous Daily    magnesium sulfate 2 g/50 mL IVPB (premix) 2 g  2 g Intravenous Once    octreotide (SandoSTATIN) 500 mcg in sodium chloride 0.9 % 250 mL infusion  25 mcg/hr Intravenous Continuous    pantoprazole (PROTONIX) 80 mg in sodium chloride 0.9 % 100 mL infusion  8 mg/hr Intravenous Continuous    potassium chloride 20 mEq IVPB (premix)  20 mEq Intravenous Q2H     Facility-Administered Medications Ordered in Other Encounters   Medication Dose Route Frequency    dexamethasone (PF) (DECADRON) injection   Intravenous PRN    lidocaine (PF) (XYLOCAINE-MPF) 1 % injection   Intravenous PRN    ondansetron (ZOFRAN) injection   Intravenous PRN    propofol (DIPRIVAN) 1000 mg in 100 mL infusion (premix)   Intravenous PRN    sodium chloride 0.9 % infusion   Intravenous Continuous PRN    Succinylcholine Chloride 100 mg/5 mL syringe   Intravenous PRN     (Not in a hospital admission)      No Known Allergies    PHYSICAL  "EXAM    Blood pressure 166/79, pulse (!) 123, temperature 98.2 °F (36.8 °C), resp. rate 20, SpO2 99%. There is no height or weight on file to calculate BMI.  General Appearance: Anxious, cooperative, alert  Eyes: Anicteric, PERRLA, EOMI  ENT:  Normocephalic, atraumatic, normal mucosa.    Neck:  Supple, symmetrical, trachea midline  Resp:  Clear to auscultation bilaterally; no rales, rhonchi or wheezing; respirations unlabored   CV:  S1 S2, Regular rate and rhythm; no murmur, rub, or gallop.  GI:  Soft, non-tender, non-distended; normal bowel sounds; no masses, no organomegaly   Rectal: Deferred  Musculoskeletal: No cyanosis, clubbing or edema. Normal ROM.  Skin:  No jaundice, rashes, or lesions   Heme/Lymph: No palpable cervical lymphadenopathy  Psych: Anxious, rambling but can be redirected  Neuro: No gross deficits, AAOx3    Lab Results   Component Value Date    GLUCOSE 90 10/01/2015    CALCIUM 9.6 03/01/2024     09/30/2015    K 3.4 (L) 03/01/2024    CO2 21 03/01/2024    CL 81 (L) 03/01/2024    BUN 7 03/01/2024    CREATININE 0.61 03/01/2024     Lab Results   Component Value Date    WBC 11.29 (H) 03/01/2024    HGB 10.0 (L) 03/01/2024    HCT 29.7 (L) 03/01/2024    MCV 95 03/01/2024     03/01/2024     Lab Results   Component Value Date    ALT 53 (H) 03/01/2024     (H) 03/01/2024    ALKPHOS 161 (H) 03/01/2024    BILITOT 0.53 09/30/2015     No results found for: \"AMYLASE\"  Lab Results   Component Value Date    LIPASE 129 (H) 03/01/2024     Lab Results   Component Value Date    IRON 71 10/21/2023    TIBC 206 (L) 10/21/2023    FERRITIN 717 (H) 10/21/2023     Lab Results   Component Value Date    INR 1.30 (H) 03/01/2024       Imaging Studies: I have personally reviewed pertinent reports.      EKG, Pathology, and Other Studies: I have personally reviewed pertinent reports.      REVIEW OF SYSTEMS:    CONSTITUTIONAL: Denies any fever, chills, rigors, and weight loss.  HEENT: No earache or tinnitus. Denies " hearing loss or visual disturbances.  CARDIOVASCULAR: No chest pain or palpitations.   RESPIRATORY: Denies any cough, hemoptysis, shortness of breath or dyspnea on exertion.  GASTROINTESTINAL: As noted in the History of Present Illness.   GENITOURINARY: No problems with urination. Denies any hematuria or dysuria.  NEUROLOGIC: No dizziness or vertigo, denies headaches.   MUSCULOSKELETAL: Denies any muscle or joint pain.   SKIN: Denies skin rashes or itching.   ENDOCRINE: Denies excessive thirst. Denies intolerance to heat or cold.  PSYCHOSOCIAL: Denies depression or anxiety. Denies any recent memory loss.

## 2024-03-01 NOTE — ANESTHESIA PREPROCEDURE EVALUATION
Procedure:  PRE-OP ONLY    Relevant Problems   CARDIO   (+) Essential hypertension   (+) Long Q-T syndrome      GI/HEPATIC   (+) Alcoholic cirrhosis (HCC)   (+) GI bleed      HEMATOLOGY   (+) Anemia      NEURO/PSYCH   (+) Depression      Other   (+) Suicidal ideation      Known varices       Anesthesia Plan  ASA Score- 4 Emergent    Anesthesia Type- general with ASA Monitors.         Additional Monitors:     Airway Plan: ETT.    Comment: RSI    High chance to remain intubated for ICU transfer after the procedure. May need blood transfusion intraop.       Plan Factors-Exercise tolerance (METS): >4 METS.    Chart reviewed. EKG reviewed.  Existing labs reviewed. Patient summary reviewed.                  Induction- intravenous.    Postoperative Plan- Plan for postoperative opioid use. Planned trial extubation    Informed Consent- Anesthetic plan and risks discussed with patient.  I personally reviewed this patient with the CRNA. Discussed and agreed on the Anesthesia Plan with the CRNA..

## 2024-03-01 NOTE — ASSESSMENT & PLAN NOTE
Na 126 on admission  Likely secondary to poor solute intake as patient states she hasn't eaten anything in at least 5 days  Received 2 L NS in ED  Check repeat bmp before additional IVF are given  Goal sodium increase by 6-8 meq in the next 24 hours  Trend bmp

## 2024-03-01 NOTE — BRIEF OP NOTE (RAD/CATH)
EGD  IMPRESSION:  Portal hypertensive gastropathy  Varices in the esophagus  Multiple medium and circumferential grade III varices (red nakul sign) in the lower third of the esophagus and GE junction; there was stigmata of recent hemorrhage; placed 7 bands successfully, resulting in partial eradication  Moderate edematous, erythematous, friable and petechial mucosa in the stomach  The duodenum appeared normal.      RECOMMENDATION:  Continue n.p.o.  IV PPI  IV octreotide  IV ceftriaxone  H&H and transfuse as needed  Trend INR  Repeat EGD in about 1 month to reassess varices and possible repeat banding

## 2024-03-01 NOTE — ASSESSMENT & PLAN NOTE
History of gastritis and esophageal varices  Recently admitted at [facility] for variceal bleeding after a binge-drinking episode  Follows with GI outpatient and was planning to have EGD next week with variceal banding  Presented with bloody emesis that started this morning.  Patient states she had 2 episodes of large-volume emesis prior to arrival.  Episodes of dark brown emesis noted in ED  CT high-volume bleeding scan without evidence of active extravasation  GI consulted-planning for urgent EGD  Hemoglobin stable    Plan:  Continue Protonix infusion  Continue octreotide infusion  Start CTX   Trend hgb  Follow-up results of EGD

## 2024-03-01 NOTE — ANESTHESIA PREPROCEDURE EVALUATION
Procedure:  EGD    Relevant Problems   CARDIO   (+) Essential hypertension   (+) Long Q-T syndrome      GI/HEPATIC   (+) Alcoholic cirrhosis (HCC)   (+) GI bleed      HEMATOLOGY   (+) Anemia      NEURO/PSYCH   (+) Depression        Physical Exam    Airway    Mallampati score: II  TM Distance: <3 FB  Neck ROM: full     Dental   Comment: None loose; implants,     Cardiovascular      Pulmonary      Other Findings  post-pubertal.      Anesthesia Plan  ASA Score- 4 Emergent    Anesthesia Type- general with ASA Monitors.         Additional Monitors:     Airway Plan: ETT.    Comment: Drinking alcohol and mouthwash until about 11:00    Patient understands that she is at increased risk of remaining intubated post EGD and requiring ICU-level of care. She also understands that she may require a blood transfusion during the course of her intra-op stay    Active T&S. Will cross for 2 units of PRBC in the event of active bleeding during the case.       Plan Factors-Exercise tolerance (METS): >4 METS.    Chart reviewed. EKG reviewed.  Existing labs reviewed. Patient summary reviewed.    Patient is not a current smoker.              Induction- intravenous.    Postoperative Plan- Plan for postoperative opioid use. Planned trial extubation    Informed Consent- Anesthetic plan and risks discussed with patient.  I personally reviewed this patient with the CRNA. Discussed and agreed on the Anesthesia Plan with the CRNA..

## 2024-03-01 NOTE — H&P
[facility]  H&P  Name: Yvette iTtus 53 y.o. female I MRN: [id number]  Unit/Bed#: -01 I Date of Admission: 3/1/2024   Date of Service: 3/1/2024 I Hospital Day: 0      Assessment/Plan   * GI bleed  Assessment & Plan  History of gastritis and esophageal varices  Recently admitted at [facility] for variceal bleeding after a binge-drinking episode  Follows with GI outpatient and was planning to have EGD next week with variceal banding  Presented with bloody emesis that started this morning.  Patient states she had 2 episodes of large-volume emesis prior to arrival.  Episodes of dark brown emesis noted in ED  CT high-volume bleeding scan without evidence of active extravasation  GI consulted-planning for urgent EGD  Hemoglobin stable    Plan:  Continue Protonix infusion  Continue octreotide infusion  Start CTX   Trend hgb  Follow-up results of EGD    Acute respiratory failure (HCC)  Assessment & Plan  Returning to the ICU intubated following EGD  Check CXR  Check vbg  Sedation: Propofol  Titrate to maintain RASS-1 to 0  Wean FiO2 and PEEP to maintain spO2 > 92%    Alcoholic cirrhosis (HCC)  Assessment & Plan  Hx of alcoholic cirrhosis with varices  Follows with GI outpatient  Patient states she has been drinking heavily over the past several days. Typically drinks wine and vodka but recently ran out of alcohol. She has been drinking prescribed mouthwash that is 16% alcohol. Last drink just prior to arrival to ED  Ethanol 275 upon admission  [facility] protocol ordered  Seizure precautions  MELD 22  DF normal  GI following    SIRS (systemic inflammatory response syndrome) (HCC)  Assessment & Plan  SIRS: tachycardia, tachypnea  Unlikely infection. SIRS likely secondary to acute GIB  Blood cultures ordered  Lactic ordered  Started on CTX in the setting of possible variceal bleeding  Continue CTX for now  Follow-up culture data  Trend fever curve and WBC count    Anemia  Assessment & Plan  Baseline  anemia. Follows with Heme/Onc outpatient  Takes B12 and folic acid supplementation outpatient  Continue thiamine supplementation  Hold oral B12 while NPO  Now with acute GIB. Hgb stable  Type and screen ordered  2 u blood prepared  Trend hgb and maintain hgb > 7    Hyponatremia  Assessment & Plan  Na 126 on admission  Likely secondary to poor solute intake as patient states she hasn't eaten anything in at least 5 days  Received 2 L NS in ED  Check repeat bmp before additional IVF are given  Goal sodium increase by 6-8 meq in the next 24 hours  Trend bmp    Hypokalemia  Assessment & Plan  Replete to maintain K > 4    Hypomagnesemia  Assessment & Plan  Replete to maintain Mg > 2    Suicidal ideation  Assessment & Plan  Patient states that she has been feeling very depressed and wishes that she could just die. She states that she has been having suicidal thoughts for quite some time  Follows with [facility] and take Zoloft daily  Hold Zoloft   1:1 monitoring  Psych consult when medically appropriate    Long Q-T syndrome  Assessment & Plan  Qtc currently 536  Replace Mg   Trend EKG q8h  Avoid QT prolonging agents    Transaminitis  Assessment & Plan  Likely secondary to alcoholic cirrhosis  DF normal  Trend cmp           History of Present Illness     HPI: Yvette Titus is a 53 y.o. female with past medical history significant for alcohol abuse, alcoholic cirrhosis, esophageal varices with recent variceal bleed, long QT syndrome, anemia, and anxiety who presented to the ED with hematemesis since this morning.  Patient states that she has been drinking heavily over the past 5 days and has not had anything to eat since that time.  She states that she has been drinking mostly vodka and wine but recently ran out and has been drinking mouthwash that she has from previous dental procedures.  Her last drink was just prior to arriving to ED.  She began vomiting blood this morning and called her sponsor from [employer] who  was adamant she come to the emergency room.  She was recently admitted at [facility] for a variceal bleed requiring banding and was scheduled to have an endoscopy earlier this month for further banding.  While in the ED she had multiple episodes of dark brown hematemesis, was tachycardic, but hemodynamically stable.  CT high-volume bleeding scan without evidence of active extravasation and hemoglobin stable.  GI consulted and planning for urgent EGD and she will likely remain intubated following procedure.    History obtained from chart review and the patient.  Review of Systems   Constitutional:  Positive for appetite change. Negative for fatigue.   HENT:  Negative for congestion.    Respiratory:  Negative for cough and shortness of breath.    Cardiovascular:  Negative for chest pain and palpitations.   Gastrointestinal:  Positive for nausea and vomiting. Negative for abdominal distention, abdominal pain, blood in stool and diarrhea.   Neurological:  Negative for dizziness, tremors, weakness, light-headedness and headaches.     Disposition: Critical care  Historical Information   Past Medical History:  10/1/2010: Alcoholism (HCC)  01/20/2023: Fatty liver  No date: Gallstones  No date: Long Q-T syndrome Past Surgical History:  4/29/2019: COLONOSCOPY      Comment:  Clear  07/14/2005: TUBAL LIGATION  1/20/2023: UPPER GASTROINTESTINAL ENDOSCOPY      Comment:  Burst ulcer   Current Outpatient Medications   Medication Instructions    amoxicillin (AMOXIL) 500 mg capsule No dose, route, or frequency recorded.    Apple Cider Vinegar 188 MG CAPS Oral    Biotin 10 MG TABS 10 tablets, Oral, Daily    chlorhexidine (PERIDEX) 0.12 % solution No dose, route, or frequency recorded.    Cobalamin Combinations (B-12) 1000-400 MCG SUBL No dose, route, or frequency recorded.    cyanocobalamin (VITAMIN B-12) 100 mcg, Oral, Daily    CYANOCOBALAMIN PO 1 capsule, Oral, Daily    escitalopram (LEXAPRO) 20 mg, Oral, Daily, DC by barbara  on 1/13    folic acid (FOLVITE) 1,000 mcg, Oral, Daily    gabapentin (NEURONTIN) 200 mg, Oral, Daily at bedtime    gabapentin (NEURONTIN) 200 mg, Oral, Daily    magnesium Oxide (MAG-OX) 400 mg, Oral, Once    Melatonin 10 mg, Oral, Daily at bedtime PRN    mirtazapine (REMERON) 15 mg, Oral, Daily at bedtime    Multiple Vitamins-Minerals (multivitamin with minerals) tablet 1 tablet, Oral, Daily    nadolol (CORGARD) 20 mg, Oral, Daily    naloxone (NARCAN) 4 mg/0.1 mL nasal spray No dose, route, or frequency recorded.    pantoprazole (PROTONIX) 40 mg, Oral, Daily    traZODone (DESYREL) 100 mg, Oral, Daily at bedtime    valsartan (DIOVAN) 40 mg, Oral, Daily    Vivitrol 380 MG SUSR No dose, route, or frequency recorded.    No Known Allergies   Social History     Tobacco Use    Smoking status: Never     Passive exposure: Never    Smokeless tobacco: Never   Vaping Use    Vaping status: Never Used   Substance Use Topics    Alcohol use: Yes     Comment: pint of vodka and a bottle of wine    Drug use: Never    Family History   Problem Relation Age of Onset    Cancer Mother           Objective                            Vitals I/O      Most Recent Min/Max in 24hrs   Temp 98.2 °F (36.8 °C) Temp  Min: 98.2 °F (36.8 °C)  Max: 98.8 °F (37.1 °C)   Pulse (!) 123 Pulse  Min: 123  Max: 133   Resp 20 Resp  Min: 20  Max: 24   /79 BP  Min: 141/73  Max: 173/77   O2 Sat 100 % SpO2  Min: 95 %  Max: 100 %      Intake/Output Summary (Last 24 hours) at 3/1/2024 1654  Last data filed at 3/1/2024 1536  Gross per 24 hour   Intake 2200 ml   Output --   Net 2200 ml       Diet NPO  Diet NPO; Sips with meds    Invasive Monitoring           Physical Exam   Physical Exam  Vitals reviewed.   Eyes:      Extraocular Movements: Extraocular movements intact.      Pupils: Pupils are equal, round, and reactive to light.   Skin:     General: Skin is warm and dry.      Capillary Refill: Capillary refill takes 2 to 3 seconds.   HENT:      Head: Normocephalic  and atraumatic.      Mouth/Throat:      Mouth: Mucous membranes are moist.      Comments: Dried blood in oropharynx  Cardiovascular:      Rate and Rhythm: Regular rhythm. Tachycardia present.      Heart sounds: No murmur heard.     No friction rub. No gallop.   Musculoskeletal:      Right lower leg: No edema.      Left lower leg: No edema.   Abdominal: General: There is no distension.      Palpations: Abdomen is soft.      Tenderness: There is no abdominal tenderness.   Constitutional:       General: She is not in acute distress.     Appearance: She is well-developed and well-nourished. She is not ill-appearing.   Pulmonary:      Effort: Pulmonary effort is normal. No accessory muscle usage, respiratory distress or accessory muscle usage.      Breath sounds: Normal breath sounds. No wheezing, rhonchi or rales.   Neurological:      General: No focal deficit present.      Mental Status: She is alert and oriented to person, place and time. Mental status is at baseline.      Motor: Strength full and intact in all extremities.            Diagnostic Studies      EKG: Sinus tachycardia with prolonged QTc  Imaging:  I have personally reviewed pertinent reports.       Medications:  Scheduled PRN   cefTRIAXone, 1,000 mg, Q24H  chlorhexidine, 15 mL, Q12H RAY  chlorhexidine, 15 mL, Q12H RAY  [START ON 3/2/2024] folic acid 1 mg, thiamine (VITAMIN B1) 100 mg in sodium chloride 0.9 % 100 mL IV piggyback, , Daily  magnesium sulfate, 2 g, Once  PHENobarbital, 260 mg, Once  potassium chloride, 20 mEq, Q2H      fentanyl citrate (PF), 50 mcg, Q1H PRN       Continuous    dexmedetomidine, 0.1-0.7 mcg/kg/hr, Last Rate: 0.2 mcg/kg/hr (03/01/24 1641)  fentaNYL, 50 mcg/hr, Last Rate: 50 mcg/hr (03/01/24 1605)  octreotide, 25 mcg/hr, Last Rate: 25 mcg/hr (03/01/24 1611)  pantoprazole (PROTONIX) 80 mg in sodium chloride 0.9 % 100 mL infusion, 8 mg/hr, Last Rate: 8 mg/hr (03/01/24 1255)  propofol, 5-50 mcg/kg/min, Last Rate: 50 mcg/kg/min  (03/01/24 1552)         Labs:    CBC    Recent Labs     03/01/24  1146 03/01/24  1256   WBC 11.29*  --    HGB 10.0*  --    HCT 29.7*  --     186   BANDSPCT 1  --      BMP    Recent Labs     03/01/24  1146   SODIUM 126*   K 3.4*   CL 81*   CO2 21   AGAP 24   BUN 7   CREATININE 0.61   CALCIUM 9.6       Coags    Recent Labs     03/01/24  1202 03/01/24  1256   INR 1.30* 1.30*   PTT 39* 40*        Additional Electrolytes  Recent Labs     03/01/24  1146   MG 1.4*          Blood Gas    No recent results  No recent results LFTs  Recent Labs     03/01/24  1146   ALT 53*   *   ALKPHOS 161*   ALB 4.4   TBILI 1.84*       Infectious  No recent results  Glucose  Recent Labs     03/01/24  1146   GLUC 151*             Anticipated Length of Stay is > 2 midnights  Radha Brownlee PA-C

## 2024-03-01 NOTE — ASSESSMENT & PLAN NOTE
Patient states that she has been feeling very depressed and wishes that she could just die. She states that she has been having suicidal thoughts for quite some time  Follows with [facility] and take Zoloft daily  Hold Zoloft   1:1 monitoring  Psych consult when medically appropriate

## 2024-03-01 NOTE — ASSESSMENT & PLAN NOTE
SIRS: tachycardia, tachypnea  Unlikely infection. SIRS likely secondary to acute GIB  Blood cultures ordered  Lactic ordered  Started on CTX in the setting of possible variceal bleeding  Continue CTX for now  Follow-up culture data  Trend fever curve and WBC count

## 2024-03-02 LAB
ALBUMIN SERPL BCP-MCNC: 4.3 G/DL (ref 3.5–5)
ALBUMIN SERPL BCP-MCNC: 4.4 G/DL (ref 3.5–5)
ALP SERPL-CCNC: 162 U/L (ref 34–104)
ALP SERPL-CCNC: 164 U/L (ref 34–104)
ALT SERPL W P-5'-P-CCNC: 50 U/L (ref 7–52)
ALT SERPL W P-5'-P-CCNC: 50 U/L (ref 7–52)
AMMONIA PLAS-SCNC: 30 UMOL/L (ref 18–72)
ANION GAP SERPL CALCULATED.3IONS-SCNC: 10 MMOL/L
ANION GAP SERPL CALCULATED.3IONS-SCNC: 11 MMOL/L
ANION GAP SERPL CALCULATED.3IONS-SCNC: 11 MMOL/L
ANION GAP SERPL CALCULATED.3IONS-SCNC: 17 MMOL/L
ANION GAP SERPL CALCULATED.3IONS-SCNC: 17 MMOL/L
AST SERPL W P-5'-P-CCNC: 222 U/L (ref 13–39)
AST SERPL W P-5'-P-CCNC: 224 U/L (ref 13–39)
BILIRUB DIRECT SERPL-MCNC: 0.76 MG/DL (ref 0–0.2)
BILIRUB SERPL-MCNC: 2.4 MG/DL (ref 0.2–1)
BILIRUB SERPL-MCNC: 2.41 MG/DL (ref 0.2–1)
BUN SERPL-MCNC: 6 MG/DL (ref 5–25)
BUN SERPL-MCNC: 6 MG/DL (ref 5–25)
BUN SERPL-MCNC: 7 MG/DL (ref 5–25)
BUN SERPL-MCNC: 7 MG/DL (ref 5–25)
BUN SERPL-MCNC: 8 MG/DL (ref 5–25)
CALCIUM SERPL-MCNC: 8.3 MG/DL (ref 8.4–10.2)
CALCIUM SERPL-MCNC: 8.7 MG/DL (ref 8.4–10.2)
CALCIUM SERPL-MCNC: 8.9 MG/DL (ref 8.4–10.2)
CALCIUM SERPL-MCNC: 9 MG/DL (ref 8.4–10.2)
CALCIUM SERPL-MCNC: 9.1 MG/DL (ref 8.4–10.2)
CHLORIDE SERPL-SCNC: 92 MMOL/L (ref 96–108)
CHLORIDE SERPL-SCNC: 92 MMOL/L (ref 96–108)
CHLORIDE SERPL-SCNC: 95 MMOL/L (ref 96–108)
CHLORIDE SERPL-SCNC: 96 MMOL/L (ref 96–108)
CHLORIDE SERPL-SCNC: 96 MMOL/L (ref 96–108)
CO2 SERPL-SCNC: 23 MMOL/L (ref 21–32)
CO2 SERPL-SCNC: 23 MMOL/L (ref 21–32)
CO2 SERPL-SCNC: 25 MMOL/L (ref 21–32)
CO2 SERPL-SCNC: 28 MMOL/L (ref 21–32)
CO2 SERPL-SCNC: 29 MMOL/L (ref 21–32)
CREAT SERPL-MCNC: 0.56 MG/DL (ref 0.6–1.3)
CREAT SERPL-MCNC: 0.56 MG/DL (ref 0.6–1.3)
CREAT SERPL-MCNC: 0.57 MG/DL (ref 0.6–1.3)
CREAT SERPL-MCNC: 0.57 MG/DL (ref 0.6–1.3)
CREAT SERPL-MCNC: 0.58 MG/DL (ref 0.6–1.3)
ERYTHROCYTE [DISTWIDTH] IN BLOOD BY AUTOMATED COUNT: 16.2 % (ref 11.6–15.1)
GFR SERPL CREATININE-BSD FRML MDRD: 104 ML/MIN/1.73SQ M
GFR SERPL CREATININE-BSD FRML MDRD: 105 ML/MIN/1.73SQ M
GFR SERPL CREATININE-BSD FRML MDRD: 105 ML/MIN/1.73SQ M
GFR SERPL CREATININE-BSD FRML MDRD: 106 ML/MIN/1.73SQ M
GFR SERPL CREATININE-BSD FRML MDRD: 106 ML/MIN/1.73SQ M
GLUCOSE SERPL-MCNC: 145 MG/DL (ref 65–140)
GLUCOSE SERPL-MCNC: 152 MG/DL (ref 65–140)
GLUCOSE SERPL-MCNC: 165 MG/DL (ref 65–140)
GLUCOSE SERPL-MCNC: 184 MG/DL (ref 65–140)
GLUCOSE SERPL-MCNC: 185 MG/DL (ref 65–140)
HCT VFR BLD AUTO: 32.6 % (ref 34.8–46.1)
HGB BLD-MCNC: 10.6 G/DL (ref 11.5–15.4)
HGB BLD-MCNC: 10.8 G/DL (ref 11.5–15.4)
HGB BLD-MCNC: 8.4 G/DL (ref 11.5–15.4)
HGB BLD-MCNC: 9.2 G/DL (ref 11.5–15.4)
INR PPP: 1.26 (ref 0.84–1.19)
LACTATE SERPL-SCNC: 1.2 MMOL/L (ref 0.5–2)
MAGNESIUM SERPL-MCNC: 2.1 MG/DL (ref 1.9–2.7)
MCH RBC QN AUTO: 32.5 PG (ref 26.8–34.3)
MCHC RBC AUTO-ENTMCNC: 33.1 G/DL (ref 31.4–37.4)
MCV RBC AUTO: 98 FL (ref 82–98)
OSMOLALITY UR: 373 MMOL/KG
PHOSPHATE SERPL-MCNC: 2.8 MG/DL (ref 2.7–4.5)
PLATELET # BLD AUTO: 133 THOUSANDS/UL (ref 149–390)
PMV BLD AUTO: 9.1 FL (ref 8.9–12.7)
POTASSIUM SERPL-SCNC: 3.3 MMOL/L (ref 3.5–5.3)
POTASSIUM SERPL-SCNC: 3.7 MMOL/L (ref 3.5–5.3)
POTASSIUM SERPL-SCNC: 4.6 MMOL/L (ref 3.5–5.3)
POTASSIUM SERPL-SCNC: 4.7 MMOL/L (ref 3.5–5.3)
POTASSIUM SERPL-SCNC: 5.6 MMOL/L (ref 3.5–5.3)
PROT SERPL-MCNC: 10.4 G/DL (ref 6.4–8.4)
PROT SERPL-MCNC: 10.5 G/DL (ref 6.4–8.4)
PROTHROMBIN TIME: 16.2 SECONDS (ref 11.6–14.5)
RBC # BLD AUTO: 3.32 MILLION/UL (ref 3.81–5.12)
SODIUM SERPL-SCNC: 131 MMOL/L (ref 135–147)
SODIUM SERPL-SCNC: 132 MMOL/L (ref 135–147)
SODIUM SERPL-SCNC: 132 MMOL/L (ref 135–147)
SODIUM SERPL-SCNC: 135 MMOL/L (ref 135–147)
SODIUM SERPL-SCNC: 135 MMOL/L (ref 135–147)
WBC # BLD AUTO: 5.83 THOUSAND/UL (ref 4.31–10.16)

## 2024-03-02 PROCEDURE — 85018 HEMOGLOBIN: CPT | Performed by: PHYSICIAN ASSISTANT

## 2024-03-02 PROCEDURE — 83605 ASSAY OF LACTIC ACID: CPT

## 2024-03-02 PROCEDURE — C9113 INJ PANTOPRAZOLE SODIUM, VIA: HCPCS | Performed by: PHYSICIAN ASSISTANT

## 2024-03-02 PROCEDURE — 84100 ASSAY OF PHOSPHORUS: CPT

## 2024-03-02 PROCEDURE — 80053 COMPREHEN METABOLIC PANEL: CPT

## 2024-03-02 PROCEDURE — 80048 BASIC METABOLIC PNL TOTAL CA: CPT

## 2024-03-02 PROCEDURE — 99291 CRITICAL CARE FIRST HOUR: CPT | Performed by: INTERNAL MEDICINE

## 2024-03-02 PROCEDURE — 94150 VITAL CAPACITY TEST: CPT

## 2024-03-02 PROCEDURE — 94760 N-INVAS EAR/PLS OXIMETRY 1: CPT

## 2024-03-02 PROCEDURE — 80076 HEPATIC FUNCTION PANEL: CPT

## 2024-03-02 PROCEDURE — 82140 ASSAY OF AMMONIA: CPT

## 2024-03-02 PROCEDURE — 93005 ELECTROCARDIOGRAM TRACING: CPT

## 2024-03-02 PROCEDURE — 80048 BASIC METABOLIC PNL TOTAL CA: CPT | Performed by: INTERNAL MEDICINE

## 2024-03-02 PROCEDURE — 85610 PROTHROMBIN TIME: CPT

## 2024-03-02 PROCEDURE — 83735 ASSAY OF MAGNESIUM: CPT

## 2024-03-02 PROCEDURE — 85027 COMPLETE CBC AUTOMATED: CPT

## 2024-03-02 RX ORDER — POTASSIUM CHLORIDE 14.9 MG/ML
20 INJECTION INTRAVENOUS
Status: COMPLETED | OUTPATIENT
Start: 2024-03-02 | End: 2024-03-03

## 2024-03-02 RX ORDER — SODIUM CHLORIDE, SODIUM GLUCONATE, SODIUM ACETATE, POTASSIUM CHLORIDE, MAGNESIUM CHLORIDE, SODIUM PHOSPHATE, DIBASIC, AND POTASSIUM PHOSPHATE .53; .5; .37; .037; .03; .012; .00082 G/100ML; G/100ML; G/100ML; G/100ML; G/100ML; G/100ML; G/100ML
75 INJECTION, SOLUTION INTRAVENOUS CONTINUOUS
Status: DISCONTINUED | OUTPATIENT
Start: 2024-03-02 | End: 2024-03-05

## 2024-03-02 RX ORDER — METHYLPREDNISOLONE SODIUM SUCCINATE 40 MG/ML
40 INJECTION, POWDER, LYOPHILIZED, FOR SOLUTION INTRAMUSCULAR; INTRAVENOUS EVERY 6 HOURS SCHEDULED
Status: COMPLETED | OUTPATIENT
Start: 2024-03-03 | End: 2024-03-03

## 2024-03-02 RX ORDER — FENTANYL CITRATE-0.9 % NACL/PF 10 MCG/ML
100 PLASTIC BAG, INJECTION (ML) INTRAVENOUS CONTINUOUS
Status: DISCONTINUED | OUTPATIENT
Start: 2024-03-02 | End: 2024-03-03

## 2024-03-02 RX ADMIN — Medication 100 MCG/HR: at 09:37

## 2024-03-02 RX ADMIN — FENTANYL CITRATE 50 MCG: 50 INJECTION INTRAMUSCULAR; INTRAVENOUS at 21:45

## 2024-03-02 RX ADMIN — PHENOBARBITAL SODIUM 260 MG: 130 INJECTION INTRAMUSCULAR at 12:33

## 2024-03-02 RX ADMIN — Medication 150 MCG/HR: at 05:39

## 2024-03-02 RX ADMIN — POTASSIUM CHLORIDE 20 MEQ: 14.9 INJECTION, SOLUTION INTRAVENOUS at 21:45

## 2024-03-02 RX ADMIN — FENTANYL CITRATE 50 MCG: 50 INJECTION INTRAMUSCULAR; INTRAVENOUS at 01:07

## 2024-03-02 RX ADMIN — PROPOFOL 45 MCG/KG/MIN: 10 INJECTION, EMULSION INTRAVENOUS at 07:42

## 2024-03-02 RX ADMIN — FENTANYL CITRATE 50 MCG: 50 INJECTION INTRAMUSCULAR; INTRAVENOUS at 05:24

## 2024-03-02 RX ADMIN — CHLORHEXIDINE GLUCONATE 15 ML: 1.2 SOLUTION ORAL at 20:43

## 2024-03-02 RX ADMIN — OCTREOTIDE ACETATE 25 MCG/HR: 500 INJECTION, SOLUTION INTRAVENOUS; SUBCUTANEOUS at 10:24

## 2024-03-02 RX ADMIN — PROPOFOL 50 MCG/KG/MIN: 10 INJECTION, EMULSION INTRAVENOUS at 20:48

## 2024-03-02 RX ADMIN — POTASSIUM CHLORIDE 20 MEQ: 14.9 INJECTION, SOLUTION INTRAVENOUS at 19:21

## 2024-03-02 RX ADMIN — PANTOPRAZOLE SODIUM 40 MG: 40 INJECTION, POWDER, FOR SOLUTION INTRAVENOUS at 09:58

## 2024-03-02 RX ADMIN — PROPOFOL 50 MCG/KG/MIN: 10 INJECTION, EMULSION INTRAVENOUS at 16:36

## 2024-03-02 RX ADMIN — PANTOPRAZOLE SODIUM 40 MG: 40 INJECTION, POWDER, FOR SOLUTION INTRAVENOUS at 20:38

## 2024-03-02 RX ADMIN — CHLORHEXIDINE GLUCONATE 15 ML: 1.2 SOLUTION ORAL at 09:58

## 2024-03-02 RX ADMIN — CEFTRIAXONE 1000 MG: 1 INJECTION, SOLUTION INTRAVENOUS at 15:26

## 2024-03-02 RX ADMIN — PROPOFOL 50 MCG/KG/MIN: 10 INJECTION, EMULSION INTRAVENOUS at 02:50

## 2024-03-02 RX ADMIN — POTASSIUM CHLORIDE 20 MEQ: 14.9 INJECTION, SOLUTION INTRAVENOUS at 17:48

## 2024-03-02 RX ADMIN — THIAMINE HYDROCHLORIDE: 100 INJECTION, SOLUTION INTRAMUSCULAR; INTRAVENOUS at 09:58

## 2024-03-02 RX ADMIN — FENTANYL CITRATE 50 MCG: 50 INJECTION INTRAMUSCULAR; INTRAVENOUS at 19:23

## 2024-03-02 RX ADMIN — FENTANYL CITRATE 50 MCG: 50 INJECTION INTRAMUSCULAR; INTRAVENOUS at 20:36

## 2024-03-02 RX ADMIN — Medication 100 MCG/HR: at 16:51

## 2024-03-02 RX ADMIN — SODIUM CHLORIDE, SODIUM GLUCONATE, SODIUM ACETATE, POTASSIUM CHLORIDE, MAGNESIUM CHLORIDE, SODIUM PHOSPHATE, DIBASIC, AND POTASSIUM PHOSPHATE 75 ML/HR: .53; .5; .37; .037; .03; .012; .00082 INJECTION, SOLUTION INTRAVENOUS at 12:18

## 2024-03-02 NOTE — UTILIZATION REVIEW
"Initial Clinical Review    Admission: Date/Time/Statement:   Admission Orders (From admission, onward)       Ordered        03/01/24 1403  INPATIENT ADMISSION  Once                          Orders Placed This Encounter   Procedures    INPATIENT ADMISSION     Standing Status:   Standing     Number of Occurrences:   1     Order Specific Question:   Level of Care     Answer:   Level 1 Stepdown [13]     Order Specific Question:   Estimated length of stay     Answer:   More than 2 Midnights     Order Specific Question:   Certification     Answer:   I certify that inpatient services are medically necessary for this patient for a duration of greater than two midnights. See H&P and MD Progress Notes for additional information about the patient's course of treatment.     ED Arrival Information       Expected   -    Arrival   3/1/2024 11:21    Acuity   Emergent              Means of arrival   Walk-In    Escorted by   Friend    Service   Critical Care/ICU    Admission type   Emergency              Arrival complaint   vomiting blood             Chief Complaint   Patient presents with    Vomiting Blood     Pt states that within the last hour \"I threw up blood. I'm trying to get over my alcohol withdraw and started throwing up blood. I have esophageal varices\" Last drink 2 hours ago.        Initial Presentation: 53 y.o. female to ED via walk-in from home   Present to ED with hematemesis since this morning. Patient states that she has been drinking heavily over the past 5 days and has not had anything to eat since that time. She states that she has been drinking mostly vodka and wine. Her last drink was just prior to arriving to ED. She began vomiting blood this morning. While in the ED she had multiple episodes of dark brown hematemesis   PMHX alcohol abuse, alcoholic cirrhosis, esophageal varices with recent variceal bleed, long QT syndrome, anemia, and anxiety   Admitted to Level 1 Stepdown with DX: GI Bleed  on exam: tachy; " tachypnea; hypertensive; diaphoretic; abdominal tenderness in epigastric; anxious; thoughts of suicidal ideation; Ethanol 275; MELD 22; iCT high-volume bleeding scan without evidence of active extravasation and hemoglobin stable.   PLAN: rec'd phenobarbitol iv; cont iv abx; cont iv protonix; cont precedex gtt; cont fentanyl gtt; cont ivf; cont oxtreotide gtt; monitor labs; f/u vbg; seizure precatuions; f/u blood cx; 1:1 monitoring; BH consult; NPO; KCL iv Q2 hr    GI CONSULT - planning for urgent EGD   Hematemesis and acute blood loss anemia -presentation most consistent with acute variceal bleed secondary to esophageal varices from alcoholic cirrhosis.  May have underlying portal gastropathy.  Rule out peptic ulcer with bleed.   Acute alcoholic intoxication/alcohol withdrawal -patient acknowledges binge drinking.  Last drink earlier this morning.  Now with abdominal pain, retching, shakiness and hematemesis.  MELD Na 22.  DF-18       Date: 3/2/24     Day 2 - changed to ICU lox  24hr events: Patient was dry heaving or retching preop and so decision was made to keep patient intubated over the night attain airway to achieve hemodynamic stability and to hopefully prevent the bleeding of varices.  Patient also not making urine patient was bladder scanned and found to have greater than 1 L in her bladder. Patient was straight cathed overnight.  O2 sat 100% on vent; EGD with banding of 7 varices; VBG- respiratory alkalosis;  hyponatremia, hyperkalemia;  Given additional 1 L of Isolyte over the night.   Plan: rec'd phenobarbitol iv; start folic acid iv; cont iv abx; cont iv protonix; cont precedex gtt; cont fentanyl gtt; cont ivf; cont oxtreotide gtt; cont propofol gtt; monitor labs; seizure precatuions; f/u blood cx; 1:1 monitoring; BH consult; NPO      ED Triage Vitals   Temperature Pulse Respirations Blood Pressure SpO2   03/01/24 1128 03/01/24 1128 03/01/24 1128 03/01/24 1128 03/01/24 1128   98.8 °F (37.1 °C) (!) 133  22 (!) 173/77 99 %      Temp Source Heart Rate Source Patient Position - Orthostatic VS BP Location FiO2 (%)   03/01/24 1128 03/01/24 1128 03/01/24 1128 03/01/24 1128 03/01/24 1601   Temporal Monitor Sitting Right arm 40      Pain Score       03/01/24 1128       No Pain          Wt Readings from Last 1 Encounters:   03/02/24 57 kg (125 lb 10.6 oz)     Additional Vital Signs:   Date/Time Temp Pulse Resp BP MAP (mmHg) SpO2 FiO2 (%) O2 Device Patient Position - Orthostatic VS   03/02/24 1500 -- 96 23 Abnormal  138/76 101 100 % -- Ventilator --   03/02/24 1412 -- -- -- -- -- 100 % -- -- --   03/02/24 1400 98.9 °F (37.2 °C) 106 Abnormal  23 Abnormal  153/81 111 100 % -- Ventilator --   03/02/24 1300 -- 115 Abnormal  21 163/87 119 100 % -- Ventilator --   03/02/24 1200 -- 110 Abnormal  20 156/80 111 100 % -- Ventilator --   03/02/24 1100 98.3 °F (36.8 °C) 110 Abnormal  15 157/83 114 100 % -- Ventilator --   03/02/24 1000 98.9 °F (37.2 °C) 103 21 148/78 107 100 % -- Ventilator Lying   03/02/24 0900 -- 106 Abnormal  20 152/75 107 100 % -- Ventilator Lying   03/02/24 0841 -- -- -- -- -- 100 % -- -- --   03/02/24 0800 -- 110 Abnormal  18 152/74 105 100 % -- Ventilator Lying   03/02/24 0739 98.7 °F (37.1 °C) -- -- -- -- -- -- -- --   03/02/24 0700 98.7 °F (37.1 °C) 107 Abnormal  21 149/72 103 100 % -- Ventilator --   03/02/24 0600 -- 110 Abnormal  21 164/79 114 100 % -- -- --   03/02/24 0500 -- 112 Abnormal  23 Abnormal  151/77 107 100 % -- -- --   03/02/24 0400 98.9 °F (37.2 °C) 79 19 129/67 92 100 % -- Ventilator Lying   03/02/24 0359 -- -- -- -- -- 99 % -- -- --   03/02/24 0300 -- 84 22 131/64 91 100 % -- -- --   03/02/24 0200 -- 101 21 141/72 100 100 % -- -- --   03/02/24 0100 -- 117 Abnormal  23 Abnormal  148/80 108 100 % -- -- --   03/02/24 0022 -- -- -- -- -- 100 % -- -- --   03/02/24 0000 99.2 °F (37.3 °C) 105 22 141/73 101 100 % -- Ventilator Lying   03/01/24 2300 -- 115 Abnormal  24 Abnormal  143/74 102 100 % -- --  --   03/01/24 2200 -- 119 Abnormal  23 Abnormal  139/78 103 100 % -- -- --   03/01/24 2100 -- 83 17 122/62 87 99 % -- -- --   03/01/24 2000 -- 80 17 105/59 79 100 % -- -- --   03/01/24 1952 -- -- -- -- -- 100 % -- -- --   03/01/24 1930 98 °F (36.7 °C) 80 17 101/58 76 100 % -- Ventilator Lying   03/01/24 1915 -- 80 16 99/54 70 99 % -- -- --   03/01/24 1900 -- 82 17 102/58 77 99 % -- -- --   03/01/24 1845 -- 84 17 100/59 75 99 % -- -- --   03/01/24 1830 -- 84 17 100/57 72 99 % -- -- --   03/01/24 1815 -- 83 21 109/64 79 99 % -- -- --   03/01/24 1800 -- 84 21 107/65 80 99 % -- -- --   03/01/24 1745 -- 91 23 Abnormal  114/68 85 99 % -- -- --   03/01/24 1730 98.2 °F (36.8 °C) 100 25 Abnormal  127/75 94 99 % -- -- --   03/01/24 1715 -- 106 Abnormal  26 Abnormal  137/82 102 99 % -- -- --   03/01/24 1700 -- 109 Abnormal  23 Abnormal  139/78 102 99 % -- -- --   03/01/24 1645 -- 123 Abnormal  21 167/85 116 98 % -- -- --   03/01/24 1630 -- 126 Abnormal  22 161/86 116 100 % -- -- --   03/01/24 1615 -- 125 Abnormal  27 Abnormal  158/91 117 100 % -- -- --   03/01/24 1601 -- -- -- -- -- 100 % 40 Ventilator --   03/01/24 1600 -- 119 Abnormal  20 159/97 122 100 % -- -- --   03/01/24 1545 -- 137 Abnormal  -- 149/98 118 97 % -- -- --   03/01/24 1453 98.2 °F (36.8 °C) 123 Abnormal  20 166/79 -- 99 % -- None (Room air) --   03/01/24 1400 -- 129 Abnormal  24 Abnormal  143/62 98 95 % -- -- --   03/01/24 1257 -- -- -- 141/73 -- -- -- -- --   03/01/24 1128 98.8 °F (37.1 °C) 133 Abnormal  22 173/77 Abnormal  95 99 % -- None (Room air) Sitting       EKG:       Pertinent Labs/Diagnostic Test Results:   XR chest portable ICU   Final Result by Olman Slater DO (03/01 1804)      ET tube tip terminates about 3 cm above the rosemary.      Small nodular density adjacent to the left heart border, possibly vascular shadows, true nodule not excluded. Attention on follow-up recommended.         Workstation performed: [id number]         CT high  volume bleeding scan abdomen pelvis   Final Result by Sergio Wilson MD (03/01 1331)      1.  No CT evidence of active high-volume gastrointestinal hemorrhage.   2.  Cirrhosis with paraesophageal, gastric, and umbilical varices.         Workstation performed: [id number]              Results from last 7 days   Lab Units 03/02/24  0940 03/02/24  0143 03/02/24  0136 03/01/24  1703 03/01/24  1256 03/01/24  1146   WBC Thousand/uL  --  5.83  --   --   --  11.29*   HEMOGLOBIN g/dL 9.2* 10.8* 10.6* 8.6*  --  10.0*   HEMATOCRIT %  --  32.6*  --   --   --  29.7*   PLATELETS Thousands/uL  --  133*  --   --  186 252   BANDS PCT %  --   --   --   --   --  1        Results from last 7 days   Lab Units 03/02/24  1229 03/02/24  0832 03/02/24  0142 03/02/24 0136 03/01/24  1951 03/01/24  1703 03/01/24  1146   SODIUM mmol/L 135 131* 132* 132*  --  131* 126*   POTASSIUM mmol/L 3.7 5.6* 4.7 4.6  --  3.5 3.4*   CHLORIDE mmol/L 96 95* 92* 92*  --  92* 81*   CO2 mmol/L 28 25 23 23  --  21 21   ANION GAP mmol/L 11 11 17 17  --  18 24   BUN mg/dL 7 8 6 6  --  4* 7   CREATININE mg/dL 0.58* 0.56* 0.56* 0.57*  --  0.43* 0.61   EGFR ml/min/1.73sq m 104 106 106 105  --  115 103   CALCIUM mg/dL 9.1 8.7 9.0 8.9  --  7.7* 9.6   MAGNESIUM mg/dL  --   --  2.1  --  1.9  --  1.4*   PHOSPHORUS mg/dL  --   --  2.8  --   --   --   --      Results from last 7 days   Lab Units 03/02/24  1229 03/02/24  0142 03/02/24 0136 03/01/24  1146   AST U/L  --  222* 224* 242*   ALT U/L  --  50 50 53*   ALK PHOS U/L  --  162* 164* 161*   TOTAL PROTEIN g/dL  --  10.4* 10.5* 10.1*   ALBUMIN g/dL  --  4.3 4.4 4.4   TOTAL BILIRUBIN mg/dL  --  2.41* 2.40* 1.84*   BILIRUBIN DIRECT mg/dL  --   --  0.76*  --    AMMONIA umol/L 30  --   --   --         Results from last 7 days   Lab Units 03/02/24  1229 03/02/24  0832 03/02/24  0142 03/02/24  0136 03/01/24  1703 03/01/24  1146   GLUCOSE RANDOM mg/dL 145* 165* 185* 184* 131 151*     Results from last 7 days   Lab Units  03/01/24  1202   OSMOLALITY, SERUM mmol/*        Results from last 7 days   Lab Units 03/01/24  1703   PH RODDY  7.451*   PCO2 RODDY mm Hg 30.9*   PO2 RODDY mm Hg 83.7*   HCO3 RODDY mmol/L 21.0*   BASE EXC RODDY mmol/L -2.2   O2 CONTENT RODDY ml/dL 13.8   O2 HGB, VENOUS % 93.8*        Results from last 7 days   Lab Units 03/02/24  0142 03/01/24  1256 03/01/24  1202   PROTIME seconds 16.2* 17.5*  16.7* 16.6*   INR  1.26* 1.30* 1.30*   PTT seconds  --  41*  40* 39*        Results from last 7 days   Lab Units 03/02/24  0136 03/01/24  1951 03/01/24  1703   LACTIC ACID mmol/L 1.2 2.8* 4.7*        Results from last 7 days   Lab Units 03/01/24  1603   UNIT PRODUCT CODE  [id number]  [id number]   UNIT NUMBER  [id number]  [id number]   UNITABO  A  A   UNITRH  POS  POS   CROSSMATCH  Compatible  Compatible   UNIT DISPENSE STATUS  Crossmatched  Crossmatched   UNIT PRODUCT VOL ml 350  350        Results from last 7 days   Lab Units 03/01/24  1146   LIPASE u/L 129*        Results from last 7 days   Lab Units 03/01/24  1202   OSMOLALITY, SERUM mmol/*     Results from last 7 days   Lab Units 03/01/24  1901 03/01/24  1815   CLARITY UA  Clear  --    COLOR UA  Yellow  --    SPEC GRAV UA  1.010  --    PH UA  6.0  --    GLUCOSE UA mg/dl Negative  --    KETONES UA mg/dl Negative  --    BLOOD UA  Small*  --    PROTEIN UA mg/dl 30 (1+)*  --    NITRITE UA  Negative  --    BILIRUBIN UA  Negative  --    UROBILINOGEN UA (BE) mg/dl <2.0  --    LEUKOCYTES UA  Negative  --    WBC UA /hpf None Seen  --    RBC UA /hpf 0-1  --    BACTERIA UA /hpf None Seen  --    EPITHELIAL CELLS WET PREP /hpf None Seen  --    MUCUS THREADS  Occasional*  --    SODIUM UR   --  81        Results from last 7 days   Lab Units 03/01/24  1815   AMPH/METH  Negative   BARBITURATE UR  Negative   BENZODIAZEPINE UR  Negative   COCAINE UR  Negative   METHADONE URINE  Negative   OPIATE UR  Positive*   PCP UR  Negative   THC UR  Negative     Results from last 7 days    Lab Units 03/01/24  1202 03/01/24  1146   ETHANOL LVL mg/dL 261* 275*   ACETAMINOPHEN LVL ug/mL <2*  --    SALICYLATE LVL mg/dL <5  --         Results from last 7 days   Lab Units 03/01/24  1721 03/01/24  1703   BLOOD CULTURE  Received in Microbiology Lab. Culture in Progress. Received in Microbiology Lab. Culture in Progress.          ED Treatment:   Medication Administration from 03/01/2024 1121 to 03/01/2024 1543         Date/Time Order Dose Route Action     03/01/2024 1148 EST ondansetron (ZOFRAN) injection 4 mg 4 mg Intravenous Given     03/01/2024 1204 EST sodium chloride 0.9 % bolus 1,000 mL 1,000 mL Intravenous New Bag     03/01/2024 1207 EST pantoprazole (PROTONIX) 80 mg in sodium chloride 0.9 % 100 mL IVPB 80 mg Intravenous New Bag     03/01/2024 1255 EST pantoprazole (PROTONIX) 80 mg in sodium chloride 0.9 % 100 mL infusion 8 mg/hr Intravenous New Bag     03/01/2024 1207 EST fentanyl citrate (PF) 100 MCG/2ML 50 mcg 50 mcg Intravenous Given     03/01/2024 1229 EST iohexol (OMNIPAQUE) 350 MG/ML injection (SINGLE-DOSE) 80 mL 80 mL Intravenous Given     03/01/2024 1256 EST morphine injection 4 mg 4 mg Intravenous Given     03/01/2024 1347 EST sodium chloride 0.9 % bolus 1,000 mL 1,000 mL Intravenous New Bag     03/01/2024 1404 EST thiamine (VITAMIN B1) 100 mg in sodium chloride 0.9 % 50 mL IVPB 100 mg Intravenous New Bag     03/01/2024 1320 EST folic acid 1 mg in sodium chloride 0.9 % 50 mL IVPB 1 mg Intravenous New Bag     03/01/2024 1345 EST ondansetron (ZOFRAN) injection 4 mg 4 mg Intravenous Given            Present on Admission:   (Resolved) Alcohol abuse   GI bleed   Transaminitis   Hyponatremia   Hypokalemia   Hypomagnesemia   Anemia   SIRS (systemic inflammatory response syndrome) (HCC)   Alcoholic cirrhosis (HCC)      Admitting Diagnosis: Hematemesis [K92.0]  Vomiting blood [K92.0]  GI bleed [K92.2]  Suicidal thoughts [R45.851]  Alcohol use [Z78.9]    Age/Sex: 53 y.o. female    Admission Orders:  SCDs; CAM; I/O; daily wts; neuro checks; Cardiopulmonary monitoring; seizure precautions; NPO    Scheduled Medications:  cefTRIAXone, 1,000 mg, Intravenous, Q24H  chlorhexidine, 15 mL, Mouth/Throat, Q12H RAY  folic acid 1 mg, thiamine (VITAMIN B1) 100 mg in sodium chloride 0.9 % 100 mL IV piggyback, , Intravenous, Daily  pantoprazole, 40 mg, Intravenous, Q12H RAY    LORazepam (ATIVAN) injection 1 mg  Dose: 1 mg  Freq: Once Route: IV  Start: 03/01/24 1645 End: 03/01/24 1639     magnesium sulfate 2 g/50 mL IVPB (premix) 2 g  Dose: 2 g  Freq: Once Route: IV  Last Dose: Stopped (03/01/24 2009)  Start: 03/01/24 1330 End: 03/01/24 2009     PHENobarbital 260 mg in sodium chloride 0.9 % 100 mL IVPB  Dose: 260 mg  Freq: Once Route: IV  Last Dose: Stopped (03/02/24 1430)  Start: 03/02/24 1230 End: 03/02/24 1430     PHENobarbital 260 mg in sodium chloride 0.9 % 100 mL IVPB  Dose: 260 mg  Freq: Once Route: IV  Last Dose: Stopped (03/01/24 1806)  Start: 03/01/24 1645 End: 03/01/24 1806     potassium chloride 20 mEq IVPB (premix)  Dose: 20 mEq  Freq: Every 2 hours Route: IV  Last Dose: Stopped (03/01/24 2200)  Start: 03/01/24 1430 End: 03/01/24 2200     Continuous IV Infusions:  fentaNYL, 100 mcg/hr, Intravenous, Continuous  multi-electrolyte, 75 mL/hr, Intravenous, Continuous  octreotide, 25 mcg/hr, Intravenous, Continuous  propofol, 5-50 mcg/kg/min, Intravenous, Titrated  dexmedeTOMIDine (Precedex) 400 mcg in sodium chloride 0.9% 100 mL      PRN Meds:  fentanyl citrate (PF), 50 mcg, Intravenous, Q1H PRN  (3/1 rec'd x3)  (3/2 rec'd x2)         IP CONSULT TO CASE MANAGEMENT  IP CONSULT TO GASTROENTEROLOGY    Network Utilization Review Department  ATTENTION: Please call with any questions or concerns to [phone] and carefully listen to the prompts so that you are directed to the right person. All voicemails are confidential.   For Discharge needs, contact Care Management DC Support Team at [phone] opt. [phone]  Send all  requests for admission clinical reviews, approved or denied determinations and any other requests to dedicated fax number below belonging to the campus where the patient is receiving treatment. List of dedicated fax numbers for the Facilities:  FACILITY NAME UR FAX NUMBER   ADMISSION DENIALS (Administrative/Medical Necessity) [phone]   DISCHARGE SUPPORT TEAM (NETWORK) [phone]   PARENT CHILD HEALTH (Maternity/NICU/Pediatrics) [phone]   [facility] [phone]   [facility] [phone]   [facility] [phone]   [facility] [phone]   [facility] [phone]   [facility] [phone]   [facility] [phone]   [facility] [phone]   [facility] [phone]   [facility] [phone]   [facility] [phone]   [facility] [phone]

## 2024-03-02 NOTE — ASSESSMENT & PLAN NOTE
Na 126 on admission repeat Na+ in the evening 131.  Likely secondary to poor solute intake as patient states she hasn't eaten anything in at least 5 days  Received 2 L NS in ED  Received additional 1 L of IVF in the ICU for elevated lactate    Plan:  Goal sodium increase by 6-8 meq (132-134) in the next 24 hours  Trend bmp

## 2024-03-02 NOTE — PROGRESS NOTES
[facility]  Progress Note  Name: Yvette Titus I  MRN: [id number]  Unit/Bed#: -01 I Date of Admission: 3/1/2024   Date of Service: 3/2/2024 I Hospital Day: 1    Assessment/Plan   * GI bleed  Assessment & Plan  History of gastritis and esophageal varices  Recently admitted at [facility] for variceal bleeding after a binge-drinking episode  Follows with GI outpatient and was planning to have EGD next week with variceal banding  Presented with bloody emesis that started this morning.  Patient states she had 2 episodes of large-volume emesis prior to arrival.  Episodes of dark brown emesis noted in ED  CT high-volume bleeding scan without evidence of active extravasation  GI consulted-Patient underwent EGD on 3/1 with banding of 7 varices   Hemoglobin stable    Plan:  Continue Protonix IV BID  Continue octreotide infusion  Continue with CTX   Trend hgb  Follow Pt-INR  ETOH cessation when appropriate  Patient will need repeat EGD in 1 month for potential more banding  GI following- appreciate recommendations     Acute respiratory failure (HCC)  Assessment & Plan  Returning to the ICU intubated following EGD  ETT in good position per CXR  VBG 7.451/30.9  Sedation: Propofol, fentanyl, patient was loaded with 260 mg IV Phenobarbital for ETOH withdrawal. Patient required multiple pushes of fentanyl and a dose of versed over the night due to agitation  Titrate to maintain RASS - 2  Current Vent settings: 16/320/40/6  Wean FiO2 and PEEP to maintain spO2 > 92%  SAT/SBT later this AM  Vent bundle order     Alcoholic cirrhosis (HCC)  Assessment & Plan  Hx of alcoholic cirrhosis with varices  Follows with GI outpatient  Patient states she has been drinking heavily over the past several days. Typically drinks wine and vodka but recently ran out of alcohol. She has been drinking prescribed mouthwash that is 16% alcohol. Last drink just prior to arrival to ED  Ethanol 275 upon admission  CIWA protocol  ordered  Seizure precautions  MELD 22  DF normal  GI following    SIRS (systemic inflammatory response syndrome) (HCC)  Assessment & Plan  SIRS: tachycardia, tachypnea  Unlikely infection. SIRS likely secondary to acute GIB  Blood cultures ordered  Lactic elevated at 4.8 on admission. Repeat in the ICU 2.8. Given additional 1 L of Isolyte over the night.  Started on CTX in the setting of possible variceal bleeding  Remains hemodynamically stable     Plan:  Continue CTX for now  Follow-up culture data  Follow up repeat lactate   Trend fever curve and WBC count    Anemia  Assessment & Plan  Baseline anemia. Follows with Heme/Onc outpatient. Baseline between 9-10  Takes B12 and folic acid supplementation outpatient  H/H remains stable- has not required transfusion     Plan:  Continue thiamine supplementation  Hold oral B12 while NPO  Trend Hgb Q8 hrs   Transfuse for Hgb < 7    Hyponatremia  Assessment & Plan  Na 126 on admission repeat Na+ in the evening 131.  Likely secondary to poor solute intake as patient states she hasn't eaten anything in at least 5 days  Received 2 L NS in ED  Received additional 1 L of IVF in the ICU for elevated lactate    Plan:  Goal sodium increase by 6-8 meq (132-134) in the next 24 hours  Trend bmp    Hypokalemia  Assessment & Plan  Replete to maintain K > 4    Hypomagnesemia  Assessment & Plan  Replete to maintain Mg > 2    Suicidal ideation  Assessment & Plan  Patient states that she has been feeling very depressed and wishes that she could just die. She states that she has been having suicidal thoughts for quite some time  Follows with [employer] and take Zoloft daily  Hold Zoloft   1:1 monitoring  Psych consult when medically appropriate    Long Q-T syndrome  Assessment & Plan  Qtc currently 536  Replace Mg   Trend EKG q8h  Avoid QT prolonging agents    Transaminitis  Assessment & Plan  Likely secondary to alcoholic cirrhosis  DF normal  Trend cmp             Disposition: Critical  care    ICU Core Measures     Vented Patient  VAP Bundle  VAP bundle ordered     A: Assess, Prevent, and Manage Pain Has pain been assessed? Yes  Need for changes to pain regimen? No   B: Both Spontaneous Awakening Trials (SATs) and Spontaneous Breathing Trials (SBTs) Plan to perform spontaneous awakening trial today? Yes   Plan to perform spontaneous breathing trial today? Yes   Obvious barriers to extubation? No   C: Choice of Sedation RASS Goal: -2 Light Sedation  Need for changes to sedation or analgesia regimen? No   D: Delirium CAM-ICU: Positive   E: Early Mobility  Plan for early mobility? Yes   F: Family Engagement Plan for family engagement today? Yes       Antibiotic Review: Awaiting culture results.       Prophylaxis:  VTE Contraindicated secondary to: GI Bleed   Stress Ulcer  covered bypantoprazole (PROTONIX) 40 mg tablet [[id number]] (Long-Term Med), pantoprazole (PROTONIX) injection 40 mg [[id number]]         Significant 24hr Events     24hr events: Patient presented to the hospital over the previous day with hematemesis in the setting of acute variceal bleed.  GI consulted in the ED and the patient underwent emergent EGD with banding of 7 varices.  Patient was dry heaving or retching preop and so decision was made to keep patient intubated over the night attain airway to achieve hemodynamic stability and to hopefully prevent the bleeding of varices.  Overnight patient remained stable on the vent however had difficulty with sedation requiring multiple pushes of IV fentanyl as well as 1 push of 2 mg IV Versed.  Patient was also noted to have elevated lactate likely in the setting of acute GI bleed.  Lactate decreased but patient was given additional 1 L of crystalloid over the night as lactate was still elevated at 2.8.  Patient also not making urine patient was bladder scanned and found to have greater than 1 L in her bladder.  Patient was straight cathed overnight.  Patient made hemodynamically stable  overnight no other overnight events.  Will attempt SAT/SBT later this morning.     Subjective   Review of Systems   Unable to perform ROS: Intubated      Objective                            Vitals I/O      Most Recent Min/Max in 24hrs   Temp 98 °F (36.7 °C) Temp  Min: 98 °F (36.7 °C)  Max: 98.8 °F (37.1 °C)   Pulse 105 Pulse  Min: 80  Max: 137   Resp 22 Resp  Min: 16  Max: 27   /73 BP  Min: 99/54  Max: 173/77   O2 Sat 100 % SpO2  Min: 95 %  Max: 100 %      Intake/Output Summary (Last 24 hours) at 3/2/2024 0120  Last data filed at 3/2/2024 0106  Gross per 24 hour   Intake 3788.97 ml   Output 2525 ml   Net 1263.97 ml       Diet NPO    Invasive Monitoring           Physical Exam   Physical Exam  Eyes:      General: Vision grossly intact. No scleral icterus.     Extraocular Movements: Extraocular movements intact.      Conjunctiva/sclera: Conjunctivae normal.      Pupils: Pupils are equal, round, and reactive to light.   Skin:     General: Skin is warm and dry.      Capillary Refill: Capillary refill takes less than 2 seconds.   HENT:      Head: Normocephalic and atraumatic.      Mouth/Throat:      Mouth: Mucous membranes are moist.   Cardiovascular:      Rate and Rhythm: Regular rhythm. Tachycardia present.      Pulses: Normal pulses.      Heart sounds: Normal heart sounds.   Musculoskeletal:         General: Normal range of motion.      Right lower leg: No edema.      Left lower leg: No edema.   Abdominal: General: Bowel sounds are normal.      Palpations: Abdomen is soft.      Tenderness: There is no abdominal tenderness.   Constitutional:       General: She is not in acute distress.     Appearance: She is ill-appearing. She is not toxic-appearing.      Interventions: She is sedated, intubated and restrained.   Pulmonary:      Effort: Pulmonary effort is normal. No respiratory distress. She is intubated.      Breath sounds: Normal breath sounds. No wheezing, rhonchi or rales.   Neurological:      Mental  Status: She is lethargic.      GCS: GCS eye subscore is 3. GCS verbal subscore is 1. GCS motor subscore is 6.      Sensory: Sensation is intact.        Corneal reflex present, cough reflex and gag reflex intact.            Diagnostic Studies      EKG: ST  Imaging:  I have personally reviewed pertinent reports.   and I have personally reviewed pertinent films in PACS     Medications:  Scheduled PRN   cefTRIAXone, 1,000 mg, Q24H  chlorhexidine, 15 mL, Q12H RAY  folic acid 1 mg, thiamine (VITAMIN B1) 100 mg in sodium chloride 0.9 % 100 mL IV piggyback, , Daily  pantoprazole, 40 mg, Q12H RAY      fentanyl citrate (PF), 50 mcg, Q1H PRN       Continuous    fentaNYL, 150 mcg/hr, Last Rate: 150 mcg/hr (03/01/24 2327)  octreotide, 25 mcg/hr, Last Rate: 25 mcg/hr (03/01/24 1611)  propofol, 5-50 mcg/kg/min, Last Rate: 50 mcg/kg/min (03/01/24 2233)         Labs:    CBC    Recent Labs     03/01/24  1146 03/01/24  1256 03/01/24  1703   WBC 11.29*  --   --    HGB 10.0*  --  8.6*   HCT 29.7*  --   --     186  --    BANDSPCT 1  --   --      BMP    Recent Labs     03/01/24  1146 03/01/24  1703   SODIUM 126* 131*   K 3.4* 3.5   CL 81* 92*   CO2 21 21   AGAP 24 18   BUN 7 4*   CREATININE 0.61 0.43*   CALCIUM 9.6 7.7*       Coags    Recent Labs     03/01/24  1202 03/01/24  1256   INR 1.30* 1.30*   PTT 39* 41*  40*        Additional Electrolytes  Recent Labs     03/01/24  1146 03/01/24  1951   MG 1.4* 1.9          Blood Gas    No recent results  Recent Labs     03/01/24  1703   PHVEN 7.451*   YTL4EVQ 30.9*   PO2VEN 83.7*   EFE1VPT 21.0*   BEVEN -2.2   S2SXVHK 93.8*    LFTs  Recent Labs     03/01/24  1146   ALT 53*   *   ALKPHOS 161*   ALB 4.4   TBILI 1.84*       Infectious  No recent results  Glucose  Recent Labs     03/01/24  1146 03/01/24  1703   GLUC 151* 131               KAREN Hong

## 2024-03-02 NOTE — CASE MANAGEMENT
Case Management Progress Note    Patient name Yvette Titus  Location /-01 MRN [id number]  : 1970 Date 3/2/2024       LOS (days): 1  Geometric Mean LOS (GMLOS) (days):   Days to GMLOS:        OBJECTIVE:        Current admission status: Inpatient  Preferred Pharmacy:   [facility]/pharmacy #[id number] - [place] - [address]  [facility]  [place] [address]  Phone: [phone] Fax: [phone]    Primary Care Provider: Alberto Robertson MD    Primary Insurance: [employer]  Secondary Insurance:     PROGRESS NOTE:    Consult received. Acknowledge Pt is currently intubated. CM to follow as Pt progresses.

## 2024-03-02 NOTE — ASSESSMENT & PLAN NOTE
Hx of alcoholic cirrhosis with varices  Follows with GI outpatient  Patient states she has been drinking heavily over the past several days. Typically drinks wine and vodka but recently ran out of alcohol. She has been drinking prescribed mouthwash that is 16% alcohol. Last drink just prior to arrival to ED  Ethanol 275 upon admission  [facility] protocol ordered  Seizure precautions  MELD 22  DF normal  GI following

## 2024-03-02 NOTE — ASSESSMENT & PLAN NOTE
Returning to the ICU intubated following EGD  ETT in good position per CXR  VBG 7.451/30.9  Sedation: Propofol, fentanyl, patient was loaded with 260 mg IV Phenobarbital for ETOH withdrawal. Patient required multiple pushes of fentanyl and a dose of versed over the night due to agitation  Titrate to maintain RASS - 2  Current Vent settings: 16/320/40/6  Wean FiO2 and PEEP to maintain spO2 > 92%  SAT/SBT later this AM  Vent bundle order

## 2024-03-02 NOTE — ASSESSMENT & PLAN NOTE
Patient states that she has been feeling very depressed and wishes that she could just die. She states that she has been having suicidal thoughts for quite some time  Follows with [facility] and take Zoloft daily  Hold Zoloft   1:1 monitoring  Psych consult when medically appropriate

## 2024-03-02 NOTE — ASSESSMENT & PLAN NOTE
Baseline anemia. Follows with Heme/Onc outpatient. Baseline between 9-10  Takes B12 and folic acid supplementation outpatient  H/H remains stable- has not required transfusion     Plan:  Continue thiamine supplementation  Hold oral B12 while NPO  Trend Hgb Q8 hrs   Transfuse for Hgb < 7

## 2024-03-02 NOTE — ASSESSMENT & PLAN NOTE
SIRS: tachycardia, tachypnea  Unlikely infection. SIRS likely secondary to acute GIB  Blood cultures ordered  Lactic elevated at 4.8 on admission. Repeat in the ICU 2.8. Given additional 1 L of Isolyte over the night.  Started on CTX in the setting of possible variceal bleeding  Remains hemodynamically stable     Plan:  Continue CTX for now  Follow-up culture data  Follow up repeat lactate   Trend fever curve and WBC count

## 2024-03-02 NOTE — ASSESSMENT & PLAN NOTE
History of gastritis and esophageal varices  Recently admitted at [facility] for variceal bleeding after a binge-drinking episode  Follows with GI outpatient and was planning to have EGD next week with variceal banding  Presented with bloody emesis that started this morning.  Patient states she had 2 episodes of large-volume emesis prior to arrival.  Episodes of dark brown emesis noted in ED  CT high-volume bleeding scan without evidence of active extravasation  GI consulted-Patient underwent EGD on 3/1 with banding of 7 varices   Hemoglobin stable    Plan:  Continue Protonix IV BID  Continue octreotide infusion  Continue with CTX   Trend hgb  Follow Pt-INR  ETOH cessation when appropriate  Patient will need repeat EGD in 1 month for potential more banding  GI following- appreciate recommendations

## 2024-03-02 NOTE — PLAN OF CARE
Problem: PAIN - ADULT  Goal: Verbalizes/displays adequate comfort level or baseline comfort level  Description: Interventions:  - Encourage patient to monitor pain and request assistance  - Assess pain using appropriate pain scale  - Administer analgesics based on type and severity of pain and evaluate response  - Implement non-pharmacological measures as appropriate and evaluate response  - Consider cultural and social influences on pain and pain management  - Notify physician/advanced practitioner if interventions unsuccessful or patient reports new pain  Outcome: Progressing     Problem: INFECTION - ADULT  Goal: Absence or prevention of progression during hospitalization  Description: INTERVENTIONS:  - Assess and monitor for signs and symptoms of infection  - Monitor lab/diagnostic results  - Monitor all insertion sites, i.e. indwelling lines, tubes, and drains  - Monitor endotracheal if appropriate and nasal secretions for changes in amount and color  - [place] appropriate cooling/warming therapies per order  - Administer medications as ordered  - Instruct and encourage patient and family to use good hand hygiene technique  - Identify and instruct in appropriate isolation precautions for identified infection/condition  Outcome: Progressing  Goal: Absence of fever/infection during neutropenic period  Description: INTERVENTIONS:  - Monitor WBC    Outcome: Progressing     Problem: SAFETY ADULT  Goal: Patient will remain free of falls  Description: INTERVENTIONS:  - Educate patient/family on patient safety including physical limitations  - Instruct patient to call for assistance with activity   - Consult OT/PT to assist with strengthening/mobility   - Keep Call bell within reach  - Keep bed low and locked with side rails adjusted as appropriate  - Keep care items and personal belongings within reach  - Initiate and maintain comfort rounds  - Make Fall Risk Sign visible to staff    - Apply yellow socks and  bracelet for high fall risk patients  - Consider moving patient to room near nurses station  Outcome: Progressing  Goal: Maintain or return to baseline ADL function  Description: INTERVENTIONS:  -  Assess patient's ability to carry out ADLs; assess patient's baseline for ADL function and identify physical deficits which impact ability to perform ADLs (bathing, care of mouth/teeth, toileting, grooming, dressing, etc.)  - Assess/evaluate cause of self-care deficits   - Assess range of motion  - Assess patient's mobility; develop plan if impaired  - Assess patient's need for assistive devices and provide as appropriate  - Encourage maximum independence but intervene and supervise when necessary  - Involve family in performance of ADLs  - Assess for home care needs following discharge   - Consider OT consult to assist with ADL evaluation and planning for discharge  - Provide patient education as appropriate  Outcome: Progressing  Goal: Maintains/Returns to pre admission functional level  Description: INTERVENTIONS:  - Perform AM-PAC 6 Click Basic Mobility/ Daily Activity assessment daily.  - Set and communicate daily mobility goal to care team and patient/family/caregiver.   - Collaborate with rehabilitation services on mobility goals if consulted    - Out of bed for toileting  - Record patient progress and toleration of activity level   Outcome: Progressing     Problem: DISCHARGE PLANNING  Goal: Discharge to home or other facility with appropriate resources  Description: INTERVENTIONS:  - Identify barriers to discharge w/patient and caregiver  - Arrange for needed discharge resources and transportation as appropriate  - Identify discharge learning needs (meds, wound care, etc.)  - Arrange for interpretive services to assist at discharge as needed  - Refer to Case Management Department for coordinating discharge planning if the patient needs post-hospital services based on physician/advanced practitioner order or  complex needs related to functional status, cognitive ability, or social support system  Outcome: Progressing     Problem: Knowledge Deficit  Goal: Patient/family/caregiver demonstrates understanding of disease process, treatment plan, medications, and discharge instructions  Description: Complete learning assessment and assess knowledge base.  Interventions:  - Provide teaching at level of understanding  - Provide teaching via preferred learning methods  Outcome: Progressing     Problem: RESPIRATORY - ADULT  Goal: Achieves optimal ventilation and oxygenation  Description: INTERVENTIONS:  - Assess for changes in respiratory status  - Assess for changes in mentation and behavior  - Position to facilitate oxygenation and minimize respiratory effort  - Oxygen administered by appropriate delivery if ordered  - Initiate smoking cessation education as indicated  - Encourage broncho-pulmonary hygiene including cough, deep breathe, Incentive Spirometry  - Assess the need for suctioning and aspirate as needed  - Assess and instruct to report SOB or any respiratory difficulty  - Respiratory Therapy support as indicated  Outcome: Progressing     Problem: GASTROINTESTINAL - ADULT  Goal: Minimal or absence of nausea and/or vomiting  Description: INTERVENTIONS:  - Administer IV fluids if ordered to ensure adequate hydration  - Maintain NPO status until nausea and vomiting are resolved  - Nasogastric tube if ordered  - Administer ordered antiemetic medications as needed  - Provide nonpharmacologic comfort measures as appropriate  - Advance diet as tolerated, if ordered  - Consider nutrition services referral to assist patient with adequate nutrition and appropriate food choices  Outcome: Progressing     Problem: HEMATOLOGIC - ADULT  Goal: Maintains hematologic stability  Description: INTERVENTIONS  - Assess for signs and symptoms of bleeding or hemorrhage  - Monitor labs  - Administer supportive blood products/factors as ordered  and appropriate  Outcome: Progressing     Problem: Prexisting or High Potential for Compromised Skin Integrity  Goal: Skin integrity is maintained or improved  Description: INTERVENTIONS:  - Identify patients at risk for skin breakdown  - Assess and monitor skin integrity  - Assess and monitor nutrition and hydration status  - Monitor labs   - Assess for incontinence   - Turn and reposition patient  - Assist with mobility/ambulation  - Relieve pressure over bony prominences  - Avoid friction and shearing  - Provide appropriate hygiene as needed including keeping skin clean and dry  - Evaluate need for skin moisturizer/barrier cream  - Collaborate with interdisciplinary team   - Patient/family teaching  - Consider wound care consult   Outcome: Progressing     Problem: SAFETY,RESTRAINT: NV/NON-SELF DESTRUCTIVE BEHAVIOR  Goal: Remains free of harm/injury (restraint for non violent/non self-detsructive behavior)  Description: INTERVENTIONS:  - Instruct patient/family regarding restraint use   - Assess and monitor physiologic and psychological status   - Provide interventions and comfort measures to meet assessed patient needs   - Identify and implement measures to help patient regain control  - Assess readiness for release of restraint   Outcome: Progressing  Goal: Returns to optimal restraint-free functioning  Description: INTERVENTIONS:  - Assess the patient's behavior and symptoms that indicate continued need for restraint  - Identify and implement measures to help patient regain control  - Assess readiness for release of restraint   Outcome: Progressing

## 2024-03-02 NOTE — RESPIRATORY THERAPY NOTE
RT Ventilator Management Note  Yvette Titus 53 y.o. female MRN: [id number]  Unit/Bed#: -01 Encounter: [id number]      Pt was seen and assessed . Vent day 2 post op yesterday for esophageal varices resection    She was obtained on full vent support and weaned to PS6 P6 40%. Pt is agitates at times but also still lethargic. Pt followed all the commands for weaning VC 820cc, RSBI 56, Cuff leak test was performed and pt was found yo have NO CUFF LEAK   ETT#7, laryngeal area suctioned, ETT repositioned , pt was temporary  placed on full vent support for the most accurate measurement  - without improvement   Due to risk of self extubation , pt was placed back on sedation and back to full vent support S-/16/40%/6p       Physical Exam:   Respiratory Pattern: Assisted  Chest Assessment: Chest expansion symmetrical  Bilateral Breath Sounds: Clear

## 2024-03-02 NOTE — PROGRESS NOTES
"Progress Note - Yvette Titus 53 y.o. female MRN: [id number]    Unit/Bed#: -01 Encounter: [id number]    Assessment and Plan:    1) Hematemesis and acute blood loss anemia  2) Esophageal varices with hemorrhage  EGD yesterday showed multiple medium and circumferential grade III varices (red nakul sign) in the lower third of the esophagus and GE junction. There was stigmata of recent hemorrhage, placed 7 bands successfully resulting in partial eradication. She has not required transfusion. Hemoglobin 9.2 down from 10.8, but hemoglobin has been fluctuating. No evidence of ongoing bleeding. She is tachycardic and agitated in the setting of alcohol withdrawal.     Ventilator management per ICU  Keep NPO  Continue octreotide for an additional 48 hours  Continue IV PPI twice daily  Continue IV ceftriaxone for a total of 5 days  Follow H&H and INR  She will need repeat EGD in 1 month to reassess varices and possibly repeat banding     3) Decompensated alcoholic cirrhosis  4) Acute alcoholic intoxication/alcohol withdrawal   Cirrhosis decompensated in the setting of bleeding esophageal varices, coagulopathy, hepatic encephalopathy. Patient acknowledges binge drinking. Last drink yesterday. MELD Na 17 (from 22). DF 18.  Trend LFTs  CIWA protocol  No indication for steroids with discriminant function of 18    Subjective:     Patient seen and examined. On ventilator in ICU. No evidence of ongoing GI bleeding.     Objective:     Vitals: Blood pressure 157/83, pulse (!) 110, temperature 98.3 °F (36.8 °C), temperature source Axillary, resp. rate 15, height 5' 4\" (1.626 m), weight 57 kg (125 lb 10.6 oz), SpO2 100%.,Body mass index is 21.57 kg/m².      Intake/Output Summary (Last 24 hours) at 3/2/2024 1210  Last data filed at 3/2/2024 1030  Gross per 24 hour   Intake 4237.06 ml   Output 4800 ml   Net -562.94 ml       Physical Exam:     General Appearance: Intubated, chronically ill appearing  Lungs: Clear to auscultation " bilaterally  Heart: Tachycardic, regular rhythm  Abdomen: Soft, non-tender, non-distended; bowel sounds normal  Extremities: No cyanosis, edema    Invasive Devices       Peripheral Intravenous Line  Duration             Peripheral IV 03/01/24 Right Antecubital 1 day    Peripheral IV 03/01/24 Distal;Left;Ventral (anterior) Forearm <1 day    Peripheral IV 03/01/24 Left;Proximal;Ventral (anterior) Forearm <1 day              Airway  Duration             ETT  Cuffed;Oral;Hi-Lo 7 mm <1 day                    Lab Results:  Results from last 7 days   Lab Units 03/02/24  0940 03/02/24  0143 03/01/24  1256 03/01/24  1146   WBC Thousand/uL  --  5.83  --  11.29*   HEMOGLOBIN g/dL 9.2* 10.8*   < > 10.0*   HEMATOCRIT %  --  32.6*  --  29.7*   PLATELETS Thousands/uL  --  133*   < > 252   LYMPHO PCT %  --   --   --  21   MONO PCT %  --   --   --  5   EOS PCT %  --   --   --  7*    < > = values in this interval not displayed.     Results from last 7 days   Lab Units 03/02/24  0832 03/02/24  0142   POTASSIUM mmol/L 5.6* 4.7   CHLORIDE mmol/L 95* 92*   CO2 mmol/L 25 23   BUN mg/dL 8 6   CREATININE mg/dL 0.56* 0.56*   CALCIUM mg/dL 8.7 9.0   ALK PHOS U/L  --  162*   ALT U/L  --  50   AST U/L  --  222*     Results from last 7 days   Lab Units 03/02/24  0142   INR  1.26*     Results from last 7 days   Lab Units 03/01/24  1146   LIPASE u/L 129*       Imaging Studies: I have personally reviewed pertinent imaging studies.    XR chest portable ICU    Result Date: 3/1/2024  Impression: ET tube tip terminates about 3 cm above the rosemary. Small nodular density adjacent to the left heart border, possibly vascular shadows, true nodule not excluded. Attention on follow-up recommended. Workstation performed: [id number]     EGD Banding    Result Date: 3/1/2024  Impression: Multiple medium and circumferential grade III varices (red nakul sign) in the lower third of the esophagus and GE junction; there was stigmata of recent hemorrhage; placed 7  bands successfully, resulting in partial eradication Moderate edematous, erythematous, friable and petechial mucosa in the stomach The duodenum appeared normal. RECOMMENDATION: Continue n.p.o. IV PPI IV octreotide IV ceftriaxone H&H and transfuse as needed Trend INR Repeat EGD in about 1 month to reassess varices and possible repeat banding   Anupama Blank MD     CT high volume bleeding scan abdomen pelvis    Result Date: 3/1/2024  Impression: 1.  No CT evidence of active high-volume gastrointestinal hemorrhage. 2.  Cirrhosis with paraesophageal, gastric, and umbilical varices. Workstation performed: [id number]

## 2024-03-03 PROBLEM — G93.41 ACUTE METABOLIC ENCEPHALOPATHY: Status: ACTIVE | Noted: 2024-03-03

## 2024-03-03 LAB
ALBUMIN SERPL BCP-MCNC: 3.4 G/DL (ref 3.5–5)
ALP SERPL-CCNC: 121 U/L (ref 34–104)
ALT SERPL W P-5'-P-CCNC: 45 U/L (ref 7–52)
ANION GAP SERPL CALCULATED.3IONS-SCNC: 11 MMOL/L
ANION GAP SERPL CALCULATED.3IONS-SCNC: 8 MMOL/L
ANION GAP SERPL CALCULATED.3IONS-SCNC: 9 MMOL/L
AST SERPL W P-5'-P-CCNC: 232 U/L (ref 13–39)
ATRIAL RATE: 105 BPM
ATRIAL RATE: 111 BPM
ATRIAL RATE: 119 BPM
ATRIAL RATE: 132 BPM
ATRIAL RATE: 77 BPM
ATRIAL RATE: 81 BPM
ATRIAL RATE: 81 BPM
ATRIAL RATE: 84 BPM
ATRIAL RATE: 85 BPM
ATRIAL RATE: 95 BPM
BASOPHILS # BLD AUTO: 0.02 THOUSANDS/ÂΜL (ref 0–0.1)
BASOPHILS NFR BLD AUTO: 0 % (ref 0–1)
BILIRUB DIRECT SERPL-MCNC: 0.66 MG/DL (ref 0–0.2)
BILIRUB SERPL-MCNC: 2.02 MG/DL (ref 0.2–1)
BUN SERPL-MCNC: 6 MG/DL (ref 5–25)
BUN SERPL-MCNC: 7 MG/DL (ref 5–25)
BUN SERPL-MCNC: 7 MG/DL (ref 5–25)
CALCIUM SERPL-MCNC: 8.1 MG/DL (ref 8.4–10.2)
CALCIUM SERPL-MCNC: 8.5 MG/DL (ref 8.4–10.2)
CALCIUM SERPL-MCNC: 8.7 MG/DL (ref 8.4–10.2)
CHLORIDE SERPL-SCNC: 100 MMOL/L (ref 96–108)
CHLORIDE SERPL-SCNC: 100 MMOL/L (ref 96–108)
CHLORIDE SERPL-SCNC: 99 MMOL/L (ref 96–108)
CO2 SERPL-SCNC: 25 MMOL/L (ref 21–32)
CO2 SERPL-SCNC: 26 MMOL/L (ref 21–32)
CO2 SERPL-SCNC: 27 MMOL/L (ref 21–32)
CREAT SERPL-MCNC: 0.53 MG/DL (ref 0.6–1.3)
CREAT SERPL-MCNC: 0.53 MG/DL (ref 0.6–1.3)
CREAT SERPL-MCNC: 0.57 MG/DL (ref 0.6–1.3)
EOSINOPHIL # BLD AUTO: 0 THOUSAND/ÂΜL (ref 0–0.61)
EOSINOPHIL NFR BLD AUTO: 0 % (ref 0–6)
ERYTHROCYTE [DISTWIDTH] IN BLOOD BY AUTOMATED COUNT: 16.3 % (ref 11.6–15.1)
GFR SERPL CREATININE-BSD FRML MDRD: 105 ML/MIN/1.73SQ M
GFR SERPL CREATININE-BSD FRML MDRD: 107 ML/MIN/1.73SQ M
GFR SERPL CREATININE-BSD FRML MDRD: 107 ML/MIN/1.73SQ M
GLUCOSE SERPL-MCNC: 113 MG/DL (ref 65–140)
GLUCOSE SERPL-MCNC: 114 MG/DL (ref 65–140)
GLUCOSE SERPL-MCNC: 115 MG/DL (ref 65–140)
GLUCOSE SERPL-MCNC: 136 MG/DL (ref 65–140)
GLUCOSE SERPL-MCNC: 158 MG/DL (ref 65–140)
GLUCOSE SERPL-MCNC: 161 MG/DL (ref 65–140)
GLUCOSE SERPL-MCNC: 190 MG/DL (ref 65–140)
HCT VFR BLD AUTO: 27.7 % (ref 34.8–46.1)
HGB BLD-MCNC: 8.1 G/DL (ref 11.5–15.4)
HGB BLD-MCNC: 8.2 G/DL (ref 11.5–15.4)
HGB BLD-MCNC: 8.9 G/DL (ref 11.5–15.4)
HGB BLD-MCNC: 9.2 G/DL (ref 11.5–15.4)
IMM GRANULOCYTES # BLD AUTO: 0.07 THOUSAND/UL (ref 0–0.2)
IMM GRANULOCYTES NFR BLD AUTO: 1 % (ref 0–2)
INR PPP: 1.49 (ref 0.84–1.19)
LYMPHOCYTES # BLD AUTO: 0.49 THOUSANDS/ÂΜL (ref 0.6–4.47)
LYMPHOCYTES NFR BLD AUTO: 7 % (ref 14–44)
MAGNESIUM SERPL-MCNC: 2 MG/DL (ref 1.9–2.7)
MCH RBC QN AUTO: 32.4 PG (ref 26.8–34.3)
MCHC RBC AUTO-ENTMCNC: 32.1 G/DL (ref 31.4–37.4)
MCV RBC AUTO: 101 FL (ref 82–98)
MONOCYTES # BLD AUTO: 0.23 THOUSAND/ÂΜL (ref 0.17–1.22)
MONOCYTES NFR BLD AUTO: 3 % (ref 4–12)
NEUTROPHILS # BLD AUTO: 6.67 THOUSANDS/ÂΜL (ref 1.85–7.62)
NEUTS SEG NFR BLD AUTO: 89 % (ref 43–75)
NRBC BLD AUTO-RTO: 0 /100 WBCS
P AXIS: 64 DEGREES
P AXIS: 69 DEGREES
P AXIS: 71 DEGREES
P AXIS: 74 DEGREES
P AXIS: 75 DEGREES
P AXIS: 77 DEGREES
P AXIS: 77 DEGREES
P AXIS: 80 DEGREES
P AXIS: 80 DEGREES
P AXIS: 83 DEGREES
PHOSPHATE SERPL-MCNC: 1.9 MG/DL (ref 2.7–4.5)
PLATELET # BLD AUTO: 96 THOUSANDS/UL (ref 149–390)
PLATELET BLD QL SMEAR: ABNORMAL
PMV BLD AUTO: 9.9 FL (ref 8.9–12.7)
POTASSIUM SERPL-SCNC: 3.1 MMOL/L (ref 3.5–5.3)
POTASSIUM SERPL-SCNC: 4.2 MMOL/L (ref 3.5–5.3)
POTASSIUM SERPL-SCNC: 5.1 MMOL/L (ref 3.5–5.3)
PR INTERVAL: 140 MS
PR INTERVAL: 142 MS
PR INTERVAL: 148 MS
PR INTERVAL: 150 MS
PR INTERVAL: 160 MS
PR INTERVAL: 170 MS
PR INTERVAL: 172 MS
PROT SERPL-MCNC: 8.2 G/DL (ref 6.4–8.4)
PROTHROMBIN TIME: 18.5 SECONDS (ref 11.6–14.5)
QRS AXIS: 63 DEGREES
QRS AXIS: 65 DEGREES
QRS AXIS: 70 DEGREES
QRS AXIS: 72 DEGREES
QRS AXIS: 74 DEGREES
QRS AXIS: 74 DEGREES
QRS AXIS: 76 DEGREES
QRS AXIS: 77 DEGREES
QRS AXIS: 79 DEGREES
QRS AXIS: 82 DEGREES
QRSD INTERVAL: 76 MS
QRSD INTERVAL: 78 MS
QRSD INTERVAL: 82 MS
QRSD INTERVAL: 84 MS
QRSD INTERVAL: 84 MS
QRSD INTERVAL: 86 MS
QRSD INTERVAL: 88 MS
QT INTERVAL: 306 MS
QT INTERVAL: 356 MS
QT INTERVAL: 376 MS
QT INTERVAL: 396 MS
QT INTERVAL: 430 MS
QT INTERVAL: 440 MS
QT INTERVAL: 472 MS
QT INTERVAL: 506 MS
QT INTERVAL: 512 MS
QT INTERVAL: 518 MS
QTC INTERVAL: 453 MS
QTC INTERVAL: 497 MS
QTC INTERVAL: 500 MS
QTC INTERVAL: 511 MS
QTC INTERVAL: 511 MS
QTC INTERVAL: 534 MS
QTC INTERVAL: 568 MS
QTC INTERVAL: 594 MS
QTC INTERVAL: 597 MS
QTC INTERVAL: 616 MS
RBC # BLD AUTO: 2.75 MILLION/UL (ref 3.81–5.12)
RBC MORPH BLD: NORMAL
SODIUM SERPL-SCNC: 134 MMOL/L (ref 135–147)
SODIUM SERPL-SCNC: 134 MMOL/L (ref 135–147)
SODIUM SERPL-SCNC: 137 MMOL/L (ref 135–147)
T WAVE AXIS: 62 DEGREES
T WAVE AXIS: 66 DEGREES
T WAVE AXIS: 67 DEGREES
T WAVE AXIS: 68 DEGREES
T WAVE AXIS: 69 DEGREES
T WAVE AXIS: 73 DEGREES
T WAVE AXIS: 76 DEGREES
T WAVE AXIS: 76 DEGREES
T WAVE AXIS: 77 DEGREES
T WAVE AXIS: 78 DEGREES
VENTRICULAR RATE: 105 BPM
VENTRICULAR RATE: 111 BPM
VENTRICULAR RATE: 119 BPM
VENTRICULAR RATE: 132 BPM
VENTRICULAR RATE: 77 BPM
VENTRICULAR RATE: 81 BPM
VENTRICULAR RATE: 81 BPM
VENTRICULAR RATE: 84 BPM
VENTRICULAR RATE: 85 BPM
VENTRICULAR RATE: 95 BPM
WBC # BLD AUTO: 7.48 THOUSAND/UL (ref 4.31–10.16)

## 2024-03-03 PROCEDURE — 93005 ELECTROCARDIOGRAM TRACING: CPT

## 2024-03-03 PROCEDURE — 84100 ASSAY OF PHOSPHORUS: CPT

## 2024-03-03 PROCEDURE — 99291 CRITICAL CARE FIRST HOUR: CPT | Performed by: INTERNAL MEDICINE

## 2024-03-03 PROCEDURE — 93010 ELECTROCARDIOGRAM REPORT: CPT | Performed by: INTERNAL MEDICINE

## 2024-03-03 PROCEDURE — 94150 VITAL CAPACITY TEST: CPT

## 2024-03-03 PROCEDURE — 85025 COMPLETE CBC W/AUTO DIFF WBC: CPT

## 2024-03-03 PROCEDURE — 85018 HEMOGLOBIN: CPT

## 2024-03-03 PROCEDURE — 83735 ASSAY OF MAGNESIUM: CPT

## 2024-03-03 PROCEDURE — C9113 INJ PANTOPRAZOLE SODIUM, VIA: HCPCS | Performed by: PHYSICIAN ASSISTANT

## 2024-03-03 PROCEDURE — 85018 HEMOGLOBIN: CPT | Performed by: PHYSICIAN ASSISTANT

## 2024-03-03 PROCEDURE — 80076 HEPATIC FUNCTION PANEL: CPT

## 2024-03-03 PROCEDURE — 94760 N-INVAS EAR/PLS OXIMETRY 1: CPT

## 2024-03-03 PROCEDURE — 94003 VENT MGMT INPAT SUBQ DAY: CPT

## 2024-03-03 PROCEDURE — 80048 BASIC METABOLIC PNL TOTAL CA: CPT

## 2024-03-03 PROCEDURE — 85610 PROTHROMBIN TIME: CPT

## 2024-03-03 PROCEDURE — 82948 REAGENT STRIP/BLOOD GLUCOSE: CPT

## 2024-03-03 RX ORDER — DEXMEDETOMIDINE HYDROCHLORIDE 4 UG/ML
.1-.7 INJECTION, SOLUTION INTRAVENOUS
Status: DISCONTINUED | OUTPATIENT
Start: 2024-03-03 | End: 2024-03-03

## 2024-03-03 RX ORDER — FENTANYL CITRATE-0.9 % NACL/PF 10 MCG/ML
150 PLASTIC BAG, INJECTION (ML) INTRAVENOUS CONTINUOUS
Status: DISCONTINUED | OUTPATIENT
Start: 2024-03-03 | End: 2024-03-04

## 2024-03-03 RX ORDER — PROPOFOL 10 MG/ML
5-50 INJECTION, EMULSION INTRAVENOUS
Status: DISCONTINUED | OUTPATIENT
Start: 2024-03-03 | End: 2024-03-04

## 2024-03-03 RX ORDER — POTASSIUM CHLORIDE 14.9 MG/ML
20 INJECTION INTRAVENOUS ONCE
Qty: 100 ML | Refills: 0 | Status: COMPLETED | OUTPATIENT
Start: 2024-03-04 | End: 2024-03-04

## 2024-03-03 RX ORDER — MIDAZOLAM HYDROCHLORIDE 2 MG/2ML
2 INJECTION, SOLUTION INTRAMUSCULAR; INTRAVENOUS ONCE
Status: COMPLETED | OUTPATIENT
Start: 2024-03-03 | End: 2024-03-03

## 2024-03-03 RX ORDER — MIDAZOLAM HYDROCHLORIDE 2 MG/2ML
INJECTION, SOLUTION INTRAMUSCULAR; INTRAVENOUS
Status: DISPENSED
Start: 2024-03-03 | End: 2024-03-04

## 2024-03-03 RX ORDER — POTASSIUM CHLORIDE 14.9 MG/ML
20 INJECTION INTRAVENOUS ONCE
Qty: 100 ML | Refills: 0 | Status: COMPLETED | OUTPATIENT
Start: 2024-03-03 | End: 2024-03-04

## 2024-03-03 RX ORDER — MIDAZOLAM HYDROCHLORIDE 2 MG/2ML
INJECTION, SOLUTION INTRAMUSCULAR; INTRAVENOUS
Status: COMPLETED
Start: 2024-03-03 | End: 2024-03-03

## 2024-03-03 RX ORDER — PROPOFOL 10 MG/ML
INJECTION, EMULSION INTRAVENOUS
Status: COMPLETED
Start: 2024-03-03 | End: 2024-03-03

## 2024-03-03 RX ADMIN — FENTANYL CITRATE 50 MCG: 50 INJECTION INTRAMUSCULAR; INTRAVENOUS at 21:58

## 2024-03-03 RX ADMIN — PROPOFOL 10 MCG/KG/MIN: 10 INJECTION, EMULSION INTRAVENOUS at 19:41

## 2024-03-03 RX ADMIN — Medication 100 MCG/HR: at 02:21

## 2024-03-03 RX ADMIN — CHLORHEXIDINE GLUCONATE 15 ML: 1.2 SOLUTION ORAL at 22:08

## 2024-03-03 RX ADMIN — MIDAZOLAM 2 MG: 1 INJECTION INTRAMUSCULAR; INTRAVENOUS at 19:17

## 2024-03-03 RX ADMIN — THIAMINE HYDROCHLORIDE 500 MG: 100 INJECTION, SOLUTION INTRAMUSCULAR; INTRAVENOUS at 22:08

## 2024-03-03 RX ADMIN — METHYLPREDNISOLONE SODIUM SUCCINATE 40 MG: 40 INJECTION, POWDER, FOR SOLUTION INTRAMUSCULAR; INTRAVENOUS at 05:40

## 2024-03-03 RX ADMIN — PROPOFOL 50 MCG/KG/MIN: 10 INJECTION, EMULSION INTRAVENOUS at 03:20

## 2024-03-03 RX ADMIN — THIAMINE HYDROCHLORIDE 500 MG: 100 INJECTION, SOLUTION INTRAMUSCULAR; INTRAVENOUS at 16:33

## 2024-03-03 RX ADMIN — DEXMEDETOMIDINE HYDROCHLORIDE 0.1 MCG/KG/HR: 4 INJECTION, SOLUTION INTRAVENOUS at 13:27

## 2024-03-03 RX ADMIN — SODIUM CHLORIDE, SODIUM GLUCONATE, SODIUM ACETATE, POTASSIUM CHLORIDE, MAGNESIUM CHLORIDE, SODIUM PHOSPHATE, DIBASIC, AND POTASSIUM PHOSPHATE 75 ML/HR: .53; .5; .37; .037; .03; .012; .00082 INJECTION, SOLUTION INTRAVENOUS at 04:37

## 2024-03-03 RX ADMIN — PHENOBARBITAL SODIUM 260 MG: 130 INJECTION INTRAMUSCULAR; INTRAVENOUS at 10:39

## 2024-03-03 RX ADMIN — THIAMINE HYDROCHLORIDE: 100 INJECTION, SOLUTION INTRAMUSCULAR; INTRAVENOUS at 08:26

## 2024-03-03 RX ADMIN — CHLORHEXIDINE GLUCONATE 15 ML: 1.2 SOLUTION ORAL at 08:34

## 2024-03-03 RX ADMIN — SODIUM CHLORIDE, SODIUM GLUCONATE, SODIUM ACETATE, POTASSIUM CHLORIDE, MAGNESIUM CHLORIDE, SODIUM PHOSPHATE, DIBASIC, AND POTASSIUM PHOSPHATE 75 ML/HR: .53; .5; .37; .037; .03; .012; .00082 INJECTION, SOLUTION INTRAVENOUS at 21:40

## 2024-03-03 RX ADMIN — CEFTRIAXONE 1000 MG: 1 INJECTION, SOLUTION INTRAVENOUS at 14:55

## 2024-03-03 RX ADMIN — FENTANYL CITRATE 50 MCG: 50 INJECTION INTRAMUSCULAR; INTRAVENOUS at 19:12

## 2024-03-03 RX ADMIN — POTASSIUM CHLORIDE 20 MEQ: 14.9 INJECTION, SOLUTION INTRAVENOUS at 22:08

## 2024-03-03 RX ADMIN — METHYLPREDNISOLONE SODIUM SUCCINATE 40 MG: 40 INJECTION, POWDER, FOR SOLUTION INTRAMUSCULAR; INTRAVENOUS at 00:26

## 2024-03-03 RX ADMIN — SODIUM PHOSPHATE, MONOBASIC, MONOHYDRATE AND SODIUM PHOSPHATE, DIBASIC, ANHYDROUS 30 MMOL: 142; 276 INJECTION, SOLUTION INTRAVENOUS at 08:29

## 2024-03-03 RX ADMIN — FENTANYL CITRATE 50 MCG/HR: 50 INJECTION INTRAVENOUS at 14:17

## 2024-03-03 RX ADMIN — MIDAZOLAM HYDROCHLORIDE 2 MG: 2 INJECTION, SOLUTION INTRAMUSCULAR; INTRAVENOUS at 22:07

## 2024-03-03 RX ADMIN — FENTANYL CITRATE 50 MCG: 50 INJECTION INTRAMUSCULAR; INTRAVENOUS at 11:39

## 2024-03-03 RX ADMIN — PANTOPRAZOLE SODIUM 40 MG: 40 INJECTION, POWDER, FOR SOLUTION INTRAVENOUS at 22:08

## 2024-03-03 RX ADMIN — PROPOFOL 40 MCG/KG/MIN: 10 INJECTION, EMULSION INTRAVENOUS at 08:30

## 2024-03-03 RX ADMIN — MIDAZOLAM 2 MG: 1 INJECTION INTRAMUSCULAR; INTRAVENOUS at 22:07

## 2024-03-03 RX ADMIN — OCTREOTIDE ACETATE 25 MCG/HR: 500 INJECTION, SOLUTION INTRAVENOUS; SUBCUTANEOUS at 07:17

## 2024-03-03 RX ADMIN — PANTOPRAZOLE SODIUM 40 MG: 40 INJECTION, POWDER, FOR SOLUTION INTRAVENOUS at 08:23

## 2024-03-03 RX ADMIN — FENTANYL CITRATE 100 MCG/HR: 50 INJECTION INTRAVENOUS at 17:38

## 2024-03-03 RX ADMIN — FENTANYL CITRATE 50 MCG: 50 INJECTION INTRAMUSCULAR; INTRAVENOUS at 16:15

## 2024-03-03 NOTE — PROGRESS NOTES
Pt intubated, unable to obtain verbal diet hx at this time. Pt noted to have hx ETOH abuse, drinking vodka, wine and mouthwash at home with no food intake x 5 days per H&P. Chart review of weight hx: 1/26/23 121lb, 5/25/23 115lb, 1/10/24 117lb, 3/3/24 122lb. Pt noted to have received IV meds mixed in dextrose. Monitor -lytes given ETOH abuse for refeeding syndrome, replete as medically appropriate. No OGT/NGT placed at this time with recent esophageal varices banding.     If medically appropriate to initiate EN feeds, would recommend Jevity 1.2 initiated at 5-10 ml/h, advance by 5-10 ml/h q 4-8 h as medically appropriate/tolerated to goal rate of 35 ml/h with addition of prosource two packets per day. TF + prosource + current propofol rate to provide 1381 kcal, 76 g PRO, 678 ml free water. Water flush per CC.

## 2024-03-03 NOTE — ASSESSMENT & PLAN NOTE
Hx of alcoholic cirrhosis with varices  Follows with GI outpatient  Patient states she has been drinking heavily over the past several days. Typically drinks wine and vodka but recently ran out of alcohol. She has been drinking prescribed mouthwash that is 16% alcohol. Last drink just prior to arrival to ED  Ethanol 275 upon admission  [facility] protocol ordered  Seizure precautions  MELD 22  DF normal  GI following  Ammonia normal at 30

## 2024-03-03 NOTE — RESPIRATORY THERAPY NOTE
"RT Ventilator Management Note  Yvette Titus 53 y.o. female MRN: [id number]  Unit/Bed#: -01 Encounter: [id number]      Break-the-Glass       Encounters that might contain data have been deemed confidential and restricted.            Daily Screen    No data found from available encounters.           Physical Exam:   Assessment Type: (P) Assess only  General Appearance: (P) Drowsy  Respiratory Pattern: Assisted  Chest Assessment: Chest expansion symmetrical  Bilateral Breath Sounds: Clear      Respiratory Therapy Note.    Pt was placed on PS wean 6/6, fio2 40% while MD/NP  at bedside. Pt was able to lift head off bed but was unable to give \"thumbs-up\" when asked by MD. Pt unable to open eyes. Pt became agitated, wean was terminated and pt returned now to full vent support as documented. RT at bedside throughout. RT will continue to monitor pt. RN at bedside and aware.     "

## 2024-03-03 NOTE — ASSESSMENT & PLAN NOTE
History of gastritis and esophageal varices  Recently admitted at [facility] for variceal bleeding after a binge-drinking episode  Follows with GI outpatient and was planning to have EGD next week with variceal banding  Presented with bloody emesis that started this morning.  Patient states she had 2 episodes of large-volume emesis prior to arrival.  Episodes of dark brown emesis noted in ED  CT high-volume bleeding scan without evidence of active extravasation  GI consulted-Patient underwent EGD on 3/1 with banding of 7 varices   Hemoglobin stable    Plan:  Continue Protonix IV BID  Continue octreotide infusion on day 3/3  Continue with CTX on day 3/5  Trend hgb Q8 hours transfuse for Hgb < 7  Follow Pt-INR daily  ETOH cessation when appropriate  Patient will need repeat EGD in 1 month for potential more banding  GI following- appreciate recommendations

## 2024-03-03 NOTE — RESPIRATORY THERAPY NOTE
RT Ventilator Management Note  Yvette Titus 53 y.o. female MRN: [id number]  Unit/Bed#: -01 Encounter: [id number]      Break-the-Glass       Encounters that might contain data have been deemed confidential and restricted.            Daily Screen    No data found from available encounters.           Physical Exam:   Assessment Type: Assess only  General Appearance: Sedated  Respiratory Pattern: Assisted  Chest Assessment: Chest expansion symmetrical  Bilateral Breath Sounds: Clear    Respiratory Therapy Note.   As per NP PS wean was not attempted at this time. Pt remains sedation on vent. Pt suctioned for clear scant secretions. BS are clear and equal b/l. Pt does have a cuff leak, Notify NP.  RT will continue to monitor pt.

## 2024-03-03 NOTE — PROGRESS NOTES
"Progress Note - Yvette Titus 53 y.o. female MRN: [id number]    Unit/Bed#: -01 Encounter: [id number]    Assessment and Plan:    1) Hematemesis and acute blood loss anemia  2) Esophageal varices with hemorrhage  EGD 3/1 showed multiple medium and circumferential grade III varices (red nakul sign) in the lower third of the esophagus and GE junction. There was stigmata of recent hemorrhage, placed 7 bands successfully resulting in partial eradication. Hemoglobin fluctuating between 8-10 but she has not required blood transfusion. Hemoglobin currently 8.2. No signs of overt GI bleeding. Tachycardia resolved. BP within normal range.      Ventilator management per ICU  Keep NPO  Continue octreotide for an additional 24 hours  Continue IV PPI twice daily  Continue IV ceftriaxone for a total of 5 days  Follow H&H and transfuse to keep > 7  She will need repeat EGD in 1 month to reassess varices and possibly repeat banding     3) Decompensated alcoholic cirrhosis  4) Acute alcoholic intoxication/alcohol withdrawal   Cirrhosis decompensated in the setting of bleeding esophageal varices, coagulopathy, hepatic encephalopathy. Patient acknowledges binge drinking. Patient drinking morning of admission. MELD 3.0 is 16. DF < 32.  Trend LFTs  CIWA protocol  No indication for steroids with low discriminant function    Subjective:    Patient seen and examined at bedside. She remains intubated and agitated. No documentation of melena or hematochezia.     Objective:     Vitals: Blood pressure 138/73, pulse 84, temperature 98.7 °F (37.1 °C), temperature source Axillary, resp. rate 21, height 5' 4\" (1.626 m), weight 55.6 kg (122 lb 9.2 oz), SpO2 100%.,Body mass index is 21.04 kg/m².      Intake/Output Summary (Last 24 hours) at 3/3/2024 1148  Last data filed at 3/3/2024 1030  Gross per 24 hour   Intake 3254.07 ml   Output 3175 ml   Net 79.07 ml       Physical Exam:     General Appearance: Intubated, ill appearing, agitated  Lungs: " Clear to auscultation bilaterally  Heart: Regular rate and rhythm  Abdomen: Soft, non-tender, non-distended; bowel sounds normal  Extremities: No cyanosis, edema    Invasive Devices       Peripheral Intravenous Line  Duration             Peripheral IV 03/01/24 Right Antecubital 2 days    Peripheral IV 03/01/24 Distal;Left;Ventral (anterior) Forearm 1 day    Peripheral IV 03/01/24 Left;Proximal;Ventral (anterior) Forearm 1 day              Drain  Duration             Urethral Catheter Latex 18 Fr. <1 day              Airway  Duration             ETT  Cuffed;Oral;Hi-Lo 7 mm 1 day                    Lab Results:  Results from last 7 days   Lab Units 03/03/24  0753 03/03/24  0447   WBC Thousand/uL  --  7.48   HEMOGLOBIN g/dL 8.2* 8.9*   HEMATOCRIT %  --  27.7*   PLATELETS Thousands/uL  --  96*   NEUTROS PCT %  --  89*   LYMPHS PCT %  --  7*   MONOS PCT %  --  3*   EOS PCT %  --  0     Results from last 7 days   Lab Units 03/03/24  0753 03/03/24  0447   POTASSIUM mmol/L 4.2  --    CHLORIDE mmol/L 100  --    CO2 mmol/L 25  --    BUN mg/dL 7  --    CREATININE mg/dL 0.53*  --    CALCIUM mg/dL 8.5  --    ALK PHOS U/L  --  121*   ALT U/L  --  45   AST U/L  --  232*     Results from last 7 days   Lab Units 03/03/24  0447   INR  1.49*     Results from last 7 days   Lab Units 03/01/24  1146   LIPASE u/L 129*       Imaging Studies: I have personally reviewed pertinent imaging studies.    XR chest portable ICU    Result Date: 3/1/2024  Impression: ET tube tip terminates about 3 cm above the rosemary. Small nodular density adjacent to the left heart border, possibly vascular shadows, true nodule not excluded. Attention on follow-up recommended. Workstation performed: [id number]     EGD Banding    Result Date: 3/1/2024  Impression: Multiple medium and circumferential grade III varices (red nakul sign) in the lower third of the esophagus and GE junction; there was stigmata of recent hemorrhage; placed 7 bands successfully, resulting  in partial eradication Moderate edematous, erythematous, friable and petechial mucosa in the stomach The duodenum appeared normal. RECOMMENDATION: Continue n.p.o. IV PPI IV octreotide IV ceftriaxone H&H and transfuse as needed Trend INR Repeat EGD in about 1 month to reassess varices and possible repeat banding   Anupama Blank MD     CT high volume bleeding scan abdomen pelvis    Result Date: 3/1/2024  Impression: 1.  No CT evidence of active high-volume gastrointestinal hemorrhage. 2.  Cirrhosis with paraesophageal, gastric, and umbilical varices. Workstation performed: [id number]

## 2024-03-03 NOTE — ASSESSMENT & PLAN NOTE
Patient states that she has been feeling very depressed and wishes that she could just die. She states that she has been having suicidal thoughts for quite some time  Follows with [facility] and take Zoloft daily  Hold Zoloft   1:1 monitoring  Psych consult when medically appropriate

## 2024-03-03 NOTE — ASSESSMENT & PLAN NOTE
Returning to the ICU intubated following EGD  ETT in good position per CXR  VBG 7.451/30.9  Sedation: Propofol, fentanyl, patient was loaded with 260 mg IV Phenobarbital x2 for ETOH withdrawal. As well as PRN Fentanyl  Titrate to maintain RASS - 2  Current Vent settings: 16/320/40/6  Wean FiO2 and PEEP to maintain spO2 > 92%  Previous day patient was not extubated due to lack of cuff leak on exam. Given Solumedrol 40 mg IV Q 6 hours x 2 doses over the night  Assess for cuff leak later this morning. If cuff leak present can attempt SAT/SBT later this morning   Vent bundle order

## 2024-03-03 NOTE — ASSESSMENT & PLAN NOTE
SIRS: tachycardia, tachypnea  Unlikely infection. SIRS likely secondary to acute GIB  Blood cultures ordered NGTD x 24 hours  Lactic elevated at 4.8 on admission. Cleared in the ICU after fluid resusitation  Started on CTX in the setting of possible variceal bleeding  Remains hemodynamically stable     Plan:  Continue CTX for now  Follow-up culture data  Follow up repeat lactate   Trend fever curve and WBC count

## 2024-03-03 NOTE — UTILIZATION REVIEW
NOTIFICATION OF INPATIENT ADMISSION   AUTHORIZATION REQUEST   SERVICING FACILITY:   [place] [address]  Tax ID: [id number]  NPI: [id number] ATTENDING PROVIDER:  Attending Name and NPI#: Veda Carr Do [[id number]]  Address: [address] [place] [address]  Phone: [phone]   ADMISSION INFORMATION:  Place of Service: Inpatient [facility]  Place of Service Code: 21  Inpatient Admission Date/Time: 3/1/24  2:03 PM  Discharge Date/Time: No discharge date for patient encounter.  Admitting Diagnosis Code/Description:  Hematemesis [K92.0]  Vomiting blood [K92.0]  GI bleed [K92.2]  Suicidal thoughts [R45.851]  Alcohol use [Z78.9]     UTILIZATION REVIEW CONTACT:  Tamiko Morocho Utilization   Network Utilization Review Department  Phone: [phone]  Fax [phone]  Email: [email or web address]  Contact for approvals/pending authorizations, clinical reviews, and discharge.     PHYSICIAN ADVISORY SERVICES:  Medical Necessity Denial & Bimh-oq-Dsim Review  Phone: [phone]  Fax: [phone]  Email: [email or web address]     DISCHARGE SUPPORT TEAM:  For Patients Discharge Needs & Updates  Phone: [phone] opt. 2 Fax: [phone]  Email: [email or web address]

## 2024-03-03 NOTE — PLAN OF CARE
Problem: PAIN - ADULT  Goal: Verbalizes/displays adequate comfort level or baseline comfort level  Description: Interventions:  - Encourage patient to monitor pain and request assistance  - Assess pain using appropriate pain scale  - Administer analgesics based on type and severity of pain and evaluate response  - Implement non-pharmacological measures as appropriate and evaluate response  - Consider cultural and social influences on pain and pain management  - Notify physician/advanced practitioner if interventions unsuccessful or patient reports new pain  3/3/2024 0941 by Stephenie Camejo RN  Outcome: Progressing  3/3/2024 0940 by Stephenie Camejo RN  Outcome: Progressing     Problem: INFECTION - ADULT  Goal: Absence or prevention of progression during hospitalization  Description: INTERVENTIONS:  - Assess and monitor for signs and symptoms of infection  - Monitor lab/diagnostic results  - Monitor all insertion sites, i.e. indwelling lines, tubes, and drains  - Monitor endotracheal if appropriate and nasal secretions for changes in amount and color  - [place] appropriate cooling/warming therapies per order  - Administer medications as ordered  - Instruct and encourage patient and family to use good hand hygiene technique  - Identify and instruct in appropriate isolation precautions for identified infection/condition  3/3/2024 0941 by Stephenie Camejo RN  Outcome: Progressing  3/3/2024 0940 by Stephenie Camejo RN  Outcome: Progressing  Goal: Absence of fever/infection during neutropenic period  Description: INTERVENTIONS:  - Monitor WBC    3/3/2024 0941 by Stephenie Camejo RN  Outcome: Progressing  3/3/2024 0940 by Stephenie Camejo RN  Outcome: Progressing     Problem: SAFETY ADULT  Goal: Patient will remain free of falls  Description: INTERVENTIONS:  - Educate patient/family on patient safety including physical limitations  - Instruct patient to call for assistance with activity   - Consult OT/PT to assist with  strengthening/mobility   - Keep Call bell within reach  - Keep bed low and locked with side rails adjusted as appropriate  - Keep care items and personal belongings within reach  - Initiate and maintain comfort rounds  - Make Fall Risk Sign visible to staff    - Apply yellow socks and bracelet for high fall risk patients  - Consider moving patient to room near nurses station  3/3/2024 0941 by Stephenie Camejo RN  Outcome: Progressing  3/3/2024 0940 by Stephenie Camejo RN  Outcome: Progressing  Goal: Maintain or return to baseline ADL function  Description: INTERVENTIONS:  -  Assess patient's ability to carry out ADLs; assess patient's baseline for ADL function and identify physical deficits which impact ability to perform ADLs (bathing, care of mouth/teeth, toileting, grooming, dressing, etc.)  - Assess/evaluate cause of self-care deficits   - Assess range of motion  - Assess patient's mobility; develop plan if impaired  - Assess patient's need for assistive devices and provide as appropriate  - Encourage maximum independence but intervene and supervise when necessary  - Involve family in performance of ADLs  - Assess for home care needs following discharge   - Consider OT consult to assist with ADL evaluation and planning for discharge  - Provide patient education as appropriate  3/3/2024 0941 by Stephenie Camejo RN  Outcome: Progressing  3/3/2024 0940 by Stephenie Camejo RN  Outcome: Progressing  Goal: Maintains/Returns to pre admission functional level  Description: INTERVENTIONS:  - Perform AM-PAC 6 Click Basic Mobility/ Daily Activity assessment daily.  - Set and communicate daily mobility goal to care team and patient/family/caregiver.     - Out of bed for toileting  - Record patient progress and toleration of activity level   3/3/2024 0941 by Stephenie Camejo RN  Outcome: Progressing  3/3/2024 0940 by Stephenie Camejo RN  Outcome: Progressing     Problem: DISCHARGE PLANNING  Goal: Discharge to home or other facility with  appropriate resources  Description: INTERVENTIONS:  - Identify barriers to discharge w/patient and caregiver  - Arrange for needed discharge resources and transportation as appropriate  - Identify discharge learning needs (meds, wound care, etc.)  - Arrange for interpretive services to assist at discharge as needed  - Refer to Case Management Department for coordinating discharge planning if the patient needs post-hospital services based on physician/advanced practitioner order or complex needs related to functional status, cognitive ability, or social support system  3/3/2024 0941 by Stephenie Camejo RN  Outcome: Progressing  3/3/2024 0940 by Stephenie Camejo RN  Outcome: Progressing     Problem: Knowledge Deficit  Goal: Patient/family/caregiver demonstrates understanding of disease process, treatment plan, medications, and discharge instructions  Description: Complete learning assessment and assess knowledge base.  Interventions:  - Provide teaching at level of understanding  - Provide teaching via preferred learning methods  3/3/2024 0941 by Stephenie Camejo RN  Outcome: Progressing  3/3/2024 0940 by Stephenie Camejo RN  Outcome: Progressing     Problem: RESPIRATORY - ADULT  Goal: Achieves optimal ventilation and oxygenation  Description: INTERVENTIONS:  - Assess for changes in respiratory status  - Assess for changes in mentation and behavior  - Position to facilitate oxygenation and minimize respiratory effort  - Oxygen administered by appropriate delivery if ordered  - Initiate smoking cessation education as indicated  - Encourage broncho-pulmonary hygiene including cough, deep breathe, Incentive Spirometry  - Assess the need for suctioning and aspirate as needed  - Assess and instruct to report SOB or any respiratory difficulty  - Respiratory Therapy support as indicated  3/3/2024 0941 by Stephenie Camejo RN  Outcome: Progressing  3/3/2024 0940 by Stephenie Camejo RN  Outcome: Progressing     Problem: GASTROINTESTINAL -  ADULT  Goal: Minimal or absence of nausea and/or vomiting  Description: INTERVENTIONS:  - Administer IV fluids if ordered to ensure adequate hydration  - Maintain NPO status until nausea and vomiting are resolved  - Nasogastric tube if ordered  - Administer ordered antiemetic medications as needed  - Provide nonpharmacologic comfort measures as appropriate  - Advance diet as tolerated, if ordered  - Consider nutrition services referral to assist patient with adequate nutrition and appropriate food choices  3/3/2024 0941 by Stephenie Camejo RN  Outcome: Progressing  3/3/2024 0940 by Stephenie Camejo RN  Outcome: Progressing     Problem: HEMATOLOGIC - ADULT  Goal: Maintains hematologic stability  Description: INTERVENTIONS  - Assess for signs and symptoms of bleeding or hemorrhage  - Monitor labs  - Administer supportive blood products/factors as ordered and appropriate  3/3/2024 0941 by Stephenie Camejo RN  Outcome: Progressing  3/3/2024 0940 by Stephenie Camejo RN  Outcome: Progressing     Problem: Prexisting or High Potential for Compromised Skin Integrity  Goal: Skin integrity is maintained or improved  Description: INTERVENTIONS:  - Identify patients at risk for skin breakdown  - Assess and monitor skin integrity  - Assess and monitor nutrition and hydration status  - Monitor labs   - Assess for incontinence   - Turn and reposition patient  - Assist with mobility/ambulation  - Relieve pressure over bony prominences  - Avoid friction and shearing  - Provide appropriate hygiene as needed including keeping skin clean and dry  - Evaluate need for skin moisturizer/barrier cream  - Collaborate with interdisciplinary team   - Patient/family teaching  - Consider wound care consult   3/3/2024 0941 by Stephenie Camejo RN  Outcome: Progressing  3/3/2024 0940 by Stephenie Camejo RN  Outcome: Progressing     Problem: SAFETY,RESTRAINT: NV/NON-SELF DESTRUCTIVE BEHAVIOR  Goal: Remains free of harm/injury (restraint for non violent/non  self-detsructive behavior)  Description: INTERVENTIONS:  - Instruct patient/family regarding restraint use   - Assess and monitor physiologic and psychological status   - Provide interventions and comfort measures to meet assessed patient needs   - Identify and implement measures to help patient regain control  - Assess readiness for release of restraint   3/3/2024 0941 by Stephenie Camejo RN  Outcome: Progressing  3/3/2024 0940 by Stephenie Camejo RN  Outcome: Progressing  Goal: Returns to optimal restraint-free functioning  Description: INTERVENTIONS:  - Assess the patient's behavior and symptoms that indicate continued need for restraint  - Identify and implement measures to help patient regain control  - Assess readiness for release of restraint   3/3/2024 0941 by Stephenie Camejo RN  Outcome: Progressing  3/3/2024 0940 by Stephenie Camejo RN  Outcome: Progressing

## 2024-03-03 NOTE — ASSESSMENT & PLAN NOTE
Na 126 on admission repeat Na+ in the evening 131. Current Na+ 135  Likely secondary to poor solute intake as patient states she hasn't eaten anything in at least 5 days  Received 2 L NS in ED and 1 L in the ICU    Plan:  Trend bmp Q12 hours

## 2024-03-03 NOTE — PROGRESS NOTES
[facility]  Progress Note  Name: Yvette Titus I  MRN: [id number]  Unit/Bed#: -01 I Date of Admission: 3/1/2024   Date of Service: 3/3/2024 I Hospital Day: 2    Assessment/Plan   * GI bleed  Assessment & Plan  History of gastritis and esophageal varices  Recently admitted at [facility] for variceal bleeding after a binge-drinking episode  Follows with GI outpatient and was planning to have EGD next week with variceal banding  Presented with bloody emesis that started this morning.  Patient states she had 2 episodes of large-volume emesis prior to arrival.  Episodes of dark brown emesis noted in ED  CT high-volume bleeding scan without evidence of active extravasation  GI consulted-Patient underwent EGD on 3/1 with banding of 7 varices   Hemoglobin stable    Plan:  Continue Protonix IV BID  Continue octreotide infusion on day 3/3  Continue with CTX on day 3/5  Trend hgb Q8 hours transfuse for Hgb < 7  Follow Pt-INR daily  ETOH cessation when appropriate  Patient will need repeat EGD in 1 month for potential more banding  GI following- appreciate recommendations     Acute respiratory failure (HCC)  Assessment & Plan  Returning to the ICU intubated following EGD  ETT in good position per CXR  VBG 7.451/30.9  Sedation: Propofol, fentanyl, patient was loaded with 260 mg IV Phenobarbital x2 for ETOH withdrawal. As well as PRN Fentanyl  Titrate to maintain RASS - 2  Current Vent settings: 16/320/40/6  Wean FiO2 and PEEP to maintain spO2 > 92%  Previous day patient was not extubated due to lack of cuff leak on exam. Given Solumedrol 40 mg IV Q 6 hours x 2 doses over the night  Assess for cuff leak later this morning. If cuff leak present can attempt SAT/SBT later this morning   Vent bundle order     Alcoholic cirrhosis (HCC)  Assessment & Plan  Hx of alcoholic cirrhosis with varices  Follows with GI outpatient  Patient states she has been drinking heavily over the past several days. Typically  drinks wine and vodka but recently ran out of alcohol. She has been drinking prescribed mouthwash that is 16% alcohol. Last drink just prior to arrival to ED  Ethanol 275 upon admission  CIWA protocol ordered  Seizure precautions  MELD 22  DF normal  GI following  Ammonia normal at 30    SIRS (systemic inflammatory response syndrome) (HCC)  Assessment & Plan  SIRS: tachycardia, tachypnea  Unlikely infection. SIRS likely secondary to acute GIB  Blood cultures ordered NGTD x 24 hours  Lactic elevated at 4.8 on admission. Cleared in the ICU after fluid resusitation  Started on CTX in the setting of possible variceal bleeding  Remains hemodynamically stable     Plan:  Continue CTX for now  Follow-up culture data  Follow up repeat lactate   Trend fever curve and WBC count    Anemia  Assessment & Plan  Baseline anemia. Follows with Heme/Onc outpatient. Baseline between 9-10  Takes B12 and folic acid supplementation outpatient  H/H remains stable- has not required transfusion     Plan:  Continue thiamine supplementation  Hold oral B12 while NPO  Trend Hgb Q8 hrs   Transfuse for Hgb < 7    Hyponatremia  Assessment & Plan  Na 126 on admission repeat Na+ in the evening 131. Current Na+ 135  Likely secondary to poor solute intake as patient states she hasn't eaten anything in at least 5 days  Received 2 L NS in ED and 1 L in the ICU    Plan:  Trend bmp Q12 hours    Hypokalemia  Assessment & Plan  Replete to maintain K > 4    Hypomagnesemia  Assessment & Plan  Replete to maintain Mg > 2    Suicidal ideation  Assessment & Plan  Patient states that she has been feeling very depressed and wishes that she could just die. She states that she has been having suicidal thoughts for quite some time  Follows with [facility] and take Zoloft daily  Hold Zoloft   1:1 monitoring  Psych consult when medically appropriate    Long Q-T syndrome  Assessment & Plan  Qtc currently 510  Replace Mg   Trend EKG Q12  Avoid QT prolonging  agents    Transaminitis  Assessment & Plan  Likely secondary to alcoholic cirrhosis  DF normal  Trend cmp             Disposition: Critical care    ICU Core Measures     Vented Patient  VAP Bundle  VAP bundle ordered     A: Assess, Prevent, and Manage Pain Has pain been assessed? Yes  Need for changes to pain regimen? No   B: Both Spontaneous Awakening Trials (SATs) and Spontaneous Breathing Trials (SBTs) Plan to perform spontaneous awakening trial today? Yes   Plan to perform spontaneous breathing trial today? Yes   Obvious barriers to extubation? Yes   C: Choice of Sedation RASS Goal: -1 Drowsy or 0 Alert and Calm  Need for changes to sedation or analgesia regimen? No   D: Delirium CAM-ICU: Negative   E: Early Mobility  Plan for early mobility? Yes   F: Family Engagement Plan for family engagement today? Yes       Antibiotic Review: Awaiting culture results.     Review of Invasive Devices:    Cole Plan: Continue for accurate I/O monitoring for 48 hours        Prophylaxis:  VTE Contraindicated secondary to: GI Bleed   Stress Ulcer  covered bypantoprazole (PROTONIX) 40 mg tablet [[id number]] (Long-Term Med), pantoprazole (PROTONIX) injection 40 mg [[id number]]         Significant 24hr Events     24hr events: Patient presented 2 days ago for GI bleed secondary to esophageal varices.  Went to the OR for EGD with banding by GI.  Patient was left intubated to ensure band integrity as the patient was having retching preop.  Previous day patient was assessed to be taken off the mechanical ventilator.  However on evaluation, patient was noted not to have a cuff leak when the balloon was deflated on the ETT.  Questionable airway inflammation secondary to her agitation and moving around.  Overnight agitation wise patient remained stable requiring several PRNs but improved from previous night.  Patient was given Solu-Medrol 40 mg IV at midnight with a plan to give an additional dose at 0600.  Will reassess if patient has a  cuff leak at that time.  Patient has cuff leak we will proceed with SAT/SBT.  No other overnight events.     Subjective   Review of Systems   Unable to perform ROS: Intubated      Objective                            Vitals I/O      Most Recent Min/Max in 24hrs   Temp 98.2 °F (36.8 °C) Temp  Min: 98.1 °F (36.7 °C)  Max: 98.9 °F (37.2 °C)   Pulse 74 Pulse  Min: 74  Max: 115   Resp 18 Resp  Min: 15  Max: 27   BP 94/55 BP  Min: 94/55  Max: 164/79   O2 Sat 100 % SpO2  Min: 99 %  Max: 100 %      Intake/Output Summary (Last 24 hours) at 3/3/2024 0101  Last data filed at 3/3/2024 0021  Gross per 24 hour   Intake 3393.84 ml   Output 3925 ml   Net -531.16 ml       Diet NPO    Invasive Monitoring           Physical Exam   Physical Exam  Eyes:      General: Vision grossly intact. No scleral icterus.     Extraocular Movements: Extraocular movements intact.      Conjunctiva/sclera: Conjunctivae normal.      Pupils: Pupils are equal, round, and reactive to light.   Skin:     General: Skin is warm and dry.      Capillary Refill: Capillary refill takes less than 2 seconds.   HENT:      Head: Normocephalic and atraumatic.      Mouth/Throat:      Mouth: Mucous membranes are moist.   Cardiovascular:      Rate and Rhythm: Normal rate and regular rhythm.      Pulses: Normal pulses.      Heart sounds: Normal heart sounds.   Musculoskeletal:         General: Normal range of motion.      Right lower leg: No edema.      Left lower leg: No edema.   Abdominal: General: Bowel sounds are normal.      Palpations: Abdomen is soft.      Tenderness: There is no abdominal tenderness.   Constitutional:       General: She is not in acute distress.     Appearance: She is ill-appearing. She is not toxic-appearing.      Interventions: She is sedated, intubated and restrained.   Pulmonary:      Effort: Pulmonary effort is normal. She is intubated.      Breath sounds: Normal breath sounds. No wheezing, rhonchi or rales.   Neurological:      GCS: GCS eye  subscore is 3. GCS verbal subscore is 1. GCS motor subscore is 6.      Sensory: Sensation is intact.        Corneal reflex present, cough reflex and gag reflex intact.   Genitourinary/Anorectal:  Cole present.          Diagnostic Studies      EKG: NSR with prolonged Qtc   Imaging:  I have personally reviewed pertinent reports.   and I have personally reviewed pertinent films in PACS     Medications:  Scheduled PRN   cefTRIAXone, 1,000 mg, Q24H  chlorhexidine, 15 mL, Q12H RAY  folic acid 1 mg, thiamine (VITAMIN B1) 100 mg in sodium chloride 0.9 % 100 mL IV piggyback, , Daily  methylPREDNISolone sodium succinate, 40 mg, Q6H RAY  pantoprazole, 40 mg, Q12H RAY      fentanyl citrate (PF), 50 mcg, Q1H PRN       Continuous    fentaNYL, 100 mcg/hr, Last Rate: 100 mcg/hr (03/02/24 1651)  multi-electrolyte, 75 mL/hr, Last Rate: 75 mL/hr (03/02/24 1218)  octreotide, 25 mcg/hr, Last Rate: 25 mcg/hr (03/02/24 1024)  propofol, 5-50 mcg/kg/min, Last Rate: 50 mcg/kg/min (03/02/24 2048)         Labs:    CBC    Recent Labs     03/01/24  1146 03/01/24  1256 03/01/24  1703 03/02/24  0143 03/02/24  0940 03/02/24  1653 03/03/24  0051   WBC 11.29*  --   --  5.83  --   --   --    HGB 10.0*  --    < > 10.8*   < > 8.4* 8.1*   HCT 29.7*  --   --  32.6*  --   --   --     186  --  133*  --   --   --    BANDSPCT 1  --   --   --   --   --   --     < > = values in this interval not displayed.     BMP    Recent Labs     03/02/24  1229 03/02/24  1653   SODIUM 135 135   K 3.7 3.3*   CL 96 96   CO2 28 29   AGAP 11 10   BUN 7 7   CREATININE 0.58* 0.57*   CALCIUM 9.1 8.3*       Coags    Recent Labs     03/01/24  1202 03/01/24  1256 03/02/24  0142   INR 1.30* 1.30* 1.26*   PTT 39* 41*  40*  --         Additional Electrolytes  Recent Labs     03/01/24  1951 03/02/24  0142   MG 1.9 2.1   PHOS  --  2.8          Blood Gas    No recent results  Recent Labs     03/01/24  1703   PHVEN 7.451*   [id number] 30.9*   PO2VEN 83.7*   OPD4PMV 21.0*   BEVEN -2.2    [id number] 93.8*    LFTs  Recent Labs     03/02/24  0136 03/02/24  0142   ALT 50 50   * 222*   ALKPHOS 164* 162*   ALB 4.4 4.3   TBILI 2.40* 2.41*       Infectious  No recent results  Glucose  Recent Labs     03/02/24  0142 03/02/24  0832 03/02/24  1229 03/02/24  1653   GLUC 185* 165* 145* 152*               KAREN Hong

## 2024-03-04 PROBLEM — D69.6 THROMBOCYTOPENIA (HCC): Status: ACTIVE | Noted: 2024-03-04

## 2024-03-04 LAB
ALBUMIN SERPL BCP-MCNC: 2.9 G/DL (ref 3.5–5)
ALP SERPL-CCNC: 101 U/L (ref 34–104)
ALT SERPL W P-5'-P-CCNC: 40 U/L (ref 7–52)
ANION GAP SERPL CALCULATED.3IONS-SCNC: 8 MMOL/L
ANION GAP SERPL CALCULATED.3IONS-SCNC: 9 MMOL/L
ANISOCYTOSIS BLD QL SMEAR: PRESENT
AST SERPL W P-5'-P-CCNC: 170 U/L (ref 13–39)
BASOPHILS # BLD AUTO: 0.02 THOUSANDS/ÂΜL (ref 0–0.1)
BASOPHILS NFR BLD AUTO: 0 % (ref 0–1)
BILIRUB DIRECT SERPL-MCNC: 0.82 MG/DL (ref 0–0.2)
BILIRUB SERPL-MCNC: 2.15 MG/DL (ref 0.2–1)
BUN SERPL-MCNC: 5 MG/DL (ref 5–25)
BUN SERPL-MCNC: 7 MG/DL (ref 5–25)
CALCIUM SERPL-MCNC: 6.5 MG/DL (ref 8.4–10.2)
CALCIUM SERPL-MCNC: 8.5 MG/DL (ref 8.4–10.2)
CHLORIDE SERPL-SCNC: 104 MMOL/L (ref 96–108)
CHLORIDE SERPL-SCNC: 109 MMOL/L (ref 96–108)
CO2 SERPL-SCNC: 21 MMOL/L (ref 21–32)
CO2 SERPL-SCNC: 25 MMOL/L (ref 21–32)
CREAT SERPL-MCNC: 0.39 MG/DL (ref 0.6–1.3)
CREAT SERPL-MCNC: 0.55 MG/DL (ref 0.6–1.3)
EOSINOPHIL # BLD AUTO: 0.03 THOUSAND/ÂΜL (ref 0–0.61)
EOSINOPHIL NFR BLD AUTO: 0 % (ref 0–6)
ERYTHROCYTE [DISTWIDTH] IN BLOOD BY AUTOMATED COUNT: 15.8 % (ref 11.6–15.1)
GFR SERPL CREATININE-BSD FRML MDRD: 106 ML/MIN/1.73SQ M
GFR SERPL CREATININE-BSD FRML MDRD: 119 ML/MIN/1.73SQ M
GLUCOSE SERPL-MCNC: 105 MG/DL (ref 65–140)
GLUCOSE SERPL-MCNC: 107 MG/DL (ref 65–140)
GLUCOSE SERPL-MCNC: 109 MG/DL (ref 65–140)
GLUCOSE SERPL-MCNC: 116 MG/DL (ref 65–140)
GLUCOSE SERPL-MCNC: 117 MG/DL (ref 65–140)
GLUCOSE SERPL-MCNC: 118 MG/DL (ref 65–140)
GLUCOSE SERPL-MCNC: 136 MG/DL (ref 65–140)
HCT VFR BLD AUTO: 27.9 % (ref 34.8–46.1)
HGB BLD-MCNC: 9.1 G/DL (ref 11.5–15.4)
IMM GRANULOCYTES # BLD AUTO: 0.02 THOUSAND/UL (ref 0–0.2)
IMM GRANULOCYTES NFR BLD AUTO: 0 % (ref 0–2)
INR PPP: 1.52 (ref 0.84–1.19)
LYMPHOCYTES # BLD AUTO: 1.6 THOUSANDS/ÂΜL (ref 0.6–4.47)
LYMPHOCYTES NFR BLD AUTO: 22 % (ref 14–44)
MAGNESIUM SERPL-MCNC: 1.6 MG/DL (ref 1.9–2.7)
MAGNESIUM SERPL-MCNC: 1.9 MG/DL (ref 1.9–2.7)
MCH RBC QN AUTO: 32.6 PG (ref 26.8–34.3)
MCHC RBC AUTO-ENTMCNC: 32.6 G/DL (ref 31.4–37.4)
MCV RBC AUTO: 100 FL (ref 82–98)
MONOCYTES # BLD AUTO: 0.37 THOUSAND/ÂΜL (ref 0.17–1.22)
MONOCYTES NFR BLD AUTO: 5 % (ref 4–12)
NEUTROPHILS # BLD AUTO: 5.26 THOUSANDS/ÂΜL (ref 1.85–7.62)
NEUTS SEG NFR BLD AUTO: 73 % (ref 43–75)
NRBC BLD AUTO-RTO: 0 /100 WBCS
PHOSPHATE SERPL-MCNC: 1.8 MG/DL (ref 2.7–4.5)
PHOSPHATE SERPL-MCNC: 5 MG/DL (ref 2.7–4.5)
PLATELET # BLD AUTO: 70 THOUSANDS/UL (ref 149–390)
PLATELET BLD QL SMEAR: ABNORMAL
PMV BLD AUTO: 9.5 FL (ref 8.9–12.7)
POTASSIUM SERPL-SCNC: 3.3 MMOL/L (ref 3.5–5.3)
POTASSIUM SERPL-SCNC: 3.5 MMOL/L (ref 3.5–5.3)
PROT SERPL-MCNC: 6.8 G/DL (ref 6.4–8.4)
PROTHROMBIN TIME: 18.7 SECONDS (ref 11.6–14.5)
RBC # BLD AUTO: 2.79 MILLION/UL (ref 3.81–5.12)
RBC MORPH BLD: PRESENT
SODIUM SERPL-SCNC: 138 MMOL/L (ref 135–147)
SODIUM SERPL-SCNC: 138 MMOL/L (ref 135–147)
TRIGL SERPL-MCNC: 205 MG/DL
WBC # BLD AUTO: 7.3 THOUSAND/UL (ref 4.31–10.16)

## 2024-03-04 PROCEDURE — 84100 ASSAY OF PHOSPHORUS: CPT | Performed by: PHYSICIAN ASSISTANT

## 2024-03-04 PROCEDURE — 94760 N-INVAS EAR/PLS OXIMETRY 1: CPT

## 2024-03-04 PROCEDURE — 84100 ASSAY OF PHOSPHORUS: CPT

## 2024-03-04 PROCEDURE — C9113 INJ PANTOPRAZOLE SODIUM, VIA: HCPCS | Performed by: PHYSICIAN ASSISTANT

## 2024-03-04 PROCEDURE — 84478 ASSAY OF TRIGLYCERIDES: CPT | Performed by: PHYSICIAN ASSISTANT

## 2024-03-04 PROCEDURE — 83735 ASSAY OF MAGNESIUM: CPT

## 2024-03-04 PROCEDURE — 83735 ASSAY OF MAGNESIUM: CPT | Performed by: PHYSICIAN ASSISTANT

## 2024-03-04 PROCEDURE — 93005 ELECTROCARDIOGRAM TRACING: CPT

## 2024-03-04 PROCEDURE — 94150 VITAL CAPACITY TEST: CPT

## 2024-03-04 PROCEDURE — 99291 CRITICAL CARE FIRST HOUR: CPT | Performed by: INTERNAL MEDICINE

## 2024-03-04 PROCEDURE — 82948 REAGENT STRIP/BLOOD GLUCOSE: CPT

## 2024-03-04 PROCEDURE — 80048 BASIC METABOLIC PNL TOTAL CA: CPT | Performed by: PHYSICIAN ASSISTANT

## 2024-03-04 PROCEDURE — 85610 PROTHROMBIN TIME: CPT

## 2024-03-04 PROCEDURE — 94003 VENT MGMT INPAT SUBQ DAY: CPT

## 2024-03-04 PROCEDURE — 80048 BASIC METABOLIC PNL TOTAL CA: CPT

## 2024-03-04 PROCEDURE — 80076 HEPATIC FUNCTION PANEL: CPT

## 2024-03-04 PROCEDURE — 85025 COMPLETE CBC W/AUTO DIFF WBC: CPT

## 2024-03-04 RX ORDER — ENOXAPARIN SODIUM 100 MG/ML
40 INJECTION SUBCUTANEOUS
Status: DISCONTINUED | OUTPATIENT
Start: 2024-03-04 | End: 2024-03-07 | Stop reason: HOSPADM

## 2024-03-04 RX ORDER — POTASSIUM CHLORIDE 14.9 MG/ML
20 INJECTION INTRAVENOUS ONCE
Qty: 100 ML | Refills: 0 | Status: COMPLETED | OUTPATIENT
Start: 2024-03-04 | End: 2024-03-04

## 2024-03-04 RX ORDER — MAGNESIUM SULFATE HEPTAHYDRATE 40 MG/ML
2 INJECTION, SOLUTION INTRAVENOUS ONCE
Status: COMPLETED | OUTPATIENT
Start: 2024-03-04 | End: 2024-03-04

## 2024-03-04 RX ORDER — CALCIUM GLUCONATE 20 MG/ML
2 INJECTION, SOLUTION INTRAVENOUS ONCE
Status: COMPLETED | OUTPATIENT
Start: 2024-03-04 | End: 2024-03-04

## 2024-03-04 RX ORDER — POTASSIUM CHLORIDE 14.9 MG/ML
20 INJECTION INTRAVENOUS ONCE
Qty: 100 ML | Refills: 0 | Status: DISCONTINUED | OUTPATIENT
Start: 2024-03-04 | End: 2024-03-04

## 2024-03-04 RX ORDER — POTASSIUM CHLORIDE 20 MEQ/1
20 TABLET, EXTENDED RELEASE ORAL
Status: DISCONTINUED | OUTPATIENT
Start: 2024-03-05 | End: 2024-03-07 | Stop reason: HOSPADM

## 2024-03-04 RX ORDER — DEXMEDETOMIDINE HYDROCHLORIDE 4 UG/ML
.1-.7 INJECTION, SOLUTION INTRAVENOUS
Status: DISCONTINUED | OUTPATIENT
Start: 2024-03-04 | End: 2024-03-04

## 2024-03-04 RX ORDER — POTASSIUM CHLORIDE 14.9 MG/ML
20 INJECTION INTRAVENOUS ONCE
Status: COMPLETED | OUTPATIENT
Start: 2024-03-04 | End: 2024-03-04

## 2024-03-04 RX ADMIN — THIAMINE HYDROCHLORIDE 500 MG: 100 INJECTION, SOLUTION INTRAMUSCULAR; INTRAVENOUS at 20:39

## 2024-03-04 RX ADMIN — CHLORHEXIDINE GLUCONATE 15 ML: 1.2 SOLUTION ORAL at 20:38

## 2024-03-04 RX ADMIN — ENOXAPARIN SODIUM 40 MG: 100 INJECTION SUBCUTANEOUS at 09:44

## 2024-03-04 RX ADMIN — POTASSIUM CHLORIDE 20 MEQ: 14.9 INJECTION, SOLUTION INTRAVENOUS at 09:31

## 2024-03-04 RX ADMIN — FENTANYL CITRATE 150 MCG/HR: 50 INJECTION INTRAVENOUS at 00:49

## 2024-03-04 RX ADMIN — PANTOPRAZOLE SODIUM 40 MG: 40 INJECTION, POWDER, FOR SOLUTION INTRAVENOUS at 09:44

## 2024-03-04 RX ADMIN — FENTANYL CITRATE 150 MCG/HR: 50 INJECTION INTRAVENOUS at 07:46

## 2024-03-04 RX ADMIN — CALCIUM GLUCONATE 2 G: 20 INJECTION, SOLUTION INTRAVENOUS at 19:28

## 2024-03-04 RX ADMIN — OCTREOTIDE ACETATE 25 MCG/HR: 500 INJECTION, SOLUTION INTRAVENOUS; SUBCUTANEOUS at 03:14

## 2024-03-04 RX ADMIN — DEXMEDETOMIDINE HYDROCHLORIDE 0.4 MCG/KG/HR: 4 INJECTION, SOLUTION INTRAVENOUS at 09:26

## 2024-03-04 RX ADMIN — CHLORHEXIDINE GLUCONATE 15 ML: 1.2 SOLUTION ORAL at 09:44

## 2024-03-04 RX ADMIN — CEFTRIAXONE 1000 MG: 1 INJECTION, SOLUTION INTRAVENOUS at 17:27

## 2024-03-04 RX ADMIN — FENTANYL CITRATE 50 MCG: 50 INJECTION INTRAMUSCULAR; INTRAVENOUS at 05:41

## 2024-03-04 RX ADMIN — PROPOFOL 30 MCG/KG/MIN: 10 INJECTION, EMULSION INTRAVENOUS at 02:14

## 2024-03-04 RX ADMIN — POTASSIUM CHLORIDE 20 MEQ: 14.9 INJECTION, SOLUTION INTRAVENOUS at 00:14

## 2024-03-04 RX ADMIN — POTASSIUM PHOSPHATE, MONOBASIC POTASSIUM PHOSPHATE, DIBASIC 30 MMOL: 224; 236 INJECTION, SOLUTION, CONCENTRATE INTRAVENOUS at 06:25

## 2024-03-04 RX ADMIN — SODIUM PHOSPHATE, MONOBASIC, MONOHYDRATE AND SODIUM PHOSPHATE, DIBASIC, ANHYDROUS 30 MMOL: 142; 276 INJECTION, SOLUTION INTRAVENOUS at 11:19

## 2024-03-04 RX ADMIN — THIAMINE HYDROCHLORIDE 500 MG: 100 INJECTION, SOLUTION INTRAMUSCULAR; INTRAVENOUS at 17:28

## 2024-03-04 RX ADMIN — MAGNESIUM SULFATE HEPTAHYDRATE 2 G: 2 INJECTION, SOLUTION INTRAVENOUS at 09:31

## 2024-03-04 RX ADMIN — THIAMINE HYDROCHLORIDE 500 MG: 100 INJECTION, SOLUTION INTRAMUSCULAR; INTRAVENOUS at 10:59

## 2024-03-04 RX ADMIN — PANTOPRAZOLE SODIUM 40 MG: 40 INJECTION, POWDER, FOR SOLUTION INTRAVENOUS at 20:39

## 2024-03-04 RX ADMIN — PROPOFOL 40 MCG/KG/MIN: 10 INJECTION, EMULSION INTRAVENOUS at 07:46

## 2024-03-04 RX ADMIN — MAGNESIUM SULFATE HEPTAHYDRATE 2 G: 2 INJECTION, SOLUTION INTRAVENOUS at 06:19

## 2024-03-04 RX ADMIN — MAGNESIUM SULFATE HEPTAHYDRATE 2 G: 2 INJECTION, SOLUTION INTRAVENOUS at 19:26

## 2024-03-04 RX ADMIN — POTASSIUM CHLORIDE 20 MEQ: 14.9 INJECTION, SOLUTION INTRAVENOUS at 19:28

## 2024-03-04 RX ADMIN — FOLIC ACID 1 MG: 5 INJECTION, SOLUTION INTRAMUSCULAR; INTRAVENOUS; SUBCUTANEOUS at 09:45

## 2024-03-04 RX ADMIN — POTASSIUM CHLORIDE 20 MEQ: 14.9 INJECTION, SOLUTION INTRAVENOUS at 06:25

## 2024-03-04 RX ADMIN — POTASSIUM CHLORIDE 20 MEQ: 14.9 INJECTION, SOLUTION INTRAVENOUS at 02:14

## 2024-03-04 NOTE — ASSESSMENT & PLAN NOTE
Baseline anemia. Follows with Heme/Onc outpatient. Baseline between 9-10  Takes B12 and folic acid supplementation outpatient  H/H remains stable- has not required transfusion     Plan:  Continue thiamine supplementation  Hold oral B12 while NPO  Trend Hgb Q12 hrs   Transfuse for Hgb < 7

## 2024-03-04 NOTE — ASSESSMENT & PLAN NOTE
Patient altered and agitated since her EGD on day of admission  Very difficult time properly sedating patient to maintain vent synchrony   History of heavy use. Given 260 mg IV phenobarbital x 3 doses for potential withdrawal  Unsuccessful SAT the previous day- unable to follow commands and became agitated and was thrashing in bed after  Over night patient acutely agitated and thrashing in bed- required restarting of Propofol, and receiving 2 mg IV Versed    Plan:  Continue CIWA protocol  Patients altered mentation likely in the setting of sedation- may need abrupt cessation of sedation and then immediate SBT to extubation   If not improving may require head CT  Continue with Fentanyl at 150 mcg/hr. Continue with propofol titrate to RASS 0 to -1.   Avoid Precedex- had prolonged Qtc when placed on this medication previously.

## 2024-03-04 NOTE — ASSESSMENT & PLAN NOTE
Hx of alcoholic cirrhosis with varices  Follows with GI outpatient  Patient states she has been drinking heavily over the past several days. Typically drinks wine and vodka but recently ran out of alcohol. She has been drinking prescribed mouthwash that is 16% alcohol. Last drink just prior to arrival to ED  Ethanol 275 upon admission  [facility] protocol ordered  Seizure precautions  MELD 22  DF normal  GI following  Ammonia normal at 30

## 2024-03-04 NOTE — PROGRESS NOTES
"Progress note - Gastroenterology   Yvette Titus 53 y.o. female MRN: [id number]  Unit/Bed#: -01 Encounter: [id number]    ASSESSMENT and PLAN    1) Hematemesis and acute blood loss anemia  2) Esophageal varices with hemorrhage  EGD 3/1 showed multiple medium and circumferential grade III varices (red nakul sign) in the lower third of the esophagus and GE junction. There was stigmata of recent hemorrhage, placed 7 bands successfully resulting in partial eradication. Hemoglobin fluctuating between 8-10 but she has not required blood transfusion. Hemoglobin currently 9.1. No signs of overt GI bleeding. Tachycardia resolved. BP within normal range.      Ventilator management per ICU  Keep NPO  Continue octreotide 3-5 days.  Continue IV PPI twice daily  Continue IV ceftriaxone for a total of 5 days  Follow H&H and transfuse to keep > 7  She will need repeat EGD in 1 month to reassess varices and possibly repeat banding     3) Decompensated alcoholic cirrhosis  4) Acute alcoholic intoxication/alcohol withdrawal   Cirrhosis decompensated in the setting of bleeding esophageal varices, coagulopathy, hepatic encephalopathy. Patient acknowledges binge drinking. Patient drinking morning of admission. MELD 3.0 is 16 with a.m. labs today. DF < 32.    Trend LFTs  CIWA protocol  No indication for steroids with low discriminant function    Chief Complaint   Patient presents with    Vomiting Blood     Pt states that within the last hour \"I threw up blood. I'm trying to get over my alcohol withdraw and started throwing up blood. I have esophageal varices\" Last drink 2 hours ago.        SUBJECTIVE/HPI   Patient remains mechanically ventilated with sedation.  Spoke with patient's nurse and ICU CRNP, considering attempt to extubate patient today, ?discontinue tube trying and antibiotics.    /64   Pulse 70   Temp 98.3 °F (36.8 °C) (Axillary)   Resp 19   Ht 5' 4.02\" (1.626 m)   Wt 56.3 kg (124 lb 1.9 oz)   SpO2 100%   BMI " "21.29 kg/m²     PHYSICALEXAM  General appearance: Mechanical ventilation, sedation  Eyes: PERLLA, EOMI, no icterus   Head: Normocephalic, without obvious abnormality, atraumatic  Lungs: Mechanical ventilation, sedation  Heart: regular rate and rhythm, S1, S2 normal, no murmur, click, rub or gallop  Abdomen: soft, bowel sounds normal; no masses,  no organomegaly  Extremities: extremities normal, atraumatic, no cyanosis or edema  Neurologic: Mechanical ventilation, sedation    Lab Results   Component Value Date    GLUCOSE 90 10/01/2015    CALCIUM 8.5 03/04/2024     09/30/2015    K 3.3 (L) 03/04/2024    CO2 25 03/04/2024     03/04/2024    BUN 7 03/04/2024    CREATININE 0.55 (L) 03/04/2024     Lab Results   Component Value Date    WBC 7.30 03/04/2024    HGB 9.1 (L) 03/04/2024    HCT 27.9 (L) 03/04/2024     (H) 03/04/2024    PLT 70 (L) 03/04/2024     Lab Results   Component Value Date    ALT 40 03/04/2024     (H) 03/04/2024    ALKPHOS 101 03/04/2024    BILITOT 0.53 09/30/2015     No results found for: \"AMYLASE\"  Lab Results   Component Value Date    LIPASE 129 (H) 03/01/2024     Lab Results   Component Value Date    IRON 71 10/21/2023    TIBC 206 (L) 10/21/2023    FERRITIN 717 (H) 10/21/2023     Lab Results   Component Value Date    INR 1.52 (H) 03/04/2024     "

## 2024-03-04 NOTE — PROGRESS NOTES
Patient evaluated on  Precedex 0.6, off propofol.  Still lethargic, taking shallow breaths, intermittently following commands.  When she would wake up, she would be agitated.    At that time stopped Precedex.  We continued to monitor her respiratory status closely.  Once tidal volumes were consistently greater than 300.  Extubated to nasal cannula oxygen.    Will continue to monitor in the ICU overnight.  GI agreeable to clear liquid diet once taking p.o.      Additional critical care time excluding procedures and family updates 15 minutes

## 2024-03-04 NOTE — PROGRESS NOTES
[facility]  Progress Note  Name: Yvette Titus I  MRN: [id number]  Unit/Bed#: -01 I Date of Admission: 3/1/2024   Date of Service: 3/4/2024 I Hospital Day: 3    Assessment/Plan   * GI bleed  Assessment & Plan  History of gastritis and esophageal varices  Recently admitted at [facility] for variceal bleeding after a binge-drinking episode  Follows with GI outpatient and was planning to have EGD next week with variceal banding  Presented with bloody emesis that started this morning.  Patient states she had 2 episodes of large-volume emesis prior to arrival.  Episodes of dark brown emesis noted in ED  CT high-volume bleeding scan without evidence of active extravasation  GI consulted-Patient underwent EGD on 3/1 with banding of 7 varices   Hemoglobin stable    Plan:  Continue Protonix IV BID  Continue octreotide infusion continue per GI recommendations  Continue with CTX on day 3/5  Trend hgb Q8 hours transfuse for Hgb < 7  Follow Pt-INR daily  ETOH cessation when appropriate  Patient will need repeat EGD in 1 month for potential more banding  GI following- appreciate recommendations     Acute respiratory failure (HCC)  Assessment & Plan  Returning to the ICU intubated following EGD  ETT in good position per CXR  VBG 7.451/30.9  Sedation: Propofol, fentanyl, patient was loaded with 260 mg IV Phenobarbital x2 for ETOH withdrawal. As well as PRN Fentanyl  Titrate to maintain RASS - 2  Current Vent settings: 16/320/40/6  Wean FiO2 and PEEP to maintain spO2 > 92%  3/2 patient was not extubated due to lack of cuff leak on exam. Given Solumedrol 40 mg IV Q 6 hours x 2 doses now with cuff leak. Unsuccessful SAT on 3/3- patient not able to follow commands  Continue with Fentanyl gtts. Increased to 150 mcg/hr over the night due to agitation. Patient back on propofol- Titrate to RASS goal 0 to - 1  Patient required PRN Versed for agitation over the night x 2  Vent bundle order     Alcoholic cirrhosis  (HCC)  Assessment & Plan  Hx of alcoholic cirrhosis with varices  Follows with GI outpatient  Patient states she has been drinking heavily over the past several days. Typically drinks wine and vodka but recently ran out of alcohol. She has been drinking prescribed mouthwash that is 16% alcohol. Last drink just prior to arrival to ED  Ethanol 275 upon admission  CIWA protocol ordered  Seizure precautions  MELD 22  DF normal  GI following  Ammonia normal at 30    SIRS (systemic inflammatory response syndrome) (HCC)  Assessment & Plan  SIRS: tachycardia, tachypnea  Unlikely infection. SIRS likely secondary to acute GIB  Blood cultures ordered NGTD x 24 hours  Lactic elevated at 4.8 on admission. Cleared in the ICU after fluid resusitation  Started on CTX in the setting of possible variceal bleeding  Remains hemodynamically stable     Plan:  Continue CTX for now  Follow-up culture data  Follow up repeat lactate   Trend fever curve and WBC count    Anemia  Assessment & Plan  Baseline anemia. Follows with Heme/Onc outpatient. Baseline between 9-10  Takes B12 and folic acid supplementation outpatient  H/H remains stable- has not required transfusion     Plan:  Continue thiamine supplementation  Hold oral B12 while NPO  Trend Hgb Q12 hrs   Transfuse for Hgb < 7    Hyponatremia  Assessment & Plan  Na 126 on admission repeat Na+ in the evening 131. Current Na+ 135  Likely secondary to poor solute intake as patient states she hasn't eaten anything in at least 5 days  Received 2 L NS in ED and 1 L in the ICU    Plan:  Daily BMP    Hypokalemia  Assessment & Plan  Replete to maintain K > 4    Hypomagnesemia  Assessment & Plan  Replete to maintain Mg > 2    Suicidal ideation  Assessment & Plan  Patient states that she has been feeling very depressed and wishes that she could just die. She states that she has been having suicidal thoughts for quite some time  Follows with [facility] and take Zoloft daily  Hold Zoloft   1:1  monitoring  Psych consult when medically appropriate    Acute metabolic encephalopathy  Assessment & Plan  Patient altered and agitated since her EGD on day of admission  Very difficult time properly sedating patient to maintain vent synchrony   History of heavy use. Given 260 mg IV phenobarbital x 3 doses for potential withdrawal  Unsuccessful SAT the previous day- unable to follow commands and became agitated and was thrashing in bed after  Over night patient acutely agitated and thrashing in bed- required restarting of Propofol, and receiving 2 mg IV Versed    Plan:  Continue CIWA protocol  Patients altered mentation likely in the setting of sedation- may need abrupt cessation of sedation and then immediate SBT to extubation   If not improving may require head CT  Continue with Fentanyl at 150 mcg/hr. Continue with propofol titrate to RASS 0 to -1.   Avoid Precedex- had prolonged Qtc when placed on this medication previously.     Long Q-T syndrome  Assessment & Plan  Qtc currently 510  Replace Mg   Trend EKG Q12  Avoid QT prolonging agents    Transaminitis  Assessment & Plan  Likely secondary to alcoholic cirrhosis  DF normal  Trend cmp             Disposition: Critical care    ICU Core Measures     Vented Patient  VAP Bundle  VAP bundle ordered     A: Assess, Prevent, and Manage Pain Has pain been assessed? Yes  Need for changes to pain regimen? No   B: Both Spontaneous Awakening Trials (SATs) and Spontaneous Breathing Trials (SBTs) Plan to perform spontaneous awakening trial today? Yes   Plan to perform spontaneous breathing trial today? Yes   Obvious barriers to extubation? No   C: Choice of Sedation RASS Goal: -2 Light Sedation  Need for changes to sedation or analgesia regimen? Yes   D: Delirium CAM-ICU: Positive   E: Early Mobility  Plan for early mobility? Yes   F: Family Engagement Plan for family engagement today? Yes       Antibiotic Review: Awaiting culture results.     Review of Invasive  Devices:    Cole Plan: Continue for accurate I/O monitoring for 48 hours        Prophylaxis:  VTE Contraindicated secondary to: GI Bleed   Stress Ulcer  covered bypantoprazole (PROTONIX) 40 mg tablet [[id number]] (Long-Term Med), pantoprazole (PROTONIX) injection 40 mg [[id number]]         Significant 24hr Events     24hr events: Patient was status post 2 doses of IV Solu-Medrol over the previous day.  Patient now with cuff leak for ETT.  Patient assessed readiness for extubation on the previous day.  Patient failed SAT unable to follow directions with sedation holiday, patient became more agitated.  Patient was given PRNs and sedation de-escalated to 100 mics of fentanyl per hour.  Overnight patient acutely agitated thrashing in bed becoming desynchronous with vent with respiratory rates in the 40s and tachycardic in the 160s.  Patient required increasing fentanyl drip, and reinitiation of propofol drip, and multiple push doses of IV Versed to get sedation under control.  After patient's sedation controlled patient more comfortable on the vent, remained hemodynamically stable.  No other acute overnight events.     Subjective   Review of Systems   Unable to perform ROS: Intubated      Objective                            Vitals I/O      Most Recent Min/Max in 24hrs   Temp 98.7 °F (37.1 °C) Temp  Min: 97.8 °F (36.6 °C)  Max: 99.2 °F (37.3 °C)   Pulse 74 Pulse  Min: 57  Max: 147   Resp 22 Resp  Min: 16  Max: 27   /70 BP  Min: 104/55  Max: 161/84   O2 Sat 100 % SpO2  Min: 99 %  Max: 100 %      Intake/Output Summary (Last 24 hours) at 3/4/2024 0059  Last data filed at 3/4/2024 0000  Gross per 24 hour   Intake 3268.53 ml   Output 3025 ml   Net 243.53 ml       Diet NPO    Invasive Monitoring           Physical Exam   Physical Exam  Eyes:      General: Vision grossly intact. No scleral icterus.     Extraocular Movements: Extraocular movements intact.      Conjunctiva/sclera: Conjunctivae normal.      Pupils: Pupils  are equal, round, and reactive to light.   Skin:     General: Skin is warm and dry.      Capillary Refill: Capillary refill takes less than 2 seconds.   HENT:      Head: Normocephalic and atraumatic.      Mouth/Throat:      Mouth: Mucous membranes are moist.   Cardiovascular:      Rate and Rhythm: Normal rate and regular rhythm.      Pulses: Normal pulses.      Heart sounds: Normal heart sounds.   Musculoskeletal:         General: Normal range of motion.      Right lower leg: No edema.      Left lower leg: No edema.   Abdominal: General: Bowel sounds are normal.      Palpations: Abdomen is soft.      Tenderness: There is no abdominal tenderness.   Constitutional:       General: She is not in acute distress.     Appearance: She is ill-appearing.      Interventions: She is sedated, intubated and restrained.   Pulmonary:      Effort: Pulmonary effort is normal. No respiratory distress. She is intubated.      Breath sounds: Normal breath sounds. No wheezing, rhonchi or rales.   Neurological:      Mental Status: She is lethargic.      GCS: GCS eye subscore is 3. GCS verbal subscore is 1. GCS motor subscore is 6.        Corneal reflex present, cough reflex and gag reflex intact.      Comments: Moves spontaneously, does not follow commands    Genitourinary/Anorectal:  Cole present.          Diagnostic Studies      EKG: NSR  Imaging:  I have personally reviewed pertinent reports.   and I have personally reviewed pertinent films in PACS     Medications:  Scheduled PRN   cefTRIAXone, 1,000 mg, Q24H  chlorhexidine, 15 mL, Q12H RAY  folic acid 1 mg in sodium chloride 0.9 % 50 mL IVPB, 1 mg, Daily  pantoprazole, 40 mg, Q12H RAY  potassium chloride, 20 mEq, Once   Followed by  potassium chloride, 20 mEq, Once  thiamine, 500 mg, TID   Followed by  [START ON 3/6/2024] thiamine, 250 mg, Daily      fentanyl citrate (PF), 50 mcg, Q1H PRN       Continuous    fentaNYL, 150 mcg/hr, Last Rate: 150 mcg/hr (03/04/24  0049)  multi-electrolyte, 75 mL/hr, Last Rate: 75 mL/hr (03/03/24 2140)  octreotide, 25 mcg/hr, Last Rate: 25 mcg/hr (03/03/24 0717)  propofol, 5-50 mcg/kg/min, Last Rate: 30 mcg/kg/min (03/03/24 2201)         Labs:    CBC    Recent Labs     03/02/24  0143 03/02/24  0940 03/03/24  0447 03/03/24  0753 03/03/24  2101   WBC 5.83  --  7.48  --   --    HGB 10.8*   < > 8.9* 8.2* 9.2*   HCT 32.6*  --  27.7*  --   --    *  --  96*  --   --     < > = values in this interval not displayed.     BMP    Recent Labs     03/03/24  0753 03/03/24  2101   SODIUM 134* 137   K 4.2 3.1*    100   CO2 25 26   AGAP 9 11   BUN 7 7   CREATININE 0.53* 0.53*   CALCIUM 8.5 8.7       Coags    Recent Labs     03/02/24  0142 03/03/24  0447   INR 1.26* 1.49*        Additional Electrolytes  Recent Labs     03/02/24  0142 03/03/24  0447   MG 2.1 2.0   PHOS 2.8 1.9*          Blood Gas    No recent results  No recent results LFTs  Recent Labs     03/02/24  0142 03/03/24  0447   ALT 50 45   * 232*   ALKPHOS 162* 121*   ALB 4.3 3.4*   TBILI 2.41* 2.02*       Infectious  No recent results  Glucose  Recent Labs     03/02/24  1653 03/03/24  0032 03/03/24  0753 03/03/24  2101   GLUC 152* 113 158* 115               KAREN Hong

## 2024-03-04 NOTE — ANESTHESIA POSTPROCEDURE EVALUATION
Post-Op Assessment Note    CV Status:  Stable  Pain Score: 0    Pain management: adequate       Mental Status:  Sleepy   Hydration Status:  Stable   PONV Controlled:  None   Airway Patency:  Patent  Airway: intubated     Post Op Vitals Reviewed: Yes    No anethesia notable event occurred.    Staff: Anesthesiologist     Reason for prolonged intubation > 24 hours:  Airway watchReason for prolonged intubation > 48 hours:  Was intubated for airway protection - now extubated          BP      Temp      Pulse     Resp      SpO2

## 2024-03-04 NOTE — ASSESSMENT & PLAN NOTE
Returning to the ICU intubated following EGD  ETT in good position per CXR  VBG 7.451/30.9  Sedation: Propofol, fentanyl, patient was loaded with 260 mg IV Phenobarbital x2 for ETOH withdrawal. As well as PRN Fentanyl  Titrate to maintain RASS - 2  Current Vent settings: 16/320/40/6  Wean FiO2 and PEEP to maintain spO2 > 92%  3/2 patient was not extubated due to lack of cuff leak on exam. Given Solumedrol 40 mg IV Q 6 hours x 2 doses now with cuff leak. Unsuccessful SAT on 3/3- patient not able to follow commands  Continue with Fentanyl gtts. Increased to 150 mcg/hr over the night due to agitation. Patient back on propofol- Titrate to RASS goal 0 to - 1  Patient required PRN Versed for agitation over the night x 2  Vent bundle order

## 2024-03-04 NOTE — RESPIRATORY THERAPY NOTE
Patient extubated to nasal cannula as ordered. No distress or stridor present at this time. Pt tolerating well.

## 2024-03-04 NOTE — ASSESSMENT & PLAN NOTE
History of gastritis and esophageal varices  Recently admitted at [facility] for variceal bleeding after a binge-drinking episode  Follows with GI outpatient and was planning to have EGD next week with variceal banding  Presented with bloody emesis that started this morning.  Patient states she had 2 episodes of large-volume emesis prior to arrival.  Episodes of dark brown emesis noted in ED  CT high-volume bleeding scan without evidence of active extravasation  GI consulted-Patient underwent EGD on 3/1 with banding of 7 varices   Hemoglobin stable    Plan:  Continue Protonix IV BID  Continue octreotide infusion continue per GI recommendations  Continue with CTX on day 3/5  Trend hgb Q8 hours transfuse for Hgb < 7  Follow Pt-INR daily  ETOH cessation when appropriate  Patient will need repeat EGD in 1 month for potential more banding  GI following- appreciate recommendations

## 2024-03-04 NOTE — ASSESSMENT & PLAN NOTE
Patient states that she has been feeling very depressed and wishes that she could just die. She states that she has been having suicidal thoughts for quite some time  Follows with [facility] and take Zoloft daily  Hold Zoloft   1:1 monitoring  Psych consult when medically appropriate

## 2024-03-04 NOTE — ASSESSMENT & PLAN NOTE
Na 126 on admission repeat Na+ in the evening 131. Current Na+ 135  Likely secondary to poor solute intake as patient states she hasn't eaten anything in at least 5 days  Received 2 L NS in ED and 1 L in the ICU    Plan:  Daily BMP

## 2024-03-04 NOTE — PLAN OF CARE
Problem: PAIN - ADULT  Goal: Verbalizes/displays adequate comfort level or baseline comfort level  Description: Interventions:  - Encourage patient to monitor pain and request assistance  - Assess pain using appropriate pain scale  - Administer analgesics based on type and severity of pain and evaluate response  - Implement non-pharmacological measures as appropriate and evaluate response  - Consider cultural and social influences on pain and pain management  - Notify physician/advanced practitioner if interventions unsuccessful or patient reports new pain  Outcome: Progressing     Problem: INFECTION - ADULT  Goal: Absence or prevention of progression during hospitalization  Description: INTERVENTIONS:  - Assess and monitor for signs and symptoms of infection  - Monitor lab/diagnostic results  - Monitor all insertion sites, i.e. indwelling lines, tubes, and drains  - Monitor endotracheal if appropriate and nasal secretions for changes in amount and color  - [place] appropriate cooling/warming therapies per order  - Administer medications as ordered  - Instruct and encourage patient and family to use good hand hygiene technique  - Identify and instruct in appropriate isolation precautions for identified infection/condition  Outcome: Progressing  Goal: Absence of fever/infection during neutropenic period  Description: INTERVENTIONS:  - Monitor WBC    Outcome: Progressing     Problem: SAFETY ADULT  Goal: Patient will remain free of falls  Description: INTERVENTIONS:  - Educate patient/family on patient safety including physical limitations  - Instruct patient to call for assistance with activity   - Consult OT/PT to assist with strengthening/mobility   - Keep Call bell within reach  - Keep bed low and locked with side rails adjusted as appropriate  - Keep care items and personal belongings within reach  - Initiate and maintain comfort rounds  - Make Fall Risk Sign visible to staff  - Offer Toileting every 2 Hours,  in advance of need  - Initiate/Maintain bed alarm  - Apply yellow socks and bracelet for high fall risk patients  - Consider moving patient to room near nurses station  Outcome: Progressing  Goal: Maintain or return to baseline ADL function  Description: INTERVENTIONS:  -  Assess patient's ability to carry out ADLs; assess patient's baseline for ADL function and identify physical deficits which impact ability to perform ADLs (bathing, care of mouth/teeth, toileting, grooming, dressing, etc.)  - Assess/evaluate cause of self-care deficits   - Assess range of motion  - Assess patient's mobility; develop plan if impaired  - Assess patient's need for assistive devices and provide as appropriate  - Encourage maximum independence but intervene and supervise when necessary  - Involve family in performance of ADLs  - Assess for home care needs following discharge   - Consider OT consult to assist with ADL evaluation and planning for discharge  - Provide patient education as appropriate  Outcome: Progressing  Goal: Maintains/Returns to pre admission functional level  Description: INTERVENTIONS:  - Perform AM-PAC 6 Click Basic Mobility/ Daily Activity assessment daily.  - Set and communicate daily mobility goal to care team and patient/family/caregiver.   - Collaborate with rehabilitation services on mobility goals if consulted  - Perform Range of Motion 3 times a day.  - Reposition patient every 2 hours.  - Record patient progress and toleration of activity level   Outcome: Progressing     Problem: Knowledge Deficit  Goal: Patient/family/caregiver demonstrates understanding of disease process, treatment plan, medications, and discharge instructions  Description: Complete learning assessment and assess knowledge base.  Interventions:  - Provide teaching at level of understanding  - Provide teaching via preferred learning methods  Outcome: Progressing     Problem: RESPIRATORY - ADULT  Goal: Achieves optimal ventilation and  oxygenation  Description: INTERVENTIONS:  - Assess for changes in respiratory status  - Assess for changes in mentation and behavior  - Position to facilitate oxygenation and minimize respiratory effort  - Oxygen administered by appropriate delivery if ordered  - Initiate smoking cessation education as indicated  - Encourage broncho-pulmonary hygiene including cough, deep breathe, Incentive Spirometry  - Assess the need for suctioning and aspirate as needed  - Assess and instruct to report SOB or any respiratory difficulty  - Respiratory Therapy support as indicated  Outcome: Progressing     Problem: GASTROINTESTINAL - ADULT  Goal: Minimal or absence of nausea and/or vomiting  Description: INTERVENTIONS:  - Administer IV fluids if ordered to ensure adequate hydration  - Maintain NPO status until nausea and vomiting are resolved  - Nasogastric tube if ordered  - Administer ordered antiemetic medications as needed  - Provide nonpharmacologic comfort measures as appropriate  - Advance diet as tolerated, if ordered  - Consider nutrition services referral to assist patient with adequate nutrition and appropriate food choices  Outcome: Progressing     Problem: HEMATOLOGIC - ADULT  Goal: Maintains hematologic stability  Description: INTERVENTIONS  - Assess for signs and symptoms of bleeding or hemorrhage  - Monitor labs  - Administer supportive blood products/factors as ordered and appropriate  Outcome: Progressing     Problem: Prexisting or High Potential for Compromised Skin Integrity  Goal: Skin integrity is maintained or improved  Description: INTERVENTIONS:  - Identify patients at risk for skin breakdown  - Assess and monitor skin integrity  - Assess and monitor nutrition and hydration status  - Monitor labs   - Assess for incontinence   - Turn and reposition patient  - Assist with mobility/ambulation  - Relieve pressure over bony prominences  - Avoid friction and shearing  - Provide appropriate hygiene as needed  including keeping skin clean and dry  - Evaluate need for skin moisturizer/barrier cream  - Collaborate with interdisciplinary team   - Patient/family teaching  - Consider wound care consult   Outcome: Progressing     Problem: SAFETY,RESTRAINT: NV/NON-SELF DESTRUCTIVE BEHAVIOR  Goal: Remains free of harm/injury (restraint for non violent/non self-detsructive behavior)  Description: INTERVENTIONS:  - Instruct patient/family regarding restraint use   - Assess and monitor physiologic and psychological status   - Provide interventions and comfort measures to meet assessed patient needs   - Identify and implement measures to help patient regain control  - Assess readiness for release of restraint   Outcome: Progressing  Goal: Returns to optimal restraint-free functioning  Description: INTERVENTIONS:  - Assess the patient's behavior and symptoms that indicate continued need for restraint  - Identify and implement measures to help patient regain control  - Assess readiness for release of restraint   Outcome: Progressing     Problem: Nutrition/Hydration-ADULT  Goal: Nutrient/Hydration intake appropriate for improving, restoring or maintaining nutritional needs  Description: Monitor and assess patient's nutrition/hydration status for malnutrition. Collaborate with interdisciplinary team and initiate plan and interventions as ordered.  Monitor patient's weight and dietary intake as ordered or per policy. Utilize nutrition screening tool and intervene as necessary. Determine patient's food preferences and provide high-protein, high-caloric foods as appropriate.     INTERVENTIONS:  - Monitor oral intake, urinary output, labs, and treatment plans  - Assess nutrition and hydration status and recommend course of action  - Evaluate amount of meals eaten  - Assist patient with eating if necessary   - Allow adequate time for meals  - Recommend/ encourage appropriate diets, oral nutritional supplements, and vitamin/mineral  supplements  - Order, calculate, and assess calorie counts as needed  - Recommend, monitor, and adjust tube feedings and TPN/PPN based on assessed needs  - Assess need for intravenous fluids  - Provide specific nutrition/hydration education as appropriate  - Include patient/family/caregiver in decisions related to nutrition  Outcome: Progressing

## 2024-03-05 PROBLEM — E83.42 HYPOMAGNESEMIA: Status: RESOLVED | Noted: 2024-03-01 | Resolved: 2024-03-05

## 2024-03-05 PROBLEM — E87.6 HYPOKALEMIA: Status: RESOLVED | Noted: 2024-03-01 | Resolved: 2024-03-05

## 2024-03-05 LAB
ABO GROUP BLD BPU: NORMAL
ABO GROUP BLD BPU: NORMAL
ALBUMIN SERPL BCP-MCNC: 3.3 G/DL (ref 3.5–5)
ALP SERPL-CCNC: 123 U/L (ref 34–104)
ALT SERPL W P-5'-P-CCNC: 40 U/L (ref 7–52)
ANION GAP SERPL CALCULATED.3IONS-SCNC: 9 MMOL/L
AST SERPL W P-5'-P-CCNC: 135 U/L (ref 13–39)
BILIRUB DIRECT SERPL-MCNC: 1.32 MG/DL (ref 0–0.2)
BILIRUB SERPL-MCNC: 3.53 MG/DL (ref 0.2–1)
BPU ID: NORMAL
BPU ID: NORMAL
BUN SERPL-MCNC: 6 MG/DL (ref 5–25)
CA-I BLD-SCNC: 1 MMOL/L (ref 1.12–1.32)
CALCIUM SERPL-MCNC: 8.8 MG/DL (ref 8.4–10.2)
CHLORIDE SERPL-SCNC: 98 MMOL/L (ref 96–108)
CO2 SERPL-SCNC: 26 MMOL/L (ref 21–32)
CREAT SERPL-MCNC: 0.51 MG/DL (ref 0.6–1.3)
CROSSMATCH: NORMAL
CROSSMATCH: NORMAL
ERYTHROCYTE [DISTWIDTH] IN BLOOD BY AUTOMATED COUNT: 15.9 % (ref 11.6–15.1)
GFR SERPL CREATININE-BSD FRML MDRD: 109 ML/MIN/1.73SQ M
GLUCOSE SERPL-MCNC: 100 MG/DL (ref 65–140)
GLUCOSE SERPL-MCNC: 100 MG/DL (ref 65–140)
HCT VFR BLD AUTO: 26.7 % (ref 34.8–46.1)
HGB BLD-MCNC: 8.8 G/DL (ref 11.5–15.4)
INR PPP: 1.38 (ref 0.84–1.19)
MAGNESIUM SERPL-MCNC: 2 MG/DL (ref 1.9–2.7)
MCH RBC QN AUTO: 32.8 PG (ref 26.8–34.3)
MCHC RBC AUTO-ENTMCNC: 33 G/DL (ref 31.4–37.4)
MCV RBC AUTO: 100 FL (ref 82–98)
PHOSPHATE SERPL-MCNC: 4 MG/DL (ref 2.7–4.5)
PLATELET # BLD AUTO: 66 THOUSANDS/UL (ref 149–390)
PMV BLD AUTO: 9.5 FL (ref 8.9–12.7)
POTASSIUM SERPL-SCNC: 4 MMOL/L (ref 3.5–5.3)
PROT SERPL-MCNC: 7.8 G/DL (ref 6.4–8.4)
PROTHROMBIN TIME: 17.4 SECONDS (ref 11.6–14.5)
RBC # BLD AUTO: 2.68 MILLION/UL (ref 3.81–5.12)
SODIUM SERPL-SCNC: 133 MMOL/L (ref 135–147)
UNIT DISPENSE STATUS: NORMAL
UNIT DISPENSE STATUS: NORMAL
UNIT PRODUCT CODE: NORMAL
UNIT PRODUCT CODE: NORMAL
UNIT PRODUCT VOLUME: 350 ML
UNIT PRODUCT VOLUME: 350 ML
UNIT RH: NORMAL
UNIT RH: NORMAL
WBC # BLD AUTO: 6.37 THOUSAND/UL (ref 4.31–10.16)

## 2024-03-05 PROCEDURE — 85027 COMPLETE CBC AUTOMATED: CPT | Performed by: PHYSICIAN ASSISTANT

## 2024-03-05 PROCEDURE — 80048 BASIC METABOLIC PNL TOTAL CA: CPT | Performed by: PHYSICIAN ASSISTANT

## 2024-03-05 PROCEDURE — 85610 PROTHROMBIN TIME: CPT | Performed by: PHYSICIAN ASSISTANT

## 2024-03-05 PROCEDURE — 83735 ASSAY OF MAGNESIUM: CPT | Performed by: PHYSICIAN ASSISTANT

## 2024-03-05 PROCEDURE — 82330 ASSAY OF CALCIUM: CPT | Performed by: PHYSICIAN ASSISTANT

## 2024-03-05 PROCEDURE — C9113 INJ PANTOPRAZOLE SODIUM, VIA: HCPCS | Performed by: PHYSICIAN ASSISTANT

## 2024-03-05 PROCEDURE — 82948 REAGENT STRIP/BLOOD GLUCOSE: CPT

## 2024-03-05 PROCEDURE — 93005 ELECTROCARDIOGRAM TRACING: CPT

## 2024-03-05 PROCEDURE — 84100 ASSAY OF PHOSPHORUS: CPT | Performed by: PHYSICIAN ASSISTANT

## 2024-03-05 PROCEDURE — 80076 HEPATIC FUNCTION PANEL: CPT | Performed by: NURSE PRACTITIONER

## 2024-03-05 PROCEDURE — 99255 IP/OBS CONSLTJ NEW/EST HI 80: CPT | Performed by: STUDENT IN AN ORGANIZED HEALTH CARE EDUCATION/TRAINING PROGRAM

## 2024-03-05 RX ORDER — CALCIUM GLUCONATE 20 MG/ML
2 INJECTION, SOLUTION INTRAVENOUS ONCE
Status: COMPLETED | OUTPATIENT
Start: 2024-03-05 | End: 2024-03-05

## 2024-03-05 RX ORDER — FENTANYL CITRATE/PF 50 MCG/ML
25 SYRINGE (ML) INJECTION
Status: DISCONTINUED | OUTPATIENT
Start: 2024-03-05 | End: 2024-03-05

## 2024-03-05 RX ORDER — LOSARTAN POTASSIUM 25 MG/1
25 TABLET ORAL DAILY
Status: DISCONTINUED | OUTPATIENT
Start: 2024-03-05 | End: 2024-03-07 | Stop reason: HOSPADM

## 2024-03-05 RX ORDER — ESCITALOPRAM OXALATE 20 MG/1
20 TABLET ORAL DAILY
Status: DISCONTINUED | OUTPATIENT
Start: 2024-03-05 | End: 2024-03-07 | Stop reason: HOSPADM

## 2024-03-05 RX ORDER — ONDANSETRON 2 MG/ML
4 INJECTION INTRAMUSCULAR; INTRAVENOUS ONCE AS NEEDED
Status: DISCONTINUED | OUTPATIENT
Start: 2024-03-05 | End: 2024-03-05

## 2024-03-05 RX ORDER — SODIUM CHLORIDE 9 MG/ML
100 INJECTION, SOLUTION INTRAVENOUS CONTINUOUS
Status: DISCONTINUED | OUTPATIENT
Start: 2024-03-05 | End: 2024-03-05

## 2024-03-05 RX ADMIN — POTASSIUM CHLORIDE 20 MEQ: 1500 TABLET, EXTENDED RELEASE ORAL at 08:24

## 2024-03-05 RX ADMIN — TRIMETHOBENZAMIDE HYDROCHLORIDE 200 MG: 100 INJECTION INTRAMUSCULAR at 04:38

## 2024-03-05 RX ADMIN — LOSARTAN POTASSIUM 25 MG: 25 TABLET, FILM COATED ORAL at 08:24

## 2024-03-05 RX ADMIN — THIAMINE HYDROCHLORIDE 500 MG: 100 INJECTION, SOLUTION INTRAMUSCULAR; INTRAVENOUS at 09:21

## 2024-03-05 RX ADMIN — ENOXAPARIN SODIUM 40 MG: 100 INJECTION SUBCUTANEOUS at 08:23

## 2024-03-05 RX ADMIN — PANTOPRAZOLE SODIUM 40 MG: 40 INJECTION, POWDER, FOR SOLUTION INTRAVENOUS at 08:23

## 2024-03-05 RX ADMIN — CHLORHEXIDINE GLUCONATE 15 ML: 1.2 SOLUTION ORAL at 08:24

## 2024-03-05 RX ADMIN — PANTOPRAZOLE SODIUM 40 MG: 40 INJECTION, POWDER, FOR SOLUTION INTRAVENOUS at 20:14

## 2024-03-05 RX ADMIN — ESCITALOPRAM OXALATE 20 MG: 20 TABLET ORAL at 08:23

## 2024-03-05 RX ADMIN — CEFTRIAXONE 1000 MG: 1 INJECTION, SOLUTION INTRAVENOUS at 14:54

## 2024-03-05 RX ADMIN — FOLIC ACID 1 MG: 5 INJECTION, SOLUTION INTRAMUSCULAR; INTRAVENOUS; SUBCUTANEOUS at 08:43

## 2024-03-05 RX ADMIN — SODIUM CHLORIDE, SODIUM GLUCONATE, SODIUM ACETATE, POTASSIUM CHLORIDE, MAGNESIUM CHLORIDE, SODIUM PHOSPHATE, DIBASIC, AND POTASSIUM PHOSPHATE 75 ML/HR: .53; .5; .37; .037; .03; .012; .00082 INJECTION, SOLUTION INTRAVENOUS at 08:22

## 2024-03-05 RX ADMIN — CALCIUM GLUCONATE 2 G: 20 INJECTION, SOLUTION INTRAVENOUS at 05:58

## 2024-03-05 NOTE — PROGRESS NOTES
[facility]  Progress Note  Name: Yvette Titus I  MRN: [id number]  Unit/Bed#: -01 I Date of Admission: 3/1/2024   Date of Service: 3/5/2024 I Hospital Day: 4    Assessment/Plan   * GI bleed  Assessment & Plan  History of gastritis and esophageal varices  Recently admitted at [facility] for variceal bleeding after a binge-drinking episode  Follows with GI outpatient and was planning to have EGD next week with variceal banding  Presented with bloody emesis that started this morning.  Patient states she had 2 episodes of large-volume emesis prior to arrival.  Episodes of dark brown emesis noted in ED  CT high-volume bleeding scan without evidence of active extravasation  GI consulted-Patient underwent EGD on 3/1 with banding of 7 varices   Hemoglobin stable    Plan:  Continue Protonix IV BID  Continue octreotide infusion continue per GI recommendations  Continue with CTX on day 3/5  Trend hgb Q8 hours transfuse for Hgb < 7  Follow Pt-INR daily  ETOH cessation when appropriate  Patient will need repeat EGD in 1 month for potential more banding  GI following- appreciate recommendations     Acute respiratory failure (HCC)  Assessment & Plan  Returning to the ICU intubated following EGD  ETT in good position per CXR  VBG 7.451/30.9  Sedation: Propofol, fentanyl, patient was loaded with 260 mg IV Phenobarbital x2 for ETOH withdrawal. As well as PRN Fentanyl  Titrate to maintain RASS - 2  Current Vent settings: 16/320/40/6  Wean FiO2 and PEEP to maintain spO2 > 92%  3/2 patient was not extubated due to lack of cuff leak on exam. Given Solumedrol 40 mg IV Q 6 hours x 2 doses now with cuff leak. Unsuccessful SAT on 3/3- patient not able to follow commands  Pt was extubated 3/4/2024       Alcoholic cirrhosis (HCC)  Assessment & Plan  Hx of alcoholic cirrhosis with varices  Follows with GI outpatient  Patient states she has been drinking heavily over the past several days. Typically drinks wine and  vodka but recently ran out of alcohol. She has been drinking prescribed mouthwash that is 16% alcohol. Last drink just prior to arrival to ED  Ethanol 275 upon admission  CIWA protocol ordered  Seizure precautions  MELD 22- decreased to 16   DF normal  GI following  Ammonia normal at 30    SIRS (systemic inflammatory response syndrome) (HCC)  Assessment & Plan  SIRS: tachycardia, tachypnea  Unlikely infection. SIRS likely secondary to acute GIB  Blood cultures ordered NGTD x 24 hours  Lactic elevated at 4.8 on admission. Cleared in the ICU after fluid resusitation  Started on CTX in the setting of possible variceal bleeding  Remains hemodynamically stable     Plan:  Continue CTX for now  Follow-up culture data  Follow up repeat lactate   Trend fever curve and WBC count    Anemia  Assessment & Plan  Baseline anemia. Follows with Heme/Onc outpatient. Baseline between 9-10  Takes B12 and folic acid supplementation outpatient  H/H remains stable- has not required transfusion     Plan:  Continue thiamine supplementation  Hold oral B12 while NPO  Trend Hgb Q12 hrs   Transfuse for Hgb < 7    Hyponatremia  Assessment & Plan  Na 126 on admission repeat Na+ in the evening 131. Current Na+ 135  Likely secondary to poor solute intake as patient states she hasn't eaten anything in at least 5 days  Received 2 L NS in ED and 1 L in the ICU    Plan:  Daily BMP    Hypokalemia  Assessment & Plan  Replete to maintain K > 4    Hypomagnesemia  Assessment & Plan  Replete to maintain Mg > 2    Suicidal ideation  Assessment & Plan  Patient states that she has been feeling very depressed and wishes that she could just die. She states that she has been having suicidal thoughts for quite some time  Follows with [facility] and take Zoloft daily  Hold Zoloft   1:1 monitoring  Psych consult when medically appropriate    Thrombocytopenia (HCC)  Assessment & Plan  Most likely related to liver disease and acute blood loss anemia from bleeding  varices   Continue  to monitor       Acute metabolic encephalopathy  Assessment & Plan  Patient altered and agitated since her EGD on day of admission  Very difficult time properly sedating patient to maintain vent synchrony   History of heavy use. Given 260 mg IV phenobarbital x 3 doses for potential withdrawal  Unsuccessful SAT the previous day- unable to follow commands and became agitated and was thrashing in bed after  Over night patient acutely agitated and thrashing in bed- required restarting of Propofol, and receiving 2 mg IV Versed    Plan:  Continue CIWA protocol  Patients altered mentation likely in the setting of sedation  Improved mentation off all sedation     Long Q-T syndrome  Assessment & Plan  Qtc currently 510  Replace Mg   Trend EKG Q12  Avoid QT prolonging agents    Transaminitis  Assessment & Plan  Likely secondary to alcoholic cirrhosis  DF normal  Trend cmp             Disposition: Stepdown Level 1    ICU Core Measures     A: Assess, Prevent, and Manage Pain Has pain been assessed? NA  Need for changes to pain regimen? NA   B: Both SAT/SAT  N/A   C: Choice of Sedation RASS Goal: 0 Alert and Calm or N/A patient not on sedation  Need for changes to sedation or analgesia regimen? NA   D: Delirium CAM-ICU: Negative   E: Early Mobility  Plan for early mobility? Yes   F: Family Engagement Plan for family engagement today? Yes       Antibiotic Review: Awaiting culture results.     Review of Invasive Devices:    Cole Plan: Cole to be removed. Order has been placed        Prophylaxis:  VTE VTE covered by:  enoxaparin, Subcutaneous, 40 mg at 03/04/24 0944       Stress Ulcer  covered bypantoprazole (PROTONIX) 40 mg tablet [[id number]] (Long-Term Med), pantoprazole (PROTONIX) injection 40 mg [[id number]]         Significant 24hr Events     Patient was placed on a one-to-one for suicidal ideation  Psychiatry consult is pending  Nausea started on tigan 200 mg IM q 6 hours      Subjective   Review of  Systems   Respiratory: Negative.     Cardiovascular: Negative.    Gastrointestinal: Negative.    All other systems reviewed and are negative.     Objective              Pt is sleeping                   Vitals I/O      Most Recent Min/Max in 24hrs   Temp 98.6 °F (37 °C) Temp  Min: 98.2 °F (36.8 °C)  Max: 100.1 °F (37.8 °C)   Pulse 80 Pulse  Min: 70  Max: 125   Resp 17 Resp  Min: 12  Max: 26   /79 BP  Min: 107/62  Max: 164/85   O2 Sat 97 % SpO2  Min: 94 %  Max: 100 %      Intake/Output Summary (Last 24 hours) at 3/5/2024 0502  Last data filed at 3/5/2024 0400  Gross per 24 hour   Intake 3445.01 ml   Output 2895 ml   Net 550.01 ml       Diet Clear Liquid    Invasive Monitoring           Physical Exam   Physical Exam  Vitals and nursing note reviewed.   Eyes:      Extraocular Movements: Extraocular movements intact.      Pupils: Pupils are equal, round, and reactive to light.   Skin:     General: Skin is warm, dry and not mottled extremities.      Capillary Refill: Capillary refill takes less than 2 seconds.      Coloration: Skin is not pale.   HENT:      Head: Normocephalic and atraumatic.   Cardiovascular:      Rate and Rhythm: Normal rate and regular rhythm.      Pulses: Normal pulses.      Heart sounds: Normal heart sounds.   Musculoskeletal:         General: Normal range of motion.      Cervical back: Full passive range of motion without pain, normal range of motion and neck supple.   Abdominal: General: Bowel sounds are normal.      Palpations: Abdomen is soft.   Constitutional:       General: She is awake.      Appearance: She is well-developed and well-nourished.   Pulmonary:      Effort: Pulmonary effort is normal.      Breath sounds: Normal breath sounds. No wheezing or rhonchi.   Psychiatric:         Behavior: Behavior is cooperative.   Neurological:      General: No focal deficit present.      Mental Status: She is alert, easily aroused and oriented to person, place and time. Mental status is at  baseline.      Sensory: Sensation is intact.      Motor: gross motor function is at baseline for patient. Strength full and intact in all extremities.            Diagnostic Studies      EKG: NSR  Imaging:  I have personally reviewed pertinent reports.       Medications:  Scheduled PRN   cefTRIAXone, 1,000 mg, Q24H  chlorhexidine, 15 mL, Q12H RAY  enoxaparin, 40 mg, Q24H RAY  folic acid 1 mg in sodium chloride 0.9 % 50 mL IVPB, 1 mg, Daily  pantoprazole, 40 mg, Q12H RAY  potassium chloride, 20 mEq, Daily With Breakfast  thiamine, 500 mg, TID   Followed by  [START ON 3/6/2024] thiamine, 250 mg, Daily      trimethobenzamide, 200 mg, Q6H PRN       Continuous    multi-electrolyte, 75 mL/hr, Last Rate: 75 mL/hr (03/03/24 2140)         Labs:    CBC    Recent Labs     03/03/24 2101 03/04/24  0455   WBC  --  7.30   HGB 9.2* 9.1*   HCT  --  27.9*   PLT  --  70*     BMP    Recent Labs     03/04/24  0455 03/04/24  1737   SODIUM 138 138   K 3.3* 3.5    109*   CO2 25 21   AGAP 9 8   BUN 7 5   CREATININE 0.55* 0.39*   CALCIUM 8.5 6.5*       Coags    Recent Labs     03/04/24  0455   INR 1.52*        Additional Electrolytes  Recent Labs     03/04/24  0455 03/04/24  1737   MG 1.6* 1.9   PHOS 1.8* 5.0*          Blood Gas    No recent results  No recent results LFTs  Recent Labs     03/04/24  0455   ALT 40   *   ALKPHOS 101   ALB 2.9*   TBILI 2.15*       Infectious  No recent results  Glucose  Recent Labs     03/03/24  0753 03/03/24  2101 03/04/24  0455 03/04/24  1737   GLUC 158* 115 109 116               Payam Wilder PA-C

## 2024-03-05 NOTE — ASSESSMENT & PLAN NOTE
Returning to the ICU intubated following EGD  ETT in good position per CXR  VBG 7.451/30.9  Sedation: Propofol, fentanyl, patient was loaded with 260 mg IV Phenobarbital x2 for ETOH withdrawal. As well as PRN Fentanyl  Titrate to maintain RASS - 2  Current Vent settings: 16/320/40/6  Wean FiO2 and PEEP to maintain spO2 > 92%  3/2 patient was not extubated due to lack of cuff leak on exam. Given Solumedrol 40 mg IV Q 6 hours x 2 doses now with cuff leak. Unsuccessful SAT on 3/3- patient not able to follow commands  Pt was extubated 3/4/2024

## 2024-03-05 NOTE — ASSESSMENT & PLAN NOTE
Most likely related to liver disease and acute blood loss anemia from bleeding varices   Continue  to monitor

## 2024-03-05 NOTE — PLAN OF CARE
Problem: PAIN - ADULT  Goal: Verbalizes/displays adequate comfort level or baseline comfort level  Description: Interventions:  - Encourage patient to monitor pain and request assistance  - Assess pain using appropriate pain scale  - Administer analgesics based on type and severity of pain and evaluate response  - Implement non-pharmacological measures as appropriate and evaluate response  - Consider cultural and social influences on pain and pain management  - Notify physician/advanced practitioner if interventions unsuccessful or patient reports new pain  Outcome: Progressing     Problem: INFECTION - ADULT  Goal: Absence or prevention of progression during hospitalization  Description: INTERVENTIONS:  - Assess and monitor for signs and symptoms of infection  - Monitor lab/diagnostic results  - Monitor all insertion sites, i.e. indwelling lines, tubes, and drains  - Monitor endotracheal if appropriate and nasal secretions for changes in amount and color  - [place] appropriate cooling/warming therapies per order  - Administer medications as ordered  - Instruct and encourage patient and family to use good hand hygiene technique  - Identify and instruct in appropriate isolation precautions for identified infection/condition  Outcome: Progressing  Goal: Absence of fever/infection during neutropenic period  Description: INTERVENTIONS:  - Monitor WBC    Outcome: Progressing     Problem: SAFETY ADULT  Goal: Patient will remain free of falls  Description: INTERVENTIONS:  - Educate patient/family on patient safety including physical limitations  - Instruct patient to call for assistance with activity   - Consult OT/PT to assist with strengthening/mobility   - Keep Call bell within reach  - Keep bed low and locked with side rails adjusted as appropriate  - Keep care items and personal belongings within reach  - Initiate and maintain comfort rounds  - Make Fall Risk Sign visible to staff  - Apply yellow socks and bracelet  for high fall risk patients  - Consider moving patient to room near nurses station  Outcome: Progressing  Goal: Maintain or return to baseline ADL function  Description: INTERVENTIONS:  -  Assess patient's ability to carry out ADLs; assess patient's baseline for ADL function and identify physical deficits which impact ability to perform ADLs (bathing, care of mouth/teeth, toileting, grooming, dressing, etc.)  - Assess/evaluate cause of self-care deficits   - Assess range of motion  - Assess patient's mobility; develop plan if impaired  - Assess patient's need for assistive devices and provide as appropriate  - Encourage maximum independence but intervene and supervise when necessary  - Involve family in performance of ADLs  - Assess for home care needs following discharge   - Consider OT consult to assist with ADL evaluation and planning for discharge  - Provide patient education as appropriate  Outcome: Progressing  Goal: Maintains/Returns to pre admission functional level  Description: INTERVENTIONS:  - Perform AM-PAC 6 Click Basic Mobility/ Daily Activity assessment daily.  - Set and communicate daily mobility goal to care team and patient/family/caregiver.   - Collaborate with rehabilitation services on mobility goals if consulted  - Out of bed for toileting  - Record patient progress and toleration of activity level   Outcome: Progressing     Problem: DISCHARGE PLANNING  Goal: Discharge to home or other facility with appropriate resources  Description: INTERVENTIONS:  - Identify barriers to discharge w/patient and caregiver  - Arrange for needed discharge resources and transportation as appropriate  - Identify discharge learning needs (meds, wound care, etc.)  - Arrange for interpretive services to assist at discharge as needed  - Refer to Case Management Department for coordinating discharge planning if the patient needs post-hospital services based on physician/advanced practitioner order or complex needs  related to functional status, cognitive ability, or social support system  Outcome: Progressing     Problem: Knowledge Deficit  Goal: Patient/family/caregiver demonstrates understanding of disease process, treatment plan, medications, and discharge instructions  Description: Complete learning assessment and assess knowledge base.  Interventions:  - Provide teaching at level of understanding  - Provide teaching via preferred learning methods  Outcome: Progressing     Problem: RESPIRATORY - ADULT  Goal: Achieves optimal ventilation and oxygenation  Description: INTERVENTIONS:  - Assess for changes in respiratory status  - Assess for changes in mentation and behavior  - Position to facilitate oxygenation and minimize respiratory effort  - Oxygen administered by appropriate delivery if ordered  - Initiate smoking cessation education as indicated  - Encourage broncho-pulmonary hygiene including cough, deep breathe, Incentive Spirometry  - Assess the need for suctioning and aspirate as needed  - Assess and instruct to report SOB or any respiratory difficulty  - Respiratory Therapy support as indicated  Outcome: Progressing     Problem: GASTROINTESTINAL - ADULT  Goal: Minimal or absence of nausea and/or vomiting  Description: INTERVENTIONS:  - Administer IV fluids if ordered to ensure adequate hydration  - Maintain NPO status until nausea and vomiting are resolved  - Nasogastric tube if ordered  - Administer ordered antiemetic medications as needed  - Provide nonpharmacologic comfort measures as appropriate  - Advance diet as tolerated, if ordered  - Consider nutrition services referral to assist patient with adequate nutrition and appropriate food choices  Outcome: Progressing     Problem: HEMATOLOGIC - ADULT  Goal: Maintains hematologic stability  Description: INTERVENTIONS  - Assess for signs and symptoms of bleeding or hemorrhage  - Monitor labs  - Administer supportive blood products/factors as ordered and  appropriate  Outcome: Progressing     Problem: Prexisting or High Potential for Compromised Skin Integrity  Goal: Skin integrity is maintained or improved  Description: INTERVENTIONS:  - Identify patients at risk for skin breakdown  - Assess and monitor skin integrity  - Assess and monitor nutrition and hydration status  - Monitor labs   - Assess for incontinence   - Turn and reposition patient  - Assist with mobility/ambulation  - Relieve pressure over bony prominences  - Avoid friction and shearing  - Provide appropriate hygiene as needed including keeping skin clean and dry  - Evaluate need for skin moisturizer/barrier cream  - Collaborate with interdisciplinary team   - Patient/family teaching  - Consider wound care consult   Outcome: Progressing     Problem: SAFETY,RESTRAINT: NV/NON-SELF DESTRUCTIVE BEHAVIOR  Goal: Remains free of harm/injury (restraint for non violent/non self-detsructive behavior)  Description: INTERVENTIONS:  - Instruct patient/family regarding restraint use   - Assess and monitor physiologic and psychological status   - Provide interventions and comfort measures to meet assessed patient needs   - Identify and implement measures to help patient regain control  - Assess readiness for release of restraint   Outcome: Progressing  Goal: Returns to optimal restraint-free functioning  Description: INTERVENTIONS:  - Assess the patient's behavior and symptoms that indicate continued need for restraint  - Identify and implement measures to help patient regain control  - Assess readiness for release of restraint   Outcome: Progressing     Problem: Nutrition/Hydration-ADULT  Goal: Nutrient/Hydration intake appropriate for improving, restoring or maintaining nutritional needs  Description: Monitor and assess patient's nutrition/hydration status for malnutrition. Collaborate with interdisciplinary team and initiate plan and interventions as ordered.  Monitor patient's weight and dietary intake as ordered  or per policy. Utilize nutrition screening tool and intervene as necessary. Determine patient's food preferences and provide high-protein, high-caloric foods as appropriate.     INTERVENTIONS:  - Monitor oral intake, urinary output, labs, and treatment plans  - Assess nutrition and hydration status and recommend course of action  - Evaluate amount of meals eaten  - Assist patient with eating if necessary   - Allow adequate time for meals  - Recommend/ encourage appropriate diets, oral nutritional supplements, and vitamin/mineral supplements  - Order, calculate, and assess calorie counts as needed  - Recommend, monitor, and adjust tube feedings and TPN/PPN based on assessed needs  - Assess need for intravenous fluids  - Provide specific nutrition/hydration education as appropriate  - Include patient/family/caregiver in decisions related to nutrition  Outcome: Progressing

## 2024-03-05 NOTE — ASSESSMENT & PLAN NOTE
Hx of alcoholic cirrhosis with varices  Follows with GI outpatient  Patient states she has been drinking heavily over the past several days. Typically drinks wine and vodka but recently ran out of alcohol. She has been drinking prescribed mouthwash that is 16% alcohol. Last drink just prior to arrival to ED  Ethanol 275 upon admission  [facility] protocol ordered  Seizure precautions  MELD 22- decreased to 16   DF normal  GI following  Ammonia normal at 30

## 2024-03-05 NOTE — CONSULTS
TELEConsultation - Behavioral Health   Yvette Titus 53 y.o. female MRN: [id number]  Unit/Bed#: -01 Encounter: [id number]    REQUIRED DOCUMENTATION:     1. This service was provided via Telemedicine.  2. Provider located in [place].  3. TeleMed provider: Roger Galindo MD  4. Identify all parties in room with patient during tele consult: patient  5. After connecting through televideo, patient was identified by name and date of birth. Parent/patient was then informed that this was being conducted confidentially over secure lines. My office door was closed. Parties in the room listed above as per #4.  Patient acknowledged consent and understanding of privacy and security of the Telemedicine visit. The patient is aware this is a billable service and understands that she can discontinue the visit at any time. I informed the patient that I have reviewed their record in Epic and presented the opportunity for them to ask any questions regarding the visit today.  The patient agreed to participate.       Chief Complaint: depression/SI    History of Present Illness   Physician Requesting Consult: Veda Carr DO    Reason for Consult / Principal Problem: SI    Per H and P:  Yvette Titus is a 53 y.o. female with past medical history significant for alcohol abuse, alcoholic cirrhosis, esophageal varices with recent variceal bleed, long QT syndrome, anemia, and anxiety who presented to the ED with hematemesis since this morning.  Patient states that she has been drinking heavily over the past 5 days and has not had anything to eat since that time.  She states that she has been drinking mostly vodka and wine but recently ran out and has been drinking mouthwash that she has from previous dental procedures.  Her last drink was just prior to arriving to ED.  She began vomiting blood this morning and called her sponsor from [facility] who was adamant she come to the emergency room.  She was recently admitted at [facility]  "for a variceal bleed requiring banding and was scheduled to have an endoscopy earlier this month for further banding.  While in the ED she had multiple episodes of dark brown hematemesis, was tachycardic, but hemodynamically stable.  CT high-volume bleeding scan without evidence of active extravasation and hemoglobin stable.  GI consulted and planning for urgent EGD and she will likely remain intubated following procedure.     On evaluation,    Patient was calm and cooperative with interview.  Thought process is organized and she is able to participate meaningfully in interview.  Eye contact is poor and speech is normal rate and rhythm.  She reports that she has been feeling sad intermittently due to feeling as though she wants to be \"normal\".  She reports that she is upset with herself for continuing to drink and \"just want to be normal for my children and my partner\".  She reports feeling sad or depressed intermittently and she has a psychiatrist at [facility] who prescribes escitalopram for depression in addition to a therapist at [facility].  She is preoccupied with the idea of feeling normal and that she does not know what to do in order to get better.  Reports that she made statements that she does not want to live anymore out of frustration but does not truly feel suicidal.  Denies any current suicidal ideation, intention, or plan.  No overt HI expressed.  Denies psychotic symptoms and not endorsing any AVH.  Thought process is generally organized and she is alert and oriented.  Reports intermittent depressed mood \"once in a while\".  Some problems with sleep and appetite.  Patient ended interview early due to nausea and needing to go to the bathroom.      Psychiatric Review Of Systems:  Medication side effects: none  Sleep: poor  Appetite: poor  Hygiene: able to tend to instrumental and basic ADLs  Anxiety Symptoms: denies  Psychotic Symptoms: denies  Depression Symptoms: " endorses  Manic Symptoms: denies  PTSD Symptoms: denies  Suicidal Thoughts: denies  Homicidal Thoughts: denies    Historical Information   Psychiatric History:   Diagnoses: MDD  Inpatient Hx: none   Outpatient Hx: [facility]  Medications/Trials: escitalopram     Substance Abuse History:    Social History     Substance and Sexual Activity   Alcohol Use Yes    Comment: pint of vodka and a bottle of wine     Social History     Substance and Sexual Activity   Drug Use Never       I discussed substance abuse with the patient and, if pertinent, discussed risks vs benefits of decreasing frequency of use.    Family History:   Family History   Problem Relation Age of Onset    Cancer Mother        Social History  Currently living: with partner   Relationships: in a relationship  Children: yes  Rest of social history as per below:  Social History     Socioeconomic History    Marital status:      Spouse name: Not on file    Number of children: Not on file    Years of education: Not on file    Highest education level: Not on file   Occupational History    Not on file   Tobacco Use    Smoking status: Never     Passive exposure: Never    Smokeless tobacco: Never   Vaping Use    Vaping status: Never Used   Substance and Sexual Activity    Alcohol use: Yes     Comment: pint of vodka and a bottle of wine    Drug use: Never    Sexual activity: Not Currently     Partners: Male     Birth control/protection: Female Sterilization   Other Topics Concern    Not on file   Social History Narrative    Not on file     Social Determinants of Health     Financial Resource Strain: Not At Risk (9/7/2023)    Received from [facility]    Financial Resource Strain     In the last 12 months did you skip medications to save money?: No     In the last 12 months, was there a time when you needed to see a doctor but could not because of cost?: No   Food Insecurity: Not At Risk (9/7/2023)    Received from [facility]  [facility]    Food Insecurity     In the last 12 months did you ever eat less than you felt you should because there wasn't enough money for food?: No   Transportation Needs: Not At Risk (9/7/2023)    Received from [facility]    Transporation     In the last 12 months, have you ever had to go without healthcare because you didn't have a way to get there?: No   Physical Activity: Inactive (9/22/2022)    Received from [facility]    Exercise Vital Sign     Days of Exercise per Week: 0 days     Minutes of Exercise per Session: 0 min   Stress: Stress Concern Present (9/22/2022)    Received from [facility]    [employer] [place] [employer] - Occupational Stress Questionnaire     Feeling of Stress : Rather much   Social Connections: Not At Risk (9/7/2023)    Received from [facility]    Social Connections     Do you often feel lonely?: No   Intimate Partner Violence: Not At Risk (9/22/2022)    Received from [facility]    Humiliation, Afraid, Rape, and Kick questionnaire     Fear of Current or Ex-Partner: No     Emotionally Abused: No     Physically Abused: No     Sexually Abused: No   Housing Stability: Not At Risk (9/7/2023)    Received from [facility]    Housing Stability     Are you worried that in the next 2 months you may not have stable housing?: No       Past Medical History:   Diagnosis Date    Alcoholism (HCC) 10/1/2010    Fatty liver 01/20/2023    Gallstones     Long Q-T syndrome        Medical Review Of Systems:  Patient denies headache or dizziness.   Patient denies chest pain or palpitations.  Patient denies difficulty breathing or wheezing.  Patient endorses N/V  Patient denies polyuria or polydipsia.  Patient denies weakness or numbness.  Pertinent positives as per HPI.    Meds/Allergies   No Known Allergies  Current  Facility-Administered Medications   Medication Dose Route Frequency    cefTRIAXone (ROCEPHIN) IVPB (premix in dextrose) 1,000 mg 50 mL  1,000 mg Intravenous Q24H    chlorhexidine (PERIDEX) 0.12 % oral rinse 15 mL  15 mL Mouth/Throat Q12H RAY    enoxaparin (LOVENOX) subcutaneous injection 40 mg  40 mg Subcutaneous Q24H RAY    escitalopram (LEXAPRO) tablet 20 mg  20 mg Oral Daily    folic acid 1 mg in sodium chloride 0.9 % 50 mL IVPB  1 mg Intravenous Daily    losartan (COZAAR) tablet 25 mg  25 mg Oral Daily    multi-electrolyte (PLASMALYTE-A/ISOLYTE-S PH 7.4) IV solution  75 mL/hr Intravenous Continuous    pantoprazole (PROTONIX) injection 40 mg  40 mg Intravenous Q12H ARY    potassium chloride (Klor-Con M20) CR tablet 20 mEq  20 mEq Oral Daily With Breakfast    thiamine (VITAMIN B1) 500 mg in sodium chloride 0.9 % 50 mL IVPB  500 mg Intravenous TID    Followed by    [START ON 3/6/2024] thiamine (VITAMIN B1) 250 mg in sodium chloride 0.9 % 50 mL IVPB  250 mg Intravenous Daily    trimethobenzamide (TIGAN) IM injection 200 mg  200 mg Intramuscular Q6H PRN       Current Medications:  Current medications as per above. All medications have been reviewed.   Risks, benefits, alternatives, and possible side effects of patient's psychiatric medications were discussed with patient.     Objective   Vital signs in last 24 hours:  Temp:  [98.2 °F (36.8 °C)-100.1 °F (37.8 °C)] 99 °F (37.2 °C)  HR:  [] 76  Resp:  [12-22] 17  BP: (107-160)/(61-95) 138/80    Mental Status Exam:  Appearance: casually dressed, consistent with stated age  Motor: +psychomotor retardation, no gait abnormalities  Behavior: cooperative, answers questions appropriately  Speech: soft, normal rhythm  Mood: sad  Affect: constricted, depressed-appearing  Thought Process: linear and goal-oriented  Thought Content: denies auditory hallucinations, denies visual hallucinations, denies delusions  Risk Potential: denies suicidal ideation, plan, or intent. Denies  homicidal ideation  Sensorium: Oriented to person, place, time, and situation  Cognition: cognitive ability appears intact but was not quantitatively tested  Consciousness: alert and awake  Attention: intact, able to focus without difficulty  Insight: Limited  Judgement: limited      Laboratory results:  I have personally reviewed all pertinent laboratory/tests results.  Recent Results (from the past 48 hour(s))   Fingerstick Glucose (POCT)    Collection Time: 03/03/24 12:31 PM   Result Value Ref Range    POC Glucose 190 (H) 65 - 140 mg/dl   ECG 12 lead    Collection Time: 03/03/24 12:56 PM   Result Value Ref Range    Ventricular Rate 95 BPM    Atrial Rate 95 BPM    VT Interval 148 ms    QRSD Interval 78 ms    QT Interval 396 ms    QTC Interval 497 ms    P [place] 64 degrees    QRS Axis 63 degrees    T Wave Axis 66 degrees   ECG 12 lead    Collection Time: 03/03/24  2:11 PM   Result Value Ref Range    Ventricular Rate 81 BPM    Atrial Rate 81 BPM    VT Interval 150 ms    QRSD Interval 84 ms    QT Interval 440 ms    QTC Interval 511 ms    P [place] 69 degrees    QRS Axis 70 degrees    T Wave Axis 69 degrees   Fingerstick Glucose (POCT)    Collection Time: 03/03/24  5:31 PM   Result Value Ref Range    POC Glucose 136 65 - 140 mg/dl   ECG 12 lead    Collection Time: 03/03/24  7:08 PM   Result Value Ref Range    Ventricular Rate 132 BPM    Atrial Rate 132 BPM    VT Interval 140 ms    QRSD Interval 76 ms    QT Interval 306 ms    QTC Interval 453 ms    P Axis 80 degrees    QRS Axis 77 degrees    T Wave Axis 68 degrees   Basic metabolic panel    Collection Time: 03/03/24  9:01 PM   Result Value Ref Range    Sodium 137 135 - 147 mmol/L    Potassium 3.1 (L) 3.5 - 5.3 mmol/L    Chloride 100 96 - 108 mmol/L    CO2 26 21 - 32 mmol/L    ANION GAP 11 mmol/L    BUN 7 5 - 25 mg/dL    Creatinine 0.53 (L) 0.60 - 1.30 mg/dL    Glucose 115 65 - 140 mg/dL    Calcium 8.7 8.4 - 10.2 mg/dL    eGFR 107 ml/min/1.73sq m   Hemoglobin    Collection  Time: 03/03/24  9:01 PM   Result Value Ref Range    Hemoglobin 9.2 (L) 11.5 - 15.4 g/dL   Fingerstick Glucose (POCT)    Collection Time: 03/04/24 12:03 AM   Result Value Ref Range    POC Glucose 117 65 - 140 mg/dl   Basic metabolic panel    Collection Time: 03/04/24  4:55 AM   Result Value Ref Range    Sodium 138 135 - 147 mmol/L    Potassium 3.3 (L) 3.5 - 5.3 mmol/L    Chloride 104 96 - 108 mmol/L    CO2 25 21 - 32 mmol/L    ANION GAP 9 mmol/L    BUN 7 5 - 25 mg/dL    Creatinine 0.55 (L) 0.60 - 1.30 mg/dL    Glucose 109 65 - 140 mg/dL    Calcium 8.5 8.4 - 10.2 mg/dL    eGFR 106 ml/min/1.73sq m   Magnesium    Collection Time: 03/04/24  4:55 AM   Result Value Ref Range    Magnesium 1.6 (L) 1.9 - 2.7 mg/dL   Phosphorus    Collection Time: 03/04/24  4:55 AM   Result Value Ref Range    Phosphorus 1.8 (L) 2.7 - 4.5 mg/dL   CBC and differential    Collection Time: 03/04/24  4:55 AM   Result Value Ref Range    WBC 7.30 4.31 - 10.16 Thousand/uL    RBC 2.79 (L) 3.81 - 5.12 Million/uL    Hemoglobin 9.1 (L) 11.5 - 15.4 g/dL    Hematocrit 27.9 (L) 34.8 - 46.1 %     (H) 82 - 98 fL    MCH 32.6 26.8 - 34.3 pg    MCHC 32.6 31.4 - 37.4 g/dL    RDW 15.8 (H) 11.6 - 15.1 %    MPV 9.5 8.9 - 12.7 fL    Platelets 70 (L) 149 - 390 Thousands/uL    nRBC 0 /100 WBCs    Neutrophils Relative 73 43 - 75 %    Immat GRANS % 0 0 - 2 %    Lymphocytes Relative 22 14 - 44 %    Monocytes Relative 5 4 - 12 %    Eosinophils Relative 0 0 - 6 %    Basophils Relative 0 0 - 1 %    Neutrophils Absolute 5.26 1.85 - 7.62 Thousands/µL    Immature Grans Absolute 0.02 0.00 - 0.20 Thousand/uL    Lymphocytes Absolute 1.60 0.60 - 4.47 Thousands/µL    Monocytes Absolute 0.37 0.17 - 1.22 Thousand/µL    Eosinophils Absolute 0.03 0.00 - 0.61 Thousand/µL    Basophils Absolute 0.02 0.00 - 0.10 Thousands/µL   Protime-INR    Collection Time: 03/04/24  4:55 AM   Result Value Ref Range    Protime 18.7 (H) 11.6 - 14.5 seconds    INR 1.52 (H) 0.84 - 1.19   Hepatic  function panel    Collection Time: 03/04/24  4:55 AM   Result Value Ref Range    Total Bilirubin 2.15 (H) 0.20 - 1.00 mg/dL    Bilirubin, Direct 0.82 (H) 0.00 - 0.20 mg/dL    Alkaline Phosphatase 101 34 - 104 U/L     (H) 13 - 39 U/L    ALT 40 7 - 52 U/L    Total Protein 6.8 6.4 - 8.4 g/dL    Albumin 2.9 (L) 3.5 - 5.0 g/dL   Smear Review(Phlebs Do Not Order)    Collection Time: 03/04/24  4:55 AM   Result Value Ref Range    RBC Morphology Present     Platelet Estimate Decreased (A) Adequate    Anisocytosis Present    Triglycerides    Collection Time: 03/04/24  4:55 AM   Result Value Ref Range    Triglycerides 205 (H) See Comment mg/dL   Fingerstick Glucose (POCT)    Collection Time: 03/04/24  5:55 AM   Result Value Ref Range    POC Glucose 118 65 - 140 mg/dl   ECG 12 lead    Collection Time: 03/04/24  7:47 AM   Result Value Ref Range    Ventricular Rate 76 BPM    Atrial Rate 76 BPM    NV Interval 144 ms    QRSD Interval 76 ms    QT Interval 450 ms    QTC Interval 506 ms    P [place] 69 degrees    QRS Axis 74 degrees    T Wave Axis 74 degrees   ECG 12 lead    Collection Time: 03/04/24  9:15 AM   Result Value Ref Range    Ventricular Rate 95 BPM    Atrial Rate 95 BPM    NV Interval 144 ms    QRSD Interval 76 ms    QT Interval 374 ms    QTC Interval 469 ms    P Axis 71 degrees    QRS Axis 70 degrees    T Wave Axis 69 degrees   ECG 12 lead    Collection Time: 03/04/24 11:46 AM   Result Value Ref Range    Ventricular Rate 109 BPM    Atrial Rate 109 BPM    NV Interval 138 ms    QRSD Interval 76 ms    QT Interval 380 ms    QTC Interval 511 ms    P [place] 77 degrees    QRS Axis 76 degrees    T Wave Axis 74 degrees   Fingerstick Glucose (POCT)    Collection Time: 03/04/24 12:17 PM   Result Value Ref Range    POC Glucose 105 65 - 140 mg/dl   Basic metabolic panel    Collection Time: 03/04/24  5:37 PM   Result Value Ref Range    Sodium 138 135 - 147 mmol/L    Potassium 3.5 3.5 - 5.3 mmol/L    Chloride 109 (H) 96 - 108 mmol/L     CO2 21 21 - 32 mmol/L    ANION GAP 8 mmol/L    BUN 5 5 - 25 mg/dL    Creatinine 0.39 (L) 0.60 - 1.30 mg/dL    Glucose 116 65 - 140 mg/dL    Calcium 6.5 (L) 8.4 - 10.2 mg/dL    eGFR 119 ml/min/1.73sq m   Magnesium    Collection Time: 03/04/24  5:37 PM   Result Value Ref Range    Magnesium 1.9 1.9 - 2.7 mg/dL   Phosphorus    Collection Time: 03/04/24  5:37 PM   Result Value Ref Range    Phosphorus 5.0 (H) 2.7 - 4.5 mg/dL   Fingerstick Glucose (POCT)    Collection Time: 03/04/24  6:05 PM   Result Value Ref Range    POC Glucose 136 65 - 140 mg/dl   ECG 12 lead    Collection Time: 03/04/24  8:02 PM   Result Value Ref Range    Ventricular Rate 102 BPM    Atrial Rate 102 BPM    [place] Interval 144 ms    QRSD Interval 76 ms    QT Interval 360 ms    QTC Interval 469 ms    P Axis 79 degrees    QRS Axis 67 degrees    T Wave Axis 55 degrees   Fingerstick Glucose (POCT)    Collection Time: 03/04/24 11:14 PM   Result Value Ref Range    POC Glucose 107 65 - 140 mg/dl   CBC    Collection Time: 03/05/24  4:31 AM   Result Value Ref Range    WBC 6.37 4.31 - 10.16 Thousand/uL    RBC 2.68 (L) 3.81 - 5.12 Million/uL    Hemoglobin 8.8 (L) 11.5 - 15.4 g/dL    Hematocrit 26.7 (L) 34.8 - 46.1 %     (H) 82 - 98 fL    MCH 32.8 26.8 - 34.3 pg    MCHC 33.0 31.4 - 37.4 g/dL    RDW 15.9 (H) 11.6 - 15.1 %    Platelets 66 (L) 149 - 390 Thousands/uL    MPV 9.5 8.9 - 12.7 fL   Basic metabolic panel    Collection Time: 03/05/24  4:31 AM   Result Value Ref Range    Sodium 133 (L) 135 - 147 mmol/L    Potassium 4.0 3.5 - 5.3 mmol/L    Chloride 98 96 - 108 mmol/L    CO2 26 21 - 32 mmol/L    ANION GAP 9 mmol/L    BUN 6 5 - 25 mg/dL    Creatinine 0.51 (L) 0.60 - 1.30 mg/dL    Glucose 100 65 - 140 mg/dL    Calcium 8.8 8.4 - 10.2 mg/dL    eGFR 109 ml/min/1.73sq m   Calcium, ionized    Collection Time: 03/05/24  4:31 AM   Result Value Ref Range    Calcium, Ionized 1.00 (L) 1.12 - 1.32 mmol/L   Phosphorus    Collection Time: 03/05/24  4:31 AM   Result  Value Ref Range    Phosphorus 4.0 2.7 - 4.5 mg/dL   Magnesium    Collection Time: 03/05/24  4:31 AM   Result Value Ref Range    Magnesium 2.0 1.9 - 2.7 mg/dL   Protime-INR    Collection Time: 03/05/24  4:31 AM   Result Value Ref Range    Protime 17.4 (H) 11.6 - 14.5 seconds    INR 1.38 (H) 0.84 - 1.19   Hepatic function panel    Collection Time: 03/05/24  4:31 AM   Result Value Ref Range    Total Bilirubin 3.53 (H) 0.20 - 1.00 mg/dL    Bilirubin, Direct 1.32 (H) 0.00 - 0.20 mg/dL    Alkaline Phosphatase 123 (H) 34 - 104 U/L     (H) 13 - 39 U/L    ALT 40 7 - 52 U/L    Total Protein 7.8 6.4 - 8.4 g/dL    Albumin 3.3 (L) 3.5 - 5.0 g/dL   Fingerstick Glucose (POCT)    Collection Time: 03/05/24  5:12 AM   Result Value Ref Range    POC Glucose 100 65 - 140 mg/dl   Prepare Leukoreduced RBC: 2 Units, Leukoreduced    Collection Time: 03/05/24  7:22 AM   Result Value Ref Range    Unit Product Code L4163D77     Unit Number [id number]     Unit ABO A     Unit RH POS     Crossmatch Compatible     Unit Dispense Status Return to Inv     Unit Product Volume 350 ml    Unit Product Code P3855I74     Unit Number [id number]     Unit ABO A     Unit RH POS     Crossmatch Compatible     Unit Dispense Status Return to Inv     Unit Product Volume 350 ml   ECG 12 lead    Collection Time: 03/05/24  7:29 AM   Result Value Ref Range    Ventricular Rate 80 BPM    Atrial Rate 80 BPM    [place] Interval 142 ms    QRSD Interval 80 ms    QT Interval 406 ms    QTC Interval 468 ms    P Axis 79 degrees    QRS Axis 90 degrees    T Wave Axis 87 degrees          Assessment/Plan     Assessment: 53-year-old woman with psychiatric history of major depressive disorder currently admitted to the hospital in the setting of hematemesis in the setting of alcohol use.  Psychiatry consulted for suicidal statements.  Patient reports that she has been feeling sad intermittently due to feeling as though she wants to live a normal life and feeling as though  she does not know what to do to get better.  Has current outpatient behavioral health treatment at [facility] and is currently prescribed Lexapro 20 mg daily which she has been adherent with.  She reports feeling sad only intermittently and it is likely that her mood is significantly impacted by her alcohol use.  She denies feeling suicidal at this time stating that she has multiple reasons for living including her children and her partner.  She denies suicidal ideation, intention, or plan.  Reports a willingness to follow-up with outpatient psychiatric treatment.  This patient's mood is intricately connected with her alcohol use and she should be offered inpatient drug and alcohol rehabilitation if interested.  If not, she should be referred to outpatient drug and alcohol treatment in the form of [facility] or outpatient substance use treatment if possible.  She should continue her Lexapro 20 mg daily.  Does not meet criteria for inpatient psychiatric hospitalization at this time, monitor mood in the setting of now sobriety.  If patient expresses further suicidal ideations, intention, or plan we can reconsider psychiatric disposition.    Diagnosis: Major depressive disorder, recurrent, without psychotic features    Recommendations:   --Continue Lexapro 20 mg daily  --Denies true suicidal ideation, reports that she made statements out of frustration.  No indication for inpatient psychiatric treatment, continue with her current outpatient behavioral services  --Please refer for substance use treatment-at the highest level of care that patient is willing to receive.  Patient would benefit most from inpatient drug and alcohol rehabilitation but if not willing, please refer for outpatient substance use treatment  --Please TigerText with any questions or concerns.    Diagnoses, available treatment options, alternatives to treatment, and risks vs. benefits of current psychiatric treatment plan were discussed with  the patient.  Prior records were reviewed in [facility].  The case was discussed with the primary team.    Roger Galindo MD    This note has been constructed using a voice recognition system. There may be translation, syntax, or grammatical errors. If you have any questions, please contact the dictating provider.

## 2024-03-05 NOTE — PLAN OF CARE
Problem: PAIN - ADULT  Goal: Verbalizes/displays adequate comfort level or baseline comfort level  Description: Interventions:  - Encourage patient to monitor pain and request assistance  - Assess pain using appropriate pain scale  - Administer analgesics based on type and severity of pain and evaluate response  - Implement non-pharmacological measures as appropriate and evaluate response  - Consider cultural and social influences on pain and pain management  - Notify physician/advanced practitioner if interventions unsuccessful or patient reports new pain  Outcome: Progressing     Problem: INFECTION - ADULT  Goal: Absence or prevention of progression during hospitalization  Description: INTERVENTIONS:  - Assess and monitor for signs and symptoms of infection  - Monitor lab/diagnostic results  - Monitor all insertion sites, i.e. indwelling lines, tubes, and drains  - Monitor endotracheal if appropriate and nasal secretions for changes in amount and color  - [place] appropriate cooling/warming therapies per order  - Administer medications as ordered  - Instruct and encourage patient and family to use good hand hygiene technique  - Identify and instruct in appropriate isolation precautions for identified infection/condition  Outcome: Progressing  Goal: Absence of fever/infection during neutropenic period  Description: INTERVENTIONS:  - Monitor WBC    Outcome: Progressing     Problem: SAFETY ADULT  Goal: Patient will remain free of falls  Description: INTERVENTIONS:  - Educate patient/family on patient safety including physical limitations  - Instruct patient to call for assistance with activity   - Consult OT/PT to assist with strengthening/mobility   - Keep Call bell within reach  - Keep bed low and locked with side rails adjusted as appropriate  - Keep care items and personal belongings within reach  - Initiate and maintain comfort rounds  - Make Fall Risk Sign visible to staff  - Offer Toileting every 2 Hours,  in advance of need  - Initiate/Maintain bed alarm  - Obtain necessary fall risk management equipment  - Apply yellow socks and bracelet for high fall risk patients  - Consider moving patient to room near nurses station  Outcome: Progressing  Goal: Maintain or return to baseline ADL function  Description: INTERVENTIONS:  -  Assess patient's ability to carry out ADLs; assess patient's baseline for ADL function and identify physical deficits which impact ability to perform ADLs (bathing, care of mouth/teeth, toileting, grooming, dressing, etc.)  - Assess/evaluate cause of self-care deficits   - Assess range of motion  - Assess patient's mobility; develop plan if impaired  - Assess patient's need for assistive devices and provide as appropriate  - Encourage maximum independence but intervene and supervise when necessary  - Involve family in performance of ADLs  - Assess for home care needs following discharge   - Consider OT consult to assist with ADL evaluation and planning for discharge  - Provide patient education as appropriate  Outcome: Progressing  Goal: Maintains/Returns to pre admission functional level  Description: INTERVENTIONS:  - Perform AM-PAC 6 Click Basic Mobility/ Daily Activity assessment daily.  - Set and communicate daily mobility goal to care team and patient/family/caregiver.   - Collaborate with rehabilitation services on mobility goals if consulted  - Perform Range of Motion 3 times a day.  - Reposition patient every 2 hours.  - Dangle patient 3 times a day  - Stand patient 3 times a day  - Ambulate patient 3 times a day  - Out of bed to chair 3 times a day   - Out of bed for meals 3 times a day  - Out of bed for toileting  - Record patient progress and toleration of activity level   Outcome: Progressing     Problem: DISCHARGE PLANNING  Goal: Discharge to home or other facility with appropriate resources  Description: INTERVENTIONS:  - Identify barriers to discharge w/patient and caregiver  -  Arrange for needed discharge resources and transportation as appropriate  - Identify discharge learning needs (meds, wound care, etc.)  - Arrange for interpretive services to assist at discharge as needed  - Refer to Case Management Department for coordinating discharge planning if the patient needs post-hospital services based on physician/advanced practitioner order or complex needs related to functional status, cognitive ability, or social support system  Outcome: Progressing     Problem: Knowledge Deficit  Goal: Patient/family/caregiver demonstrates understanding of disease process, treatment plan, medications, and discharge instructions  Description: Complete learning assessment and assess knowledge base.  Interventions:  - Provide teaching at level of understanding  - Provide teaching via preferred learning methods  Outcome: Progressing     Problem: RESPIRATORY - ADULT  Goal: Achieves optimal ventilation and oxygenation  Description: INTERVENTIONS:  - Assess for changes in respiratory status  - Assess for changes in mentation and behavior  - Position to facilitate oxygenation and minimize respiratory effort  - Oxygen administered by appropriate delivery if ordered  - Initiate smoking cessation education as indicated  - Encourage broncho-pulmonary hygiene including cough, deep breathe, Incentive Spirometry  - Assess the need for suctioning and aspirate as needed  - Assess and instruct to report SOB or any respiratory difficulty  - Respiratory Therapy support as indicated  Outcome: Progressing     Problem: GASTROINTESTINAL - ADULT  Goal: Minimal or absence of nausea and/or vomiting  Description: INTERVENTIONS:  - Administer IV fluids if ordered to ensure adequate hydration  - Maintain NPO status until nausea and vomiting are resolved  - Nasogastric tube if ordered  - Administer ordered antiemetic medications as needed  - Provide nonpharmacologic comfort measures as appropriate  - Advance diet as tolerated, if  ordered  - Consider nutrition services referral to assist patient with adequate nutrition and appropriate food choices  Outcome: Progressing     Problem: HEMATOLOGIC - ADULT  Goal: Maintains hematologic stability  Description: INTERVENTIONS  - Assess for signs and symptoms of bleeding or hemorrhage  - Monitor labs  - Administer supportive blood products/factors as ordered and appropriate  Outcome: Progressing     Problem: Prexisting or High Potential for Compromised Skin Integrity  Goal: Skin integrity is maintained or improved  Description: INTERVENTIONS:  - Identify patients at risk for skin breakdown  - Assess and monitor skin integrity  - Assess and monitor nutrition and hydration status  - Monitor labs   - Assess for incontinence   - Turn and reposition patient  - Assist with mobility/ambulation  - Relieve pressure over bony prominences  - Avoid friction and shearing  - Provide appropriate hygiene as needed including keeping skin clean and dry  - Evaluate need for skin moisturizer/barrier cream  - Collaborate with interdisciplinary team   - Patient/family teaching  - Consider wound care consult   Outcome: Progressing     Problem: Nutrition/Hydration-ADULT  Goal: Nutrient/Hydration intake appropriate for improving, restoring or maintaining nutritional needs  Description: Monitor and assess patient's nutrition/hydration status for malnutrition. Collaborate with interdisciplinary team and initiate plan and interventions as ordered.  Monitor patient's weight and dietary intake as ordered or per policy. Utilize nutrition screening tool and intervene as necessary. Determine patient's food preferences and provide high-protein, high-caloric foods as appropriate.     INTERVENTIONS:  - Monitor oral intake, urinary output, labs, and treatment plans  - Assess nutrition and hydration status and recommend course of action  - Evaluate amount of meals eaten  - Assist patient with eating if necessary   - Allow adequate time for  meals  - Recommend/ encourage appropriate diets, oral nutritional supplements, and vitamin/mineral supplements  - Order, calculate, and assess calorie counts as needed  - Recommend, monitor, and adjust tube feedings and TPN/PPN based on assessed needs  - Assess need for intravenous fluids  - Provide specific nutrition/hydration education as appropriate  - Include patient/family/caregiver in decisions related to nutrition  Outcome: Progressing

## 2024-03-05 NOTE — ASSESSMENT & PLAN NOTE
Hx of alcoholic cirrhosis with varices  Follows with GI outpatient  Patient states she has been drinking heavily over the past several days. Typically drinks wine and vodka but recently ran out of alcohol. She has been drinking prescribed mouthwash that is 16% alcohol. Last drink just prior to arrival to ED  Ethanol 275 upon admission  [facility] protocol ordered  Seizure precautions  MELD 22- decreased to 16   DF normal  GI following  Ammonia normal at 30

## 2024-03-05 NOTE — ASSESSMENT & PLAN NOTE
Patient states that she has been feeling very depressed and wishes that she could just die. She states that she has been having suicidal thoughts for quite some time  Follows with [facility] and take Zoloft daily  Hold Zoloft   1:1 monitoring  Psych consult when medically appropriate

## 2024-03-05 NOTE — ASSESSMENT & PLAN NOTE
Patient altered and agitated since her EGD on day of admission  Very difficult time properly sedating patient to maintain vent synchrony   History of heavy use. Given 260 mg IV phenobarbital x 3 doses for potential withdrawal  Unsuccessful SAT the previous day- unable to follow commands and became agitated and was thrashing in bed after  Over night patient acutely agitated and thrashing in bed- required restarting of Propofol, and receiving 2 mg IV Versed    Plan:  Continue CIWA protocol  Patients altered mentation likely in the setting of sedation  Improved mentation off all sedation

## 2024-03-05 NOTE — PROGRESS NOTES
"Progress note - Gastroenterology   Yvette Titus 53 y.o. female MRN: [id number]  Unit/Bed#: -01 Encounter: [id number]    ASSESSMENT and PLAN    1) Hematemesis and acute blood loss anemia  2) Esophageal varices with hemorrhage  EGD 3/1 showed multiple medium and circumferential grade III varices (red nakul sign) in the lower third of the esophagus and GE junction. There was stigmata of recent hemorrhage, placed 7 bands successfully resulting in partial eradication. Hemoglobin fluctuating between 8-10 but she has not required blood transfusion. Hemoglobin currently 8.8. No signs of overt GI bleeding. Tachycardia resolved. BP within normal range.      Tolerating clear liquids   Discontinued octreotide   Continue IV PPI twice daily  Continue IV ceftriaxone for a total of 5 days  Follow H&H and transfuse to keep > 7  She will need repeat EGD in 1 month to reassess varices and possibly repeat banding.  Currently scheduled for EGD on 4/4.     3) Decompensated alcoholic cirrhosis  4) Acute alcoholic intoxication/alcohol withdrawal   Cirrhosis decompensated in the setting of bleeding esophageal varices, coagulopathy, hepatic encephalopathy. Patient acknowledges binge drinking. Patient drinking morning of admission. MELD 3.0 is 18 with a.m. labs today. DF < 32 on admission.     Trend LFTs, monitor MELD score labs, CMP, INR  CIWA protocol  No indication for steroids with low discriminant function        Chief Complaint   Patient presents with    Vomiting Blood     Pt states that within the last hour \"I threw up blood. I'm trying to get over my alcohol withdraw and started throwing up blood. I have esophageal varices\" Last drink 2 hours ago.        SUBJECTIVE/HPI   Patient denies any abdominal pain, nausea or vomiting.  Patient states she is tolerating clear liquids well.  Patient is aware of follow-up EGD already scheduled as an outpatient.  Was also questioning colonoscopy which could not find in the epic system at this " "time.    /77 (BP Location: Right arm)   Pulse 83   Temp 98.6 °F (37 °C) (Oral)   Resp 18   Ht 5' 4.02\" (1.626 m)   Wt 55.5 kg (122 lb 5.7 oz)   SpO2 96%   BMI 20.99 kg/m²     PHYSICALEXAM  General appearance: alert, appears stated age and cooperative  Eyes: PERLLA, EOMI, no icterus   Head: Normocephalic, without obvious abnormality, atraumatic  Lungs: clear to auscultation bilaterally  Heart: regular rate and rhythm, S1, S2 normal, no murmur, click, rub or gallop  Abdomen: soft, non-tender; bowel sounds normal; no masses,  no organomegaly  Extremities: extremities normal, atraumatic, no cyanosis or edema  Neurologic: Grossly normal    Lab Results   Component Value Date    GLUCOSE 90 10/01/2015    CALCIUM 8.8 03/05/2024     09/30/2015    K 4.0 03/05/2024    CO2 26 03/05/2024    CL 98 03/05/2024    BUN 6 03/05/2024    CREATININE 0.51 (L) 03/05/2024     Lab Results   Component Value Date    WBC 6.37 03/05/2024    HGB 8.8 (L) 03/05/2024    HCT 26.7 (L) 03/05/2024     (H) 03/05/2024    PLT 66 (L) 03/05/2024     Lab Results   Component Value Date    ALT 40 03/05/2024     (H) 03/05/2024    ALKPHOS 123 (H) 03/05/2024    BILITOT 0.53 09/30/2015     No results found for: \"AMYLASE\"  Lab Results   Component Value Date    LIPASE 129 (H) 03/01/2024     Lab Results   Component Value Date    IRON 71 10/21/2023    TIBC 206 (L) 10/21/2023    FERRITIN 717 (H) 10/21/2023     Lab Results   Component Value Date    INR 1.38 (H) 03/05/2024     "

## 2024-03-05 NOTE — ASSESSMENT & PLAN NOTE
History of gastritis and esophageal varices  Recently admitted at [facility] for variceal bleeding after a binge-drinking episode  Follows with GI outpatient and was planning to have EGD next week with variceal banding  Presented with bloody emesis that started this morning.  Patient states she had 2 episodes of large-volume emesis prior to arrival.  Episodes of dark brown emesis noted in ED  CT high-volume bleeding scan without evidence of active extravasation  GI consulted-Patient underwent EGD on 3/1 with banding of 7 varices   Hemoglobin stable     Plan:  Continue Protonix IV BID  Continue octreotide infusion continue per GI recommendations  Continue with CTX on day 3/5  Trend hgb Q8 hours transfuse for Hgb < 7  Follow Pt-INR daily  ETOH cessation when appropriate  Patient will need repeat EGD in 1 month for potential more banding  GI following- appreciate recommendations

## 2024-03-05 NOTE — PROGRESS NOTES
Critical Care Interval Transfer Note:    Please refer to progress note from earlier today for full details.     Barriers to discharge:   Trend hgb and platelets  Complete CTX  Trend sodium  ADAT     Consults: IP CONSULT TO CASE MANAGEMENT  IP CONSULT TO GASTROENTEROLOGY  IP CONSULT TO PSYCHIATRY    Recommended to review admission imaging for incidental findings and document in discharge navigator: Chart reviewed, no known incidental findings noted at this time.      Discharge Plan: Anticipate discharge in 24-48 hrs to home.            Patient seen and evaluated by Critical Care today and deemed to be appropriate for transfer to Med Surg. Spoke to Dr. Hendrix from [facility] to accept transfer. Critical care can be contacted via [employer] with any questions or concerns.

## 2024-03-05 NOTE — ASSESSMENT & PLAN NOTE
History of gastritis and esophageal varices  Recently admitted at [facility] for variceal bleeding after a binge-drinking episode  Follows with GI outpatient and was planning to have EGD next week with variceal banding  Presented with bloody emesis that started this morning.  Patient states she had 2 episodes of large-volume emesis prior to arrival.  Episodes of dark brown emesis noted in ED  CT high-volume bleeding scan without evidence of active extravasation  GI consulted-Patient underwent EGD on 3/1 with banding of 7 varices   Hemoglobin stable    Plan:  Continue Protonix IV BID  Continue octreotide infusion continue per GI recommendations  Continue with CTX on day 3/5  Trend hgb Q8 hours transfuse for Hgb < 7  Follow Pt-INR daily  ETOH cessation when appropriate  Patient will need repeat EGD in 1 month for potential more banding  GI following- appreciate recommendations

## 2024-03-06 PROBLEM — G93.41 ACUTE METABOLIC ENCEPHALOPATHY: Status: RESOLVED | Noted: 2024-03-03 | Resolved: 2024-03-06

## 2024-03-06 PROBLEM — J96.00 ACUTE RESPIRATORY FAILURE (HCC): Status: RESOLVED | Noted: 2024-03-01 | Resolved: 2024-03-06

## 2024-03-06 LAB
ALBUMIN SERPL BCP-MCNC: 3.2 G/DL (ref 3.5–5)
ALP SERPL-CCNC: 133 U/L (ref 34–104)
ALT SERPL W P-5'-P-CCNC: 35 U/L (ref 7–52)
ANION GAP SERPL CALCULATED.3IONS-SCNC: 8 MMOL/L
AST SERPL W P-5'-P-CCNC: 104 U/L (ref 13–39)
BACTERIA BLD CULT: NORMAL
BACTERIA BLD CULT: NORMAL
BILIRUB SERPL-MCNC: 2.86 MG/DL (ref 0.2–1)
BUN SERPL-MCNC: 7 MG/DL (ref 5–25)
CALCIUM ALBUM COR SERPL-MCNC: 9.8 MG/DL (ref 8.3–10.1)
CALCIUM SERPL-MCNC: 9.2 MG/DL (ref 8.4–10.2)
CHLORIDE SERPL-SCNC: 98 MMOL/L (ref 96–108)
CO2 SERPL-SCNC: 27 MMOL/L (ref 21–32)
CREAT SERPL-MCNC: 0.4 MG/DL (ref 0.6–1.3)
ERYTHROCYTE [DISTWIDTH] IN BLOOD BY AUTOMATED COUNT: 15.7 % (ref 11.6–15.1)
GFR SERPL CREATININE-BSD FRML MDRD: 118 ML/MIN/1.73SQ M
GLUCOSE SERPL-MCNC: 105 MG/DL (ref 65–140)
HCT VFR BLD AUTO: 27.4 % (ref 34.8–46.1)
HGB BLD-MCNC: 9.1 G/DL (ref 11.5–15.4)
INR PPP: 1.36 (ref 0.84–1.19)
MAGNESIUM SERPL-MCNC: 1.5 MG/DL (ref 1.9–2.7)
MCH RBC QN AUTO: 32.9 PG (ref 26.8–34.3)
MCHC RBC AUTO-ENTMCNC: 33.2 G/DL (ref 31.4–37.4)
MCV RBC AUTO: 99 FL (ref 82–98)
PHOSPHATE SERPL-MCNC: 3.6 MG/DL (ref 2.7–4.5)
PLATELET # BLD AUTO: 67 THOUSANDS/UL (ref 149–390)
PMV BLD AUTO: 9 FL (ref 8.9–12.7)
POTASSIUM SERPL-SCNC: 3.6 MMOL/L (ref 3.5–5.3)
PROT SERPL-MCNC: 7.6 G/DL (ref 6.4–8.4)
PROTHROMBIN TIME: 17.2 SECONDS (ref 11.6–14.5)
RBC # BLD AUTO: 2.77 MILLION/UL (ref 3.81–5.12)
SODIUM SERPL-SCNC: 133 MMOL/L (ref 135–147)
WBC # BLD AUTO: 3.92 THOUSAND/UL (ref 4.31–10.16)

## 2024-03-06 PROCEDURE — 99233 SBSQ HOSP IP/OBS HIGH 50: CPT | Performed by: INTERNAL MEDICINE

## 2024-03-06 PROCEDURE — 85027 COMPLETE CBC AUTOMATED: CPT | Performed by: PHYSICIAN ASSISTANT

## 2024-03-06 PROCEDURE — C9113 INJ PANTOPRAZOLE SODIUM, VIA: HCPCS | Performed by: INTERNAL MEDICINE

## 2024-03-06 PROCEDURE — 84100 ASSAY OF PHOSPHORUS: CPT | Performed by: PHYSICIAN ASSISTANT

## 2024-03-06 PROCEDURE — C9113 INJ PANTOPRAZOLE SODIUM, VIA: HCPCS | Performed by: PHYSICIAN ASSISTANT

## 2024-03-06 PROCEDURE — 85610 PROTHROMBIN TIME: CPT | Performed by: PHYSICIAN ASSISTANT

## 2024-03-06 PROCEDURE — 83735 ASSAY OF MAGNESIUM: CPT | Performed by: PHYSICIAN ASSISTANT

## 2024-03-06 PROCEDURE — 80053 COMPREHEN METABOLIC PANEL: CPT | Performed by: PHYSICIAN ASSISTANT

## 2024-03-06 RX ORDER — DIPHENHYDRAMINE HYDROCHLORIDE AND LIDOCAINE HYDROCHLORIDE AND ALUMINUM HYDROXIDE AND MAGNESIUM HYDRO
10 KIT EVERY 6 HOURS PRN
Status: DISCONTINUED | OUTPATIENT
Start: 2024-03-06 | End: 2024-03-07 | Stop reason: HOSPADM

## 2024-03-06 RX ORDER — MAGNESIUM SULFATE HEPTAHYDRATE 40 MG/ML
2 INJECTION, SOLUTION INTRAVENOUS ONCE
Status: COMPLETED | OUTPATIENT
Start: 2024-03-06 | End: 2024-03-06

## 2024-03-06 RX ORDER — TRAZODONE HYDROCHLORIDE 50 MG/1
50 TABLET ORAL
Status: DISCONTINUED | OUTPATIENT
Start: 2024-03-06 | End: 2024-03-07 | Stop reason: HOSPADM

## 2024-03-06 RX ORDER — GABAPENTIN 100 MG/1
200 CAPSULE ORAL
Status: DISCONTINUED | OUTPATIENT
Start: 2024-03-06 | End: 2024-03-07 | Stop reason: HOSPADM

## 2024-03-06 RX ORDER — MIRTAZAPINE 15 MG/1
15 TABLET, FILM COATED ORAL
Status: DISCONTINUED | OUTPATIENT
Start: 2024-03-06 | End: 2024-03-07 | Stop reason: HOSPADM

## 2024-03-06 RX ORDER — ONDANSETRON 2 MG/ML
4 INJECTION INTRAMUSCULAR; INTRAVENOUS EVERY 8 HOURS PRN
Status: DISCONTINUED | OUTPATIENT
Start: 2024-03-07 | End: 2024-03-07 | Stop reason: HOSPADM

## 2024-03-06 RX ORDER — BENZONATATE 100 MG/1
100 CAPSULE ORAL 3 TIMES DAILY PRN
Status: DISCONTINUED | OUTPATIENT
Start: 2024-03-06 | End: 2024-03-07 | Stop reason: HOSPADM

## 2024-03-06 RX ORDER — ONDANSETRON 2 MG/ML
4 INJECTION INTRAMUSCULAR; INTRAVENOUS EVERY 8 HOURS PRN
Status: DISCONTINUED | OUTPATIENT
Start: 2024-03-06 | End: 2024-03-06

## 2024-03-06 RX ORDER — PHENOL 1.4 %
10 AEROSOL, SPRAY (ML) MUCOUS MEMBRANE
Status: DISCONTINUED | OUTPATIENT
Start: 2024-03-06 | End: 2024-03-06

## 2024-03-06 RX ADMIN — TRAZODONE HYDROCHLORIDE 50 MG: 50 TABLET ORAL at 21:34

## 2024-03-06 RX ADMIN — GABAPENTIN 200 MG: 100 CAPSULE ORAL at 21:30

## 2024-03-06 RX ADMIN — MAGNESIUM SULFATE HEPTAHYDRATE 2 G: 2 INJECTION, SOLUTION INTRAVENOUS at 10:31

## 2024-03-06 RX ADMIN — FOLIC ACID 1 MG: 5 INJECTION, SOLUTION INTRAMUSCULAR; INTRAVENOUS; SUBCUTANEOUS at 09:06

## 2024-03-06 RX ADMIN — MIRTAZAPINE 15 MG: 15 TABLET, FILM COATED ORAL at 21:30

## 2024-03-06 RX ADMIN — ENOXAPARIN SODIUM 40 MG: 100 INJECTION SUBCUTANEOUS at 08:05

## 2024-03-06 RX ADMIN — ONDANSETRON 4 MG: 2 INJECTION INTRAMUSCULAR; INTRAVENOUS at 16:55

## 2024-03-06 RX ADMIN — THIAMINE HYDROCHLORIDE 250 MG: 100 INJECTION, SOLUTION INTRAMUSCULAR; INTRAVENOUS at 08:10

## 2024-03-06 RX ADMIN — POTASSIUM CHLORIDE 20 MEQ: 1500 TABLET, EXTENDED RELEASE ORAL at 08:07

## 2024-03-06 RX ADMIN — Medication 10 MG: at 21:30

## 2024-03-06 RX ADMIN — LOSARTAN POTASSIUM 25 MG: 25 TABLET, FILM COATED ORAL at 08:05

## 2024-03-06 RX ADMIN — PANTOPRAZOLE SODIUM 40 MG: 40 INJECTION, POWDER, FOR SOLUTION INTRAVENOUS at 08:05

## 2024-03-06 RX ADMIN — PANTOPRAZOLE SODIUM 40 MG: 40 INJECTION, POWDER, FOR SOLUTION INTRAVENOUS at 20:18

## 2024-03-06 RX ADMIN — ESCITALOPRAM OXALATE 20 MG: 20 TABLET ORAL at 08:06

## 2024-03-06 RX ADMIN — TRIMETHOBENZAMIDE HYDROCHLORIDE 200 MG: 100 INJECTION INTRAMUSCULAR at 11:14

## 2024-03-06 NOTE — PROGRESS NOTES
Progress note - Gastroenterology   Yvette Titus 53 y.o. female MRN: [id number]  Unit/Bed#: -01 Encounter: [id number]    ASSESSMENT and PLAN    1) Hematemesis and acute blood loss anemia  2) Esophageal varices with hemorrhage  EGD 3/1 showed multiple medium and circumferential grade III varices (red nakul sign) in the lower third of the esophagus and GE junction. There was stigmata of recent hemorrhage, placed 7 bands successfully resulting in partial eradication. Hemoglobin fluctuating between 8-10 but she has not required blood transfusion. Hemoglobin currently 9.1. No signs of overt GI bleeding.     VSS/afebrile    On exam, patient does note generalized abdominal tenderness with palpation.  Discussed with patient importance of oral intake.  Patient states she was getting increased postprandial nausea.  Discussed with patient's nurse possibly administering antiemetics prior to meals if patient continues with after meal nausea.  Unfortunately patient did have prolonged QTc which limits use of antiemetics.  Currently getting Tigan every 6 hours intramuscularly.      Diet advanced to ulcerogenic as tolerated  Discontinued octreotide   Continue IV PPI twice daily  Discontinued IV ceftriaxone total of 5 days  Follow H&H and transfuse to keep > 7  She will need repeat EGD in 1 month to reassess varices and possibly repeat banding.  Currently scheduled for EGD on 4/4.     3) Decompensated alcoholic cirrhosis  4) Acute alcoholic intoxication/alcohol withdrawal   Cirrhosis decompensated in the setting of bleeding esophageal varices, coagulopathy, hepatic encephalopathy. Patient acknowledges binge drinking. Patient drinking morning of admission. MELD 3.0 is 18 with a.m. labs today. DF < 32 on admission.  Liver enzymes continue trending down.  Platelets stable at 67.     Trend LFTs, monitor MELD score labs, CMP, INR  CIWA protocol  No indication for steroids with low discriminant function        Chief Complaint   Patient  "presents with    Vomiting Blood     Pt states that within the last hour \"I threw up blood. I'm trying to get over my alcohol withdraw and started throwing up blood. I have esophageal varices\" Last drink 2 hours ago.        SUBJECTIVE/HPI   Patient states she is having generalized abdominal tenderness with palpation.  She also notes she is having some nausea with eating which is why she is not attempting to eat as much.  Discussed with patient's importance of oral intake.  Encouraged frequent small meals and ordering extra to stay at the bedside.     /84 (BP Location: Left arm)   Pulse 67   Temp 98.1 °F (36.7 °C) (Oral)   Resp 18   Ht 5' 4.02\" (1.626 m)   Wt 55.7 kg (122 lb 12.7 oz)   SpO2 96%   BMI 21.07 kg/m²     PHYSICALEXAM  General appearance: alert, appears stated age and cooperative  Eyes: PERLLA, EOMI, no icterus   Head: Normocephalic, without obvious abnormality, atraumatic  Lungs: clear to auscultation bilaterally  Heart: regular rate and rhythm, S1, S2 normal, no murmur, click, rub or gallop  Abdomen: soft, generalized abdominal tenderness with palpation; bowel sounds normal; no masses,  no organomegaly  Extremities: extremities normal, atraumatic, no cyanosis or edema  Neurologic: Grossly normal    Lab Results   Component Value Date    GLUCOSE 90 10/01/2015    CALCIUM 9.2 03/06/2024     09/30/2015    K 3.6 03/06/2024    CO2 27 03/06/2024    CL 98 03/06/2024    BUN 7 03/06/2024    CREATININE 0.40 (L) 03/06/2024     Lab Results   Component Value Date    WBC 3.92 (L) 03/06/2024    HGB 9.1 (L) 03/06/2024    HCT 27.4 (L) 03/06/2024    MCV 99 (H) 03/06/2024    PLT 67 (L) 03/06/2024     Lab Results   Component Value Date    ALT 35 03/06/2024     (H) 03/06/2024    ALKPHOS 133 (H) 03/06/2024    BILITOT 0.53 09/30/2015     No results found for: \"AMYLASE\"  Lab Results   Component Value Date    LIPASE 129 (H) 03/01/2024     Lab Results   Component Value Date    IRON 71 10/21/2023     " (L) 10/21/2023    FERRITIN 717 (H) 10/21/2023     Lab Results   Component Value Date    INR 1.36 (H) 03/06/2024

## 2024-03-06 NOTE — PROGRESS NOTES
[facility]  Progress Note  Name: Yvette Titus I  MRN: [id number]  Unit/Bed#: -01 I Date of Admission: 3/1/2024   Date of Service: 3/6/2024 I Hospital Day: 5    Assessment/Plan   Thrombocytopenia (HCC)  Assessment & Plan  Most likely related to liver disease and acute blood loss anemia from bleeding varices   Continue  to monitor     Alcoholic cirrhosis (HCC)  Assessment & Plan  Hx of alcoholic cirrhosis with varices  Follows with GI outpatient  Patient states she has been drinking heavily over the past several days. Typically drinks wine and vodka but recently ran out of alcohol. She has been drinking prescribed mouthwash that is 16% alcohol. Last drink just prior to arrival to ED  Ethanol 275 upon admission  CIWA protocol ordered  Seizure precautions  MELD 22- decreased to 16   DF normal  GI following  Ammonia normal at 30    SIRS (systemic inflammatory response syndrome) (HCC)  Assessment & Plan  SIRS: tachycardia, tachypnea  Unlikely infection. SIRS likely secondary to acute GIB  Blood cultures ordered NGTD x 24 hours  Lactic elevated at 4.8 on admission. Cleared in the ICU after fluid resusitation  Started on CTX in the setting of possible variceal bleeding  Remains hemodynamically stable      Plan:  Continue CTX for now  Follow-up culture data  Follow up repeat lactate   Trend fever curve and WBC count    Hyponatremia  Assessment & Plan  Na 126 on admission repeat Na+ in the evening 131. Current Na+ 135  Likely secondary to poor solute intake as patient states she hasn't eaten anything in at least 5 days  Received 2 L NS in ED and 1 L in the ICU     Plan:  Daily BMP    Suicidal ideation  Assessment & Plan  Patient states that she has been feeling very depressed and wishes that she could just die. She states that she has been having suicidal thoughts for quite some time  Follows with [facility] [place] and take Zoloft daily  Start Lexapro    Long Q-T syndrome  Assessment &  Plan  Qtc currently 510  Replace Mg   Trend EKG Q12  Avoid QT prolonging agents    Anemia  Assessment & Plan  Baseline anemia. Follows with Heme/Onc outpatient. Baseline between 9-10  Takes B12 and folic acid supplementation outpatient  H/H remains stable- has not required transfusion      Plan:  Continue thiamine supplementation  Hold oral B12 while NPO  Trend Hgb Q12 hrs   Transfuse for Hgb < 7    Transaminitis  Assessment & Plan  Likely secondary to alcoholic cirrhosis  DF normal  Trend cmp    * GI bleed  Assessment & Plan  History of gastritis and esophageal varices  Recently admitted at [facility] for variceal bleeding after a binge-drinking episode  Follows with GI outpatient and was planning to have EGD next week with variceal banding  Presented with bloody emesis that started this morning.  Patient states she had 2 episodes of large-volume emesis prior to arrival.  Episodes of dark brown emesis noted in ED  CT high-volume bleeding scan without evidence of active extravasation  GI consulted-Patient underwent EGD on 3/1 with banding of 7 varices   Hemoglobin stable  Advance diet     Plan:  Continue Protonix IV BID  Continue octreotide infusion continue per GI recommendations  Continue with CTX on day 3/5  Trend hgb Q8 hours transfuse for Hgb < 7  Follow Pt-INR daily  ETOH cessation when appropriate  Patient will need repeat EGD in 1 month for potential more banding  GI following- appreciate recommendations                VTE Pharmacologic Prophylaxis:   Moderate Risk (Score 3-4) - Pharmacological DVT Prophylaxis Ordered: enoxaparin (Lovenox).    Mobility:   Basic Mobility Inpatient Raw Score: 21  JH-HLM Goal: 6: Walk 10 steps or more  JH-HLM Achieved: 6: Walk 10 steps or more  HLM Goal achieved. Continue to encourage appropriate mobility.    Patient Centered Rounds: I performed bedside rounds with nursing staff today.   Discussions with Specialists or Other Care Team Provider: GI    Education and Discussions with  Family / Patient: Patient declined call to .     Total Time Spent on Date of Encounter in care of patient: 58 mins. This time was spent on one or more of the following: performing physical exam; counseling and coordination of care; obtaining or reviewing history; documenting in the medical record; reviewing/ordering tests, medications or procedures; communicating with other healthcare professionals and discussing with patient's family/caregivers.    Current Length of Stay: 5 day(s)  Current Patient Status: Inpatient   Certification Statement: The patient will continue to require additional inpatient hospital stay due to epigastric pain  Discharge Plan: Anticipate discharge tomorrow to home.    Code Status: Level 1 - Full Code    Subjective:   no acute overnight events     Objective:     Vitals:   Temp (24hrs), Av.3 °F (36.8 °C), Min:98.1 °F (36.7 °C), Max:98.5 °F (36.9 °C)    Temp:  [98.1 °F (36.7 °C)-98.5 °F (36.9 °C)] 98.4 °F (36.9 °C)  HR:  [67-95] 69  Resp:  [14-20] 18  BP: (121-141)/(60-84) 135/72  SpO2:  [93 %-99 %] 97 %  Body mass index is 21.07 kg/m².     Input and Output Summary (last 24 hours):     Intake/Output Summary (Last 24 hours) at 3/6/2024 1622  Last data filed at 3/6/2024 1201  Gross per 24 hour   Intake 725 ml   Output 1250 ml   Net -525 ml       Physical Exam:   Physical Exam     Gen.-Patient comfortable   Neck- Supple. No thyromegaly or lymphadenopathy  Lungs-Clear bilaterally without any wheeze or rales   Heart S1-S2, regular rate and rhythm, no murmurs  Abdomen-soft epigastric tenderness+, no organomegaly. Bowel sounds present  Extremities-no cyanosi,  clubbing or edema  Skin- no rash  Neuro-nonfocal     Additional Data:     Labs:  Results from last 7 days   Lab Units 24  0437 24  0431 24  0455 24  1256 24  1146   WBC Thousand/uL 3.92*   < > 7.30   < > 11.29*   HEMOGLOBIN g/dL 9.1*   < > 9.1*   < > 10.0*   HEMATOCRIT % 27.4*   < > 27.9*   < >  29.7*   PLATELETS Thousands/uL 67*   < > 70*   < > 252   BANDS PCT %  --   --   --   --  1   NEUTROS PCT %  --   --  73   < >  --    LYMPHS PCT %  --   --  22   < >  --    LYMPHO PCT %  --   --   --   --  21   MONOS PCT %  --   --  5   < >  --    MONO PCT %  --   --   --   --  5   EOS PCT %  --   --  0   < > 7*    < > = values in this interval not displayed.     Results from last 7 days   Lab Units 03/06/24  0437   SODIUM mmol/L 133*   POTASSIUM mmol/L 3.6   CHLORIDE mmol/L 98   CO2 mmol/L 27   BUN mg/dL 7   CREATININE mg/dL 0.40*   ANION GAP mmol/L 8   CALCIUM mg/dL 9.2   ALBUMIN g/dL 3.2*   TOTAL BILIRUBIN mg/dL 2.86*   ALK PHOS U/L 133*   ALT U/L 35   AST U/L 104*   GLUCOSE RANDOM mg/dL 105     Results from last 7 days   Lab Units 03/06/24  0437   INR  1.36*     Results from last 7 days   Lab Units 03/05/24  0512 03/04/24  2314 03/04/24  1805 03/04/24  1217 03/04/24  0555 03/04/24  0003 03/03/24  1731 03/03/24  1231 03/03/24  0538 03/03/24  0021   POC GLUCOSE mg/dl 100 107 136 105 118 117 136 190* 161* 114         Results from last 7 days   Lab Units 03/02/24  0136 03/01/24  1951 03/01/24  1703   LACTIC ACID mmol/L 1.2 2.8* 4.7*       Lines/Drains:  Invasive Devices       Peripheral Intravenous Line  Duration             Peripheral IV 03/05/24 Dorsal (posterior);Right Wrist 1 day                          Imaging: No pertinent imaging reviewed.    Recent Cultures (last 7 days):   Results from last 7 days   Lab Units 03/01/24  1721 03/01/24  1703   BLOOD CULTURE  No Growth After 4 Days. No Growth After 4 Days.       Last 24 Hours Medication List:   Current Facility-Administered Medications   Medication Dose Route Frequency Provider Last Rate    benzonatate  100 mg Oral TID PRN Sukumar Hendrix MD      diphenhydramine, lidocaine, Al/Mg hydroxide, simethicone  10 mL Swish & Swallow Q6H PRN Sukumar Hendrix MD      enoxaparin  40 mg Subcutaneous Q24H RAY Sukumar Hendrix MD      escitalopram  20 mg Oral Daily Sukumar  MD Ruma      folic acid 1 mg in sodium chloride 0.9 % 50 mL IVPB  1 mg Intravenous Daily Sukumar Hendrix MD 1 mg (03/06/24 0906)    losartan  25 mg Oral Daily Sukumar Hendrix MD      ondansetron  4 mg Intravenous Q8H PRN Sukumar Hendrix MD      pantoprazole  40 mg Intravenous Q12H RAY Sukumar Hendrix MD      potassium chloride  20 mEq Oral Daily With Breakfast Sukumar Hendrix MD      thiamine  250 mg Intravenous Daily Sukumar Hendrix MD Stopped (03/06/24 0901)    trimethobenzamide  200 mg Intramuscular Q6H PRN Sukumar Hendrix MD          Today, Patient Was Seen By: Sukumar Hendrix MD    **Please Note: This note may have been constructed using a voice recognition system.**

## 2024-03-06 NOTE — OCCUPATIONAL THERAPY NOTE
Occupational Therapy Screen Note     Patient Name: Yvette Titus  Today's Date: 3/6/2024  Problem List  Principal Problem:    GI bleed  Active Problems:    Transaminitis    Anemia    Long Q-T syndrome    Suicidal ideation    Hyponatremia    SIRS (systemic inflammatory response syndrome) (HCC)    Alcoholic cirrhosis (HCC)    Acute respiratory failure (HCC)    Acute metabolic encephalopathy    Thrombocytopenia (HCC)            03/06/24 1207   OT Last Visit   OT Visit Date 03/06/24   Note Type   Note type Screen   Additional Comments Chart review completed. At ICU rounds, RN reports that patient amb to/from the bathroom without assistance. 21 on the AMPAC. No inpatient OT needs. D/C OT       Orly Lilly, OTR/L

## 2024-03-06 NOTE — CASE MANAGEMENT
Case Management Assessment & Discharge Planning Note    Patient name Yvette Titus  Location /-01 MRN [id number]  : 1970 Date 3/6/2024       Current Admission Date: 3/1/2024  Current Admission Diagnosis:GI bleed   Patient Active Problem List    Diagnosis Date Noted    Thrombocytopenia (HCC) 2024    Acute metabolic encephalopathy 2024    Long Q-T syndrome 2024    Suicidal ideation 2024    Hyponatremia 2024    SIRS (systemic inflammatory response syndrome) (HCC) 2024    Alcoholic cirrhosis (HCC) 2024    Acute respiratory failure (HCC) 2024    Anemia 2023    Severe protein-calorie malnutrition (HCC) 2023    Cholelithiases 2023    Cellulitis of hand 2023    Depression 2023    Essential hypertension 2023    Transaminitis 2023    Nausea vomiting and diarrhea 2023    GI bleed 2023      LOS (days): 5  Geometric Mean LOS (GMLOS) (days):   Days to GMLOS:     OBJECTIVE:    Risk of Unplanned Readmission Score: 17.79         Current admission status: Inpatient       Preferred Pharmacy:   [id number] - [place] - [address]  [facility]  [facility]  [place] [address]  Phone: [phone] Fax: [phone]    Primary Care Provider: Alberto Robertson MD    Primary Insurance: [employer]  Secondary Insurance:     ASSESSMENT: Late Entry from 3/5/24  Active Health Care Proxies    There are no active Health Care Proxies on file.       Advance Directives  Does patient have a Health Care POA?: No  Was patient offered paperwork?: Yes  Does patient currently have a Health Care decision maker?: No  Does patient have Advance Directives?: No  Was patient offered paperwork?: Yes  Primary Contact: Newton Wilkes: [place]; [phone]    Readmission Root Cause  30 Day Readmission: No    Patient Information  Admitted from:: Home  Mental Status: Alert  During Assessment patient was  accompanied by: Not accompanied during assessment  Assessment information provided by:: Patient  Primary Caregiver: Self  Support Systems: Self, Spouse/significant other  County of Residence: [place]  What city do you live in?: [place]  Home entry access options. Select all that apply.: Stairs  Number of steps to enter home.: 1  Type of Current Residence: 2 story home  Upon entering residence, is there a bedroom on the main floor (no further steps)?: No  A bedroom is located on the following floor levels of residence (select all that apply):: 2nd Floor  Upon entering residence, is there a bathroom on the main floor (no further steps)?: Yes  Number of steps to 2nd floor from main floor: One Flight  Living Arrangements: Lives w/ Spouse/significant other  Is patient a ?: No    Activities of Daily Living Prior to Admission  Functional Status: Independent  Completes ADLs independently?: Yes  Does patient use assisted devices?: No  Does patient currently own DME?: No  Does patient have a history of Outpatient Therapy (PT/OT)?: No  Does the patient have a history of Short-Term Rehab?: No  Does patient have a history of HHC?: No  Does patient currently have HHC?: No      Patient Information Continued  Income Source: Government aid  Does patient have prescription coverage?: Yes  Does patient receive dialysis treatments?: No  Does patient have a history of substance abuse?: Yes  Historical substance use preference: Alcohol/ETOH  Does patient have a history of Mental Health Diagnosis?: Yes (depression)  Has patient received inpatient treatment related to mental health in the last 2 years?: No         Means of Transportation  Means of Transport to Appts:: Family transport      Social Determinants of Health (SDOH)      Flowsheet Row Most Recent Value   Housing Stability    In the last 12 months, was there a time when you were not able to pay the mortgage or rent on time? N   In the last 12 months, how many places  have you lived? 1   In the last 12 months, was there a time when you did not have a steady place to sleep or slept in a shelter (including now)? N   Transportation Needs    In the past 12 months, has lack of transportation kept you from medical appointments or from getting medications? no   In the past 12 months, has lack of transportation kept you from meetings, work, or from getting things needed for daily living? No   Food Insecurity    Within the past 12 months, you worried that your food would run out before you got the money to buy more. Never true   Within the past 12 months, the food you bought just didn't last and you didn't have money to get more. Never true   Utilities    In the past 12 months has the electric, gas, oil, or water company threatened to shut off services in your home? No            DISCHARGE DETAILS:    Additional Comments: Met with Pt. Pt presents AA&Ox3. Discussed role of case management. Pt lives with SO in 2sh,, 1 louie. B/B on 2nd floor. Powder room on 1st. Independent PTA. Uses [facility] pharmacy in [place], able to afford medications. Pt reports she sees a therapist once a week at [facility]. Pt reports she drinks a pint of vodka over course of week. Pt is not interested in alcohol rehab or resources. Denies hx of VNA/SNF. Has been to [facility] in for alcohol rehab in past. Pt plans to return home and reports SO will transport.

## 2024-03-06 NOTE — ASSESSMENT & PLAN NOTE
History of gastritis and esophageal varices  Recently admitted at [facility] for variceal bleeding after a binge-drinking episode  S/p EGD with 7 bands, APC  Ulcerogenic diet  IV PPI twice daily on discharge  Repeat EGD in 1 month to reassess varices and repeat banding, scheduled for EGD by GI on 4/4

## 2024-03-06 NOTE — PLAN OF CARE
Problem: PAIN - ADULT  Goal: Verbalizes/displays adequate comfort level or baseline comfort level  Description: Interventions:  - Encourage patient to monitor pain and request assistance  - Assess pain using appropriate pain scale  - Administer analgesics based on type and severity of pain and evaluate response  - Implement non-pharmacological measures as appropriate and evaluate response  - Consider cultural and social influences on pain and pain management  - Notify physician/advanced practitioner if interventions unsuccessful or patient reports new pain  Outcome: Progressing     Problem: INFECTION - ADULT  Goal: Absence or prevention of progression during hospitalization  Description: INTERVENTIONS:  - Assess and monitor for signs and symptoms of infection  - Monitor lab/diagnostic results  - Monitor all insertion sites, i.e. indwelling lines, tubes, and drains  - Monitor endotracheal if appropriate and nasal secretions for changes in amount and color  - [place] appropriate cooling/warming therapies per order  - Administer medications as ordered  - Instruct and encourage patient and family to use good hand hygiene technique  - Identify and instruct in appropriate isolation precautions for identified infection/condition  Outcome: Progressing  Goal: Absence of fever/infection during neutropenic period  Description: INTERVENTIONS:  - Monitor WBC    Outcome: Progressing     Problem: SAFETY ADULT  Goal: Patient will remain free of falls  Description: INTERVENTIONS:  - Educate patient/family on patient safety including physical limitations  - Instruct patient to call for assistance with activity   - Consult OT/PT to assist with strengthening/mobility   - Keep Call bell within reach  - Keep bed low and locked with side rails adjusted as appropriate  - Keep care items and personal belongings within reach  - Initiate and maintain comfort rounds  - Make Fall Risk Sign visible to staff  - Offer Toileting every 2 Hours,  in advance of need  - Initiate/Maintain bed alarm  - Obtain necessary fall risk management equipment  - Apply yellow socks and bracelet for high fall risk patients  - Consider moving patient to room near nurses station  Outcome: Progressing  Goal: Maintain or return to baseline ADL function  Description: INTERVENTIONS:  -  Assess patient's ability to carry out ADLs; assess patient's baseline for ADL function and identify physical deficits which impact ability to perform ADLs (bathing, care of mouth/teeth, toileting, grooming, dressing, etc.)  - Assess/evaluate cause of self-care deficits   - Assess range of motion  - Assess patient's mobility; develop plan if impaired  - Assess patient's need for assistive devices and provide as appropriate  - Encourage maximum independence but intervene and supervise when necessary  - Involve family in performance of ADLs  - Assess for home care needs following discharge   - Consider OT consult to assist with ADL evaluation and planning for discharge  - Provide patient education as appropriate  Outcome: Progressing  Goal: Maintains/Returns to pre admission functional level  Description: INTERVENTIONS:  - Perform AM-PAC 6 Click Basic Mobility/ Daily Activity assessment daily.  - Set and communicate daily mobility goal to care team and patient/family/caregiver.   - Collaborate with rehabilitation services on mobility goals if consulted  - Perform Range of Motion 3 times a day.  - Reposition patient every 2 hours.  - Dangle patient 3 times a day  - Stand patient 3 times a day  - Ambulate patient 3 times a day  - Out of bed to chair 3 times a day   - Out of bed for meals 3 times a day  - Out of bed for toileting  - Record patient progress and toleration of activity level   Outcome: Progressing     Problem: DISCHARGE PLANNING  Goal: Discharge to home or other facility with appropriate resources  Description: INTERVENTIONS:  - Identify barriers to discharge w/patient and caregiver  -  Arrange for needed discharge resources and transportation as appropriate  - Identify discharge learning needs (meds, wound care, etc.)  - Arrange for interpretive services to assist at discharge as needed  - Refer to Case Management Department for coordinating discharge planning if the patient needs post-hospital services based on physician/advanced practitioner order or complex needs related to functional status, cognitive ability, or social support system  Outcome: Progressing     Problem: Knowledge Deficit  Goal: Patient/family/caregiver demonstrates understanding of disease process, treatment plan, medications, and discharge instructions  Description: Complete learning assessment and assess knowledge base.  Interventions:  - Provide teaching at level of understanding  - Provide teaching via preferred learning methods  Outcome: Progressing     Problem: RESPIRATORY - ADULT  Goal: Achieves optimal ventilation and oxygenation  Description: INTERVENTIONS:  - Assess for changes in respiratory status  - Assess for changes in mentation and behavior  - Position to facilitate oxygenation and minimize respiratory effort  - Oxygen administered by appropriate delivery if ordered  - Initiate smoking cessation education as indicated  - Encourage broncho-pulmonary hygiene including cough, deep breathe, Incentive Spirometry  - Assess the need for suctioning and aspirate as needed  - Assess and instruct to report SOB or any respiratory difficulty  - Respiratory Therapy support as indicated  Outcome: Progressing     Problem: HEMATOLOGIC - ADULT  Goal: Maintains hematologic stability  Description: INTERVENTIONS  - Assess for signs and symptoms of bleeding or hemorrhage  - Monitor labs  - Administer supportive blood products/factors as ordered and appropriate  Outcome: Progressing     Problem: GASTROINTESTINAL - ADULT  Goal: Minimal or absence of nausea and/or vomiting  Description: INTERVENTIONS:  - Administer IV fluids if ordered  to ensure adequate hydration  - Maintain NPO status until nausea and vomiting are resolved  - Nasogastric tube if ordered  - Administer ordered antiemetic medications as needed  - Provide nonpharmacologic comfort measures as appropriate  - Advance diet as tolerated, if ordered  - Consider nutrition services referral to assist patient with adequate nutrition and appropriate food choices  Outcome: Progressing     Problem: Prexisting or High Potential for Compromised Skin Integrity  Goal: Skin integrity is maintained or improved  Description: INTERVENTIONS:  - Identify patients at risk for skin breakdown  - Assess and monitor skin integrity  - Assess and monitor nutrition and hydration status  - Monitor labs   - Assess for incontinence   - Turn and reposition patient  - Assist with mobility/ambulation  - Relieve pressure over bony prominences  - Avoid friction and shearing  - Provide appropriate hygiene as needed including keeping skin clean and dry  - Evaluate need for skin moisturizer/barrier cream  - Collaborate with interdisciplinary team   - Patient/family teaching  - Consider wound care consult   Outcome: Progressing     Problem: Nutrition/Hydration-ADULT  Goal: Nutrient/Hydration intake appropriate for improving, restoring or maintaining nutritional needs  Description: Monitor and assess patient's nutrition/hydration status for malnutrition. Collaborate with interdisciplinary team and initiate plan and interventions as ordered.  Monitor patient's weight and dietary intake as ordered or per policy. Utilize nutrition screening tool and intervene as necessary. Determine patient's food preferences and provide high-protein, high-caloric foods as appropriate.     INTERVENTIONS:  - Monitor oral intake, urinary output, labs, and treatment plans  - Assess nutrition and hydration status and recommend course of action  - Evaluate amount of meals eaten  - Assist patient with eating if necessary   - Allow adequate time for  meals  - Recommend/ encourage appropriate diets, oral nutritional supplements, and vitamin/mineral supplements  - Order, calculate, and assess calorie counts as needed  - Recommend, monitor, and adjust tube feedings and TPN/PPN based on assessed needs  - Assess need for intravenous fluids  - Provide specific nutrition/hydration education as appropriate  - Include patient/family/caregiver in decisions related to nutrition  Outcome: Progressing

## 2024-03-06 NOTE — PHYSICAL THERAPY NOTE
Physical Therapy Screen    Patient Name: Yvette Titus    Today's Date: 3/6/2024     Problem List  Principal Problem:    GI bleed  Active Problems:    Transaminitis    Anemia    Long Q-T syndrome    Suicidal ideation    Hyponatremia    SIRS (systemic inflammatory response syndrome) (HCC)    Alcoholic cirrhosis (HCC)    Acute respiratory failure (HCC)    Acute metabolic encephalopathy    Thrombocytopenia (HCC)       Past Medical History  Past Medical History:   Diagnosis Date    Alcoholism (HCC) 10/1/2010    Fatty liver 01/20/2023    Gallstones     Long Q-T syndrome         Past Surgical History  Past Surgical History:   Procedure Laterality Date    COLONOSCOPY  4/29/2019    Clear    TUBAL LIGATION  07/14/2005    UPPER GASTROINTESTINAL ENDOSCOPY  1/20/2023    Burst ulcer           03/06/24 1054   PT Last Visit   PT Visit Date 03/06/24   Note Type   Note type Screen   Additional Comments Chart review completed. At ICU rounds, RN reports that patient amb to/from the bathroom without assistance. 21 on the AMPA. No inpatient P.T. needs. D/C P.T.     Shelley Vaughn

## 2024-03-06 NOTE — ASSESSMENT & PLAN NOTE
Patient states that she has been feeling very depressed and wishes that she could just die. She states that she has been having suicidal thoughts for quite some time  Follows with [facility] and take Zoloft daily  Start Lexapro

## 2024-03-07 VITALS
BODY MASS INDEX: 20.85 KG/M2 | WEIGHT: 122.14 LBS | HEART RATE: 87 BPM | RESPIRATION RATE: 18 BRPM | SYSTOLIC BLOOD PRESSURE: 119 MMHG | OXYGEN SATURATION: 95 % | DIASTOLIC BLOOD PRESSURE: 72 MMHG | HEIGHT: 64 IN | TEMPERATURE: 98.3 F

## 2024-03-07 PROBLEM — R65.10 SIRS (SYSTEMIC INFLAMMATORY RESPONSE SYNDROME) (HCC): Status: RESOLVED | Noted: 2024-03-01 | Resolved: 2024-03-07

## 2024-03-07 PROBLEM — E87.1 HYPONATREMIA: Status: RESOLVED | Noted: 2024-03-01 | Resolved: 2024-03-07

## 2024-03-07 LAB
ALBUMIN SERPL BCP-MCNC: 3.2 G/DL (ref 3.5–5)
ALP SERPL-CCNC: 142 U/L (ref 34–104)
ALT SERPL W P-5'-P-CCNC: 34 U/L (ref 7–52)
ANION GAP SERPL CALCULATED.3IONS-SCNC: 7 MMOL/L
AST SERPL W P-5'-P-CCNC: 95 U/L (ref 13–39)
BASOPHILS # BLD AUTO: 0.01 THOUSANDS/ÂΜL (ref 0–0.1)
BASOPHILS NFR BLD AUTO: 0 % (ref 0–1)
BILIRUB SERPL-MCNC: 2.14 MG/DL (ref 0.2–1)
BUN SERPL-MCNC: 4 MG/DL (ref 5–25)
CALCIUM ALBUM COR SERPL-MCNC: 9.4 MG/DL (ref 8.3–10.1)
CALCIUM SERPL-MCNC: 8.8 MG/DL (ref 8.4–10.2)
CHLORIDE SERPL-SCNC: 102 MMOL/L (ref 96–108)
CO2 SERPL-SCNC: 27 MMOL/L (ref 21–32)
CREAT SERPL-MCNC: 0.39 MG/DL (ref 0.6–1.3)
EOSINOPHIL # BLD AUTO: 0.15 THOUSAND/ÂΜL (ref 0–0.61)
EOSINOPHIL NFR BLD AUTO: 4 % (ref 0–6)
ERYTHROCYTE [DISTWIDTH] IN BLOOD BY AUTOMATED COUNT: 16 % (ref 11.6–15.1)
GFR SERPL CREATININE-BSD FRML MDRD: 119 ML/MIN/1.73SQ M
GLUCOSE SERPL-MCNC: 90 MG/DL (ref 65–140)
HCT VFR BLD AUTO: 25.6 % (ref 34.8–46.1)
HGB BLD-MCNC: 8.4 G/DL (ref 11.5–15.4)
IMM GRANULOCYTES # BLD AUTO: 0.01 THOUSAND/UL (ref 0–0.2)
IMM GRANULOCYTES NFR BLD AUTO: 0 % (ref 0–2)
INR PPP: 1.36 (ref 0.84–1.19)
LYMPHOCYTES # BLD AUTO: 0.94 THOUSANDS/ÂΜL (ref 0.6–4.47)
LYMPHOCYTES NFR BLD AUTO: 26 % (ref 14–44)
MCH RBC QN AUTO: 32.7 PG (ref 26.8–34.3)
MCHC RBC AUTO-ENTMCNC: 32.8 G/DL (ref 31.4–37.4)
MCV RBC AUTO: 100 FL (ref 82–98)
MONOCYTES # BLD AUTO: 0.47 THOUSAND/ÂΜL (ref 0.17–1.22)
MONOCYTES NFR BLD AUTO: 13 % (ref 4–12)
NEUTROPHILS # BLD AUTO: 2.01 THOUSANDS/ÂΜL (ref 1.85–7.62)
NEUTS SEG NFR BLD AUTO: 57 % (ref 43–75)
NRBC BLD AUTO-RTO: 0 /100 WBCS
PLATELET # BLD AUTO: 81 THOUSANDS/UL (ref 149–390)
PMV BLD AUTO: 9.6 FL (ref 8.9–12.7)
POTASSIUM SERPL-SCNC: 3.3 MMOL/L (ref 3.5–5.3)
PROT SERPL-MCNC: 7.6 G/DL (ref 6.4–8.4)
PROTHROMBIN TIME: 17.3 SECONDS (ref 11.6–14.5)
RBC # BLD AUTO: 2.57 MILLION/UL (ref 3.81–5.12)
SODIUM SERPL-SCNC: 136 MMOL/L (ref 135–147)
WBC # BLD AUTO: 3.59 THOUSAND/UL (ref 4.31–10.16)

## 2024-03-07 PROCEDURE — 85025 COMPLETE CBC W/AUTO DIFF WBC: CPT | Performed by: INTERNAL MEDICINE

## 2024-03-07 PROCEDURE — 85610 PROTHROMBIN TIME: CPT | Performed by: NURSE PRACTITIONER

## 2024-03-07 PROCEDURE — C9113 INJ PANTOPRAZOLE SODIUM, VIA: HCPCS | Performed by: INTERNAL MEDICINE

## 2024-03-07 PROCEDURE — 99239 HOSP IP/OBS DSCHRG MGMT >30: CPT | Performed by: INTERNAL MEDICINE

## 2024-03-07 PROCEDURE — 93005 ELECTROCARDIOGRAM TRACING: CPT

## 2024-03-07 PROCEDURE — 80053 COMPREHEN METABOLIC PANEL: CPT | Performed by: INTERNAL MEDICINE

## 2024-03-07 RX ORDER — POTASSIUM CHLORIDE 20 MEQ/1
20 TABLET, EXTENDED RELEASE ORAL
Qty: 10 TABLET | Refills: 0 | Status: SHIPPED | OUTPATIENT
Start: 2024-03-08 | End: 2024-03-18

## 2024-03-07 RX ORDER — PANTOPRAZOLE SODIUM 40 MG/1
40 TABLET, DELAYED RELEASE ORAL 2 TIMES DAILY
Qty: 30 TABLET | Refills: 0 | Status: SHIPPED | OUTPATIENT
Start: 2024-03-07

## 2024-03-07 RX ADMIN — POTASSIUM CHLORIDE 20 MEQ: 1500 TABLET, EXTENDED RELEASE ORAL at 07:57

## 2024-03-07 RX ADMIN — PANTOPRAZOLE SODIUM 40 MG: 40 INJECTION, POWDER, FOR SOLUTION INTRAVENOUS at 08:00

## 2024-03-07 RX ADMIN — LOSARTAN POTASSIUM 25 MG: 25 TABLET, FILM COATED ORAL at 08:00

## 2024-03-07 RX ADMIN — ESCITALOPRAM OXALATE 20 MG: 20 TABLET ORAL at 08:00

## 2024-03-07 RX ADMIN — ENOXAPARIN SODIUM 40 MG: 100 INJECTION SUBCUTANEOUS at 08:00

## 2024-03-07 RX ADMIN — FOLIC ACID 1 MG: 5 INJECTION, SOLUTION INTRAMUSCULAR; INTRAVENOUS; SUBCUTANEOUS at 10:06

## 2024-03-07 RX ADMIN — THIAMINE HYDROCHLORIDE 250 MG: 100 INJECTION, SOLUTION INTRAMUSCULAR; INTRAVENOUS at 09:11

## 2024-03-07 NOTE — NURSING NOTE
Patient was discharged to home. Patient walked off the unit with her . Prior to discharge, discharge paperwork and future appointments were discussed. The patient verbalized understanding.

## 2024-03-07 NOTE — PLAN OF CARE
Problem: PAIN - ADULT  Goal: Verbalizes/displays adequate comfort level or baseline comfort level  Description: Interventions:  - Encourage patient to monitor pain and request assistance  - Assess pain using appropriate pain scale  - Administer analgesics based on type and severity of pain and evaluate response  - Implement non-pharmacological measures as appropriate and evaluate response  - Consider cultural and social influences on pain and pain management  - Notify physician/advanced practitioner if interventions unsuccessful or patient reports new pain  3/7/2024 1638 by Kika Holbrook  Outcome: Adequate for Discharge  3/7/2024 1638 by Kika Holbrook  Outcome: Progressing  3/7/2024 1333 by Kika Holbrook  Outcome: Progressing     Problem: INFECTION - ADULT  Goal: Absence or prevention of progression during hospitalization  Description: INTERVENTIONS:  - Assess and monitor for signs and symptoms of infection  - Monitor lab/diagnostic results  - Monitor all insertion sites, i.e. indwelling lines, tubes, and drains  - Monitor endotracheal if appropriate and nasal secretions for changes in amount and color  - [place] appropriate cooling/warming therapies per order  - Administer medications as ordered  - Instruct and encourage patient and family to use good hand hygiene technique  - Identify and instruct in appropriate isolation precautions for identified infection/condition  3/7/2024 1638 by Kika Holbrook  Outcome: Adequate for Discharge  3/7/2024 1638 by Kika Holbrook  Outcome: Progressing  3/7/2024 1333 by Kika Holbrook  Outcome: Progressing  Goal: Absence of fever/infection during neutropenic period  Description: INTERVENTIONS:  - Monitor WBC    3/7/2024 1638 by Kika Holbrook  Outcome: Adequate for Discharge  3/7/2024 1638 by Kika Holbrook  Outcome: Progressing  3/7/2024 1333 by Kika Holbrook  Outcome: Progressing     Problem: SAFETY ADULT  Goal: Patient will remain free of  falls  Description: INTERVENTIONS:  - Educate patient/family on patient safety including physical limitations  - Instruct patient to call for assistance with activity   - Consult OT/PT to assist with strengthening/mobility   - Keep Call bell within reach  - Keep bed low and locked with side rails adjusted as appropriate  - Keep care items and personal belongings within reach  - Initiate and maintain comfort rounds  - Make Fall Risk Sign visible to staff  - Offer Toileting every x Hours, in advance of need  - Initiate/Maintain xalarm  - Obtain necessary fall risk management equipment: x  - Apply yellow socks and bracelet for high fall risk patients  - Consider moving patient to room near nurses station  3/7/2024 1638 by Kika Holbrook  Outcome: Adequate for Discharge  3/7/2024 1638 by Kika Holbrook  Outcome: Progressing  3/7/2024 1333 by Kika Holbrook  Outcome: Progressing  Goal: Maintain or return to baseline ADL function  Description: INTERVENTIONS:  -  Assess patient's ability to carry out ADLs; assess patient's baseline for ADL function and identify physical deficits which impact ability to perform ADLs (bathing, care of mouth/teeth, toileting, grooming, dressing, etc.)  - Assess/evaluate cause of self-care deficits   - Assess range of motion  - Assess patient's mobility; develop plan if impaired  - Assess patient's need for assistive devices and provide as appropriate  - Encourage maximum independence but intervene and supervise when necessary  - Involve family in performance of ADLs  - Assess for home care needs following discharge   - Consider OT consult to assist with ADL evaluation and planning for discharge  - Provide patient education as appropriate  3/7/2024 1638 by Kika Holbrook  Outcome: Adequate for Discharge  3/7/2024 1638 by Kika Holbrook  Outcome: Progressing  3/7/2024 1333 by Kika Holbrook  Outcome: Progressing  Goal: Maintains/Returns to pre admission functional  level  Description: INTERVENTIONS:  - Perform AM-PAC 6 Click Basic Mobility/ Daily Activity assessment daily.  - Set and communicate daily mobility goal to care team and patient/family/caregiver.   - Collaborate with rehabilitation services on mobility goals if consulted  - Perform Range of Motion x times a day.  - Reposition patient every x hours.  - Dangle patient xx times a day  - Stand patient x times a day  - Ambulate patient x times a day  - Out of bed to chair x times a day   - Out of bed for meals x times a day  - Out of bed for toileting  - Record patient progress and toleration of activity level   3/7/2024 1638 by Kika Holbrook  Outcome: Adequate for Discharge  3/7/2024 1638 by Kika Holbrook  Outcome: Progressing  3/7/2024 1333 by Kika Holbrook  Outcome: Progressing     Problem: DISCHARGE PLANNING  Goal: Discharge to home or other facility with appropriate resources  Description: INTERVENTIONS:  - Identify barriers to discharge w/patient and caregiver  - Arrange for needed discharge resources and transportation as appropriate  - Identify discharge learning needs (meds, wound care, etc.)  - Arrange for interpretive services to assist at discharge as needed  - Refer to Case Management Department for coordinating discharge planning if the patient needs post-hospital services based on physician/advanced practitioner order or complex needs related to functional status, cognitive ability, or social support system  3/7/2024 1638 by Kika Holbrook  Outcome: Adequate for Discharge  3/7/2024 1638 by Kika Holbrook  Outcome: Progressing  3/7/2024 1333 by Kika Holbrook  Outcome: Progressing     Problem: Knowledge Deficit  Goal: Patient/family/caregiver demonstrates understanding of disease process, treatment plan, medications, and discharge instructions  Description: Complete learning assessment and assess knowledge base.  Interventions:  - Provide teaching at level of understanding  - Provide teaching  via preferred learning methods  3/7/2024 1638 by Kika Holbrook  Outcome: Adequate for Discharge  3/7/2024 1638 by Kika Holbrook  Outcome: Progressing  3/7/2024 1333 by Kika Holbrook  Outcome: Progressing     Problem: RESPIRATORY - ADULT  Goal: Achieves optimal ventilation and oxygenation  Description: INTERVENTIONS:  - Assess for changes in respiratory status  - Assess for changes in mentation and behavior  - Position to facilitate oxygenation and minimize respiratory effort  - Oxygen administered by appropriate delivery if ordered  - Initiate smoking cessation education as indicated  - Encourage broncho-pulmonary hygiene including cough, deep breathe, Incentive Spirometry  - Assess the need for suctioning and aspirate as needed  - Assess and instruct to report SOB or any respiratory difficulty  - Respiratory Therapy support as indicated  3/7/2024 1638 by Kika Holbrook  Outcome: Adequate for Discharge  3/7/2024 1638 by Kika Holbrook  Outcome: Progressing  3/7/2024 1333 by Kika Holbrook  Outcome: Progressing     Problem: GASTROINTESTINAL - ADULT  Goal: Minimal or absence of nausea and/or vomiting  Description: INTERVENTIONS:  - Administer IV fluids if ordered to ensure adequate hydration  - Maintain NPO status until nausea and vomiting are resolved  - Nasogastric tube if ordered  - Administer ordered antiemetic medications as needed  - Provide nonpharmacologic comfort measures as appropriate  - Advance diet as tolerated, if ordered  - Consider nutrition services referral to assist patient with adequate nutrition and appropriate food choices  3/7/2024 1638 by Kika Holbrook  Outcome: Adequate for Discharge  3/7/2024 1638 by Kika Holbrook  Outcome: Progressing  3/7/2024 1333 by Kika Holbrook  Outcome: Progressing     Problem: HEMATOLOGIC - ADULT  Goal: Maintains hematologic stability  Description: INTERVENTIONS  - Assess for signs and symptoms of bleeding or hemorrhage  - Monitor labs  -  Administer supportive blood products/factors as ordered and appropriate  3/7/2024 1638 by Kika Holbrook  Outcome: Adequate for Discharge  3/7/2024 1638 by Kika Holbrook  Outcome: Progressing  3/7/2024 1333 by Kika Holbrook  Outcome: Progressing     Problem: Prexisting or High Potential for Compromised Skin Integrity  Goal: Skin integrity is maintained or improved  Description: INTERVENTIONS:  - Identify patients at risk for skin breakdown  - Assess and monitor skin integrity  - Assess and monitor nutrition and hydration status  - Monitor labs   - Assess for incontinence   - Turn and reposition patient  - Assist with mobility/ambulation  - Relieve pressure over bony prominences  - Avoid friction and shearing  - Provide appropriate hygiene as needed including keeping skin clean and dry  - Evaluate need for skin moisturizer/barrier cream  - Collaborate with interdisciplinary team   - Patient/family teaching  - Consider wound care consult   3/7/2024 1638 by Kika Holbrook  Outcome: Adequate for Discharge  3/7/2024 1638 by Kika Holbrook  Outcome: Progressing  3/7/2024 1333 by Kika Holbrook  Outcome: Progressing     Problem: SAFETY,RESTRAINT: NV/NON-SELF DESTRUCTIVE BEHAVIOR  Goal: Remains free of harm/injury (restraint for non violent/non self-detsructive behavior)  Description: INTERVENTIONS:  - Instruct patient/family regarding restraint use   - Assess and monitor physiologic and psychological status   - Provide interventions and comfort measures to meet assessed patient needs   - Identify and implement measures to help patient regain control  - Assess readiness for release of restraint   3/7/2024 1638 by Kika Holbrook  Outcome: Adequate for Discharge  3/7/2024 1638 by Kika Holbrook  Outcome: Progressing  3/7/2024 1333 by iKka Holbrook  Outcome: Progressing  Goal: Returns to optimal restraint-free functioning  Description: INTERVENTIONS:  - Assess the patient's behavior and symptoms that  indicate continued need for restraint  - Identify and implement measures to help patient regain control  - Assess readiness for release of restraint   3/7/2024 1638 by Kika Holbrook  Outcome: Adequate for Discharge  3/7/2024 1638 by Kika Holbrook  Outcome: Progressing  3/7/2024 1333 by Kika Holbrook  Outcome: Progressing     Problem: Nutrition/Hydration-ADULT  Goal: Nutrient/Hydration intake appropriate for improving, restoring or maintaining nutritional needs  Description: Monitor and assess patient's nutrition/hydration status for malnutrition. Collaborate with interdisciplinary team and initiate plan and interventions as ordered.  Monitor patient's weight and dietary intake as ordered or per policy. Utilize nutrition screening tool and intervene as necessary. Determine patient's food preferences and provide high-protein, high-caloric foods as appropriate.     INTERVENTIONS:  - Monitor oral intake, urinary output, labs, and treatment plans  - Assess nutrition and hydration status and recommend course of action  - Evaluate amount of meals eaten  - Assist patient with eating if necessary   - Allow adequate time for meals  - Recommend/ encourage appropriate diets, oral nutritional supplements, and vitamin/mineral supplements  - Order, calculate, and assess calorie counts as needed  - Recommend, monitor, and adjust tube feedings and TPN/PPN based on assessed needs  - Assess need for intravenous fluids  - Provide specific nutrition/hydration education as appropriate  - Include patient/family/caregiver in decisions related to nutrition  3/7/2024 1638 by Kika Holbrook  Outcome: Adequate for Discharge  3/7/2024 1638 by Kika Holbrook  Outcome: Progressing  3/7/2024 1333 by Kika Holbrook  Outcome: Progressing

## 2024-03-07 NOTE — PROGRESS NOTES
"Progress note - Gastroenterology   Yvette Titus 53 y.o. female MRN: [id number]  Unit/Bed#: -01 Encounter: [id number]    ASSESSMENT and PLAN    1) Hematemesis and acute blood loss anemia  2) Esophageal varices with hemorrhage  EGD 3/1 showed multiple medium and circumferential grade III varices (red nakul sign) in the lower third of the esophagus and GE junction. There was stigmata of recent hemorrhage, placed 7 bands successfully resulting in partial eradication. Hemoglobin fluctuating between 8-10 but she has not required blood transfusion. Hemoglobin currently 9.1. No signs of overt GI bleeding.     VSS/afebrile     Patient feeling much better today.  She denies nausea or vomiting however she does continue with slight abdominal discomfort with palpation.  Discussed with patient importance of oral intake.  She is on any ulcerogenic diet and discussed keeping her food bland as well as she will continue on PPI until further evaluation.     Diet advanced to ulcerogenic as tolerated  Discontinued octreotide   Continue IV PPI twice daily  Discontinued IV ceftriaxone total of 5 days  Follow H&H and transfuse to keep > 7  She will need repeat EGD in 1 month to reassess varices and possibly repeat banding.  Currently scheduled for EGD on 4/4.     3) Decompensated alcoholic cirrhosis  4) Acute alcoholic intoxication/alcohol withdrawal   Cirrhosis decompensated in the setting of bleeding esophageal varices, coagulopathy, hepatic encephalopathy. Patient acknowledges binge drinking. Patient drinking morning of admission. MELD 3.0 is 18 with a.m. labs today. DF < 32 on admission.  Liver enzymes continue trending down.  Platelets stable at 67.     Trend LFTs, monitor MELD score labs, CMP, INR  CIWA protocol  No indication for steroids with low discriminant function    No GI barriers for discharge.    Chief Complaint   Patient presents with    Vomiting Blood     Pt states that within the last hour \"I threw up blood. I'm trying " "to get over my alcohol withdraw and started throwing up blood. I have esophageal varices\" Last drink 2 hours ago.        SUBJECTIVE/HPI   Patient states she is feeling well this morning.  She is tolerating more meals at this time.  She is having slight abdominal discomfort with palpation.  She denies nausea or vomiting.  Discussed with patient with plan for discharge she does have a follow-up appointment with Dr. Wiley and then a follow-up EGD on 4/4 with Dr. Carreon.    /72   Pulse 87   Temp 98.3 °F (36.8 °C)   Resp 18   Ht 5' 4.02\" (1.626 m)   Wt 55.4 kg (122 lb 2.2 oz)   SpO2 95%   BMI 20.95 kg/m²     PHYSICALEXAM  General appearance: alert, appears stated age and cooperative  Eyes: PERLLA, EOMI, no icterus   Head: Normocephalic, without obvious abnormality, atraumatic  Lungs: clear to auscultation bilaterally  Heart: regular rate and rhythm, S1, S2 normal, no murmur, click, rub or gallop  Abdomen: soft, non-tender; bowel sounds normal; no masses,  no organomegaly  Extremities: extremities normal, atraumatic, no cyanosis or edema  Neurologic: Grossly normal    Lab Results   Component Value Date    GLUCOSE 90 10/01/2015    CALCIUM 8.8 03/07/2024     09/30/2015    K 3.3 (L) 03/07/2024    CO2 27 03/07/2024     03/07/2024    BUN 4 (L) 03/07/2024    CREATININE 0.39 (L) 03/07/2024     Lab Results   Component Value Date    WBC 3.59 (L) 03/07/2024    HGB 8.4 (L) 03/07/2024    HCT 25.6 (L) 03/07/2024     (H) 03/07/2024    PLT 81 (L) 03/07/2024     Lab Results   Component Value Date    ALT 34 03/07/2024    AST 95 (H) 03/07/2024    ALKPHOS 142 (H) 03/07/2024    BILITOT 0.53 09/30/2015     No results found for: \"AMYLASE\"  Lab Results   Component Value Date    LIPASE 129 (H) 03/01/2024     Lab Results   Component Value Date    IRON 71 10/21/2023    TIBC 206 (L) 10/21/2023    FERRITIN 717 (H) 10/21/2023     Lab Results   Component Value Date    INR 1.36 (H) 03/07/2024     "

## 2024-03-07 NOTE — DISCHARGE SUMMARY
[facility]  Discharge- Yvette Titus 1970, 53 y.o. female MRN: [id number]  Unit/Bed#: -[facility] Encounter: [id number]  Primary Care Provider: Alberto Robertson MD   Date and time admitted to hospital: 3/1/2024 11:34 AM    Thrombocytopenia (HCC)  Assessment & Plan  Most likely related to liver disease and acute blood loss anemia from bleeding varices   Continue  to monitor     Alcoholic cirrhosis (HCC)  Assessment & Plan  Hx of alcoholic cirrhosis with varices  Follows with GI outpatient  Outpatient follow-up    Suicidal ideation  Assessment & Plan  Patient states that she has been feeling very depressed and wishes that she could just die. She states that she has been having suicidal thoughts for quite some time  Follows with [facility] and take Zoloft daily  Start Lexapro    Long Q-T syndrome  Assessment & Plan  Qtc currently 510  Replace Mg   Trend EKG Q12  Avoid QT prolonging agents    Anemia  Assessment & Plan  Hemoglobin stable on discharge    Transaminitis  Assessment & Plan  Likely secondary to alcoholic cirrhosis  DF normal  Follow-up with GI    * GI bleed  Assessment & Plan  History of gastritis and esophageal varices  Recently admitted at [facility] for variceal bleeding after a binge-drinking episode  S/p EGD with 7 bands, APC  Ulcerogenic diet  IV PPI twice daily on discharge  Repeat EGD in 1 month to reassess varices and repeat banding, scheduled for EGD by GI on 4/4    Acute metabolic encephalopathy-resolved as of 3/6/2024  Assessment & Plan  Patient altered and agitated since her EGD on day of admission  Very difficult time properly sedating patient to maintain vent synchrony   History of heavy use. Given 260 mg IV phenobarbital x 3 doses for potential withdrawal  Unsuccessful SAT the previous day- unable to follow commands and became agitated and was thrashing in bed after  Over night patient acutely agitated and thrashing in bed- required restarting of Propofol, and  receiving 2 mg IV Versed     Plan:  Continue CIWA protocol  Patients altered mentation likely in the setting of sedation  Improved mentation off all sedation     Acute respiratory failure (HCC)-resolved as of 3/6/2024  Assessment & Plan  Returning to the ICU intubated following EGD  ETT in good position per CXR  VBG 7.451/30.9  Sedation: Propofol, fentanyl, patient was loaded with 260 mg IV Phenobarbital x2 for ETOH withdrawal. As well as PRN Fentanyl  Titrate to maintain RASS - 2  Current Vent settings: 16/320/40/6  Wean FiO2 and PEEP to maintain spO2 > 92%  3/2 patient was not extubated due to lack of cuff leak on exam. Given Solumedrol 40 mg IV Q 6 hours x 2 doses now with cuff leak. Unsuccessful SAT on 3/3- patient not able to follow commands  Pt was extubated 3/4/2024     SIRS (systemic inflammatory response syndrome) (HCC)-resolved as of 3/7/2024  Assessment & Plan  SIRS: tachycardia, tachypnea  Unlikely infection. SIRS likely secondary to acute GIB  Blood cultures ordered NGTD x 24 hours  Lactic elevated at 4.8 on admission. Cleared in the ICU after fluid resusitation  Started on CTX in the setting of possible variceal bleeding  Remains hemodynamically stable      Plan:  Continue CTX for now  Follow-up culture data  Follow up repeat lactate   Trend fever curve and WBC count    Hyponatremia-resolved as of 3/7/2024  Assessment & Plan  Na 126 on admission repeat Na+ in the evening 131. Current Na+ 135  Likely secondary to poor solute intake as patient states she hasn't eaten anything in at least 5 days  Received 2 L NS in ED and 1 L in the ICU     Plan:  Daily BMP              Medical Problems       Resolved Problems  Date Reviewed: 3/4/2024            Resolved    Alcohol abuse 3/1/2024     Resolved by  Radha Brownlee PA-C    Hyponatremia 3/7/2024     Resolved by  Sukumar Hendrix MD    Hypokalemia 3/5/2024     Resolved by  Radha Brownlee PA-C    Hypomagnesemia 3/5/2024     Resolved by  Radha Brownlee PA-C     SIRS (systemic inflammatory response syndrome) (HCC) 3/7/2024     Resolved by  Sukumar Hendrix MD    Acute respiratory failure (HCC) 3/6/2024     Resolved by  Sukumar Hendrix MD    Acute metabolic encephalopathy 3/6/2024     Resolved by  Sukumar Hendrix MD          Admission Date:   Admission Orders (From admission, onward)       Ordered        03/01/24 1403  INPATIENT ADMISSION  Once                            Admitting Diagnosis: Hematemesis [K92.0]  Vomiting blood [K92.0]  GI bleed [K92.2]  Suicidal thoughts [R45.851]  Alcohol use [Z78.9]    HPI:  53 y.o. female with past medical history significant for alcohol abuse, alcoholic cirrhosis, esophageal varices with recent variceal bleed, long QT syndrome, anemia, and anxiety who presented to the ED with hematemesis since this morning.  Patient states that she has been drinking heavily over the past 5 days and has not had anything to eat since that time.  She states that she has been drinking mostly vodka and wine but recently ran out and has been drinking mouthwash that she has from previous dental procedures.  Her last drink was just prior to arriving to ED.  She began vomiting blood this morning and called her sponsor from [facility] who was adamant she come to the emergency room.  She was recently admitted at [facility] for a variceal bleed requiring banding and was scheduled to have an endoscopy earlier this month for further banding.  While in the ED she had multiple episodes of dark brown hematemesis, was tachycardic, but hemodynamically stable.  CT high-volume bleeding scan without evidence of active extravasation and hemoglobin stable.  GI consulted and planning for urgent EGD and she will likely remain intubated following procedure.     Procedures Performed:   Orders Placed This Encounter   Procedures    Critical Care    ED ECG Documentation Only       Summary of Hospital Course: Patient had EGD on 3/1 which showed grade 3 varices in the  lower third of esophagus and GE junction, placed 7 bands, hemoglobin stable.  Patient was started on ceftriaxone for SBP prophylaxis.  Patient was intubated postprocedure and was on Precedex, fentanyl, propofol.  Postextubation patient was seen and evaluated by psychiatry for suicidal ideation mentioned in the ED. patient was started on Lexapro.  Case management provided alcohol rehab And resources.  Patient was not interested in rehab and preferred home discharge.  His diet was progressed from clears to soft surgical none ulcerogenic and she will be discharged on PPI twice daily to have a follow-up EGD on 4/4    Significant Findings, Care, Treatment and Services Provided:     XR chest portable ICU    Result Date: 3/1/2024  Impression: ET tube tip terminates about 3 cm above the rosemary. Small nodular density adjacent to the left heart border, possibly vascular shadows, true nodule not excluded. Attention on follow-up recommended. Workstation performed: [id number]     EGD Banding    Result Date: 3/1/2024  Impression: Multiple medium and circumferential grade III varices (red nakul sign) in the lower third of the esophagus and GE junction; there was stigmata of recent hemorrhage; placed 7 bands successfully, resulting in partial eradication Moderate edematous, erythematous, friable and petechial mucosa in the stomach The duodenum appeared normal. RECOMMENDATION: Continue n.p.o. IV PPI IV octreotide IV ceftriaxone H&H and transfuse as needed Trend INR Repeat EGD in about 1 month to reassess varices and possible repeat banding   Anupama Blank MD     CT high volume bleeding scan abdomen pelvis    Result Date: 3/1/2024  Impression: 1.  No CT evidence of active high-volume gastrointestinal hemorrhage. 2.  Cirrhosis with paraesophageal, gastric, and umbilical varices. Workstation performed: [id number]       No Chest XR results available for this patient.          Condition at Discharge: stable         Discharge  instructions/Information to patient and family:   See after visit summary for information provided to patient and family.      Provisions for Follow-Up Care:  See after visit summary for information related to follow-up care and any pertinent home health orders.      PCP: Alberto Robertson MD    Disposition: Home    Planned Readmission: No    Discharge Statement   I spent 40 minutes discharging the patient. This time was spent on the day of discharge. I had direct contact with the patient on the day of discharge. Additional documentation is required if more than 30 minutes were spent on discharge.     Discharge Medications:  See after visit summary for reconciled discharge medications provided to patient and family.

## 2024-03-07 NOTE — PLAN OF CARE
Problem: PAIN - ADULT  Goal: Verbalizes/displays adequate comfort level or baseline comfort level  Description: Interventions:  - Encourage patient to monitor pain and request assistance  - Assess pain using appropriate pain scale  - Administer analgesics based on type and severity of pain and evaluate response  - Implement non-pharmacological measures as appropriate and evaluate response  - Consider cultural and social influences on pain and pain management  - Notify physician/advanced practitioner if interventions unsuccessful or patient reports new pain  Outcome: Progressing     Problem: INFECTION - ADULT  Goal: Absence or prevention of progression during hospitalization  Description: INTERVENTIONS:  - Assess and monitor for signs and symptoms of infection  - Monitor lab/diagnostic results  - Monitor all insertion sites, i.e. indwelling lines, tubes, and drains  - Monitor endotracheal if appropriate and nasal secretions for changes in amount and color  - [place] appropriate cooling/warming therapies per order  - Administer medications as ordered  - Instruct and encourage patient and family to use good hand hygiene technique  - Identify and instruct in appropriate isolation precautions for identified infection/condition  Outcome: Progressing  Goal: Absence of fever/infection during neutropenic period  Description: INTERVENTIONS:  - Monitor WBC    Outcome: Progressing     Problem: SAFETY ADULT  Goal: Patient will remain free of falls  Description: INTERVENTIONS:  - Educate patient/family on patient safety including physical limitations  - Instruct patient to call for assistance with activity   - Consult OT/PT to assist with strengthening/mobility   - Keep Call bell within reach  - Keep bed low and locked with side rails adjusted as appropriate  - Keep care items and personal belongings within reach  - Initiate and maintain comfort rounds  - Make Fall Risk Sign visible to staff  - Offer Toileting every x Hours,  in advance of need  - Initiate/Maintain xalarm  - Obtain necessary fall risk management equipment: x  - Apply yellow socks and bracelet for high fall risk patients  - Consider moving patient to room near nurses station  Outcome: Progressing  Goal: Maintain or return to baseline ADL function  Description: INTERVENTIONS:  -  Assess patient's ability to carry out ADLs; assess patient's baseline for ADL function and identify physical deficits which impact ability to perform ADLs (bathing, care of mouth/teeth, toileting, grooming, dressing, etc.)  - Assess/evaluate cause of self-care deficits   - Assess range of motion  - Assess patient's mobility; develop plan if impaired  - Assess patient's need for assistive devices and provide as appropriate  - Encourage maximum independence but intervene and supervise when necessary  - Involve family in performance of ADLs  - Assess for home care needs following discharge   - Consider OT consult to assist with ADL evaluation and planning for discharge  - Provide patient education as appropriate  Outcome: Progressing  Goal: Maintains/Returns to pre admission functional level  Description: INTERVENTIONS:  - Perform AM-PAC 6 Click Basic Mobility/ Daily Activity assessment daily.  - Set and communicate daily mobility goal to care team and patient/family/caregiver.   - Collaborate with rehabilitation services on mobility goals if consulted  - Perform Range of Motion x times a day.  - Reposition patient every x hours.  - Dangle patient x times a day  - Stand patient x times a day  - Ambulate patient x times a day  - Out of bed to chair x times a day   - Out of bed for meals x times a day  - Out of bed for toileting  - Record patient progress and toleration of activity level   Outcome: Progressing     Problem: DISCHARGE PLANNING  Goal: Discharge to home or other facility with appropriate resources  Description: INTERVENTIONS:  - Identify barriers to discharge w/patient and caregiver  -  Arrange for needed discharge resources and transportation as appropriate  - Identify discharge learning needs (meds, wound care, etc.)  - Arrange for interpretive services to assist at discharge as needed  - Refer to Case Management Department for coordinating discharge planning if the patient needs post-hospital services based on physician/advanced practitioner order or complex needs related to functional status, cognitive ability, or social support system  Outcome: Progressing     Problem: Knowledge Deficit  Goal: Patient/family/caregiver demonstrates understanding of disease process, treatment plan, medications, and discharge instructions  Description: Complete learning assessment and assess knowledge base.  Interventions:  - Provide teaching at level of understanding  - Provide teaching via preferred learning methods  Outcome: Progressing     Problem: RESPIRATORY - ADULT  Goal: Achieves optimal ventilation and oxygenation  Description: INTERVENTIONS:  - Assess for changes in respiratory status  - Assess for changes in mentation and behavior  - Position to facilitate oxygenation and minimize respiratory effort  - Oxygen administered by appropriate delivery if ordered  - Initiate smoking cessation education as indicated  - Encourage broncho-pulmonary hygiene including cough, deep breathe, Incentive Spirometry  - Assess the need for suctioning and aspirate as needed  - Assess and instruct to report SOB or any respiratory difficulty  - Respiratory Therapy support as indicated  Outcome: Progressing     Problem: GASTROINTESTINAL - ADULT  Goal: Minimal or absence of nausea and/or vomiting  Description: INTERVENTIONS:  - Administer IV fluids if ordered to ensure adequate hydration  - Maintain NPO status until nausea and vomiting are resolved  - Nasogastric tube if ordered  - Administer ordered antiemetic medications as needed  - Provide nonpharmacologic comfort measures as appropriate  - Advance diet as tolerated, if  ordered  - Consider nutrition services referral to assist patient with adequate nutrition and appropriate food choices  Outcome: Progressing     Problem: HEMATOLOGIC - ADULT  Goal: Maintains hematologic stability  Description: INTERVENTIONS  - Assess for signs and symptoms of bleeding or hemorrhage  - Monitor labs  - Administer supportive blood products/factors as ordered and appropriate  Outcome: Progressing     Problem: Prexisting or High Potential for Compromised Skin Integrity  Goal: Skin integrity is maintained or improved  Description: INTERVENTIONS:  - Identify patients at risk for skin breakdown  - Assess and monitor skin integrity  - Assess and monitor nutrition and hydration status  - Monitor labs   - Assess for incontinence   - Turn and reposition patient  - Assist with mobility/ambulation  - Relieve pressure over bony prominences  - Avoid friction and shearing  - Provide appropriate hygiene as needed including keeping skin clean and dry  - Evaluate need for skin moisturizer/barrier cream  - Collaborate with interdisciplinary team   - Patient/family teaching  - Consider wound care consult   Outcome: Progressing     Problem: SAFETY,RESTRAINT: NV/NON-SELF DESTRUCTIVE BEHAVIOR  Goal: Remains free of harm/injury (restraint for non violent/non self-detsructive behavior)  Description: INTERVENTIONS:  - Instruct patient/family regarding restraint use   - Assess and monitor physiologic and psychological status   - Provide interventions and comfort measures to meet assessed patient needs   - Identify and implement measures to help patient regain control  - Assess readiness for release of restraint   Outcome: Progressing  Goal: Returns to optimal restraint-free functioning  Description: INTERVENTIONS:  - Assess the patient's behavior and symptoms that indicate continued need for restraint  - Identify and implement measures to help patient regain control  - Assess readiness for release of restraint   Outcome:  Progressing     Problem: Nutrition/Hydration-ADULT  Goal: Nutrient/Hydration intake appropriate for improving, restoring or maintaining nutritional needs  Description: Monitor and assess patient's nutrition/hydration status for malnutrition. Collaborate with interdisciplinary team and initiate plan and interventions as ordered.  Monitor patient's weight and dietary intake as ordered or per policy. Utilize nutrition screening tool and intervene as necessary. Determine patient's food preferences and provide high-protein, high-caloric foods as appropriate.     INTERVENTIONS:  - Monitor oral intake, urinary output, labs, and treatment plans  - Assess nutrition and hydration status and recommend course of action  - Evaluate amount of meals eaten  - Assist patient with eating if necessary   - Allow adequate time for meals  - Recommend/ encourage appropriate diets, oral nutritional supplements, and vitamin/mineral supplements  - Order, calculate, and assess calorie counts as needed  - Recommend, monitor, and adjust tube feedings and TPN/PPN based on assessed needs  - Assess need for intravenous fluids  - Provide specific nutrition/hydration education as appropriate  - Include patient/family/caregiver in decisions related to nutrition  Outcome: Progressing

## 2024-03-07 NOTE — PLAN OF CARE
Problem: PAIN - ADULT  Goal: Verbalizes/displays adequate comfort level or baseline comfort level  Description: Interventions:  - Encourage patient to monitor pain and request assistance  - Assess pain using appropriate pain scale  - Administer analgesics based on type and severity of pain and evaluate response  - Implement non-pharmacological measures as appropriate and evaluate response  - Consider cultural and social influences on pain and pain management  - Notify physician/advanced practitioner if interventions unsuccessful or patient reports new pain  Outcome: Progressing     Problem: INFECTION - ADULT  Goal: Absence or prevention of progression during hospitalization  Description: INTERVENTIONS:  - Assess and monitor for signs and symptoms of infection  - Monitor lab/diagnostic results  - Monitor all insertion sites, i.e. indwelling lines, tubes, and drains  - Monitor endotracheal if appropriate and nasal secretions for changes in amount and color  - [place] appropriate cooling/warming therapies per order  - Administer medications as ordered  - Instruct and encourage patient and family to use good hand hygiene technique  - Identify and instruct in appropriate isolation precautions for identified infection/condition  Outcome: Progressing  Goal: Absence of fever/infection during neutropenic period  Description: INTERVENTIONS:  - Monitor WBC    Outcome: Progressing     Problem: SAFETY ADULT  Goal: Patient will remain free of falls  Description: INTERVENTIONS:  - Educate patient/family on patient safety including physical limitations  - Instruct patient to call for assistance with activity   - Consult OT/PT to assist with strengthening/mobility   - Keep Call bell within reach  - Keep bed low and locked with side rails adjusted as appropriate  - Keep care items and personal belongings within reach  - Initiate and maintain comfort rounds  - Make Fall Risk Sign visible to staff  - Offer Toileting every 2 Hours,  in advance of need  - Initiate/Maintain 2alarm  - Obtain necessary fall risk management equipment: 2  - Apply yellow socks and bracelet for high fall risk patients  - Consider moving patient to room near nurses station  Outcome: Progressing  Goal: Maintain or return to baseline ADL function  Description: INTERVENTIONS:  -  Assess patient's ability to carry out ADLs; assess patient's baseline for ADL function and identify physical deficits which impact ability to perform ADLs (bathing, care of mouth/teeth, toileting, grooming, dressing, etc.)  - Assess/evaluate cause of self-care deficits   - Assess range of motion  - Assess patient's mobility; develop plan if impaired  - Assess patient's need for assistive devices and provide as appropriate  - Encourage maximum independence but intervene and supervise when necessary  - Involve family in performance of ADLs  - Assess for home care needs following discharge   - Consider OT consult to assist with ADL evaluation and planning for discharge  - Provide patient education as appropriate  Outcome: Progressing  Goal: Maintains/Returns to pre admission functional level  Description: INTERVENTIONS:  - Perform AM-PAC 6 Click Basic Mobility/ Daily Activity assessment daily.  - Set and communicate daily mobility goal to care team and patient/family/caregiver.   - Collaborate with rehabilitation services on mobility goals if consulted  - Perform Range of Motion 2 times a day.  - Reposition patient every 2 hours.  - Dangle patient 2 times a day  - Stand patient 2 times a day  - Ambulate patient 2 times a day  - Out of bed to chair 2 times a day   - Out of bed for meals 2 times a day  - Out of bed for toileting  - Record patient progress and toleration of activity level   Outcome: Progressing     Problem: DISCHARGE PLANNING  Goal: Discharge to home or other facility with appropriate resources  Description: INTERVENTIONS:  - Identify barriers to discharge w/patient and caregiver  -  Arrange for needed discharge resources and transportation as appropriate  - Identify discharge learning needs (meds, wound care, etc.)  - Arrange for interpretive services to assist at discharge as needed  - Refer to Case Management Department for coordinating discharge planning if the patient needs post-hospital services based on physician/advanced practitioner order or complex needs related to functional status, cognitive ability, or social support system  Outcome: Progressing     Problem: Knowledge Deficit  Goal: Patient/family/caregiver demonstrates understanding of disease process, treatment plan, medications, and discharge instructions  Description: Complete learning assessment and assess knowledge base.  Interventions:  - Provide teaching at level of understanding  - Provide teaching via preferred learning methods  Outcome: Progressing     Problem: RESPIRATORY - ADULT  Goal: Achieves optimal ventilation and oxygenation  Description: INTERVENTIONS:  - Assess for changes in respiratory status  - Assess for changes in mentation and behavior  - Position to facilitate oxygenation and minimize respiratory effort  - Oxygen administered by appropriate delivery if ordered  - Initiate smoking cessation education as indicated  - Encourage broncho-pulmonary hygiene including cough, deep breathe, Incentive Spirometry  - Assess the need for suctioning and aspirate as needed  - Assess and instruct to report SOB or any respiratory difficulty  - Respiratory Therapy support as indicated  Outcome: Progressing     Problem: GASTROINTESTINAL - ADULT  Goal: Minimal or absence of nausea and/or vomiting  Description: INTERVENTIONS:  - Administer IV fluids if ordered to ensure adequate hydration  - Maintain NPO status until nausea and vomiting are resolved  - Nasogastric tube if ordered  - Administer ordered antiemetic medications as needed  - Provide nonpharmacologic comfort measures as appropriate  - Advance diet as tolerated, if  ordered  - Consider nutrition services referral to assist patient with adequate nutrition and appropriate food choices  Outcome: Progressing     Problem: HEMATOLOGIC - ADULT  Goal: Maintains hematologic stability  Description: INTERVENTIONS  - Assess for signs and symptoms of bleeding or hemorrhage  - Monitor labs  - Administer supportive blood products/factors as ordered and appropriate  Outcome: Progressing     Problem: Prexisting or High Potential for Compromised Skin Integrity  Goal: Skin integrity is maintained or improved  Description: INTERVENTIONS:  - Identify patients at risk for skin breakdown  - Assess and monitor skin integrity  - Assess and monitor nutrition and hydration status  - Monitor labs   - Assess for incontinence   - Turn and reposition patient  - Assist with mobility/ambulation  - Relieve pressure over bony prominences  - Avoid friction and shearing  - Provide appropriate hygiene as needed including keeping skin clean and dry  - Evaluate need for skin moisturizer/barrier cream  - Collaborate with interdisciplinary team   - Patient/family teaching  - Consider wound care consult   Outcome: Progressing     Problem: SAFETY,RESTRAINT: NV/NON-SELF DESTRUCTIVE BEHAVIOR  Goal: Remains free of harm/injury (restraint for non violent/non self-detsructive behavior)  Description: INTERVENTIONS:  - Instruct patient/family regarding restraint use   - Assess and monitor physiologic and psychological status   - Provide interventions and comfort measures to meet assessed patient needs   - Identify and implement measures to help patient regain control  - Assess readiness for release of restraint   Outcome: Progressing  Goal: Returns to optimal restraint-free functioning  Description: INTERVENTIONS:  - Assess the patient's behavior and symptoms that indicate continued need for restraint  - Identify and implement measures to help patient regain control  - Assess readiness for release of restraint   Outcome:  Progressing     Problem: Nutrition/Hydration-ADULT  Goal: Nutrient/Hydration intake appropriate for improving, restoring or maintaining nutritional needs  Description: Monitor and assess patient's nutrition/hydration status for malnutrition. Collaborate with interdisciplinary team and initiate plan and interventions as ordered.  Monitor patient's weight and dietary intake as ordered or per policy. Utilize nutrition screening tool and intervene as necessary. Determine patient's food preferences and provide high-protein, high-caloric foods as appropriate.     INTERVENTIONS:  - Monitor oral intake, urinary output, labs, and treatment plans  - Assess nutrition and hydration status and recommend course of action  - Evaluate amount of meals eaten  - Assist patient with eating if necessary   - Allow adequate time for meals  - Recommend/ encourage appropriate diets, oral nutritional supplements, and vitamin/mineral supplements  - Order, calculate, and assess calorie counts as needed  - Recommend, monitor, and adjust tube feedings and TPN/PPN based on assessed needs  - Assess need for intravenous fluids  - Provide specific nutrition/hydration education as appropriate  - Include patient/family/caregiver in decisions related to nutrition  Outcome: Progressing

## 2024-03-08 ENCOUNTER — TELEPHONE (OUTPATIENT)
Age: 54
End: 2024-03-08

## 2024-03-08 NOTE — UTILIZATION REVIEW
NOTIFICATION OF ADMISSION DISCHARGE   This is a Notification of Discharge from [facility]. Please be advised that this patient has been discharge from our facility. Below you will find the admission and discharge date and time including the patient’s disposition.   UTILIZATION REVIEW CONTACT:  Tamiko Morocho  Utilization   Network Utilization Review Department  Phone: [phone] x carefully listen to the prompts. All voicemails are confidential.  Email: [email or web address]     ADMISSION INFORMATION  PRESENTATION DATE: 3/1/2024 11:34 AM  OBERVATION ADMISSION DATE:   INPATIENT ADMISSION DATE: 3/1/24  2:03 PM   DISCHARGE DATE: 3/7/2024  4:40 PM   DISPOSITION:Home/Self Care    Network Utilization Review Department  ATTENTION: Please call with any questions or concerns to [phone] and carefully listen to the prompts so that you are directed to the right person. All voicemails are confidential.   For Discharge needs, contact Care Management DC Support Team at [phone] opt. 2  Send all requests for admission clinical reviews, approved or denied determinations and any other requests to dedicated fax number below belonging to the campus where the patient is receiving treatment. List of dedicated fax numbers for the Facilities:  FACILITY NAME UR FAX NUMBER   ADMISSION DENIALS (Administrative/Medical Necessity) [phone]   DISCHARGE SUPPORT TEAM ([facility]) [phone]   PARENT CHILD HEALTH (Maternity/NICU/Pediatrics) [phone]   [facility] [phone]   General acute hospital [phone]   [facility] [phone]   [facility] [phone]   [facility] [phone]   [facility] [phone]   [facility] [phone]   [facility] [phone]   [facility]  [facility] [phone]   [facility] [phone]   General acute hospital [phone]   [facility] [phone]

## 2024-03-08 NOTE — TELEPHONE ENCOUNTER
Patient states that Dr. Hays states she should not have the colonoscopy at this time and just to move forward with repeat EGD.  Will change the appointment on 4/4 to EGD instead of colonoscopy.

## 2024-03-08 NOTE — TELEPHONE ENCOUNTER
Patients GI provider:  Dr. MALCOLM    Number to return call: [phone]     Reason for call: Pt calling office stating she was recently release from hospital where they did an EGD. She was told she needs a repeat EGD in a few weeks.   Patient is scheduled 4/4/2024 for colonoscopy. Please contact pt to further discuss if EGD can be added onto 04/04 , or if patient needs hospital follow up prior.

## 2024-03-09 LAB
ATRIAL RATE: 79 BPM
P AXIS: 52 DEGREES
PR INTERVAL: 148 MS
QRS AXIS: 26 DEGREES
QRSD INTERVAL: 84 MS
QT INTERVAL: 430 MS
QTC INTERVAL: 493 MS
T WAVE AXIS: 58 DEGREES
VENTRICULAR RATE: 79 BPM

## 2024-03-09 PROCEDURE — 93010 ELECTROCARDIOGRAM REPORT: CPT | Performed by: INTERNAL MEDICINE

## 2024-03-10 LAB
ATRIAL RATE: 102 BPM
ATRIAL RATE: 109 BPM
ATRIAL RATE: 76 BPM
ATRIAL RATE: 80 BPM
ATRIAL RATE: 95 BPM
P AXIS: 69 DEGREES
P AXIS: 71 DEGREES
P AXIS: 77 DEGREES
P AXIS: 79 DEGREES
P AXIS: 79 DEGREES
PR INTERVAL: 138 MS
PR INTERVAL: 142 MS
PR INTERVAL: 144 MS
QRS AXIS: 67 DEGREES
QRS AXIS: 70 DEGREES
QRS AXIS: 74 DEGREES
QRS AXIS: 76 DEGREES
QRS AXIS: 90 DEGREES
QRSD INTERVAL: 76 MS
QRSD INTERVAL: 80 MS
QT INTERVAL: 360 MS
QT INTERVAL: 374 MS
QT INTERVAL: 380 MS
QT INTERVAL: 406 MS
QT INTERVAL: 450 MS
QTC INTERVAL: 468 MS
QTC INTERVAL: 469 MS
QTC INTERVAL: 469 MS
QTC INTERVAL: 506 MS
QTC INTERVAL: 511 MS
T WAVE AXIS: 55 DEGREES
T WAVE AXIS: 69 DEGREES
T WAVE AXIS: 74 DEGREES
T WAVE AXIS: 74 DEGREES
T WAVE AXIS: 87 DEGREES
VENTRICULAR RATE: 102 BPM
VENTRICULAR RATE: 109 BPM
VENTRICULAR RATE: 76 BPM
VENTRICULAR RATE: 80 BPM
VENTRICULAR RATE: 95 BPM

## 2024-03-10 PROCEDURE — 93010 ELECTROCARDIOGRAM REPORT: CPT | Performed by: INTERNAL MEDICINE

## 2024-03-12 ENCOUNTER — OFFICE VISIT (OUTPATIENT)
Dept: GASTROENTEROLOGY | Facility: CLINIC | Age: 54
End: 2024-03-12
Payer: COMMERCIAL

## 2024-03-12 VITALS
TEMPERATURE: 99.5 F | DIASTOLIC BLOOD PRESSURE: 70 MMHG | SYSTOLIC BLOOD PRESSURE: 102 MMHG | HEART RATE: 81 BPM | BODY MASS INDEX: 23.22 KG/M2 | WEIGHT: 123 LBS | OXYGEN SATURATION: 99 % | HEIGHT: 61 IN

## 2024-03-12 DIAGNOSIS — K70.9 BLEEDING ESOPHAGEAL VARICES DUE TO ALCOHOLIC LIVER DISEASE (HCC): ICD-10-CM

## 2024-03-12 DIAGNOSIS — K70.30 ALCOHOLIC CIRRHOSIS OF LIVER WITHOUT ASCITES (HCC): Primary | ICD-10-CM

## 2024-03-12 DIAGNOSIS — I85.11 BLEEDING ESOPHAGEAL VARICES DUE TO ALCOHOLIC LIVER DISEASE (HCC): ICD-10-CM

## 2024-03-12 PROCEDURE — 99245 OFF/OP CONSLTJ NEW/EST HI 55: CPT | Performed by: STUDENT IN AN ORGANIZED HEALTH CARE EDUCATION/TRAINING PROGRAM

## 2024-03-12 RX ORDER — BACLOFEN 10 MG/1
10 TABLET ORAL
Qty: 30 TABLET | Refills: 0 | Status: SHIPPED | OUTPATIENT
Start: 2024-03-12 | End: 2024-04-11

## 2024-03-12 RX ORDER — NADOLOL 20 MG/1
40 TABLET ORAL DAILY
COMMUNITY

## 2024-03-12 NOTE — PROGRESS NOTES
[facility]  Outpatient Hepatology Consultation  Encounter: [id number]    PATIENT INFO     Name: Yvette Titus  YOB: 1970   Age: 53 y.o.   Sex: female   MRN: [id number]    ASSESSMENT & PLAN     Problems Addressed this Visit:   1. Alcoholic cirrhosis of liver without ascites (HCC)    2. Bleeding esophageal varices due to alcoholic liver disease (HCC)        Orders Placed This Encounter   Procedures   • MRI abdomen w wo contrast   • Comprehensive metabolic panel   • CBC and Platelet   • Protime-INR   • Alpha 1 Antitrypsin Phenotype   • Alpha-1-antitrypsin   • IgG, IgA, IgM       1. Decompensated Alcohol Cirrhosis: (MELD-Na 15): Patient with a history of alcohol cirrhosis decompensated by prior history of bleeding esophageal varices.  Patient has had 2 hospitalizations in the past due to hematemesis from variceal bleed.  Patient most recently hospitalized 3/2024 at which time EGD performed on 3/1 did show medium esophageal varices with stigmata of recent bleed.  Status post banding with partial eradication.  Currently scheduled to have follow-up EGD on 4/4/24. Reports that prior episode of bleeding was addressed at [facility] in May 2023. At that time she did not require banding. She has intermittently continued to use alcohol since then, last drink 3/1/24 day of admission.     This episode in March 2024 was the first time she required banding.     MELD 3.0: 15 at 3/7/2024  7:16 AM  MELD-Na: 14 at 3/7/2024  7:16 AM  Calculated from:  Serum Creatinine: 0.39 mg/dL (Using min of 1 mg/dL) at 3/7/2024  3:46 AM  Serum Sodium: 136 mmol/L at 3/7/2024  3:46 AM  Total Bilirubin: 2.14 mg/dL at 3/7/2024  3:46 AM  Serum Albumin: 3.2 g/dL at 3/7/2024  3:46 AM  INR(ratio): 1.36 at 3/7/2024  7:16 AM  Age at listing (hypothetical): 53 years  Sex: Female at 3/7/2024  7:16 AM    # Varices:   History of EVBL; most recently hospitalized 3/2024  Last EGD: 3/1/24 - medium EV S/P x7 bands with partial  eradication  Current Regimen: None  Next EGD scheduled for 4/4/24   She needs secondary ppx for variceal hemorrhage given 2 admissions of variceal bleeding so far  She is currently on nadolol 20 mg daily, has repeat EGD upcoming in April 2024  HR at present around 80s, /70s. I will ask her to increase nadolol to 40 mg; to get HR to goal range of 55-60. She said she has machine at home to check BP and HR and I will ask her to start doing that.    # Ascites:  No prior history, recent CT from 3/1/2024 with no evidence of ascites  On examination today: No significant distension  Current Regimen: None, diuretics not indicated at this time    # Hepatic Encephalopathy:   No prior history of hepatic encephalopathy  On examination today: AAOx3, no tremors  Current Regimen: No indication for lactulose/rifaximin at this time    # HCC Surveillance and Transplant Evaluation:  Most recent HCC Screening: US abdomen with no suspicious lesions completed 9/17/2023; She also had CT high volume bleed scan done in 3/2024 showing no focal lesions, no portal vein thrombosis  Due: Now - US Abdomen previously ordered  Previously Referred: Has not been referred for transplant  Barriers to Transplant: Continued alcohol use   I will also order MRI    # Alcohol Abuse:   Longstanding history of alcohol abuse  Current Usage: 1 bottle of vodka, 1 bottle of wine every week  Last Drink: 3/1/24  Patient has used vivitrol 380 mg in the past with success however cannot be used further due to decompensated liver disease, will start baclofen qhs and increase upto TID, have advised not to drive  Complete alcohol avoidance moving forward with close follow up with labs    # Screening and Health Maintenance:   Colon cancer screening: discuss with primary GI   Encouraged to continue high protein and 2g Na restricted diet; avoid eating raw shellfish  Limit use of Tylenol to no more than 2 grams in 24 hour period and avoid use of all NSAIDs and  narcotics  Encouraged to participate in daily exercise to avoid muscle wasting  Avoid use of alcohol and strongly encourage tobacco cessation  Pending HAV and HBC immunity recommend vaccination along with yearly flu and pneumonia vaccine through PCP    We have discussed natural progression of the decompensated liver disease in detail. Would recommend continued alcohol avoidance, will try baclofen pharmacotherapy. Continue follow up with therapist as currently ongoing. Monitor HR and BP with increased dose of nadolol 40 mg.      FOLLOW-UP: Complete alcohol avoidance moving forward with close follow up with labs in 1-2 month    HISTORY OF PRESENT ILLNESS       Yvette Titus is a 53 y.o. female who presents to GI office to establish care for cirrhosis management. Patient with PMHx of alcohol cirrhosis d/b bleeding EV and c/b thrombocytopenia and coagulopathy, HTN, and depression.     Patient recently admitted to [facility] from 3/1 to 3/7 after presenting with hematemesis. Prior to this admission, patient had recent admission to [facility] for a variceal bleeding for which patient underwent EGD with banding. Following initial [facility] admission, patient was admitted to the ICU intubated. She underwent EGD on 3/1 which revealed multiple medium grade III varices with red nakul sign in the lower esophagus and GE junction with stigmata of recent bleeding. S/P placement of x7 bands with partial erdication. She was continued on PPI, octreotide, and SBP prophylaxis with Ceftriaxone.  Additionally during admission, MDF <32 and therefore patient was not treated with steroids.     Prior to this admission, patient had been seen by Jorge Hays MD.  Patient also with ongoing issues with alcohol abuse.  Patient been previously on Vivitrol that helped her control her urges to drink.      She reports that she is currently , lives with her partner for 18 months. Had DUI in 2011 and lost her job thereafter. Has 3 kids. She has been  in inpatient rehabs for nearly 5 times in span of 10 years. The longest time she has been sober has been nearly 3 months.     MOST RECENT ENDOSCOPIC HISTORY     UPPER ENDOSCOPY; 3/1/24 - Multiple medium and circumferential grade III (red nakul sign) in the lower third of the esophagus and GE junction.  There was stigmata of recent hemorrhage.  Status post 7 bands placed successfully with partial eradication.  Moderate edematous, erythematous, friable and petechial mucosa in the stomach.  The duodenum appeared normal.    COLONOSCOPY: No prior colonoscopy    REVIEW OF SYSTEMS     CONSTITUTIONAL: Denies any fever, chills, rigors, and weight loss  HEENT: No earache or tinnitus, denies hearing loss or visual disturbances  CARDIOVASCULAR: No chest pain or palpitations  RESPIRATORY: Denies any cough, hemoptysis, shortness of breath or dyspnea on exertion  GASTROINTESTINAL: As noted in the History of Present Illness  GENITOURINARY: No problems with urination, denies any hematuria or dysuria  NEUROLOGIC: No dizziness or vertigo, denies headaches   MUSCULOSKELETAL: Denies any muscle or joint pain   SKIN: Denies skin rashes or itching  ENDOCRINE: Denies excessive thirst, denies intolerance to heat or cold  PSYCHOSOCIAL: Denies depression or anxiety, denies any recent memory loss     Historical Information   Past Medical History:   Diagnosis Date   • Alcoholism (HCC) 10/1/2010   • Fatty liver 01/20/2023   • Gallstones    • Long Q-T syndrome      Past Surgical History:   Procedure Laterality Date   • COLONOSCOPY  4/29/2019    Clear   • TUBAL LIGATION  07/14/2005   • UPPER GASTROINTESTINAL ENDOSCOPY  1/20/2023    Burst ulcer     Social History   Social History     Substance and Sexual Activity   Alcohol Use Yes    Comment: pint of vodka and a bottle of wine     Social History     Substance and Sexual Activity   Drug Use Never     Social History     Tobacco Use   Smoking Status Never   • Passive exposure: Never   Smokeless Tobacco  "Never     Family History   Problem Relation Age of Onset   • Cancer Mother          MEDICATIONS AND ALLERGIES     Current Outpatient Medications   Medication Instructions   • baclofen 10 mg, Oral, Daily at bedtime   • escitalopram (LEXAPRO) 20 mg, Oral, Daily, DC by barbara on 1/13   • folic acid (FOLVITE) 1,000 mcg, Oral, Daily   • gabapentin (NEURONTIN) 200 mg, Oral, Daily   • magnesium Oxide (MAG-OX) 400 mg, Oral, Once   • Melatonin 10 mg, Oral, Daily at bedtime PRN   • mirtazapine (REMERON) 15 mg, Oral, Daily at bedtime   • Multiple Vitamins-Minerals (multivitamin with minerals) tablet 1 tablet, Oral, Daily   • nadolol (CORGARD) 40 mg, Oral, Daily   • pantoprazole (PROTONIX) 40 mg, Oral, 2 times daily   • potassium chloride (Klor-Con M20) 20 mEq tablet 20 mEq, Oral, Daily with breakfast   • traZODone (DESYREL) 100 mg, Oral, Daily at bedtime   • valsartan (DIOVAN) 40 mg, Oral, Daily     No Known Allergies    PHYSICAL EXAM      Objective   Blood pressure 102/70, pulse 81, temperature 99.5 °F (37.5 °C), temperature source Tympanic, height 5' 1\" (1.549 m), weight 55.8 kg (123 lb), SpO2 99%. Body mass index is 23.24 kg/m².    General Appearance:   Alert, cooperative, no distress   HEENT:   Normocephalic, atraumatic, anicteric     Neck:   Supple, symmetrical, trachea midline   Lungs:   Equal chest rise, respirations unlabored    Heart:   Regular rate and rhythm   Abdomen:   Soft, non-tender, non-distended; normal bowel sounds; no masses, no organomegaly    Rectal:   Deferred    Extremities:   No cyanosis, clubbing or edema    Neuro:   Moves all 4 extremities    Skin:   No jaundice, rashes, or lesions      LABORATORY RESULTS     No visits with results within 1 Day(s) from this visit.   Latest known visit with results is:   No results displayed because visit has over 200 results.        XR chest portable ICU    Result Date: 3/1/2024  Narrative: XR CHEST PORTABLE ICU INDICATION: ETT placement. COMPARISON: Chest radiograph " 1/20/2023 Views: 2 (supine portable) FINDINGS: -ET tube tip terminates about 3 cm above the rosemary. No confluent infiltrates. Small nodular density adjacent to the left heart border, possibly vascular shadows, true nodule not excluded. No pneumothorax or pleural effusion. Normal cardiomediastinal silhouette. Bones are unremarkable for age. Normal upper abdomen.     Impression: ET tube tip terminates about 3 cm above the rosemary. Small nodular density adjacent to the left heart border, possibly vascular shadows, true nodule not excluded. Attention on follow-up recommended. Workstation performed: [id number]     EGD Banding    Result Date: 3/1/2024  Narrative: Table formatting from the original result was not included. [facility] Operating Room [address] [facility] [address] [phone] DATE OF SERVICE: 3/01/24 PHYSICIAN(S): Attending: Anupama Blank MD Fellow: No Staff Documented INDICATION: GI bleed POST-OP DIAGNOSIS: See the impression below. PREPROCEDURE: Informed consent was obtained for the procedure, including sedation.  Risks of perforation, hemorrhage, adverse drug reaction and aspiration were discussed. The patient was placed in the left lateral decubitus position. Patient was explained about the risks and benefits of the procedure. Risks including but not limited to bleeding, infection, and perforation were explained in detail. Also explained about less than 100% sensitivity with the exam and other alternatives. PROCEDURE: EGD DETAILS OF PROCEDURE: Patient was taken to the procedure room where a time out was performed to confirm correct patient and correct procedure. The patient underwent monitored anesthesia care, which was administered by an anesthesia professional. The patient's blood pressure, heart rate, level of consciousness, respirations and oxygen were monitored throughout the procedure. The scope was introduced through the mouth and advanced to the second part  of the duodenum. Retroflexion was performed in the fundus. The patient experienced no blood loss. The procedure was not difficult. The patient tolerated the procedure well. There were no apparent adverse events. ANESTHESIA INFORMATION: ASA: IV Anesthesia Type: Anesthesia type not filed in the log. MEDICATIONS: Totals unavailable because the procedure time range is not set FINDINGS: Multiple medium and circumferential grade III varices (red nakul sign) in the lower third of the esophagus and GE junction (38 cm from the incisors); there was stigmata of recent hemorrhage; placed 7 bands successfully, resulting in partial eradication Moderate, generalized edematous, erythematous, friable and petechial mucosa in the stomach; no bleeding was identified. Gastric erythema consistent with portal gastropathy.  No ulceration or active bleeding sites identified. The duodenum appeared normal. SPECIMENS: * No specimens in log *     Impression: Multiple medium and circumferential grade III varices (red nakul sign) in the lower third of the esophagus and GE junction; there was stigmata of recent hemorrhage; placed 7 bands successfully, resulting in partial eradication Moderate edematous, erythematous, friable and petechial mucosa in the stomach The duodenum appeared normal. RECOMMENDATION: Continue n.p.o. IV PPI IV octreotide IV ceftriaxone H&H and transfuse as needed Trend INR Repeat EGD in about 1 month to reassess varices and possible repeat banding   Anupama Blank MD     CT high volume bleeding scan abdomen pelvis    Result Date: 3/1/2024  Narrative: CT ABDOMEN AND PELVIS - WITHOUT AND WITH IV CONTRAST INDICATION:   gi bleed. COMPARISON: CT abdomen pelvis 1/20/2023. TECHNIQUE: CT examination of the abdomen and pelvis was performed both prior to and after the administration of intravenous contrast. Multiplanar 2D reformatted images were created from the source data. Radiation dose length product (DLP) for this visit: 1367.81  mGy-cm . This examination, like all CT scans performed in the [facility], was performed utilizing techniques to minimize radiation dose exposure, including the use of iterative reconstruction and automated exposure control. IV Contrast: 80 mL of iohexol (OMNIPAQUE) Enteric Contrast: Not administered. FINDINGS: ABDOMEN BOWEL:No active extravasation of intravenous contrast into the lumen of stomach, small bowel, or large bowel loops. No abnormal bowel wall thickening or pathologic bowel wall enhancement. LOWER CHEST: Small hiatal hernia with paraesophageal varices. LIVER/BILIARY TREE: Cirrhotic morphology. No suspicious mass within study limitations. Subcentimeter hypodense foci are too small to accurately characterize. No biliary dilation. Portal vein is patent. GALLBLADDER: No calcified gallstones. No pericholecystic inflammatory change. SPLEEN: Decreased size of splenic cyst. PANCREAS: Unremarkable. ADRENAL GLANDS: Unremarkable. KIDNEYS/URETERS: Unremarkable. No hydronephrosis. APPENDIX: Normal. ABDOMINOPELVIC CAVITY: No ascites. No pneumoperitoneum. No lymphadenopathy. VESSELS: No abdominal aortic aneurysm. Patent portal vein. Gastric and paraesophageal varices. Recanalized umbilical vein with small umbilical varices. PELVIS REPRODUCTIVE ORGANS: Unremarkable for patient's age. URINARY BLADDER: Unremarkable. ABDOMINAL WALL/INGUINAL REGIONS: Unremarkable. BONES: No acute fracture or suspicious osseous lesion.     Impression: 1.  No CT evidence of active high-volume gastrointestinal hemorrhage. 2.  Cirrhosis with paraesophageal, gastric, and umbilical varices. Workstation performed: [id number]       RADIOLOGY RESULTS: I have personally reviewed pertinent imaging studies.      Deja Cifuentse DO  Gastroenterology Fellow  [facility]  Division of Gastroenterology & Hepatology  Available on [employer]    ** Please Note: This note is constructed using a voice recognition  dictation system. **

## 2024-03-12 NOTE — TELEPHONE ENCOUNTER
Patient called in stating she is running late. Called the office and spoke with Lu regarding pt being 15min late.Dr. Wiley confirmed with Lu patient can still be seen.

## 2024-03-12 NOTE — PATIENT INSTRUCTIONS
Would recommend continued alcohol avoidance, will try baclofen 10 mg start at bedtime, watch for signs of confusion, avoid driving while on it. Continue follow up with therapist as currently ongoing. Monitor heart rate and blood pressure with increased dose of nadolol 40 mg. Obtain MRI as ordered, call central scheduling for the same. Blood work before coming for next visit.    Follow up in 1 month      Patient will schedule MRI.

## 2024-03-12 NOTE — LETTER
March 12, 2024     Jorge Hays MD  [facility]  [address]  [facility] [address]    Patient: Yvette Titus   YOB: 1970   Date of Visit: 3/12/2024       Dear Dr. Hays:    Thank you for referring Yvette Titus to me for evaluation. Below are my notes for this consultation.    If you have questions, please do not hesitate to call me. I look forward to following your patient along with you.         Sincerely,        Pankaj Hernández MD        CC: No Recipients    Dejachris Cifuentes DO  3/12/2024  4:40 PM  In Progress    [facility]  Outpatient Hepatology Consultation  Encounter: [id number]    PATIENT INFO     Name: Yvette Titus  YOB: 1970   Age: 53 y.o.   Sex: female   MRN: [id number]    ASSESSMENT & PLAN     Problems Addressed this Visit:   1. Alcoholic cirrhosis of liver without ascites (HCC)    2. Bleeding esophageal varices due to alcoholic liver disease (HCC)        Orders Placed This Encounter   Procedures   • MRI abdomen w wo contrast   • Comprehensive metabolic panel   • CBC and Platelet   • Protime-INR   • Alpha 1 Antitrypsin Phenotype   • Alpha-1-antitrypsin   • IgG, IgA, IgM       1. Decompensated Alcohol Cirrhosis: (MELD-Na 15): Patient with a history of alcohol cirrhosis decompensated by prior history of bleeding esophageal varices.  Patient has had 2 hospitalizations in the past due to hematemesis from variceal bleed.  Patient most recently hospitalized 3/2024 at which time EGD performed on 3/1 did show medium esophageal varices with stigmata of recent bleed.  Status post banding with partial eradication.  Currently scheduled to have follow-up EGD on 4/4/24. Reports that prior episode of bleeding was addressed at [facility] in May 2023. At that time she did not require banding. She has intermittently continued to use alcohol since then, last drink 3/1/24 day of admission.     This episode in March 2024 was the first time she required banding.     MELD 3.0:  15 at 3/7/2024  7:16 AM  MELD-Na: 14 at 3/7/2024  7:16 AM  Calculated from:  Serum Creatinine: 0.39 mg/dL (Using min of 1 mg/dL) at 3/7/2024  3:46 AM  Serum Sodium: 136 mmol/L at 3/7/2024  3:46 AM  Total Bilirubin: 2.14 mg/dL at 3/7/2024  3:46 AM  Serum Albumin: 3.2 g/dL at 3/7/2024  3:46 AM  INR(ratio): 1.36 at 3/7/2024  7:16 AM  Age at listing (hypothetical): 53 years  Sex: Female at 3/7/2024  7:16 AM    # Varices:   History of EVBL; most recently hospitalized 3/2024  Last EGD: 3/1/24 - medium EV S/P x7 bands with partial eradication  Current Regimen: None  Next EGD scheduled for 4/4/24   She needs secondary ppx for variceal hemorrhage given 2 admissions of variceal bleeding so far  She is currently on nadolol 20 mg daily, has repeat EGD upcoming in April 2024  HR at present around 80s, /70s. I will ask her to increase nadolol to 40 mg; to get HR to goal range of 55-60. She said she has machine at home to check BP and HR and I will ask her to start doing that.    # Ascites:  No prior history, recent CT from 3/1/2024 with no evidence of ascites  On examination today: No significant distension  Current Regimen: None, diuretics not indicated at this time    # Hepatic Encephalopathy:   No prior history of hepatic encephalopathy  On examination today: AAOx3, no tremors  Current Regimen: No indication for lactulose/rifaximin at this time    # HCC Surveillance and Transplant Evaluation:  Most recent HCC Screening: US abdomen with no suspicious lesions completed 9/17/2023; She also had CT high volume bleed scan done in 3/2024 showing no focal lesions, no portal vein thrombosis  Due: Now - US Abdomen previously ordered  Previously Referred: Has not been referred for transplant  Barriers to Transplant: Continued alcohol use   I will also order MRI    # Alcohol Abuse:   Longstanding history of alcohol abuse  Current Usage: 1 bottle of vodka, 1 bottle of wine every week  Last Drink: 3/1/24  Patient has used vivitrol 380  mg in the past with success however cannot be used further due to decompensated liver disease, will start baclofen qhs and increase upto TID, have advised not to drive  Complete alcohol avoidance moving forward with close follow up with labs    # Screening and Health Maintenance:   Colon cancer screening: discuss with primary GI   Encouraged to continue high protein and 2g Na restricted diet; avoid eating raw shellfish  Limit use of Tylenol to no more than 2 grams in 24 hour period and avoid use of all NSAIDs and narcotics  Encouraged to participate in daily exercise to avoid muscle wasting  Avoid use of alcohol and strongly encourage tobacco cessation  Pending HAV and HBC immunity recommend vaccination along with yearly flu and pneumonia vaccine through PCP    We have discussed natural progression of the decompensated liver disease in detail. Would recommend continued alcohol avoidance, will try baclofen pharmacotherapy. Continue follow up with therapist as currently ongoing. Monitor HR and BP with increased dose of nadolol 40 mg.      FOLLOW-UP: Complete alcohol avoidance moving forward with close follow up with labs in 1-2 month    HISTORY OF PRESENT ILLNESS       Yvette Titus is a 53 y.o. female who presents to GI office to establish care for cirrhosis management. Patient with PMHx of alcohol cirrhosis d/b bleeding EV and c/b thrombocytopenia and coagulopathy, HTN, and depression.     Patient recently admitted to [facility] from 3/1 to 3/7 after presenting with hematemesis. Prior to this admission, patient had recent admission to [facility] for a variceal bleeding for which patient underwent EGD with banding. Following initial [facility] admission, patient was admitted to the ICU intubated. She underwent EGD on 3/1 which revealed multiple medium grade III varices with red nakul sign in the lower esophagus and GE junction with stigmata of recent bleeding. S/P placement of x7 bands with partial erdication. She was  continued on PPI, octreotide, and SBP prophylaxis with Ceftriaxone.  Additionally during admission, MDF <32 and therefore patient was not treated with steroids.     Prior to this admission, patient had been seen by Jorge Hays MD.  Patient also with ongoing issues with alcohol abuse.  Patient been previously on Vivitrol that helped her control her urges to drink.      She reports that she is currently , lives with her partner for 18 months. Had DUI in 2011 and lost her job thereafter. Has 3 kids. She has been in inpatient rehabs for nearly 5 times in span of 10 years. The longest time she has been sober has been nearly 3 months.     MOST RECENT ENDOSCOPIC HISTORY     UPPER ENDOSCOPY; 3/1/24 - Multiple medium and circumferential grade III (red nakul sign) in the lower third of the esophagus and GE junction.  There was stigmata of recent hemorrhage.  Status post 7 bands placed successfully with partial eradication.  Moderate edematous, erythematous, friable and petechial mucosa in the stomach.  The duodenum appeared normal.    COLONOSCOPY: No prior colonoscopy    REVIEW OF SYSTEMS     CONSTITUTIONAL: Denies any fever, chills, rigors, and weight loss  HEENT: No earache or tinnitus, denies hearing loss or visual disturbances  CARDIOVASCULAR: No chest pain or palpitations  RESPIRATORY: Denies any cough, hemoptysis, shortness of breath or dyspnea on exertion  GASTROINTESTINAL: As noted in the History of Present Illness  GENITOURINARY: No problems with urination, denies any hematuria or dysuria  NEUROLOGIC: No dizziness or vertigo, denies headaches   MUSCULOSKELETAL: Denies any muscle or joint pain   SKIN: Denies skin rashes or itching  ENDOCRINE: Denies excessive thirst, denies intolerance to heat or cold  PSYCHOSOCIAL: Denies depression or anxiety, denies any recent memory loss     Historical Information  Past Medical History:   Diagnosis Date   • Alcoholism (HCC) 10/1/2010   • Fatty liver 01/20/2023   •  "Gallstones    • Long Q-T syndrome      Past Surgical History:   Procedure Laterality Date   • COLONOSCOPY  4/29/2019    Clear   • TUBAL LIGATION  07/14/2005   • UPPER GASTROINTESTINAL ENDOSCOPY  1/20/2023    Burst ulcer     Social History  Social History     Substance and Sexual Activity   Alcohol Use Yes    Comment: pint of vodka and a bottle of wine     Social History     Substance and Sexual Activity   Drug Use Never     Social History     Tobacco Use   Smoking Status Never   • Passive exposure: Never   Smokeless Tobacco Never     Family History   Problem Relation Age of Onset   • Cancer Mother          MEDICATIONS AND ALLERGIES     Current Outpatient Medications   Medication Instructions   • baclofen 10 mg, Oral, Daily at bedtime   • escitalopram (LEXAPRO) 20 mg, Oral, Daily, DC by barbara on 1/13   • folic acid (FOLVITE) 1,000 mcg, Oral, Daily   • gabapentin (NEURONTIN) 200 mg, Oral, Daily   • magnesium Oxide (MAG-OX) 400 mg, Oral, Once   • Melatonin 10 mg, Oral, Daily at bedtime PRN   • mirtazapine (REMERON) 15 mg, Oral, Daily at bedtime   • Multiple Vitamins-Minerals (multivitamin with minerals) tablet 1 tablet, Oral, Daily   • nadolol (CORGARD) 40 mg, Oral, Daily   • pantoprazole (PROTONIX) 40 mg, Oral, 2 times daily   • potassium chloride (Klor-Con M20) 20 mEq tablet 20 mEq, Oral, Daily with breakfast   • traZODone (DESYREL) 100 mg, Oral, Daily at bedtime   • valsartan (DIOVAN) 40 mg, Oral, Daily     No Known Allergies    PHYSICAL EXAM      Objective  Blood pressure 102/70, pulse 81, temperature 99.5 °F (37.5 °C), temperature source Tympanic, height 5' 1\" (1.549 m), weight 55.8 kg (123 lb), SpO2 99%. Body mass index is 23.24 kg/m².    General Appearance:   Alert, cooperative, no distress   HEENT:   Normocephalic, atraumatic, anicteric     Neck:   Supple, symmetrical, trachea midline   Lungs:   Equal chest rise, respirations unlabored    Heart:   Regular rate and rhythm   Abdomen:   Soft, non-tender, " non-distended; normal bowel sounds; no masses, no organomegaly    Rectal:   Deferred    Extremities:   No cyanosis, clubbing or edema    Neuro:   Moves all 4 extremities    Skin:   No jaundice, rashes, or lesions      LABORATORY RESULTS     No visits with results within 1 Day(s) from this visit.   Latest known visit with results is:   No results displayed because visit has over 200 results.        XR chest portable ICU    Result Date: 3/1/2024  Narrative: XR CHEST PORTABLE ICU INDICATION: ETT placement. COMPARISON: Chest radiograph 1/20/2023 Views: 2 (supine portable) FINDINGS: -ET tube tip terminates about 3 cm above the rosemary. No confluent infiltrates. Small nodular density adjacent to the left heart border, possibly vascular shadows, true nodule not excluded. No pneumothorax or pleural effusion. Normal cardiomediastinal silhouette. Bones are unremarkable for age. Normal upper abdomen.     Impression: ET tube tip terminates about 3 cm above the rosemary. Small nodular density adjacent to the left heart border, possibly vascular shadows, true nodule not excluded. Attention on follow-up recommended. Workstation performed: [id number]     EGD Banding    Result Date: 3/1/2024  Narrative: Table formatting from the original result was not included. [facility] Operating Room [address] [facility] [address] [phone] DATE OF SERVICE: 3/01/24 PHYSICIAN(S): Attending: Anupama Blank MD Fellow: No Staff Documented INDICATION: GI bleed POST-OP DIAGNOSIS: See the impression below. PREPROCEDURE: Informed consent was obtained for the procedure, including sedation.  Risks of perforation, hemorrhage, adverse drug reaction and aspiration were discussed. The patient was placed in the left lateral decubitus position. Patient was explained about the risks and benefits of the procedure. Risks including but not limited to bleeding, infection, and perforation were explained in detail. Also explained  about less than 100% sensitivity with the exam and other alternatives. PROCEDURE: EGD DETAILS OF PROCEDURE: Patient was taken to the procedure room where a time out was performed to confirm correct patient and correct procedure. The patient underwent monitored anesthesia care, which was administered by an anesthesia professional. The patient's blood pressure, heart rate, level of consciousness, respirations and oxygen were monitored throughout the procedure. The scope was introduced through the mouth and advanced to the second part of the duodenum. Retroflexion was performed in the fundus. The patient experienced no blood loss. The procedure was not difficult. The patient tolerated the procedure well. There were no apparent adverse events. ANESTHESIA INFORMATION: ASA: IV Anesthesia Type: Anesthesia type not filed in the log. MEDICATIONS: Totals unavailable because the procedure time range is not set FINDINGS: Multiple medium and circumferential grade III varices (red nakul sign) in the lower third of the esophagus and GE junction (38 cm from the incisors); there was stigmata of recent hemorrhage; placed 7 bands successfully, resulting in partial eradication Moderate, generalized edematous, erythematous, friable and petechial mucosa in the stomach; no bleeding was identified. Gastric erythema consistent with portal gastropathy.  No ulceration or active bleeding sites identified. The duodenum appeared normal. SPECIMENS: * No specimens in log *     Impression: Multiple medium and circumferential grade III varices (red nakul sign) in the lower third of the esophagus and GE junction; there was stigmata of recent hemorrhage; placed 7 bands successfully, resulting in partial eradication Moderate edematous, erythematous, friable and petechial mucosa in the stomach The duodenum appeared normal. RECOMMENDATION: Continue n.p.o. IV PPI IV octreotide IV ceftriaxone H&H and transfuse as needed Trend INR Repeat EGD in about 1 month  to reassess varices and possible repeat banding   Anupama Blank MD     CT high volume bleeding scan abdomen pelvis    Result Date: 3/1/2024  Narrative: CT ABDOMEN AND PELVIS - WITHOUT AND WITH IV CONTRAST INDICATION:   gi bleed. COMPARISON: CT abdomen pelvis 1/20/2023. TECHNIQUE: CT examination of the abdomen and pelvis was performed both prior to and after the administration of intravenous contrast. Multiplanar 2D reformatted images were created from the source data. Radiation dose length product (DLP) for this visit: 1367.81 mGy-cm . This examination, like all CT scans performed in the [facility], was performed utilizing techniques to minimize radiation dose exposure, including the use of iterative reconstruction and automated exposure control. IV Contrast: 80 mL of iohexol (OMNIPAQUE) Enteric Contrast: Not administered. FINDINGS: ABDOMEN BOWEL:No active extravasation of intravenous contrast into the lumen of stomach, small bowel, or large bowel loops. No abnormal bowel wall thickening or pathologic bowel wall enhancement. LOWER CHEST: Small hiatal hernia with paraesophageal varices. LIVER/BILIARY TREE: Cirrhotic morphology. No suspicious mass within study limitations. Subcentimeter hypodense foci are too small to accurately characterize. No biliary dilation. Portal vein is patent. GALLBLADDER: No calcified gallstones. No pericholecystic inflammatory change. SPLEEN: Decreased size of splenic cyst. PANCREAS: Unremarkable. ADRENAL GLANDS: Unremarkable. KIDNEYS/URETERS: Unremarkable. No hydronephrosis. APPENDIX: Normal. ABDOMINOPELVIC CAVITY: No ascites. No pneumoperitoneum. No lymphadenopathy. VESSELS: No abdominal aortic aneurysm. Patent portal vein. Gastric and paraesophageal varices. Recanalized umbilical vein with small umbilical varices. PELVIS REPRODUCTIVE ORGANS: Unremarkable for patient's age. URINARY BLADDER: Unremarkable. ABDOMINAL WALL/INGUINAL REGIONS: Unremarkable. BONES: No acute  fracture or suspicious osseous lesion.     Impression: 1.  No CT evidence of active high-volume gastrointestinal hemorrhage. 2.  Cirrhosis with paraesophageal, gastric, and umbilical varices. Workstation performed: [id number]       RADIOLOGY RESULTS: I have personally reviewed pertinent imaging studies.      Deja Cifuentes DO  Gastroenterology Fellow  [facility]  Division of Gastroenterology & Hepatology  Available on [employer]    ** Please Note: This note is constructed using a voice recognition dictation system. **

## 2024-03-13 DIAGNOSIS — K70.30 ALCOHOLIC CIRRHOSIS OF LIVER WITHOUT ASCITES (HCC): Primary | ICD-10-CM

## 2024-03-13 RX ORDER — PYRIDOXINE HCL (VITAMIN B6) 50 MG
100 TABLET ORAL DAILY
Qty: 90 TABLET | Refills: 3 | Status: SHIPPED | OUTPATIENT
Start: 2024-03-13

## 2024-03-13 NOTE — TELEPHONE ENCOUNTER
Patients GI provider:  Dr. Hernández    Number to return call: [phone]    Reason for call: Patient calling as she was seen in the office 3/12/24 and the AVS states to stop B12. Patient states that it was not discussed in the office visit. Patient is wondering if it is okay to take B12 or if there is another B vitamin she can take. Also, was prescribed Baclofen at the visit and was told that it could make her sleepy. Patient took the medication last night and did not sleep at all. Patient did try taking Tylenol PM. Is asking if it is okay to take Tylenol PM with the Baclofen if needed. Patient states she tries to avoid taking the OTC pain medications due to her liver.    Scheduled procedure/appointment date if applicable: Procedure 4/4/24

## 2024-03-13 NOTE — TELEPHONE ENCOUNTER
Patient would like to continue taking B12.   Requesting script for recommended dose.     Patient reports inability to sleep after first dose of Baclofen. She tried melatonin and Tylenol PM with no relief. Discussed Baclofen can cause sleep disturbances and/or sleepiness. It may take a while for body to regulate after starting medication. Recommend avoiding Tylenol PM routinely due to benadryl. Can take up to 2 grams daily of Tylenol if needed.

## 2024-03-22 ENCOUNTER — TELEPHONE (OUTPATIENT)
Age: 54
End: 2024-03-22

## 2024-03-22 NOTE — TELEPHONE ENCOUNTER
LVM for patient to call back in regards to MRI needing to be cancelled due to an appeal needed for medical necessity

## 2024-03-22 NOTE — TELEPHONE ENCOUNTER
Prior auth message sent to provider from [facility] team requesting an appeal to be completed to approve MRI.  Pt is scheduled for MRI on 3/30/24. Please advise if appeal will be completed prior to 3/30/24 or if pt should reschedule for a later date. Please see referral from 3/12/24

## 2024-03-22 NOTE — TELEPHONE ENCOUNTER
Patients GI provider:  Dr. Hernández    Number to return call: [phone]    Reason for call: Pt calling back to speak w/Dia/Laura in regards to cancelling upcoming MRI. Spoke w/Norma at [facility], not sure what call was in regards to not seeing any notes about it on our end. Was speaking w/pt in further detail about the message received when call dropped. Pt advised that message stated MRI needed to be cancelled as insurance denied it. Would like to know what next steps are. Please call pt back to advise.     Scheduled procedure/appointment date if applicable: Apt 04/16/2024

## 2024-03-22 NOTE — TELEPHONE ENCOUNTER
Spoke with pt, advised on canceling MRI of abdomen as per insurance and completeing US of the abdomen first. Pt agreeable to recommendation and will call central scheduling

## 2024-03-24 LAB
AFP-TM SERPL-MCNC: 6.6 NG/ML (ref 0–9.2)
ALBUMIN SERPL-MCNC: 4 G/DL (ref 3.8–4.9)
ALBUMIN/GLOB SERPL: 0.9 {RATIO} (ref 1.2–2.2)
ALP SERPL-CCNC: 122 IU/L (ref 44–121)
ALT SERPL-CCNC: 35 IU/L (ref 0–32)
AST SERPL-CCNC: 102 IU/L (ref 0–40)
BILIRUB SERPL-MCNC: 1.5 MG/DL (ref 0–1.2)
BUN SERPL-MCNC: 15 MG/DL (ref 6–24)
BUN/CREAT SERPL: 22 (ref 9–23)
CALCIUM SERPL-MCNC: 9.7 MG/DL (ref 8.7–10.2)
CHLORIDE SERPL-SCNC: 106 MMOL/L (ref 96–106)
CO2 SERPL-SCNC: 22 MMOL/L (ref 20–29)
CREAT SERPL-MCNC: 0.67 MG/DL (ref 0.57–1)
EGFR: 104 ML/MIN/1.73
GLOBULIN SER-MCNC: 4.3 G/DL (ref 1.5–4.5)
GLUCOSE SERPL-MCNC: 106 MG/DL (ref 70–99)
IGA SERPL-MCNC: 1028 MG/DL (ref 87–352)
IGG SERPL-MCNC: 2069 MG/DL (ref 586–1602)
IGM SERPL-MCNC: 181 MG/DL (ref 26–217)
MAGNESIUM SERPL-MCNC: 1.9 MG/DL (ref 1.6–2.3)
POTASSIUM SERPL-SCNC: 4.1 MMOL/L (ref 3.5–5.2)
PROT SERPL-MCNC: 8.3 G/DL (ref 6–8.5)
SODIUM SERPL-SCNC: 142 MMOL/L (ref 134–144)

## 2024-03-26 DIAGNOSIS — D64.9 ANEMIA, UNSPECIFIED TYPE: ICD-10-CM

## 2024-03-26 RX ORDER — FOLIC ACID 1 MG/1
1000 TABLET ORAL DAILY
Qty: 21 TABLET | Refills: 2 | Status: SHIPPED | OUTPATIENT
Start: 2024-03-26

## 2024-03-29 ENCOUNTER — TELEPHONE (OUTPATIENT)
Dept: GASTROENTEROLOGY | Facility: CLINIC | Age: 54
End: 2024-03-29

## 2024-03-29 ENCOUNTER — NURSE TRIAGE (OUTPATIENT)
Age: 54
End: 2024-03-29

## 2024-03-29 ENCOUNTER — TELEPHONE (OUTPATIENT)
Age: 54
End: 2024-03-29

## 2024-03-29 NOTE — TELEPHONE ENCOUNTER
"SPOKE WITH PT, CALLING WITH QUESTIONS ABOUT FASTING FOR BLOOD WORK AND FOR U/S. ALL QUESTIONS ANSWERED.        Reason for Disposition   Information only question and nurse able to answer    Answer Assessment - Initial Assessment Questions  1. REASON FOR CALL or QUESTION: \"What is your reason for calling today?\" or \"How can I best help you?\" or \"What question do you have that I can help answer?\"     SPOKE WITH PT, CALLING WITH QUESTIONS ABOUT FASTING FOR BLOOD WORK AND FOR U/S. ALL QUESTIONS ANSWERED.    Protocols used: Information Only Call - No Triage-ADULT-[place]    "

## 2024-03-29 NOTE — TELEPHONE ENCOUNTER
----- Message from Pankaj Hernández MD sent at 3/28/2024  4:41 PM EDT -----  Hi    I am looking at conversations and looks like MRI was refused and needs an US first. Do you want me to order it?    TY

## 2024-03-29 NOTE — TELEPHONE ENCOUNTER
Chas Estrada, please see Dr Pankaj Hernández's message.   Patient is scheduled for a US abdomen complete on 4/6/24 at [facility].

## 2024-03-31 LAB
A1AT SERPL-MCNC: 131 MG/DL (ref 101–187)
ALBUMIN SERPL-MCNC: 3.9 G/DL (ref 3.8–4.9)
ALBUMIN/GLOB SERPL: 1 {RATIO} (ref 1.2–2.2)
ALP SERPL-CCNC: 122 IU/L (ref 44–121)
ALT SERPL-CCNC: 28 IU/L (ref 0–32)
AST SERPL-CCNC: 89 IU/L (ref 0–40)
BILIRUB SERPL-MCNC: 1.3 MG/DL (ref 0–1.2)
BUN SERPL-MCNC: 15 MG/DL (ref 6–24)
BUN/CREAT SERPL: 21 (ref 9–23)
CALCIUM SERPL-MCNC: 9.7 MG/DL (ref 8.7–10.2)
CHLORIDE SERPL-SCNC: 103 MMOL/L (ref 96–106)
CO2 SERPL-SCNC: 22 MMOL/L (ref 20–29)
CREAT SERPL-MCNC: 0.71 MG/DL (ref 0.57–1)
EGFR: 101 ML/MIN/1.73
ERYTHROCYTE [DISTWIDTH] IN BLOOD BY AUTOMATED COUNT: 14.1 % (ref 11.7–15.4)
GLOBULIN SER-MCNC: 4.1 G/DL (ref 1.5–4.5)
GLUCOSE SERPL-MCNC: 99 MG/DL (ref 70–99)
HCT VFR BLD AUTO: 28.4 % (ref 34–46.6)
HGB BLD-MCNC: 9.4 G/DL (ref 11.1–15.9)
INR PPP: 1.2 (ref 0.9–1.2)
MCH RBC QN AUTO: 32.2 PG (ref 26.6–33)
MCHC RBC AUTO-ENTMCNC: 33.1 G/DL (ref 31.5–35.7)
MCV RBC AUTO: 97 FL (ref 79–97)
PLATELET # BLD AUTO: 156 X10E3/UL (ref 150–450)
POTASSIUM SERPL-SCNC: 4 MMOL/L (ref 3.5–5.2)
PROT SERPL-MCNC: 8 G/DL (ref 6–8.5)
PROTHROMBIN TIME: 12.8 SEC (ref 9.1–12)
RBC # BLD AUTO: 2.92 X10E6/UL (ref 3.77–5.28)
SODIUM SERPL-SCNC: 137 MMOL/L (ref 134–144)
WBC # BLD AUTO: 6.7 X10E3/UL (ref 3.4–10.8)

## 2024-04-04 ENCOUNTER — HOSPITAL ENCOUNTER (OUTPATIENT)
Dept: GASTROENTEROLOGY | Facility: HOSPITAL | Age: 54
Setting detail: OUTPATIENT SURGERY
End: 2024-04-04
Attending: INTERNAL MEDICINE
Payer: COMMERCIAL

## 2024-04-04 ENCOUNTER — ANESTHESIA (OUTPATIENT)
Dept: GASTROENTEROLOGY | Facility: HOSPITAL | Age: 54
End: 2024-04-04

## 2024-04-04 ENCOUNTER — ANESTHESIA EVENT (OUTPATIENT)
Dept: GASTROENTEROLOGY | Facility: HOSPITAL | Age: 54
End: 2024-04-04

## 2024-04-04 VITALS
OXYGEN SATURATION: 96 % | SYSTOLIC BLOOD PRESSURE: 107 MMHG | HEART RATE: 80 BPM | DIASTOLIC BLOOD PRESSURE: 59 MMHG | TEMPERATURE: 98 F | BODY MASS INDEX: 22.28 KG/M2 | HEIGHT: 61 IN | WEIGHT: 118 LBS | RESPIRATION RATE: 12 BRPM

## 2024-04-04 DIAGNOSIS — I85.11 SECONDARY ESOPHAGEAL VARICES WITH BLEEDING (HCC): ICD-10-CM

## 2024-04-04 LAB
A1AT PHENOTYP SERPL IFE: NORMAL
A1AT SERPL-MCNC: 133 MG/DL (ref 101–187)

## 2024-04-04 RX ORDER — PROPOFOL 10 MG/ML
INJECTION, EMULSION INTRAVENOUS AS NEEDED
Status: DISCONTINUED | OUTPATIENT
Start: 2024-04-04 | End: 2024-04-04

## 2024-04-04 RX ORDER — PROPOFOL 10 MG/ML
INJECTION, EMULSION INTRAVENOUS CONTINUOUS PRN
Status: DISCONTINUED | OUTPATIENT
Start: 2024-04-04 | End: 2024-04-04

## 2024-04-04 RX ORDER — LIDOCAINE HYDROCHLORIDE 10 MG/ML
INJECTION, SOLUTION EPIDURAL; INFILTRATION; INTRACAUDAL; PERINEURAL AS NEEDED
Status: DISCONTINUED | OUTPATIENT
Start: 2024-04-04 | End: 2024-04-04

## 2024-04-04 RX ORDER — SODIUM CHLORIDE 9 MG/ML
INJECTION, SOLUTION INTRAVENOUS CONTINUOUS PRN
Status: DISCONTINUED | OUTPATIENT
Start: 2024-04-04 | End: 2024-04-04

## 2024-04-04 RX ADMIN — PROPOFOL 100 MG: 10 INJECTION, EMULSION INTRAVENOUS at 08:09

## 2024-04-04 RX ADMIN — SODIUM CHLORIDE: 9 INJECTION, SOLUTION INTRAVENOUS at 07:37

## 2024-04-04 RX ADMIN — PROPOFOL 120 MCG/KG/MIN: 10 INJECTION, EMULSION INTRAVENOUS at 08:09

## 2024-04-04 RX ADMIN — LIDOCAINE HYDROCHLORIDE 100 MG: 10 INJECTION, SOLUTION EPIDURAL; INFILTRATION; INTRACAUDAL; PERINEURAL at 08:09

## 2024-04-04 NOTE — H&P
"History and Physical -  [facility]  Yvette Titus 53 y.o. female MRN: [id number]    HPI: Yvette Titus is a 53 y.o. female who presents for upper endoscopy due to a recent upper GI bleed secondary to esophageal varices that required 7 bands to eradicate.  She is here for follow-up    REVIEW OF SYSTEMS: Per the HPI, and otherwise unremarkable.    Historical Information   Past Medical History:   Diagnosis Date    Alcoholism (HCC) 10/1/2010    Fatty liver 01/20/2023    Gallstones     Long Q-T syndrome      Past Surgical History:   Procedure Laterality Date    COLONOSCOPY  4/29/2019    Clear    TUBAL LIGATION  07/14/2005    UPPER GASTROINTESTINAL ENDOSCOPY  1/20/2023    Burst ulcer     Social History   Social History     Substance and Sexual Activity   Alcohol Use Yes    Comment: pint of vodka and a bottle of wine     Social History     Substance and Sexual Activity   Drug Use Never     Social History     Tobacco Use   Smoking Status Never    Passive exposure: Never   Smokeless Tobacco Never     Family History   Problem Relation Age of Onset    Cancer Mother        Meds/Allergies       Current Outpatient Medications:     escitalopram (LEXAPRO) 20 mg tablet    folic acid (FOLVITE) 1 mg tablet    gabapentin (NEURONTIN) 100 mg capsule    Melatonin 10 MG TABS    mirtazapine (REMERON) 15 mg tablet    Multiple Vitamins-Minerals (multivitamin with minerals) tablet    nadolol (CORGARD) 20 mg tablet    pantoprazole (PROTONIX) 40 mg tablet    traZODone (DESYREL) 100 mg tablet    valsartan (DIOVAN) 40 mg tablet    vitamin B-12 (CYANOCOBALAMIN) 100 MCG TABS    baclofen 10 mg tablet    magnesium Oxide (MAG-OX) 400 mg TABS    potassium chloride (Klor-Con M20) 20 mEq tablet    No Known Allergies    Objective     /55   Pulse 65   Temp 97.9 °F (36.6 °C) (Temporal)   Resp 16   Ht 5' 1\" (1.549 m)   Wt 53.5 kg (118 lb)   SpO2 99%   BMI 22.30 kg/m²     PHYSICAL EXAM    General Appearance: NAD, cooperative, " alert  Eyes: Anicteric  GI:  Soft, non-tender, non-distended; normal bowel sounds; no masses, no organomegaly   Rectal: Deferred until procedure  Musculoskeletal: No edema.  Skin:  No jaundice    ASSESSMENT/PLAN:  This is a 53 y.o. year old female here for upper endoscopy, and she is stable and optimized for her procedure.

## 2024-04-04 NOTE — ANESTHESIA POSTPROCEDURE EVALUATION
Post-Op Assessment Note    CV Status:  Stable  Pain Score: 0    Pain management: adequate       Mental Status:  Alert and awake   Hydration Status:  Euvolemic   PONV Controlled:  None   Airway Patency:  Patent    No anethesia notable event occurred.    Staff: Anesthesiologist, CRNA               BP   96/53   Temp      Pulse   66   Resp   15   SpO2   96%

## 2024-04-04 NOTE — ANESTHESIA PREPROCEDURE EVALUATION
Procedure:  EGD    Relevant Problems   CARDIO   (+) Essential hypertension   (+) Long Q-T syndrome      GI/HEPATIC   (+) Alcoholic cirrhosis (HCC)   (+) GI bleed      HEMATOLOGY   (+) Anemia   (+) Thrombocytopenia (HCC)      NEURO/PSYCH   (+) Depression        Physical Exam    Airway    Mallampati score: II  TM Distance: <3 FB  Neck ROM: full     Dental   Comment: None loose; implants,     Cardiovascular      Pulmonary      Other Findings  post-pubertal.      Anesthesia Plan  ASA Score- 3     Anesthesia Type- IV sedation with anesthesia with ASA Monitors.         Additional Monitors:     Airway Plan:     Comment: Drinking alcohol and mouthwash until about 11:00    Patient understands that she is at increased risk of remaining intubated post EGD and requiring ICU-level of care. She also understands that she may require a blood transfusion during the course of her intra-op stay    Active T&S. Will cross for 2 units of PRBC in the event of active bleeding during the case.       Plan Factors-Exercise tolerance (METS): >4 METS.    Chart reviewed. EKG reviewed.  Existing labs reviewed. Patient summary reviewed.    Patient is not a current smoker.              Induction- intravenous.    Postoperative Plan-     Informed Consent- Anesthetic plan and risks discussed with patient.  I personally reviewed this patient with the CRNA. Discussed and agreed on the Anesthesia Plan with the CRNA..

## 2024-04-05 ENCOUNTER — TELEPHONE (OUTPATIENT)
Age: 54
End: 2024-04-05

## 2024-04-05 ENCOUNTER — TELEPHONE (OUTPATIENT)
Dept: HEMATOLOGY ONCOLOGY | Facility: CLINIC | Age: 54
End: 2024-04-05

## 2024-04-05 DIAGNOSIS — D50.0 IRON DEFICIENCY ANEMIA DUE TO CHRONIC BLOOD LOSS: ICD-10-CM

## 2024-04-05 DIAGNOSIS — K92.2 GASTROINTESTINAL HEMORRHAGE, UNSPECIFIED GASTROINTESTINAL HEMORRHAGE TYPE: ICD-10-CM

## 2024-04-05 DIAGNOSIS — D64.9 ANEMIA, UNSPECIFIED TYPE: Primary | ICD-10-CM

## 2024-04-05 NOTE — TELEPHONE ENCOUNTER
Phone call to patient regarding having labs done for upcoming appointment. Asked patient to return call to the office to let us know which lab she uses. Provided call back number [phone]   - - -

## 2024-04-05 NOTE — TELEPHONE ENCOUNTER
Patient Call    Who are you speaking with? Patient    If it is not the patient, are they listed on an active communication consent form? Yes   What is the reason for this call? Returning call, had done labs 3/30 at [facility]   Does this require a call back? Yes if need more labwork before appmt   If a call back is required, please list best call back number [phone]    If a call back is required, advise that a message will be forwarded to their care team and someone will return their call as soon as possible.   Did you relay this information to the patient? Yes

## 2024-04-05 NOTE — TELEPHONE ENCOUNTER
Spoke to Yvette regarding lab orders to be done. She stated she uses [facility] and will go tomorrow to have labs drawn.  Faxed lab script orders to [facility] at [phone].  Received faxed confirmation.

## 2024-04-06 ENCOUNTER — HOSPITAL ENCOUNTER (OUTPATIENT)
Dept: ULTRASOUND IMAGING | Facility: HOSPITAL | Age: 54
Discharge: HOME | End: 2024-04-06
Attending: INTERNAL MEDICINE
Payer: COMMERCIAL

## 2024-04-06 DIAGNOSIS — K70.30 ALCOHOLIC CIRRHOSIS OF LIVER WITHOUT ASCITES (HCC): ICD-10-CM

## 2024-04-06 PROCEDURE — 76700 US EXAM ABDOM COMPLETE: CPT

## 2024-04-07 DIAGNOSIS — E83.42 HYPOMAGNESEMIA: ICD-10-CM

## 2024-04-08 ENCOUNTER — OFFICE VISIT (OUTPATIENT)
Age: 54
End: 2024-04-08
Payer: COMMERCIAL

## 2024-04-08 VITALS
OXYGEN SATURATION: 98 % | DIASTOLIC BLOOD PRESSURE: 60 MMHG | TEMPERATURE: 98 F | HEART RATE: 70 BPM | HEIGHT: 61 IN | SYSTOLIC BLOOD PRESSURE: 116 MMHG | RESPIRATION RATE: 18 BRPM | WEIGHT: 133 LBS | BODY MASS INDEX: 25.11 KG/M2

## 2024-04-08 DIAGNOSIS — D50.0 IRON DEFICIENCY ANEMIA DUE TO CHRONIC BLOOD LOSS: ICD-10-CM

## 2024-04-08 DIAGNOSIS — D69.6 THROMBOCYTOPENIA (HCC): ICD-10-CM

## 2024-04-08 DIAGNOSIS — D64.9 ANEMIA, UNSPECIFIED TYPE: Primary | ICD-10-CM

## 2024-04-08 DIAGNOSIS — K70.30 ALCOHOLIC CIRRHOSIS OF LIVER WITHOUT ASCITES (HCC): ICD-10-CM

## 2024-04-08 PROCEDURE — 99214 OFFICE O/P EST MOD 30 MIN: CPT | Performed by: NURSE PRACTITIONER

## 2024-04-08 RX ORDER — HYDROCODONE BITARTRATE AND ACETAMINOPHEN 5; 325 MG/1; MG/1
TABLET ORAL
COMMUNITY
Start: 2024-02-19

## 2024-04-08 RX ORDER — SACCHAROMYCES BOULARDII 250 MG
250 CAPSULE ORAL 2 TIMES DAILY
COMMUNITY

## 2024-04-08 RX ORDER — LANOLIN ALCOHOL/MO/W.PET/CERES
400 CREAM (GRAM) TOPICAL ONCE
Qty: 30 TABLET | Refills: 5 | Status: SHIPPED | OUTPATIENT
Start: 2024-04-08 | End: 2024-04-08

## 2024-04-08 RX ORDER — ALPHA LIPOIC ACID 200 MG
CAPSULE ORAL
COMMUNITY
Start: 2023-11-01

## 2024-04-08 NOTE — PROGRESS NOTES
HEMATOLOGY / ONCOLOGY CLINIC FOLLOW UP NOTE    Primary Care Provider: Alberto Robertson MD  Referring Provider:    MRN: [id number]  : 1970    Reason for Encounter: Follow-up for anemia    Interval History: Patient returns for follow-up visit for history of iron deficiency anemia secondary to GI bleeding.  Since her last visit she was hospitalized in March with hematemesis.  EGD performed on 3/1 did show medium esophageal varices with stigmata of recent bleed.  Status post banding with partial eradication.  Follow-up EGD on 24 showed some small varices, no bleeding   She is following closely with hepatology for EtOH cirrhosis and alcohol binges what led to her most recent hospitalization.  Today she reports she is 38 days clean.  She is following closely with [facility]  Most recent blood work shows normocytic anemia.  Hemoglobin stable 9.4.  White count normal.  Platelets stable 136.  Differential normal, iron panel normal.  T. bili normal.  Alk phos 126, AST 98, ALT 32   B12 ,folate normal     She denies any symptoms from her anemia.  No bleeding/bruising      REVIEW OF SYSTEMS:  Please note that a 14-point review of systems was performed to include Constitutional, HEENT, Respiratory, CVS, GI, , Musculoskeletal, Integumentary, Neurologic, Rheumatologic, Endocrinologic, Psychiatric, Lymphatic, and Hematologic/Oncologic systems were reviewed and are negative unless otherwise stated in HPI. Positive and negative findings pertinent to this evaluation are incorporated into the history of present illness.      ECOG PS: 0    PROBLEM LIST:  Patient Active Problem List   Diagnosis    Nausea vomiting and diarrhea    GI bleed    Cellulitis of hand    Depression    Essential hypertension    Transaminitis    Cholelithiases    Severe protein-calorie malnutrition (HCC)    Anemia    Long Q-T syndrome    Suicidal ideation    Alcoholic cirrhosis (HCC)    Thrombocytopenia (HCC)       Assessment / Plan:  1.  Anemia, unspecified type    2. Iron deficiency anemia due to chronic blood loss    3. Alcoholic cirrhosis of liver without ascites (HCC)    4. Thrombocytopenia (HCC)      Patient is a joelle 53-year-old female who is referred to hematology by her gastroenterologist for management of anemia, iron deficiency.  At time of consultation it was suspected her anemia was multifactorial and likely secondary to bone marrow suppression from alcohol abuse, malnutrition from lack of eating, GI blood loss  She was treated with a course of parenteral iron replacement and injectable B12.  Has been on observation.    Most recent blood work demonstrates anemia of chronic disease, thrombocytopenia.  The cytopenias likely to underlying cirrhosis.  Her iron panel was normal.  B12 and folate in normal range  She is taking daily B12 and folic acid and should continue.    I congratulated her on her efforts to maintain her sobriety.  She will continue to follow closely with her other specialists    I will see her back in 4 months with repeat blood work.  She is instructed to call anytime with questions or concerns and if noted to have drop in her counts I will gladly see her sooner.  Patient is in agreement with this plan of care    I spent 30 minutes on chart review, face to face counseling time, coordination of care and documentation.    Past Medical History:   has a past medical history of Alcoholism (HCC) (10/1/2010), Fatty liver (01/20/2023), Gallstones, and Long Q-T syndrome.    PAST SURGICAL HISTORY:   has a past surgical history that includes Tubal ligation (07/14/2005); Upper gastrointestinal endoscopy (1/20/2023); and Colonoscopy (4/29/2019).    CURRENT MEDICATIONS  Current Outpatient Medications   Medication Sig Dispense Refill    B Complex Vitamins (B Complex-B12) TABS       baclofen 10 mg tablet Take 1 tablet (10 mg total) by mouth daily at bedtime 30 tablet 0    escitalopram (LEXAPRO) 20 mg tablet Take 20 mg by mouth daily DC  "by barbara on 1/13      folic acid (FOLVITE) 1 mg tablet TAKE 1 TABLET BY MOUTH EVERY DAY 21 tablet 2    gabapentin (NEURONTIN) 100 mg capsule Take 200 mg by mouth daily      HYDROcodone-acetaminophen (NORCO) 5-325 mg per tablet TAKE 1 TABLET BY MOUTH EVERY 4 TO 6 HOURS AS NEEDED FOR SEVERE PAIN      Melatonin 10 MG TABS Take 10 mg by mouth daily at bedtime as needed      mirtazapine (REMERON) 15 mg tablet Take 15 mg by mouth daily at bedtime      Multiple Vitamins-Minerals (multivitamin with minerals) tablet Take 1 tablet by mouth daily      nadolol (CORGARD) 20 mg tablet Take 40 mg by mouth daily      pantoprazole (PROTONIX) 40 mg tablet Take 1 tablet (40 mg total) by mouth 2 (two) times a day 30 tablet 0    saccharomyces boulardii (Florastor) 250 mg capsule Take 250 mg by mouth 2 (two) times a day      traZODone (DESYREL) 100 mg tablet Take 100 mg by mouth daily at bedtime      valsartan (DIOVAN) 40 mg tablet Take 40 mg by mouth daily      vitamin B-12 (CYANOCOBALAMIN) 100 MCG TABS Take 1 tablet (100 mcg total) by mouth daily 90 tablet 3    magnesium Oxide (MAG-OX) 400 mg TABS Take 1 tablet (400 mg total) by mouth once for 1 dose 30 tablet 2    potassium chloride (Klor-Con M20) 20 mEq tablet Take 1 tablet (20 mEq total) by mouth daily with breakfast for 10 days 10 tablet 0     No current facility-administered medications for this visit.     [unfilled]    SOCIAL HISTORY:   reports that she has never smoked. She has never been exposed to tobacco smoke. She has never used smokeless tobacco. She reports current alcohol use. She reports that she does not use drugs.     FAMILY HISTORY:  family history includes Cancer in her mother.     ALLERGIES:  has No Known Allergies.      Physical Exam:  Vital Signs:   Visit Vitals  /60 (BP Location: Left arm)   Pulse 70   Temp 98 °F (36.7 °C) (Tympanic)   Resp 18   Ht 5' 1\" (1.549 m)   Wt 60.3 kg (133 lb)   SpO2 98%   BMI 25.13 kg/m²   OB Status Postmenopausal   Smoking Status " Never   BSA 1.59 m²     Body mass index is 25.13 kg/m².  Body surface area is 1.59 meters squared.    Physical Exam  Constitutional:       General: She is not in acute distress.     Appearance: Normal appearance.   HENT:      Head: Normocephalic and atraumatic.   Eyes:      General: No scleral icterus.        Right eye: No discharge.         Left eye: No discharge.      Conjunctiva/sclera: Conjunctivae normal.   Cardiovascular:      Rate and Rhythm: Normal rate and regular rhythm.   Pulmonary:      Effort: Pulmonary effort is normal. No respiratory distress.      Breath sounds: Normal breath sounds.   Abdominal:      General: Bowel sounds are normal. There is no distension.      Palpations: Abdomen is soft. There is no mass.      Tenderness: There is no abdominal tenderness.   Musculoskeletal:         General: Swelling (Chronic left hand) present. Normal range of motion.   Lymphadenopathy:      Cervical: No cervical adenopathy.      Upper Body:      Right upper body: No supraclavicular, axillary or pectoral adenopathy.      Left upper body: No supraclavicular, axillary or pectoral adenopathy.   Skin:     General: Skin is warm and dry.      Coloration: Skin is not jaundiced.   Neurological:      General: No focal deficit present.      Mental Status: She is alert and oriented to person, place, and time.   Psychiatric:         Mood and Affect: Mood normal.         Behavior: Behavior normal.         Labs:  Lab Results   Component Value Date    WBC 6.7 03/30/2024    HGB 9.4 (L) 03/30/2024    HCT 28.4 (L) 03/30/2024    MCV 97 03/30/2024     03/30/2024     Lab Results   Component Value Date     09/30/2015    SODIUM 137 03/30/2024    K 4.0 03/30/2024     03/30/2024    CO2 22 03/30/2024    ANIONGAP 14 (H) 09/30/2015    AGAP 7 03/07/2024    BUN 15 03/30/2024    CREATININE 0.71 03/30/2024    GLUC 99 03/30/2024    CALCIUM 8.8 03/07/2024    AST 89 (H) 03/30/2024    ALT 28 03/30/2024    ALKPHOS 142 (H)  03/07/2024    PROT 8.5 (H) 09/30/2015    TP 8.0 03/30/2024    BILITOT 0.53 09/30/2015    TBILI 1.3 (H) 03/30/2024    EGFR 101 03/30/2024

## 2024-04-09 ENCOUNTER — TELEPHONE (OUTPATIENT)
Dept: GASTROENTEROLOGY | Facility: CLINIC | Age: 54
End: 2024-04-09

## 2024-04-09 NOTE — RESULT ENCOUNTER NOTE
Reviewed results of her study-ultrasound shows cirrhotic liver with trace ascites.  No splenomegaly patent portal vein should repeat in 6 months.  Follows in liver clinic

## 2024-04-09 NOTE — TELEPHONE ENCOUNTER
I called and spoke to the patient regarding the results of blood work .  Discussed that the blood work shows essentially stable liver numbers.  Also discussed that she has alpha-1 antitrypsin phenotype MZ which does put her at slightly high risk for developing progression of liver disease however I do not believe that this is the primary cause for her liver cirrhosis.  She is 39 days abstinent from alcohol use, has been involved in outpatient rehab as well as volunteer work in order to continue her abstinence.  She is very motivated to continue abstinence as she is starting to feel better overall as well.  She had an endoscopy which I reviewed with her, showed small varices, repeat recommended in 1 year.  I also reviewed the ultrasound which was done on 4/6/2024 which did not show any focal liver lesions, did show cirrhotic morphology with trace perihepatic ascites.  I discussed that with ongoing alcohol abstinence and risk factor reduction it is possible that her cirrhosis will recompensate, she is motivated to achieve the same. Recommended repeat US or imaging for HCC surveillance in 6 months which we can order at next visit. Next hepatology visit 4/16/24.

## 2024-04-12 LAB
ALBUMIN SERPL-MCNC: 3.9 G/DL (ref 3.8–4.9)
ALBUMIN/GLOB SERPL: 0.9 {RATIO} (ref 1.2–2.2)
ALP SERPL-CCNC: 126 IU/L (ref 44–121)
ALT SERPL-CCNC: 32 IU/L (ref 0–32)
AST SERPL-CCNC: 98 IU/L (ref 0–40)
BASOPHILS # BLD AUTO: 0 X10E3/UL (ref 0–0.2)
BASOPHILS NFR BLD AUTO: 1 %
BILIRUB SERPL-MCNC: 1.1 MG/DL (ref 0–1.2)
BUN SERPL-MCNC: 13 MG/DL (ref 6–24)
BUN/CREAT SERPL: 16 (ref 9–23)
CALCIUM SERPL-MCNC: 9.7 MG/DL (ref 8.7–10.2)
CHLORIDE SERPL-SCNC: 103 MMOL/L (ref 96–106)
CO2 SERPL-SCNC: 24 MMOL/L (ref 20–29)
CREAT SERPL-MCNC: 0.79 MG/DL (ref 0.57–1)
EGFR: 89 ML/MIN/1.73
EOSINOPHIL # BLD AUTO: 0.2 X10E3/UL (ref 0–0.4)
EOSINOPHIL NFR BLD AUTO: 4 %
ERYTHROCYTE [DISTWIDTH] IN BLOOD BY AUTOMATED COUNT: 13.6 % (ref 11.7–15.4)
FERRITIN SERPL-MCNC: 99 NG/ML (ref 15–150)
GLOBULIN SER-MCNC: 4.2 G/DL (ref 1.5–4.5)
GLUCOSE SERPL-MCNC: 93 MG/DL (ref 70–99)
HCT VFR BLD AUTO: 27.6 % (ref 34–46.6)
HGB BLD-MCNC: 9.4 G/DL (ref 11.1–15.9)
IMM GRANULOCYTES # BLD: 0 X10E3/UL (ref 0–0.1)
IMM GRANULOCYTES NFR BLD: 0 %
IRON SATN MFR SERPL: 18 % (ref 15–55)
IRON SERPL-MCNC: 59 UG/DL (ref 27–159)
LYMPHOCYTES # BLD AUTO: 1.3 X10E3/UL (ref 0.7–3.1)
LYMPHOCYTES NFR BLD AUTO: 24 %
MCH RBC QN AUTO: 31.8 PG (ref 26.6–33)
MCHC RBC AUTO-ENTMCNC: 34.1 G/DL (ref 31.5–35.7)
MCV RBC AUTO: 93 FL (ref 79–97)
METHYLMALONATE SERPL-SCNC: 209 NMOL/L (ref 0–378)
MONOCYTES # BLD AUTO: 0.6 X10E3/UL (ref 0.1–0.9)
MONOCYTES NFR BLD AUTO: 11 %
NEUTROPHILS # BLD AUTO: 3.5 X10E3/UL (ref 1.4–7)
NEUTROPHILS NFR BLD AUTO: 60 %
PLATELET # BLD AUTO: 136 X10E3/UL (ref 150–450)
POTASSIUM SERPL-SCNC: 4.6 MMOL/L (ref 3.5–5.2)
PROT SERPL-MCNC: 8.1 G/DL (ref 6–8.5)
RBC # BLD AUTO: 2.96 X10E6/UL (ref 3.77–5.28)
SODIUM SERPL-SCNC: 140 MMOL/L (ref 134–144)
TIBC SERPL-MCNC: 331 UG/DL (ref 250–450)
UIBC SERPL-MCNC: 272 UG/DL (ref 131–425)
WBC # BLD AUTO: 5.7 X10E3/UL (ref 3.4–10.8)

## 2024-04-16 ENCOUNTER — OFFICE VISIT (OUTPATIENT)
Dept: GASTROENTEROLOGY | Facility: CLINIC | Age: 54
End: 2024-04-16
Payer: COMMERCIAL

## 2024-04-16 VITALS
BODY MASS INDEX: 25.49 KG/M2 | HEART RATE: 86 BPM | DIASTOLIC BLOOD PRESSURE: 70 MMHG | SYSTOLIC BLOOD PRESSURE: 120 MMHG | WEIGHT: 135 LBS | TEMPERATURE: 97.8 F | OXYGEN SATURATION: 98 % | HEIGHT: 61 IN

## 2024-04-16 DIAGNOSIS — K70.30 ALCOHOLIC CIRRHOSIS OF LIVER WITHOUT ASCITES (HCC): Primary | ICD-10-CM

## 2024-04-16 DIAGNOSIS — R74.01 TRANSAMINITIS: ICD-10-CM

## 2024-04-16 DIAGNOSIS — K92.2 GASTROINTESTINAL HEMORRHAGE, UNSPECIFIED GASTROINTESTINAL HEMORRHAGE TYPE: ICD-10-CM

## 2024-04-16 PROCEDURE — 99214 OFFICE O/P EST MOD 30 MIN: CPT | Performed by: INTERNAL MEDICINE

## 2024-04-16 NOTE — PROGRESS NOTES
Progress Note -  [facility]  Yvette Titus 53 y.o. female MRN: [id number]  @ Encounter: [id number]    ASSESSMENT AND PLAN:      Yvette Titus is a 53 y.o. old female with PMH of alcohol cirrhosis complicated by bleeding esophageal varices seen for follow up.     Alcohol cirrhosis  Alcohol abuse  History of variceal bleed  Primary Complaint: None.  Patient feels well at this visit.  Has been sober for 46 days.  Congratulated patient on sobriety.  Recommend continued alcohol cessation.  Recheck CBC, CMP, INR in 3 months.  Counseled patient to proceed with screening colonoscopy with personal GI.  Varices: EGD 4/4/2024 showed small varices.  She has history of bleeding esophageal varices that were banded.  Heart rate currently not optimized.  Increase nadolol to 60 mg daily for target heart rate of 55-60.  Repeat EGD in 2025.  Monitor for signs of bleeding.  Monitor hgb, transfuse for < 7  Ascites: No hx.  2 g Sodium diet  Monitor volume status, creatinine, urine output.  Encephalopathy: No hx.  Monitor mental status  HCC Screening: Ultrasound performed this month showed no evidence of masses.  Check CT multiphasic study in 6 months as well as AFP.  Transplant Candidacy:  Follow up with hepatology as an outpatient.     MELD 3.0: 10 at 3/30/2024  8:34 AM  MELD-Na: 9 at 3/30/2024  8:34 AM  Calculated from:  Serum Creatinine: 0.71 mg/dL (Using min of 1 mg/dL) at 3/30/2024  8:34 AM  Serum Sodium: 137 mmol/L at 3/30/2024  8:34 AM  Total Bilirubin: 1.3 mg/dL at 3/30/2024  8:34 AM  Serum Albumin: 3.9 g/dL (Using max of 3.5 g/dL) at 3/30/2024  8:34 AM  INR(ratio): 1.2 at 3/30/2024  8:34 AM  Age at listing (hypothetical): 53 years  Sex: Female at 3/30/2024  8:34 AM      Follow up in 6 months.  _____________________________________________________________________    Subjective: Seen and examined.  She is accompanied by her boyfriend.  No episodes of bleeding.  Has been sober for 46 days.  Denies any nausea,  vomiting, abdominal pain.  Does attend outpatient rehab sessions.  She is also been making herself busy as her drinking is exacerbated by being home alone.  She has good family support.  Compliant with medications.    HEALTHCARE MAINTENANCE:  Hepatitis C screening negative 2023.  Last EGD: 4/4/2024 small varcies, 1 cm HH  Last Colonoscopy: States at age 49, was normal, planned for repeat with local GI.    REVIEW OF SYSTEMS:    Review of Systems   Constitutional:  Negative for chills and fever.   HENT:  Negative for congestion and sinus pressure.    Respiratory:  Negative for cough and shortness of breath.    Cardiovascular:  Negative for chest pain, palpitations and leg swelling.   Gastrointestinal:  Negative for abdominal pain, diarrhea, nausea and vomiting.   Genitourinary:  Negative for dysuria and hematuria.   Musculoskeletal:  Negative for arthralgias and back pain.   Skin:  Negative for color change and rash.   Neurological:  Negative for dizziness and headaches.   Psychiatric/Behavioral:  Negative for agitation and confusion.    All other systems reviewed and are negative.      Historical Information   Past Medical History:   Diagnosis Date    Alcoholism (HCC) 10/1/2010    Fatty liver 01/20/2023    Gallstones     Long Q-T syndrome      Past Surgical History:   Procedure Laterality Date    COLONOSCOPY  4/29/2019    Clear    TUBAL LIGATION  07/14/2005    UPPER GASTROINTESTINAL ENDOSCOPY  1/20/2023    Burst ulcer     Social History   Social History     Substance and Sexual Activity   Alcohol Use Yes    Comment: pint of vodka and a bottle of wine     Social History     Substance and Sexual Activity   Drug Use Never     Social History     Tobacco Use   Smoking Status Never    Passive exposure: Never   Smokeless Tobacco Never     Family History   Problem Relation Age of Onset    Cancer Mother        Meds/Allergies     (Not in a hospital admission)    No current facility-administered medications for this visit.  "      No Known Allergies    Objective     Blood pressure 120/70, pulse 86, temperature 97.8 °F (36.6 °C), temperature source Tympanic, height 5' 1\" (1.549 m), weight 61.2 kg (135 lb), SpO2 98%. Body mass index is 25.51 kg/m².    [unfilled]      PHYSICAL EXAM:      Physical Exam  Vitals and nursing note reviewed.   Constitutional:       General: She is not in acute distress.     Appearance: Normal appearance. She is not ill-appearing.   HENT:      Head: Normocephalic and atraumatic.      Mouth/Throat:      Mouth: Mucous membranes are moist.   Eyes:      Extraocular Movements: Extraocular movements intact.      Conjunctiva/sclera: Conjunctivae normal.   Cardiovascular:      Pulses: Normal pulses.   Pulmonary:      Effort: Pulmonary effort is normal.   Abdominal:      General: Abdomen is flat. Bowel sounds are normal. There is no distension.      Palpations: Abdomen is soft.      Tenderness: There is no abdominal tenderness. There is no guarding.   Skin:     General: Skin is warm and dry.   Neurological:      General: No focal deficit present.      Mental Status: She is alert and oriented to person, place, and time.   Psychiatric:         Mood and Affect: Mood normal.         Behavior: Behavior normal.         Lab Results:   No visits with results within 1 Day(s) from this visit.   Latest known visit with results is:   Orders Only on 04/05/2024   Component Date Value    White Blood Cell Count 04/06/2024 5.7     Red Blood Cell Count 04/06/2024 2.96 (L)     Hemoglobin 04/06/2024 9.4 (L)     HCT 04/06/2024 27.6 (L)     MCV 04/06/2024 93     MCH 04/06/2024 31.8     MCHC 04/06/2024 34.1     RDW 04/06/2024 13.6     Platelet Count 04/06/2024 136 (L)     Neutrophils 04/06/2024 60     Lymphocytes 04/06/2024 24     Monocytes 04/06/2024 11     Eosinophils 04/06/2024 4     Basophils PCT 04/06/2024 1     Neutrophils (Absolute) 04/06/2024 3.5     Lymphocytes (Absolute) 04/06/2024 1.3     Monocytes (Absolute) 04/06/2024 0.6     " Eosinophils (Absolute) 04/06/2024 0.2     Basophils ABS 04/06/2024 0.0     Immature Granulocytes 04/06/2024 0     Immature Granulocytes (A* 04/06/2024 0.0     Glucose, Random 04/06/2024 93     BUN 04/06/2024 13     Creatinine 04/06/2024 0.79     eGFR 04/06/2024 89     SL AMB BUN/CREATININE RA* 04/06/2024 16     Sodium 04/06/2024 140     Potassium 04/06/2024 4.6     Chloride 04/06/2024 103     CO2 04/06/2024 24     CALCIUM 04/06/2024 9.7     Protein, Total 04/06/2024 8.1     Albumin 04/06/2024 3.9     Globulin, Total 04/06/2024 4.2     Albumin/Globulin Ratio 04/06/2024 0.9 (L)     TOTAL BILIRUBIN 04/06/2024 1.1     Alk Phos Isoenzymes 04/06/2024 126 (H)     AST 04/06/2024 98 (H)     ALT 04/06/2024 32     Total Iron Binding [place]* 04/06/2024 331     UIBC 04/06/2024 272     Iron, Serum 04/06/2024 59     Iron Saturation 04/06/2024 18     Ferritin 04/06/2024 99     Methylmalonic Acid, S 04/06/2024 209        Imaging Studies: I have personally reviewed pertinent imaging studies.    Keith Toledo D.O.  Gastroenterology Fellow  PGY-5  Available via [employer]  4/16/2024 4:30 PM

## 2024-04-24 ENCOUNTER — TELEPHONE (OUTPATIENT)
Age: 54
End: 2024-04-24

## 2024-04-24 NOTE — TELEPHONE ENCOUNTER
Scheduled date of colonoscopy (as of today):6/12/2024  Physician performing colonoscopy:Dr Parrish  [facility]  Bowel prep reviewed with patient:Darrel  Instructions reviewed with patient by:sent via [employer]  Clearances: N/A

## 2024-04-26 NOTE — ASSESSMENT & PLAN NOTE
· Home regimen: Mirtazapine 15 mg daily at bedtime and Lexapro  · Per [facility] paperwork Lexapro was discontinued on 1/13 Quality 47: Advance Care Plan: Advance Care Planning discussed and documented; advance care plan or surrogate decision maker documented in the medical record. Name And Contact Information For Health Care Proxy: Please see patient chart. Quality 226: Preventive Care And Screening: Tobacco Use: Screening And Cessation Intervention: Patient screened for tobacco use and is an ex/non-smoker Detail Level: Zone

## 2024-05-31 DIAGNOSIS — Z12.11 COLON CANCER SCREENING: Primary | ICD-10-CM

## 2024-07-11 ENCOUNTER — TELEPHONE (OUTPATIENT)
Age: 54
End: 2024-07-11

## 2024-07-11 NOTE — TELEPHONE ENCOUNTER
Patient returned office call she is taking nadolol 40 mg dose appt w DR AGEE has been dominick as requested in appt notes

## 2024-07-11 NOTE — TELEPHONE ENCOUNTER
Patients GI provider:  Dr. Hays    Number to return call: [phone]    Reason for call: Pt calling to ask for a refill on the nadolol to be sent to the [facility] pharmacy on file.  Pt is also wanting to know if she should be following up with Dr. Hays or just seeing Dr. Hernández from now on. Please reach back out to the pt to discuss    Scheduled procedure/appointment date if applicable: Apt/10/25/24

## 2024-07-11 NOTE — TELEPHONE ENCOUNTER
Patient requesting refill on nadolol. Last office visit note to increase to 60 mg, patient called back and reported taking 40 mg. Please review/order medication/dose.    Just FYI patient cancelled October appointment with Dr. Hernández and rescheduled with Dr. Wiley 10/14/24 by pep team,no notes for reason noted in appointment desk.

## 2024-07-11 NOTE — TELEPHONE ENCOUNTER
LOV 4/16/24 Dr. Toledo/Dr. Hernández, currently scheduled for hepatology f/u 10/25/24 [facility].    I called patient, no answer, left message requesting a call back to confirm nadolol dose she is taking (last visit note 60 mg day) and to discuss physician follow up.

## 2024-07-12 DIAGNOSIS — I85.10 SECONDARY ESOPHAGEAL VARICES WITHOUT BLEEDING (HCC): ICD-10-CM

## 2024-07-12 DIAGNOSIS — K70.30 ALCOHOLIC CIRRHOSIS OF LIVER WITHOUT ASCITES (HCC): Primary | ICD-10-CM

## 2024-07-12 RX ORDER — NADOLOL 20 MG/1
60 TABLET ORAL DAILY
Qty: 90 TABLET | Refills: 11 | Status: SHIPPED | OUTPATIENT
Start: 2024-07-12 | End: 2025-07-12

## 2024-07-15 ENCOUNTER — TELEPHONE (OUTPATIENT)
Age: 54
End: 2024-07-15

## 2024-07-15 NOTE — TELEPHONE ENCOUNTER
Patients GI provider:  Dr. Hernández    Number to return call: ([phone]    Reason for call: Pt called regarding her RX for Nadolol stating that she thought Dr Wiley was going to increase it to 60 mg but the RX says 20 mg. Informed pt she will take 20 mg three tablets    Scheduled procedure/appointment date if applicable: Apt/procedure 10/14/24

## 2024-08-23 ENCOUNTER — TELEPHONE (OUTPATIENT)
Age: 54
End: 2024-08-23

## 2024-08-23 NOTE — TELEPHONE ENCOUNTER
Phone call to Yvette regarding having lab work completed for upcoming appointment 8/27/2024. Unable to leave a message mailbox full.

## 2024-08-29 ENCOUNTER — TELEPHONE (OUTPATIENT)
Age: 54
End: 2024-08-29

## 2024-08-29 ENCOUNTER — NURSE TRIAGE (OUTPATIENT)
Age: 54
End: 2024-08-29

## 2024-08-29 NOTE — TELEPHONE ENCOUNTER
Called patient to see if she could move her appt on 9/10 to 8 am.  Patient was unable to move it due to needing a ride to the appt.  
no

## 2024-08-29 NOTE — TELEPHONE ENCOUNTER
SPOKE WITH PT, HX ALCOHOLIC CIRRHOSIS, VARICES, LAST OV 4/16/24. PT RECENTLY HOSPITALIZED FOR 9 DAYS, PRESENTED WITH EPISTAXIS, HEMATEMESIS, MELENA, TRANSFUSED. PT RELAPSED WITH ETOH DUE TO RECENT LOSS OF HER FATHER/DEPRESSION. PT IS CURRENTLY ON NADOLOL 40 MG DAILY, LACTULOSE BID UNTIL NEXT WEDNESDAY. PT C/O ABDOMEN SOFTLY DISTENDED, FEELS UNCOMFORTABLE. DENIES PREVIOUS PARACENTESIS. PT C/O NAUSEA DUE TO TASTE OF LACTULOSE. PT DENIES FEVER, CHILLS, VOMITING, ABDOMINAL PAIN, BLACK OR BLOODY STOOLS, SOB, L/E SWELLING, DARK URINE, JAUNDICE, CONFUSION, BRUISING. PT IS SCHEDULED FOR F/U 10/4/24. ANY RECOMMENDATIONS?          Answer questions -  Cirrhosis- Abdominal distension, weight gain, lower extremity swelling  Does the patient have any abdominal distension: YES  Has the patient had paracentesis in the past: DENIES  Any lower extremity swelling: DENIES    Any SOB: DENIES    Any weight gain: UNSURE  Any additional symptoms: NAUSEA DUE TO LACTULOSE    Protocols used: Hepatology

## 2024-08-30 NOTE — TELEPHONE ENCOUNTER
Pt. Called back, pt. Can not make urgent f/u on 9/5/24 at [facility], called [facility] to ask if a virtual visit with Dr. Wiley would be appropriate for her ER f/u, pt. Leaves for vacation on 8/12, abdomen is swollen and painful, Dr. Wiley is requesting an in person visit, pt. Made aware, pt. Will keep f/u in [place] on 9/5/24

## 2024-08-30 NOTE — TELEPHONE ENCOUNTER
Spoke with pt, calling back to f/u on message from 8/29/24. No available urgent appointments, pt wants to know if she needs diuretics, or paracentesis. Pt would need an appointment after 3pm or virtual due to transportation issues. Pt is leaving [facility] on 9/12/24.

## 2024-08-30 NOTE — TELEPHONE ENCOUNTER
Spoke with patient, scheduled sooner appointment on 9/5/20[facility] with [facility]. Wiley. Pt confirmed appointment.

## 2024-09-03 LAB
ALBUMIN SERPL-MCNC: 2.8 G/DL (ref 3.8–4.9)
ALP SERPL-CCNC: 148 IU/L (ref 44–121)
ALT SERPL-CCNC: 21 IU/L (ref 0–32)
AST SERPL-CCNC: 144 IU/L (ref 0–40)
BASOPHILS # BLD AUTO: 0.1 X10E3/UL (ref 0–0.2)
BASOPHILS NFR BLD AUTO: 1 %
BILIRUB SERPL-MCNC: 4.6 MG/DL (ref 0–1.2)
BUN SERPL-MCNC: 15 MG/DL (ref 6–24)
BUN/CREAT SERPL: 20 (ref 9–23)
CALCIUM SERPL-MCNC: 8.6 MG/DL (ref 8.7–10.2)
CHLORIDE SERPL-SCNC: 101 MMOL/L (ref 96–106)
CO2 SERPL-SCNC: 22 MMOL/L (ref 20–29)
CREAT SERPL-MCNC: 0.75 MG/DL (ref 0.57–1)
EGFR: 94 ML/MIN/1.73
EOSINOPHIL # BLD AUTO: 0.1 X10E3/UL (ref 0–0.4)
EOSINOPHIL NFR BLD AUTO: 1 %
ERYTHROCYTE [DISTWIDTH] IN BLOOD BY AUTOMATED COUNT: 19 % (ref 11.7–15.4)
FERRITIN SERPL-MCNC: 72 NG/ML (ref 15–150)
GLOBULIN SER-MCNC: 5.5 G/DL (ref 1.5–4.5)
GLUCOSE SERPL-MCNC: 109 MG/DL (ref 70–99)
HCT VFR BLD AUTO: 24 % (ref 34–46.6)
HGB BLD-MCNC: 8.6 G/DL (ref 11.1–15.9)
IMM GRANULOCYTES # BLD: 0 X10E3/UL (ref 0–0.1)
IMM GRANULOCYTES NFR BLD: 0 %
IRON SATN MFR SERPL: 40 % (ref 15–55)
IRON SERPL-MCNC: 68 UG/DL (ref 27–159)
LYMPHOCYTES # BLD AUTO: 1.8 X10E3/UL (ref 0.7–3.1)
LYMPHOCYTES NFR BLD AUTO: 15 %
MCH RBC QN AUTO: 31.4 PG (ref 26.6–33)
MCHC RBC AUTO-ENTMCNC: 35.8 G/DL (ref 31.5–35.7)
MCV RBC AUTO: 88 FL (ref 79–97)
METHYLMALONATE SERPL-SCNC: 226 NMOL/L (ref 0–378)
MONOCYTES # BLD AUTO: 1.3 X10E3/UL (ref 0.1–0.9)
MONOCYTES NFR BLD AUTO: 11 %
NEUTROPHILS # BLD AUTO: 9 X10E3/UL (ref 1.4–7)
NEUTROPHILS NFR BLD AUTO: 72 %
PLATELET # BLD AUTO: 184 X10E3/UL (ref 150–450)
POTASSIUM SERPL-SCNC: 3.7 MMOL/L (ref 3.5–5.2)
PROT SERPL-MCNC: 8.3 G/DL (ref 6–8.5)
RBC # BLD AUTO: 2.74 X10E6/UL (ref 3.77–5.28)
SODIUM SERPL-SCNC: 136 MMOL/L (ref 134–144)
TIBC SERPL-MCNC: 171 UG/DL (ref 250–450)
UIBC SERPL-MCNC: 103 UG/DL (ref 131–425)
VIT B12 SERPL-MCNC: >2000 PG/ML (ref 232–1245)
WBC # BLD AUTO: 12.4 X10E3/UL (ref 3.4–10.8)

## 2024-09-04 NOTE — TELEPHONE ENCOUNTER
Pt reports her psychiatrist stopped her Lexapro and Trazodone on Tuesday due to concerns for her liver. Was advised to reach out to Hepatology on whether they are safe to take. Having a hard time falling asleep and feels depression is worsening. Pt has appt with Dr. Wiley tomorrow. Pt denies being in crisis. She will make psychiatrist aware. ED precautions given.

## 2024-09-05 ENCOUNTER — OFFICE VISIT (OUTPATIENT)
Dept: GASTROENTEROLOGY | Facility: AMBULARY SURGERY CENTER | Age: 54
End: 2024-09-05
Payer: COMMERCIAL

## 2024-09-05 ENCOUNTER — PREP FOR PROCEDURE (OUTPATIENT)
Dept: GASTROENTEROLOGY | Facility: AMBULARY SURGERY CENTER | Age: 54
End: 2024-09-05

## 2024-09-05 ENCOUNTER — TELEPHONE (OUTPATIENT)
Age: 54
End: 2024-09-05

## 2024-09-05 VITALS
SYSTOLIC BLOOD PRESSURE: 118 MMHG | HEIGHT: 61 IN | OXYGEN SATURATION: 98 % | HEART RATE: 64 BPM | WEIGHT: 129.4 LBS | DIASTOLIC BLOOD PRESSURE: 80 MMHG | BODY MASS INDEX: 24.43 KG/M2

## 2024-09-05 DIAGNOSIS — K70.30 ALCOHOLIC CIRRHOSIS OF LIVER WITHOUT ASCITES (HCC): Primary | ICD-10-CM

## 2024-09-05 DIAGNOSIS — K70.31 ALCOHOLIC CIRRHOSIS OF LIVER WITH ASCITES (HCC): Primary | ICD-10-CM

## 2024-09-05 DIAGNOSIS — K70.11 ALCOHOLIC HEPATITIS WITH ASCITES: ICD-10-CM

## 2024-09-05 DIAGNOSIS — K76.82 HEPATIC ENCEPHALOPATHY (HCC): ICD-10-CM

## 2024-09-05 DIAGNOSIS — I85.10 SECONDARY ESOPHAGEAL VARICES WITHOUT BLEEDING (HCC): ICD-10-CM

## 2024-09-05 DIAGNOSIS — K70.31 ALCOHOLIC CIRRHOSIS OF LIVER WITH ASCITES (HCC): ICD-10-CM

## 2024-09-05 DIAGNOSIS — M62.84 SARCOPENIA: ICD-10-CM

## 2024-09-05 PROCEDURE — 99214 OFFICE O/P EST MOD 30 MIN: CPT | Performed by: STUDENT IN AN ORGANIZED HEALTH CARE EDUCATION/TRAINING PROGRAM

## 2024-09-05 RX ORDER — NICOTINE POLACRILEX 2 MG
GUM BUCCAL
COMMUNITY

## 2024-09-05 RX ORDER — FUROSEMIDE 40 MG
40 TABLET ORAL DAILY
Qty: 90 TABLET | Refills: 0 | Status: SHIPPED | OUTPATIENT
Start: 2024-09-05 | End: 2024-12-04

## 2024-09-05 RX ORDER — SPIRONOLACTONE 50 MG/1
50 TABLET, FILM COATED ORAL DAILY
Qty: 90 TABLET | Refills: 0 | Status: SHIPPED | OUTPATIENT
Start: 2024-09-05 | End: 2024-12-04

## 2024-09-05 NOTE — PATIENT INSTRUCTIONS
Restart your lexapro, trazodone and gabapentin but monitor for signs of over sedation  Schedule an EGD with Dr. Hays  Schedule a paracentesis with IR  Start taking diuretics (water pills, aka lasix 40 mg daily and aldactone 50 mg daily). Do not take your diuretics on days that you go for abdominal drainage  Repeat your labs before you go on vacation (ideally at a [facility]'s lab)  Increase your protein intake (30g protein shakes three times a day)

## 2024-09-05 NOTE — PROGRESS NOTES
[facility] - Outpatient Consultation  Yvette Titus 54 y.o. female MRN: [id number]  Encounter: [id number]    PCP:  Alberto Robertson MD, [phone]  Referring Provider:  No ref. provider found,     Patient: Yvette Titus, 1970  Reason for Referral: decompensated cirrhosis     ASSESSMENT/PLAN:  54 y.o. female with history of EtOH cirrhosis d/b EVBL with recent bleed and HE who presents for follow up evaluation. She was last seen in clinic in April 2024.    She reports longstanding history of EtOH dependence with history of complicated withdrawal and remote DUI in 2011. She reports family history of alcoholism and cites prior traumatic personal experiences as trigger for drinking. She has attempted inpatient rehabilitation multiple times with period of sustained sobriety after 2018 until the COVID pandemic. She has since had only a period of several weeks of abstinence and was recently diagnosed with cirrhosis earlier this year.      She was hospitalized in January 2023 for UGIB due to PUD and was noted to have mild EtOH hepatitis for which EtOH cessation was advised.  She had a subsequent EGD in May 2023 which showed esophageal varices. She continued to drink despite knowledge of her disease and recently was admitted for a variceal bleed requiring banding with successful eradication.     She was doing well clinically with abstinence but recently had further decompensation in the context of EtOH relapse. She was recently admitted to [facility] for acute blood loss anemia requiring transfusion and HE in the context of epistaxis, melena and hematemesis. Etiology of her bleeding was felt to be from epistaxis but concerned that she had a recurrent variceal bleed given concurrent EtOH hepatitis. Would recommend repeating her EGD with Dr. Hays for possible banding.    In addition, she has developed new abdominal distension c/f ascites. Will repeat US abdomen with dopplers to exclude PVT and  refer to IR for paracentesis. I have also started lasix and aldactone for maintenance and advised that she repeat labs next week fto monitor her renal function. Regarding her HE, advised that she should take lactulose for goal 3-4 bowel movements daily. However, she should hold lactulose if she is experiencing diarrhea as volume depletion can also precipitate HE.    Lastly, advised the importance of EtOH cessation to prevent further decompensation and death. I recommend restarting her antidepressants as SSRIs are safe in decompensated disease. Would also recommend that she engage in IOP and behavioral counseling given her high risk psychosocial factors.     She should return to liver clinic in 1 month or sooner if needed. Thank you for the opportunity to consult in her care.    1. Decompensated cirrhosis:   - Etiology: EtOH with recent relapse  - Transplant: not currently a candidate given high risk EtOH use    2. Ascites  - US abdomen with dopplers  - IR referral for diagnostic and therapeutic LVP  - Start lasix 40 mg daily  - Start aldactone 50 mg daily  - Repeat labs in 1 week to monitor renal function     3. Hepatic encephalopathy  - Titrate lactulose for goal 3-4 soft BMs daily  - Holding rifaximin for now given financial constraint    4. Varices  - Her last EGD showed no varices 4/2024  - EGD ordered given recent bleeding. Will TBW Dr. Hays    -Continue nadolol 60 mg daily for secondary prophylaxis     5. HCC surveillance  - No lesions seen 4/2024  - MRI or CT due 10/2024     6. Nutrition and sarcopenia  -  She was instructed to eat multiple small meals a day and a snack prior to bedtime to help prevent protein loss and sarcopenia    7. EtOH hepatitis  - No indication for steroids at this time as Tb appears to be resolving  - Recommend EtOH abstinence and high protein diet     8. Healthcare maintenance for patients with cirrhosis  -She was instructed to take no more than 2 grams of tylenol in 24 hours and no  products containing NSAIDs, benzodiazapines, and narcotics.  -She was also instructed to avoid raw shellfish   -She should participate in daily exercise as to prevent loss of muscle mass  -She should abstain from all alcohol intake and was counseled on this.    -She is at risk for vitamin deficiencies and metabolic bone disease.  -HAV and HBV immunity will be checked and she should be vaccinated through her primary care provider if she is not immune.  -Additionally, she should receive a yearly flu shot and the pneumonia vaccine through her primary care provider.      Karen Wiley MD  Division of Gastroenterology and Hepatology  [facility]    ============================================================================  CC/HPI: 54 y.o. female with history of EtOH cirrhosis d/b EVBL who presents for follow up evaluation. She was last seen in clinic in April 2024.    Interval events  - Relapsed recently in the context of depression and recent loss of her father.  - Admitted to [facility] for acute blood loss anemia requiring pRBC in the context of epistaxis, hematemesis and melena. Course c/b mild EtOH hepatitis with MELD 24. She did not undergo EGD as her bleeding was felt to be due to epistaxis. Course also complicated by new HE for which she was treated with lactulose and rifaximin. Declined inpatient rehabilitation but reports being abstinent since her discharge.   - Stopped lactulose due to diarrhea. She reports having 5+ watery BMs daily  - Reports new abdominal distension. Previously had trace perihepatic ascites on US   - Seen by her psychiatrist who recommended stopping lexapro and trazodone given her liver disease    Extended liver liver history  She reports longstanding history of EtOH dependence with history of complicated withdrawal and remote DUI in 2011. She reports family history of alcoholism and cites prior traumatic personal experiences as trigger for drinking. She has  attempted inpatient rehabilitation multiple times with period of sustained sobriety after 2018 until the COVID pandemic. She has since had only a period of several weeks of abstinence and was recently diagnosed with cirrhosis earlier this year.      She was hospitalized in January 2023 for UGIB due to PUD and was noted to have mild EtOH hepatitis for which EtOH cessation was advised.  She had a subsequent EGD in May 2023 which showed esophageal varices. She continued to drink despite knowledge of her disease and recently was admitted for a variceal bleed requiring banding with partial eradication. CT was negative for PVT. She has been on nadolol 20 mg daily although HR appears to be uncontrolled today. She is scheduled for a repeat EGD next month for serial banding.     ROS: Complete review of systems otherwise negative.     PAST MEDICAL/SURGICAL HISTORY:  Past Medical History:   Diagnosis Date    Alcoholism (HCC) 10/1/2010    Fatty liver 01/20/2023    Gallstones     Long Q-T syndrome         Past Surgical History:   Procedure Laterality Date    COLONOSCOPY  4/29/2019    Clear    TUBAL LIGATION  07/14/2005    UPPER GASTROINTESTINAL ENDOSCOPY  1/20/2023    Burst ulcer       FAMILY/SOCIAL HISTORY:  Family History   Problem Relation Age of Onset    Cancer Mother        Social History     Tobacco Use    Smoking status: Never     Passive exposure: Never    Smokeless tobacco: Never   Vaping Use    Vaping status: Never Used   Substance Use Topics    Alcohol use: Yes     Comment: pint of vodka and a bottle of wine    Drug use: Never       MEDICATIONS:  Current Outpatient Medications on File Prior to Visit   Medication Sig Dispense Refill    B Complex Vitamins (B Complex-B12) TABS       Biotin 1 MG CAPS Take by mouth      escitalopram (LEXAPRO) 20 mg tablet Take 20 mg by mouth daily DC by barbara on 1/13      folic acid (FOLVITE) 1 mg tablet TAKE 1 TABLET BY MOUTH EVERY DAY 21 tablet 2    magnesium Oxide (MAG-OX) 400 mg TABS  "TAKE 1 TABLET (400 MG TOTAL) BY MOUTH ONCE FOR 1 DOSE 30 tablet 5    Multiple Vitamins-Minerals (multivitamin with minerals) tablet Take 1 tablet by mouth daily      nadolol (CORGARD) 20 mg tablet Take 3 tablets (60 mg total) by mouth daily 90 tablet 11    pantoprazole (PROTONIX) 40 mg tablet Take 1 tablet (40 mg total) by mouth 2 (two) times a day 30 tablet 0    saccharomyces boulardii (Florastor) 250 mg capsule Take 250 mg by mouth 2 (two) times a day      traZODone (DESYREL) 100 mg tablet Take 100 mg by mouth daily at bedtime      vitamin B-12 (CYANOCOBALAMIN) 100 MCG TABS Take 1 tablet (100 mcg total) by mouth daily 90 tablet 3    baclofen 10 mg tablet Take 1 tablet (10 mg total) by mouth daily at bedtime 30 tablet 0    gabapentin (NEURONTIN) 100 mg capsule Take 200 mg by mouth daily (Patient not taking: Reported on 9/5/2024)      HYDROcodone-acetaminophen (NORCO) 5-325 mg per tablet TAKE 1 TABLET BY MOUTH EVERY 4 TO 6 HOURS AS NEEDED FOR SEVERE PAIN      Melatonin 10 MG TABS Take 10 mg by mouth daily at bedtime as needed      mirtazapine (REMERON) 15 mg tablet Take 15 mg by mouth daily at bedtime (Patient not taking: Reported on 9/5/2024)      polyethylene glycol (GOLYTELY) 4000 mL solution Take 4,000 mL by mouth once for 1 dose 4000 mL 0    potassium chloride (Klor-Con M20) 20 mEq tablet Take 1 tablet (20 mEq total) by mouth daily with breakfast for 10 days 10 tablet 0    valsartan (DIOVAN) 40 mg tablet Take 40 mg by mouth daily       No current facility-administered medications on file prior to visit.     No Known Allergies    PHYSICAL EXAM:  /80 (BP Location: Left arm, Patient Position: Sitting, Cuff Size: Adult)   Pulse 64   Ht 5' 1\" (1.549 m)   Wt 58.7 kg (129 lb 6.4 oz)   SpO2 98%   BMI 24.45 kg/m²   GENERAL: NAD, AAO  HEENT: anicteric, OP clear, MMM  ABDOMEN: moderate distension  EXTREMITIES: no edema   SKIN: no rashes, no palmar erythema, no spider angiomata   NEURO: normal gait, no tremor, " +asterixis     LABS/RADIOLOGY/ENDOSCOPY:  Lab Results   Component Value Date    WBC 12.4 (H) 08/31/2024    HGB 8.6 (L) 08/31/2024    HCT 24.0 (L) 08/31/2024     08/31/2024    BUN 15 08/31/2024    CREATININE 0.75 08/31/2024     09/30/2015    K 3.7 08/31/2024     08/31/2024    CO2 22 08/31/2024    PROT 8.5 (H) 09/30/2015    BILIDIR 1.32 (H) 03/05/2024    ALKPHOS 142 (H) 03/07/2024    ALT 21 08/31/2024     (H) 08/31/2024    GLOB 5.5 (H) 08/31/2024    CALCIUM 8.8 03/07/2024    EGFR 94 08/31/2024    CHOL 199 10/01/2015    TRIG 205 (H) 03/04/2024    HDL 38 10/01/2015    INR 1.2 03/30/2024    PTT 41 (H) 03/01/2024    PTT 40 (H) 03/01/2024     EGD (4/4/2024)  Small varices (no red nakul sign) in the esophagus; no bleeding was identified. Flatten with insufflation  1 cm hiatal hernia:  Hill classification: Grade II     US (4/6/2024)  Cirrhotic liver morphology with trace perihepatic ascites. No focal hepatic lesions.       MELD 3.0: 10 at 3/30/2024  8:34 AM  MELD-Na: 9 at 3/30/2024  8:34 AM  Calculated from:  Serum Creatinine: 0.71 mg/dL (Using min of 1 mg/dL) at 3/30/2024  8:34 AM  Serum Sodium: 137 mmol/L at 3/30/2024  8:34 AM  Total Bilirubin: 1.3 mg/dL at 3/30/2024  8:34 AM  Serum Albumin: 3.9 g/dL (Using max of 3.5 g/dL) at 3/30/2024  8:34 AM  INR(ratio): 1.2 at 3/30/2024  8:34 AM  Age at listing (hypothetical): 53 years  Sex: Female at 3/30/2024  8:34 AM

## 2024-09-05 NOTE — TELEPHONE ENCOUNTER
Pt calling in, reports she was reading over medications on her AVS from today's visit with Dr. Wiley.  She would like to know which dose of B 12 she is supposed to be on as she has 2 different orders   AND if she should still be taking potassium supplements- if so will need a new rx.    Please advise t

## 2024-09-05 NOTE — LETTER
September 5, 2024     Jorge Hays MD  [address]  [facility] [address]    Patient: Yvette Titus   YOB: 1970   Date of Visit: 9/5/2024       Dear Dr. Hays:    Thank you for referring Yvette Titus to me for evaluation. Below are my notes for this consultation.    If you have questions, please do not hesitate to call me. I look forward to following your patient along with you.         Sincerely,        Karen Wiley MD        CC: No Recipients    Karen Wiley MD  9/5/2024  2:08 PM  Sign when Signing Visit  [facility] - Outpatient Consultation  Yvette Titus 54 y.o. female MRN: [id number]  Encounter: [id number]    PCP:  Alberto Robertson MD, [phone]  Referring Provider:  No ref. provider found,     Patient: Yvette Titus, 1970  Reason for Referral: decompensated cirrhosis     ASSESSMENT/PLAN:  54 y.o. female with history of EtOH cirrhosis d/b EVBL with recent bleed and HE who presents for follow up evaluation. She was last seen in clinic in April 2024.    She reports longstanding history of EtOH dependence with history of complicated withdrawal and remote DUI in 2011. She reports family history of alcoholism and cites prior traumatic personal experiences as trigger for drinking. She has attempted inpatient rehabilitation multiple times with period of sustained sobriety after 2018 until the COVID pandemic. She has since had only a period of several weeks of abstinence and was recently diagnosed with cirrhosis earlier this year.      She was hospitalized in January 2023 for UGIB due to PUD and was noted to have mild EtOH hepatitis for which EtOH cessation was advised.  She had a subsequent EGD in May 2023 which showed esophageal varices. She continued to drink despite knowledge of her disease and recently was admitted for a variceal bleed requiring banding with successful eradication.     She was doing well clinically with abstinence but recently had further decompensation  in the context of EtOH relapse. She was recently admitted to [facility] for acute blood loss anemia requiring transfusion and HE in the context of epistaxis, melena and hematemesis. Etiology of her bleeding was felt to be from epistaxis but concerned that she had a recurrent variceal bleed given concurrent EtOH hepatitis. Would recommend repeating her EGD with Dr. Hays for possible banding.    In addition, she has developed new abdominal distension c/f ascites. Will repeat US abdomen with dopplers to exclude PVT and refer to IR for paracentesis. I have also started lasix and aldactone for maintenance and advised that she repeat labs next week fto monitor her renal function. Regarding her HE, advised that she should take lactulose for goal 3-4 bowel movements daily. However, she should hold lactulose if she is experiencing diarrhea as volume depletion can also precipitate HE.    Lastly, advised the importance of EtOH cessation to prevent further decompensation and death. I recommend restarting her antidepressants as SSRIs are safe in decompensated disease. Would also recommend that she engage in IOP and behavioral counseling given her high risk psychosocial factors.     She should return to liver clinic in 1 month or sooner if needed. Thank you for the opportunity to consult in her care.    1. Decompensated cirrhosis:   - Etiology: EtOH with recent relapse  - Transplant: not currently a candidate given high risk EtOH use    2. Ascites  - US abdomen with dopplers  - IR referral for diagnostic and therapeutic LVP  - Start lasix 40 mg daily  - Start aldactone 50 mg daily  - Repeat labs in 1 week to monitor renal function     3. Hepatic encephalopathy  - Titrate lactulose for goal 3-4 soft BMs daily  - Holding rifaximin for now given financial constraint    4. Varices  - Her last EGD showed no varices 4/2024  - EGD ordered given recent bleeding. Will TBW Dr. Hays    -Continue nadolol 60 mg daily for secondary  prophylaxis     5. HCC surveillance  - No lesions seen 4/2024  - MRI or CT due 10/2024     6. Nutrition and sarcopenia  -  She was instructed to eat multiple small meals a day and a snack prior to bedtime to help prevent protein loss and sarcopenia    7. Healthcare maintenance for patients with cirrhosis  -She was instructed to take no more than 2 grams of tylenol in 24 hours and no products containing NSAIDs, benzodiazapines, and narcotics.  -She was also instructed to avoid raw shellfish   -She should participate in daily exercise as to prevent loss of muscle mass  -She should abstain from all alcohol intake and was counseled on this.    -She is at risk for vitamin deficiencies and metabolic bone disease.  -HAV and HBV immunity will be checked and she should be vaccinated through her primary care provider if she is not immune.  -Additionally, she should receive a yearly flu shot and the pneumonia vaccine through her primary care provider.      Karen Wiley MD  Division of Gastroenterology and Hepatology  [facility]    ============================================================================  CC/HPI: 54 y.o. female with history of EtOH cirrhosis d/b EVBL who presents for follow up evaluation. She was last seen in clinic in April 2024.    Interval events  - Relapsed recently in the context of depression and recent loss of her father.  - Admitted to [facility] for acute blood loss anemia requiring pRBC in the context of epistaxis, hematemesis and melena. Course c/b mild EtOH hepatitis with MELD 24. She did not undergo EGD as her bleeding was felt to be due to epistaxis. Course also complicated by new HE for which she was treated with lactulose and rifaximin. Declined inpatient rehabilitation but reports being abstinent since her discharge.   - Stopped lactulose due to diarrhea. She reports having 5+ watery BMs daily  - Reports new abdominal distension. Previously had trace  perihepatic ascites on US   - Seen by her psychiatrist who recommended stopping lexapro and trazodone given her liver disease    Extended liver liver history  She reports longstanding history of EtOH dependence with history of complicated withdrawal and remote DUI in 2011. She reports family history of alcoholism and cites prior traumatic personal experiences as trigger for drinking. She has attempted inpatient rehabilitation multiple times with period of sustained sobriety after 2018 until the COVID pandemic. She has since had only a period of several weeks of abstinence and was recently diagnosed with cirrhosis earlier this year.      She was hospitalized in January 2023 for UGIB due to PUD and was noted to have mild EtOH hepatitis for which EtOH cessation was advised.  She had a subsequent EGD in May 2023 which showed esophageal varices. She continued to drink despite knowledge of her disease and recently was admitted for a variceal bleed requiring banding with partial eradication. CT was negative for PVT. She has been on nadolol 20 mg daily although HR appears to be uncontrolled today. She is scheduled for a repeat EGD next month for serial banding.     ROS: Complete review of systems otherwise negative.     PAST MEDICAL/SURGICAL HISTORY:  Past Medical History:   Diagnosis Date   • Alcoholism (HCC) 10/1/2010   • Fatty liver 01/20/2023   • Gallstones    • Long Q-T syndrome         Past Surgical History:   Procedure Laterality Date   • COLONOSCOPY  4/29/2019    Clear   • TUBAL LIGATION  07/14/2005   • UPPER GASTROINTESTINAL ENDOSCOPY  1/20/2023    Burst ulcer       FAMILY/SOCIAL HISTORY:  Family History   Problem Relation Age of Onset   • Cancer Mother        Social History     Tobacco Use   • Smoking status: Never     Passive exposure: Never   • Smokeless tobacco: Never   Vaping Use   • Vaping status: Never Used   Substance Use Topics   • Alcohol use: Yes     Comment: pint of vodka and a bottle of wine   • Drug  use: Never       MEDICATIONS:  Current Outpatient Medications on File Prior to Visit   Medication Sig Dispense Refill   • B Complex Vitamins (B Complex-B12) TABS      • Biotin 1 MG CAPS Take by mouth     • escitalopram (LEXAPRO) 20 mg tablet Take 20 mg by mouth daily DC by barbara on 1/13     • folic acid (FOLVITE) 1 mg tablet TAKE 1 TABLET BY MOUTH EVERY DAY 21 tablet 2   • magnesium Oxide (MAG-OX) 400 mg TABS TAKE 1 TABLET (400 MG TOTAL) BY MOUTH ONCE FOR 1 DOSE 30 tablet 5   • Multiple Vitamins-Minerals (multivitamin with minerals) tablet Take 1 tablet by mouth daily     • nadolol (CORGARD) 20 mg tablet Take 3 tablets (60 mg total) by mouth daily 90 tablet 11   • pantoprazole (PROTONIX) 40 mg tablet Take 1 tablet (40 mg total) by mouth 2 (two) times a day 30 tablet 0   • saccharomyces boulardii (Florastor) 250 mg capsule Take 250 mg by mouth 2 (two) times a day     • traZODone (DESYREL) 100 mg tablet Take 100 mg by mouth daily at bedtime     • vitamin B-12 (CYANOCOBALAMIN) 100 MCG TABS Take 1 tablet (100 mcg total) by mouth daily 90 tablet 3   • baclofen 10 mg tablet Take 1 tablet (10 mg total) by mouth daily at bedtime 30 tablet 0   • gabapentin (NEURONTIN) 100 mg capsule Take 200 mg by mouth daily (Patient not taking: Reported on 9/5/2024)     • HYDROcodone-acetaminophen (NORCO) 5-325 mg per tablet TAKE 1 TABLET BY MOUTH EVERY 4 TO 6 HOURS AS NEEDED FOR SEVERE PAIN     • Melatonin 10 MG TABS Take 10 mg by mouth daily at bedtime as needed     • mirtazapine (REMERON) 15 mg tablet Take 15 mg by mouth daily at bedtime (Patient not taking: Reported on 9/5/2024)     • polyethylene glycol (GOLYTELY) 4000 mL solution Take 4,000 mL by mouth once for 1 dose 4000 mL 0   • potassium chloride (Klor-Con M20) 20 mEq tablet Take 1 tablet (20 mEq total) by mouth daily with breakfast for 10 days 10 tablet 0   • valsartan (DIOVAN) 40 mg tablet Take 40 mg by mouth daily       No current facility-administered medications on file prior  "to visit.     No Known Allergies    PHYSICAL EXAM:  /80 (BP Location: Left arm, Patient Position: Sitting, Cuff Size: Adult)   Pulse 64   Ht 5' 1\" (1.549 m)   Wt 58.7 kg (129 lb 6.4 oz)   SpO2 98%   BMI 24.45 kg/m²   GENERAL: NAD, AAO  HEENT: anicteric, OP clear, MMM  ABDOMEN: moderate distension  EXTREMITIES: no edema   SKIN: no rashes, no palmar erythema, no spider angiomata   NEURO: normal gait, no tremor, +asterixis     LABS/RADIOLOGY/ENDOSCOPY:  Lab Results   Component Value Date    WBC 12.4 (H) 08/31/2024    HGB 8.6 (L) 08/31/2024    HCT 24.0 (L) 08/31/2024     08/31/2024    BUN 15 08/31/2024    CREATININE 0.75 08/31/2024     09/30/2015    K 3.7 08/31/2024     08/31/2024    CO2 22 08/31/2024    PROT 8.5 (H) 09/30/2015    BILIDIR 1.32 (H) 03/05/2024    ALKPHOS 142 (H) 03/07/2024    ALT 21 08/31/2024     (H) 08/31/2024    GLOB 5.5 (H) 08/31/2024    CALCIUM 8.8 03/07/2024    EGFR 94 08/31/2024    CHOL 199 10/01/2015    TRIG 205 (H) 03/04/2024    HDL 38 10/01/2015    INR 1.2 03/30/2024    PTT 41 (H) 03/01/2024    PTT 40 (H) 03/01/2024     EGD (4/4/2024)  Small varices (no red nakul sign) in the esophagus; no bleeding was identified. Flatten with insufflation  1 cm hiatal hernia:  Hill classification: Grade II     US (4/6/2024)  Cirrhotic liver morphology with trace perihepatic ascites. No focal hepatic lesions.       MELD 3.0: 10 at 3/30/2024  8:34 AM  MELD-Na: 9 at 3/30/2024  8:34 AM  Calculated from:  Serum Creatinine: 0.71 mg/dL (Using min of 1 mg/dL) at 3/30/2024  8:34 AM  Serum Sodium: 137 mmol/L at 3/30/2024  8:34 AM  Total Bilirubin: 1.3 mg/dL at 3/30/2024  8:34 AM  Serum Albumin: 3.9 g/dL (Using max of 3.5 g/dL) at 3/30/2024  8:34 AM  INR(ratio): 1.2 at 3/30/2024  8:34 AM  Age at listing (hypothetical): 53 years  Sex: Female at 3/30/2024  8:34 AM          "

## 2024-09-06 ENCOUNTER — HOSPITAL ENCOUNTER (OUTPATIENT)
Dept: RADIOLOGY | Facility: HOSPITAL | Age: 54
Discharge: HOME | End: 2024-09-06
Attending: STUDENT IN AN ORGANIZED HEALTH CARE EDUCATION/TRAINING PROGRAM
Payer: COMMERCIAL

## 2024-09-06 VITALS
RESPIRATION RATE: 16 BRPM | OXYGEN SATURATION: 99 % | SYSTOLIC BLOOD PRESSURE: 108 MMHG | DIASTOLIC BLOOD PRESSURE: 69 MMHG | HEART RATE: 70 BPM

## 2024-09-06 DIAGNOSIS — K70.31 ALCOHOLIC CIRRHOSIS OF LIVER WITH ASCITES (HCC): ICD-10-CM

## 2024-09-06 LAB
ALBUMIN FLD-MCNC: <1.5 G/DL
APPEARANCE FLD: ABNORMAL
BILIRUB FLD-MCNC: 0.4 MG/DL
COLOR FLD: YELLOW
GLUCOSE FLD-MCNC: 114 MG/DL
HISTIOCYTES NFR FLD: 10 %
LYMPHOCYTES NFR BLD AUTO: 30 %
MONO+MESO NFR FLD MANUAL: 12 %
MONOCYTES NFR BLD AUTO: 45 %
NEUTS SEG NFR BLD AUTO: 3 %
PROT FLD-MCNC: <3 G/DL
RBC # FLD MANUAL: 103 /UL
SITE: ABNORMAL
TOTAL CELLS COUNTED SPEC: 100
TOTAL NUCLEATED CELL COUNT: 118 /UL
TOTAL PROTEIN FLUID: 0.5 G/DL

## 2024-09-06 PROCEDURE — 82042 OTHER SOURCE ALBUMIN QUAN EA: CPT | Performed by: STUDENT IN AN ORGANIZED HEALTH CARE EDUCATION/TRAINING PROGRAM

## 2024-09-06 PROCEDURE — 89051 BODY FLUID CELL COUNT: CPT | Performed by: STUDENT IN AN ORGANIZED HEALTH CARE EDUCATION/TRAINING PROGRAM

## 2024-09-06 PROCEDURE — 84157 ASSAY OF PROTEIN OTHER: CPT | Performed by: STUDENT IN AN ORGANIZED HEALTH CARE EDUCATION/TRAINING PROGRAM

## 2024-09-06 PROCEDURE — 88112 CYTOPATH CELL ENHANCE TECH: CPT | Performed by: PATHOLOGY

## 2024-09-06 PROCEDURE — 87070 CULTURE OTHR SPECIMN AEROBIC: CPT | Performed by: STUDENT IN AN ORGANIZED HEALTH CARE EDUCATION/TRAINING PROGRAM

## 2024-09-06 PROCEDURE — 88305 TISSUE EXAM BY PATHOLOGIST: CPT | Performed by: PATHOLOGY

## 2024-09-06 PROCEDURE — 82247 BILIRUBIN TOTAL: CPT | Performed by: STUDENT IN AN ORGANIZED HEALTH CARE EDUCATION/TRAINING PROGRAM

## 2024-09-06 PROCEDURE — 49083 ABD PARACENTESIS W/IMAGING: CPT

## 2024-09-06 PROCEDURE — 87075 CULTR BACTERIA EXCEPT BLOOD: CPT | Performed by: STUDENT IN AN ORGANIZED HEALTH CARE EDUCATION/TRAINING PROGRAM

## 2024-09-06 PROCEDURE — 82945 GLUCOSE OTHER FLUID: CPT | Performed by: STUDENT IN AN ORGANIZED HEALTH CARE EDUCATION/TRAINING PROGRAM

## 2024-09-06 PROCEDURE — 89050 BODY FLUID CELL COUNT: CPT | Performed by: STUDENT IN AN ORGANIZED HEALTH CARE EDUCATION/TRAINING PROGRAM

## 2024-09-06 PROCEDURE — 87205 SMEAR GRAM STAIN: CPT | Performed by: STUDENT IN AN ORGANIZED HEALTH CARE EDUCATION/TRAINING PROGRAM

## 2024-09-06 RX ORDER — LIDOCAINE WITH 8.4% SOD BICARB 0.9%(10ML)
SYRINGE (ML) INJECTION AS NEEDED
Status: COMPLETED | OUTPATIENT
Start: 2024-09-06 | End: 2024-09-06

## 2024-09-06 RX ADMIN — Medication 10 ML: at 08:45

## 2024-09-06 NOTE — BRIEF OP NOTE (RAD/CATH)
IR PARACENTESIS Procedure Note    PATIENT NAME: Yvette Titus  : 1970  MRN: [id number]    Pre-op Diagnosis:   1. Alcoholic cirrhosis of liver with ascites (HCC)      Post-op Diagnosis:   1. Alcoholic cirrhosis of liver with ascites (HCC)        Provider:   Estrella Gomez PA-C  Assistants:     No qualified resident was available, Resident is only observing    Estimated Blood Loss: None    Findings: 3280mL clear yellow ascites drained from RLQ    Specimens: Labs collected and sent    Complications: None immediately    Anesthesia: local    Estrella Gomez PA-C     Date: 2024  Time: 8:52 AM

## 2024-09-06 NOTE — DISCHARGE INSTRUCTIONS
Abdominal Paracentesis     WHAT YOU NEED TO KNOW:   Abdominal paracentesis is a procedure to remove abnormal fluid buildup in your abdomen. Fluid builds up because of liver problems, such as swelling and scarring. Heart failure, kidney disease, a mass, or problems with your pancreas may also cause fluid buildup.     DISCHARGE INSTRUCTIONS:     Follow up with your healthcare provider as directed: Write down your questions so you remember to ask them during your visits.     Wound care: Remove dressing after 24 hours. Leave glue in place.    Return to your normal activities    Contact Interventional Radiology at [phone] ([place] PATIENTS: Contact Interventional Radiology at [phone]) ([place] PATIENTS: Contact Interventional Radiology at [phone]) if:  You have a fever and your wound is red and swollen.   You have yellow, green, or bad-smelling discharge coming from your wound.   You have pain or swelling in your abdomen.   You have an upset stomach or you vomit.   You have sudden, sharp pain in your abdomen.   You urinate very little or not at all.   You feel confused and more tired than usual.   Your arm or leg feels warm, tender, and painful. It may look swollen and red.   You suddenly feel lightheaded and have trouble breathing.

## 2024-09-08 ENCOUNTER — APPOINTMENT (OUTPATIENT)
Dept: LAB | Facility: HOSPITAL | Age: 54
End: 2024-09-08
Payer: COMMERCIAL

## 2024-09-08 DIAGNOSIS — D50.0 IRON DEFICIENCY ANEMIA DUE TO CHRONIC BLOOD LOSS: ICD-10-CM

## 2024-09-08 LAB
BACTERIA SPEC ANAEROBE CULT: NO GROWTH
BACTERIA SPEC BFLD CULT: NO GROWTH
FERRITIN SERPL-MCNC: 28 NG/ML (ref 11–307)
GRAM STN SPEC: NORMAL
GRAM STN SPEC: NORMAL
IRON SATN MFR SERPL: 18 % (ref 15–50)
IRON SERPL-MCNC: 35 UG/DL (ref 50–212)
TIBC SERPL-MCNC: 197 UG/DL (ref 250–450)
UIBC SERPL-MCNC: 162 UG/DL (ref 155–355)

## 2024-09-08 PROCEDURE — 83550 IRON BINDING TEST: CPT

## 2024-09-08 PROCEDURE — 36415 COLL VENOUS BLD VENIPUNCTURE: CPT

## 2024-09-08 PROCEDURE — 82728 ASSAY OF FERRITIN: CPT

## 2024-09-08 PROCEDURE — 83540 ASSAY OF IRON: CPT

## 2024-09-09 LAB
BACTERIA SPEC ANAEROBE CULT: NO GROWTH
BACTERIA SPEC BFLD CULT: NO GROWTH
GRAM STN SPEC: NORMAL
GRAM STN SPEC: NORMAL

## 2024-09-09 NOTE — PROGRESS NOTES
HEMATOLOGY / ONCOLOGY CLINIC FOLLOW UP NOTE    Primary Care Provider: Alberto Robertson MD  Referring Provider:    MRN: [id number]  : 1970    Reason for Encounter: Follow-up for iron deficiency anemia    Interval History: Patient returns for follow-up visit for history of iron deficiency anemia secondary to GI bleeding.  She was last seen by me on 2024.  Since her last visit, she has had multiple hospitalizations. She was hospitalized at [facility] in May 2024 after alcohol intoxication and withdrawal. She was hospitalized in August at [facility] with recurrent GI bleed requiring blood transfusions in the context of epistaxis, melena, and hematic emesis.  She has been recently diagnosed with cirrhosis which is decompensated with ascites.  She is now following with hepatology.  She is status post paracentesis for almost 4 L on 2024.  She is not a candidate for transplant given high risk EtOH use.    Most recent blood work does demonstrate recurrent iron deficiency.  Serum iron 35, iron saturation 18%, serum ferritin 28    Patient is in counseling.  At present, she feels supported and is working towards sobriety.  She denies any abnormal bleeding.  She still has some abdominal distention but states this is greatly improved status post paracentesis.  She has some fatigue      REVIEW O at F SYSTEMS:  Please note that a 14-point review of systems was performed to include Constitutional, HEENT, Respiratory, CVS, GI, , Musculoskeletal, Integumentary, Neurologic, Rheumatologic, Endocrinologic, Psychiatric, Lymphatic, and Hematologic/Oncologic systems were reviewed and are negative unless otherwise stated in HPI. Positive and negative findings pertinent to this evaluation are incorporated into the history of present illness.      ECOG PS: 0    PROBLEM LIST:  Patient Active Problem List   Diagnosis    Nausea vomiting and diarrhea    GI bleed    Cellulitis of hand    Depression    Essential  hypertension    Transaminitis    Cholelithiases    Severe protein-calorie malnutrition (HCC)    Anemia    Long Q-T syndrome    Suicidal ideation    Alcoholic cirrhosis (HCC)    Thrombocytopenia (HCC)       Assessment / Plan:  1. Severe protein-calorie malnutrition (HCC)    2. Gastrointestinal hemorrhage, unspecified gastrointestinal hemorrhage type    3. Anemia, unspecified type    4. Iron deficiency anemia due to chronic blood loss        Patient is a joelle 54 year-old female who follows with hematology for history of anemia, iron deficiency secondary to GI bleeding.  At time of consultation it was suspected her anemia was multifactorial and likely secondary to bone marrow suppression from alcohol abuse, malnutrition from lack of eating, GI blood loss  She was treated with a course of parenteral iron replacement and injectable B12 which resulted in great improvement in her numbers.  She has been on observation.    Patient recently experienced a relapse in her alcohol use disorder and this resulted in recurrent GI bleeding from esophageal varices.  She now has cirrhosis and is following with hepatology.  Recent blood work is consistent with recurrent iron deficiency likely from recent GI bleed.  I have recommended another course of parenteral iron replacement.  She is in agreement.  I will set her up with IV Venofer 300 mg weekly x 4 doses.    She is closely following with GI, hepatology and psychiatry.  I will see her back in 4 months with repeat blood work.  Time spent today providing supportive listening.  She is aware to call anytime with questions or concerns    I spent 30 minutes on chart review, face to face counseling time, coordination of care and documentation.    Past Medical History:   has a past medical history of Alcoholism (HCC) (10/1/2010), Fatty liver (01/20/2023), Gallstones, and Long Q-T syndrome.    PAST SURGICAL HISTORY:   has a past surgical history that includes Tubal ligation (07/14/2005);  Upper gastrointestinal endoscopy (1/20/2023); Colonoscopy (4/29/2019); and IR paracentesis (9/6/2024).    CURRENT MEDICATIONS  Current Outpatient Medications   Medication Sig Dispense Refill    B Complex Vitamins (B Complex-B12) TABS       Biotin 1 MG CAPS Take by mouth      escitalopram (LEXAPRO) 20 mg tablet Take 20 mg by mouth daily DC by barbara on 1/13      folic acid (FOLVITE) 1 mg tablet Take 1 tablet (1,000 mcg total) by mouth daily 21 tablet 2    furosemide (LASIX) 40 mg tablet Take 1 tablet (40 mg total) by mouth daily 90 tablet 0    gabapentin (NEURONTIN) 100 mg capsule Take 200 mg by mouth daily      magnesium Oxide (MAG-OX) 400 mg TABS TAKE 1 TABLET (400 MG TOTAL) BY MOUTH ONCE FOR 1 DOSE 30 tablet 5    Melatonin 10 MG TABS Take 10 mg by mouth daily at bedtime as needed      mirtazapine (REMERON) 15 mg tablet Take 15 mg by mouth daily at bedtime      Multiple Vitamins-Minerals (multivitamin with minerals) tablet Take 1 tablet by mouth daily      nadolol (CORGARD) 20 mg tablet Take 3 tablets (60 mg total) by mouth daily 90 tablet 11    pantoprazole (PROTONIX) 40 mg tablet Take 1 tablet (40 mg total) by mouth 2 (two) times a day 30 tablet 0    spironolactone (ALDACTONE) 50 mg tablet Take 1 tablet (50 mg total) by mouth daily 90 tablet 0    traZODone (DESYREL) 100 mg tablet Take 100 mg by mouth daily at bedtime      valsartan (DIOVAN) 40 mg tablet Take 40 mg by mouth daily      baclofen 10 mg tablet Take 1 tablet (10 mg total) by mouth daily at bedtime 30 tablet 0    HYDROcodone-acetaminophen (NORCO) 5-325 mg per tablet TAKE 1 TABLET BY MOUTH EVERY 4 TO 6 HOURS AS NEEDED FOR SEVERE PAIN      polyethylene glycol (GOLYTELY) 4000 mL solution Take 4,000 mL by mouth once for 1 dose 4000 mL 0    potassium chloride (Klor-Con M20) 20 mEq tablet Take 1 tablet (20 mEq total) by mouth daily with breakfast for 10 days 10 tablet 0    saccharomyces boulardii (Florastor) 250 mg capsule Take 250 mg by mouth 2 (two) times a  "day       No current facility-administered medications for this visit.     [unfilled]    SOCIAL HISTORY:   reports that she has never smoked. She has never been exposed to tobacco smoke. She has never used smokeless tobacco. She reports current alcohol use. She reports that she does not use drugs.     FAMILY HISTORY:  family history includes Cancer in her mother.     ALLERGIES:  has No Known Allergies.      Physical Exam:  Vital Signs:   Visit Vitals  /64 (BP Location: Left arm, Patient Position: Sitting, Cuff Size: Standard)   Pulse 65   Temp 98.2 °F (36.8 °C) (Temporal)   Resp 16   Ht 5' 1\" (1.549 m)   Wt 52.2 kg (115 lb)   SpO2 96%   BMI 21.73 kg/m²   OB Status Postmenopausal   Smoking Status Never   BSA 1.49 m²     Body mass index is 21.73 kg/m².  Body surface area is 1.49 meters squared.    Physical Exam  Constitutional:       General: She is not in acute distress.     Appearance: She is not toxic-appearing.   HENT:      Head: Normocephalic and atraumatic.   Eyes:      General: No scleral icterus.  Cardiovascular:      Rate and Rhythm: Normal rate and regular rhythm.   Pulmonary:      Effort: Pulmonary effort is normal.      Breath sounds: Normal breath sounds.   Abdominal:      General: There is distension.      Palpations: Abdomen is soft.   Skin:     General: Skin is warm and dry.   Neurological:      General: No focal deficit present.      Mental Status: She is alert and oriented to person, place, and time.         Labs:  Lab Results   Component Value Date    WBC 12.4 (H) 08/31/2024    HGB 8.6 (L) 08/31/2024    HCT 24.0 (L) 08/31/2024    MCV 88 08/31/2024     08/31/2024     Lab Results   Component Value Date     09/30/2015    SODIUM 136 08/31/2024    K 3.7 08/31/2024     08/31/2024    CO2 22 08/31/2024    ANIONGAP 14 (H) 09/30/2015    AGAP 7 03/07/2024    BUN 15 08/31/2024    CREATININE 0.75 08/31/2024    GLUC 109 (H) 08/31/2024    CALCIUM 8.8 03/07/2024     (H) 08/31/2024    " ALT 21 08/31/2024    ALKPHOS 142 (H) 03/07/2024    PROT 8.5 (H) 09/30/2015    TP 8.3 08/31/2024    BILITOT 0.53 09/30/2015    TBILI 4.6 (H) 08/31/2024    EGFR 94 08/31/2024

## 2024-09-10 ENCOUNTER — OFFICE VISIT (OUTPATIENT)
Age: 54
End: 2024-09-10
Payer: COMMERCIAL

## 2024-09-10 VITALS
OXYGEN SATURATION: 96 % | HEIGHT: 61 IN | RESPIRATION RATE: 16 BRPM | TEMPERATURE: 98.2 F | SYSTOLIC BLOOD PRESSURE: 103 MMHG | WEIGHT: 115 LBS | HEART RATE: 65 BPM | DIASTOLIC BLOOD PRESSURE: 64 MMHG | BODY MASS INDEX: 21.71 KG/M2

## 2024-09-10 DIAGNOSIS — K92.2 GASTROINTESTINAL HEMORRHAGE, UNSPECIFIED GASTROINTESTINAL HEMORRHAGE TYPE: ICD-10-CM

## 2024-09-10 DIAGNOSIS — D50.0 IRON DEFICIENCY ANEMIA DUE TO CHRONIC BLOOD LOSS: ICD-10-CM

## 2024-09-10 DIAGNOSIS — E43 SEVERE PROTEIN-CALORIE MALNUTRITION (HCC): Primary | ICD-10-CM

## 2024-09-10 DIAGNOSIS — D64.9 ANEMIA, UNSPECIFIED TYPE: ICD-10-CM

## 2024-09-10 PROCEDURE — 88305 TISSUE EXAM BY PATHOLOGIST: CPT | Performed by: PATHOLOGY

## 2024-09-10 PROCEDURE — 99214 OFFICE O/P EST MOD 30 MIN: CPT | Performed by: NURSE PRACTITIONER

## 2024-09-10 PROCEDURE — 88112 CYTOPATH CELL ENHANCE TECH: CPT | Performed by: PATHOLOGY

## 2024-09-10 RX ORDER — FOLIC ACID 1 MG/1
1000 TABLET ORAL DAILY
Qty: 90 TABLET | Refills: 1 | Status: SHIPPED | OUTPATIENT
Start: 2024-09-10

## 2024-09-10 RX ORDER — SODIUM CHLORIDE 9 MG/ML
20 INJECTION, SOLUTION INTRAVENOUS ONCE
OUTPATIENT
Start: 2024-09-19

## 2024-09-10 RX ORDER — FOLIC ACID 1 MG/1
1000 TABLET ORAL DAILY
Qty: 21 TABLET | Refills: 2 | Status: SHIPPED | OUTPATIENT
Start: 2024-09-10 | End: 2024-09-10

## 2024-09-10 NOTE — TELEPHONE ENCOUNTER
Called patient to get her checked out. Patient stated she thought she was already scheduled but wasn't informed. So I gave her all the details of her appointment and the infusion will reach out to her to get her scheduled for those appointment.    Thank you

## 2024-09-16 ENCOUNTER — TELEPHONE (OUTPATIENT)
Age: 54
End: 2024-09-16

## 2024-09-16 ENCOUNTER — NURSE TRIAGE (OUTPATIENT)
Age: 54
End: 2024-09-16

## 2024-09-16 NOTE — TELEPHONE ENCOUNTER
"    Next OV: 10/14/24        Pt. Had her first paracentesis 9/6/24 and is having constant nausea, some light vomiting of bile, diarrhea after eating, pt. Is unsure if this is related to paracentesis, pt. Is wanting to make Dr. Wiley aware, reviewed common side effects after paracentesis, pt. Is not complaining of any alarming symptoms, reviewed alarming symptoms and when to go to ER,  please advise further if needed       Reason for Disposition   Nausea lasts > 1 week    Answer Assessment - Initial Assessment Questions  1. NAUSEA SEVERITY: \"How bad is the nausea?\" (e.g., mild, moderate, severe; dehydration, weight loss)    - MILD: loss of appetite without change in eating habits    - MODERATE: decreased oral intake without significant weight loss, dehydration, or malnutrition    - SEVERE: inadequate caloric or fluid intake, significant weight loss, symptoms of dehydration      Moderate   2. ONSET: \"When did the nausea begin?\"      Since paracentesis   3. VOMITING: \"Any vomiting?\" If Yes, ask: \"How many times today?\"      Denies   4. RECURRENT SYMPTOM: \"Have you had nausea before?\" If Yes, ask: \"When was the last time?\" \"What happened that time?\"      Denies   5. CAUSE: \"What do you think is causing the nausea?\"      Paracentesis   6. PREGNANCY: \"Is there any chance you are pregnant?\" (e.g., unprotected intercourse, missed birth control pill, broken condom)      Denies    Protocols used: Nausea-ADULT-OH    "

## 2024-09-17 NOTE — TELEPHONE ENCOUNTER
Pt calling to ask if provider gave recommendations yet.  Advised message was sent to provider, will call back after she makes recommendations.  Pt verbalizes understanding.

## 2024-09-18 NOTE — TELEPHONE ENCOUNTER
LVM to review recommendations as per valarie merida and obtain additional information. Please see nurse triage encounter 9/16/24 for VALARIE Merida recommendations.

## 2024-09-18 NOTE — TELEPHONE ENCOUNTER
Spoke with pt reporting ongoing symptoms of nausea w/ occasional urge to vomit since paracentesis on 9/6/24 (removed 3280 ml fluid). Pt has ongoing symptoms of diarrhea since 9/5/24 OV w/ Dr Wiley.  Pt discontinued lactulose as advised and is now producing a diarrhea BM after each meal. Pt reports wt at  lbs and is down to 112 lbs as of today. She is trying her best to incorporate protein as instructed and is only able to tolerate a soft, bland diet at this time. Pt states para site is healing well. Pt denies alarming s/s (ever, pain, SOB, bleeding, confusion) at the time of this call requiring ED eval. Pt educated on importance of ED eval if they occur    Reviewed symptom mgmt for N/V/D (bland diet, eating frequent small meals, reducing fat intake, avoiding indigestible foods, eliminating carbonated beverages to decrease gastric distention.staying hydrated with electrolyte replacing fluids and soup broths will be important. Following a bland diet (Bananas Rice Applesauce Tea & Toast) until symptoms resolve, give stomach a rest for 24-48 hours by following a clear liquid diet, then advancing your diet to a full liquid diet, then a bland diet as tolerated until diarrhea improves)  Also, Discussed importance and ways of including protein in diet.     Pt agreeable to recommendations, verbalized understanding and aware will be called with additional recommendations as advised by provider.

## 2024-09-18 NOTE — TELEPHONE ENCOUNTER
Patient calling back after missing call.  Patient states that she is taking Protonix 40m daily.  Patient did stop lactulose per Dr. Wiley as long as normal stools.  States last colonoscopy was over 1 year ago, possibly 2.  Patient is unsure and unable to find on her calendar.  Please advise.          negative - no nasal congestion

## 2024-09-19 DIAGNOSIS — R19.7 DIARRHEA, UNSPECIFIED TYPE: Primary | ICD-10-CM

## 2024-09-19 DIAGNOSIS — K27.9 PEPTIC ULCER DISEASE: ICD-10-CM

## 2024-09-19 NOTE — TELEPHONE ENCOUNTER
Spoke with pt, advised of additional recommendations as per VALARIE Stack PA-c. Pt agreeable to recommendations. Reviewed ED precautions and increasing protein in diet. Pt verbalized understanding. Confirmed Pt has EGD scheduled for 9/23/24 and f/u w Dr Wiley on 10/14/24. All questions and concerns addressed to pt's satisfaction

## 2024-09-20 ENCOUNTER — APPOINTMENT (OUTPATIENT)
Dept: LAB | Facility: HOSPITAL | Age: 54
End: 2024-09-20

## 2024-09-22 ENCOUNTER — APPOINTMENT (OUTPATIENT)
Dept: LAB | Facility: HOSPITAL | Age: 54
End: 2024-09-22
Payer: COMMERCIAL

## 2024-09-22 ENCOUNTER — HOSPITAL ENCOUNTER (OUTPATIENT)
Dept: ULTRASOUND IMAGING | Facility: HOSPITAL | Age: 54
Discharge: HOME | End: 2024-09-22
Attending: STUDENT IN AN ORGANIZED HEALTH CARE EDUCATION/TRAINING PROGRAM
Payer: COMMERCIAL

## 2024-09-22 DIAGNOSIS — K70.31 ALCOHOLIC CIRRHOSIS OF LIVER WITH ASCITES (HCC): ICD-10-CM

## 2024-09-22 DIAGNOSIS — R19.7 DIARRHEA, UNSPECIFIED TYPE: ICD-10-CM

## 2024-09-22 PROCEDURE — 87177 OVA AND PARASITES SMEARS: CPT

## 2024-09-22 PROCEDURE — 87209 SMEAR COMPLEX STAIN: CPT

## 2024-09-22 PROCEDURE — 87506 IADNA-DNA/RNA PROBE TQ 6-11: CPT

## 2024-09-22 PROCEDURE — 87338 HPYLORI STOOL AG IA: CPT

## 2024-09-22 PROCEDURE — 76700 US EXAM ABDOM COMPLETE: CPT

## 2024-09-23 ENCOUNTER — HOSPITAL ENCOUNTER (OUTPATIENT)
Dept: GASTROENTEROLOGY | Facility: HOSPITAL | Age: 54
Setting detail: OUTPATIENT SURGERY
Discharge: HOME | End: 2024-09-23
Attending: STUDENT IN AN ORGANIZED HEALTH CARE EDUCATION/TRAINING PROGRAM
Payer: COMMERCIAL

## 2024-09-23 ENCOUNTER — ANESTHESIA EVENT (OUTPATIENT)
Dept: GASTROENTEROLOGY | Facility: HOSPITAL | Age: 54
End: 2024-09-23
Payer: COMMERCIAL

## 2024-09-23 ENCOUNTER — ANESTHESIA (OUTPATIENT)
Dept: GASTROENTEROLOGY | Facility: HOSPITAL | Age: 54
End: 2024-09-23
Payer: COMMERCIAL

## 2024-09-23 VITALS
OXYGEN SATURATION: 100 % | HEIGHT: 61 IN | HEART RATE: 62 BPM | TEMPERATURE: 97.7 F | SYSTOLIC BLOOD PRESSURE: 95 MMHG | BODY MASS INDEX: 21.14 KG/M2 | RESPIRATION RATE: 16 BRPM | DIASTOLIC BLOOD PRESSURE: 50 MMHG | WEIGHT: 112 LBS

## 2024-09-23 DIAGNOSIS — K70.31 ALCOHOLIC CIRRHOSIS OF LIVER WITH ASCITES (HCC): ICD-10-CM

## 2024-09-23 LAB
C COLI+JEJUNI TUF STL QL NAA+PROBE: NEGATIVE
C DIFF TOX GENS STL QL NAA+PROBE: NEGATIVE
EC STX1+STX2 GENES STL QL NAA+PROBE: NEGATIVE
SALMONELLA SP SPAO STL QL NAA+PROBE: NEGATIVE
SHIGELLA SP+EIEC IPAH STL QL NAA+PROBE: NEGATIVE

## 2024-09-23 PROCEDURE — 43235 EGD DIAGNOSTIC BRUSH WASH: CPT | Performed by: INTERNAL MEDICINE

## 2024-09-23 RX ORDER — LIDOCAINE HYDROCHLORIDE 20 MG/ML
INJECTION, SOLUTION EPIDURAL; INFILTRATION; INTRACAUDAL; PERINEURAL AS NEEDED
Status: DISCONTINUED | OUTPATIENT
Start: 2024-09-23 | End: 2024-09-23

## 2024-09-23 RX ORDER — SODIUM CHLORIDE 9 MG/ML
INJECTION, SOLUTION INTRAVENOUS CONTINUOUS PRN
Status: DISCONTINUED | OUTPATIENT
Start: 2024-09-23 | End: 2024-09-23

## 2024-09-23 RX ORDER — ESCITALOPRAM OXALATE 20 MG/1
20 TABLET ORAL DAILY
Qty: 30 TABLET | Refills: 1 | OUTPATIENT
Start: 2024-09-23

## 2024-09-23 RX ORDER — PROPOFOL 10 MG/ML
INJECTION, EMULSION INTRAVENOUS AS NEEDED
Status: DISCONTINUED | OUTPATIENT
Start: 2024-09-23 | End: 2024-09-23

## 2024-09-23 RX ORDER — TRAZODONE HYDROCHLORIDE 150 MG/1
150 TABLET ORAL
Qty: 30 TABLET | Refills: 1 | OUTPATIENT
Start: 2024-09-23

## 2024-09-23 RX ADMIN — PROPOFOL 80 MG: 10 INJECTION, EMULSION INTRAVENOUS at 13:52

## 2024-09-23 RX ADMIN — PROPOFOL 70 MG: 10 INJECTION, EMULSION INTRAVENOUS at 13:55

## 2024-09-23 RX ADMIN — LIDOCAINE HYDROCHLORIDE 60 MG: 20 INJECTION, SOLUTION EPIDURAL; INFILTRATION; INTRACAUDAL; PERINEURAL at 13:52

## 2024-09-23 RX ADMIN — PROPOFOL 30 MG: 10 INJECTION, EMULSION INTRAVENOUS at 13:58

## 2024-09-23 RX ADMIN — SODIUM CHLORIDE: 0.9 INJECTION, SOLUTION INTRAVENOUS at 13:38

## 2024-09-23 NOTE — ANESTHESIA PREPROCEDURE EVALUATION
Procedure:  EGD    Relevant Problems   CARDIO   (+) Essential hypertension   (+) Long Q-T syndrome      GI/HEPATIC   (+) Alcoholic cirrhosis (HCC)   (+) GI bleed      HEMATOLOGY   (+) Anemia   (+) Thrombocytopenia (HCC)      NEURO/PSYCH   (+) Depression        Physical Exam    Airway    Mallampati score: I  TM Distance: >3 FB  Neck ROM: full     Dental       Cardiovascular  Cardiovascular exam normal    Pulmonary  Pulmonary exam normal     Other Findings  post-pubertal.      Anesthesia Plan  ASA Score- 3     Anesthesia Type- IV sedation with anesthesia with ASA Monitors.         Additional Monitors:     Airway Plan:            Plan Factors-Exercise tolerance (METS): >4 METS.    Chart reviewed. EKG reviewed. Imaging results reviewed. Existing labs reviewed. Patient summary reviewed.                  Induction- intravenous.    Postoperative Plan- Plan for postoperative opioid use. Planned trial extubation        Informed Consent- Anesthetic plan and risks discussed with patient.  I personally reviewed this patient with the CRNA. Discussed and agreed on the Anesthesia Plan with the CRNA..

## 2024-09-23 NOTE — H&P
History and Physical - [facility]    Yvette Titus 54 y.o. female MRN: [id number]      HPI: Yvette Titus is a 54 y.o. female who presents for h/o esophageal varices c/b GIB and banding.     No Known Allergies      REVIEW OF SYSTEMS: Per the HPI, and otherwise unremarkable.    Historical Information     Past Medical History:   Diagnosis Date    Alcoholism (HCC) 10/1/2010    Fatty liver 01/20/2023    Gallstones     Long Q-T syndrome      Past Surgical History:   Procedure Laterality Date    COLONOSCOPY  4/29/2019    Clear    IR PARACENTESIS  9/6/2024    TUBAL LIGATION  07/14/2005    UPPER GASTROINTESTINAL ENDOSCOPY  1/20/2023    Burst ulcer     Social History   Social History     Substance and Sexual Activity   Alcohol Use Yes    Comment: pint of vodka and a bottle of wine     Social History     Substance and Sexual Activity   Drug Use Never     Social History     Tobacco Use   Smoking Status Never    Passive exposure: Never   Smokeless Tobacco Never     Family History   Problem Relation Age of Onset    Cancer Mother        Meds/Allergies       Current Outpatient Medications:     B Complex Vitamins (B Complex-B12) TABS    Biotin 1 MG CAPS    escitalopram (LEXAPRO) 20 mg tablet    folic acid (FOLVITE) 1 mg tablet    gabapentin (NEURONTIN) 100 mg capsule    Melatonin 10 MG TABS    mirtazapine (REMERON) 15 mg tablet    Multiple Vitamins-Minerals (multivitamin with minerals) tablet    nadolol (CORGARD) 20 mg tablet    pantoprazole (PROTONIX) 40 mg tablet    spironolactone (ALDACTONE) 50 mg tablet    traZODone (DESYREL) 100 mg tablet    valsartan (DIOVAN) 40 mg tablet    baclofen 10 mg tablet    furosemide (LASIX) 40 mg tablet    HYDROcodone-acetaminophen (NORCO) 5-325 mg per tablet    magnesium Oxide (MAG-OX) 400 mg TABS    polyethylene glycol (GOLYTELY) 4000 mL solution    potassium chloride (Klor-Con M20) 20 mEq tablet    saccharomyces boulardii (Florastor) 250 mg capsule        Objective  "    /57   Pulse 59   Temp 97.9 °F (36.6 °C) (Temporal)   Resp 18   Ht 5' 1\" (1.549 m)   Wt 50.8 kg (112 lb)   BMI 21.16 kg/m²       PHYSICAL EXAM    Gen: NAD AAOx3  Head: Normocephalic, Atraumatic  CV: S1S2 RRR no m/r/g  CHEST: Clear b/l no c/r/w  ABD: soft, +BS NT/ND no masses  EXT: no edema      ASSESSMENT/PLAN:  This is a 54 y.o. year old female here for EGD +/- band ligation, and she is stable and optimized for her procedure.        "

## 2024-09-24 LAB
G LAMBLIA AG STL QL IA: NEGATIVE
H PYLORI AG STL QL IA: NEGATIVE

## 2024-09-25 DIAGNOSIS — K92.2 GASTROINTESTINAL HEMORRHAGE, UNSPECIFIED GASTROINTESTINAL HEMORRHAGE TYPE: ICD-10-CM

## 2024-09-25 DIAGNOSIS — E43 SEVERE PROTEIN-CALORIE MALNUTRITION (HCC): Primary | ICD-10-CM

## 2024-09-25 DIAGNOSIS — D64.9 ANEMIA, UNSPECIFIED TYPE: ICD-10-CM

## 2024-09-25 RX ORDER — SODIUM CHLORIDE 9 MG/ML
20 INJECTION, SOLUTION INTRAVENOUS ONCE
OUTPATIENT
Start: 2024-09-27

## 2024-09-27 ENCOUNTER — HOSPITAL ENCOUNTER (OUTPATIENT)
Dept: INFUSION CENTER | Facility: HOSPITAL | Age: 54
Discharge: HOME | End: 2024-09-27
Attending: INTERNAL MEDICINE

## 2024-09-30 DIAGNOSIS — K27.9 PEPTIC ULCER DISEASE: ICD-10-CM

## 2024-09-30 NOTE — TELEPHONE ENCOUNTER
Patient reports Doctor Inez mentioned taking over Rx.     Reason for call:   [x] Refill   [] Prior Auth  [] Other:     Office:   [] PCP/Provider -   [x] Specialty/Provider -  Karen Wiley MD / [facility]     Medication: pantoprazole (PROTONIX) 40 mg tablet / Take 1 tablet (40 mg total) by mouth 2 (two) times a day    Pharmacy: [facility][id number] - [place] - [address]     Does the patient have enough for 3 days?   [] Yes   [x] No - Send as HP to POD

## 2024-10-01 RX ORDER — PANTOPRAZOLE SODIUM 40 MG/1
40 TABLET, DELAYED RELEASE ORAL 2 TIMES DAILY
Qty: 30 TABLET | Refills: 0 | Status: SHIPPED | OUTPATIENT
Start: 2024-10-01

## 2024-10-03 DIAGNOSIS — D64.9 ANEMIA, UNSPECIFIED TYPE: ICD-10-CM

## 2024-10-03 DIAGNOSIS — K92.2 GASTROINTESTINAL HEMORRHAGE, UNSPECIFIED GASTROINTESTINAL HEMORRHAGE TYPE: ICD-10-CM

## 2024-10-03 DIAGNOSIS — E43 SEVERE PROTEIN-CALORIE MALNUTRITION (HCC): Primary | ICD-10-CM

## 2024-10-03 RX ORDER — SODIUM CHLORIDE 9 MG/ML
20 INJECTION, SOLUTION INTRAVENOUS ONCE
Status: CANCELLED | OUTPATIENT
Start: 2024-10-04

## 2024-10-04 ENCOUNTER — HOSPITAL ENCOUNTER (OUTPATIENT)
Dept: INFUSION CENTER | Facility: HOSPITAL | Age: 54
End: 2024-10-04
Attending: INTERNAL MEDICINE
Payer: COMMERCIAL

## 2024-10-04 ENCOUNTER — APPOINTMENT (OUTPATIENT)
Dept: LAB | Facility: HOSPITAL | Age: 54
End: 2024-10-04
Payer: COMMERCIAL

## 2024-10-04 VITALS
SYSTOLIC BLOOD PRESSURE: 86 MMHG | OXYGEN SATURATION: 98 % | RESPIRATION RATE: 16 BRPM | HEART RATE: 63 BPM | TEMPERATURE: 97.1 F | DIASTOLIC BLOOD PRESSURE: 51 MMHG

## 2024-10-04 DIAGNOSIS — K70.30 ALCOHOLIC CIRRHOSIS, UNSPECIFIED WHETHER ASCITES PRESENT (HCC): ICD-10-CM

## 2024-10-04 DIAGNOSIS — E43 SEVERE PROTEIN-CALORIE MALNUTRITION (HCC): Primary | ICD-10-CM

## 2024-10-04 DIAGNOSIS — D64.9 ANEMIA, UNSPECIFIED TYPE: ICD-10-CM

## 2024-10-04 DIAGNOSIS — K92.2 GASTROINTESTINAL HEMORRHAGE, UNSPECIFIED GASTROINTESTINAL HEMORRHAGE TYPE: ICD-10-CM

## 2024-10-04 LAB
AFP-TM SERPL-MCNC: 11.17 NG/ML (ref 0–9)
ALBUMIN SERPL BCG-MCNC: 3.2 G/DL (ref 3.5–5)
ALP SERPL-CCNC: 81 U/L (ref 34–104)
ALT SERPL W P-5'-P-CCNC: 19 U/L (ref 7–52)
ANION GAP SERPL CALCULATED.3IONS-SCNC: 10 MMOL/L (ref 4–13)
AST SERPL W P-5'-P-CCNC: 95 U/L (ref 13–39)
BILIRUB SERPL-MCNC: 2.33 MG/DL (ref 0.2–1)
BUN SERPL-MCNC: 12 MG/DL (ref 5–25)
CALCIUM ALBUM COR SERPL-MCNC: 9.8 MG/DL (ref 8.3–10.1)
CALCIUM SERPL-MCNC: 9.2 MG/DL (ref 8.4–10.2)
CHLORIDE SERPL-SCNC: 102 MMOL/L (ref 96–108)
CO2 SERPL-SCNC: 22 MMOL/L (ref 21–32)
CREAT SERPL-MCNC: 0.83 MG/DL (ref 0.6–1.3)
ERYTHROCYTE [DISTWIDTH] IN BLOOD BY AUTOMATED COUNT: 18.7 % (ref 11.6–15.1)
GFR SERPL CREATININE-BSD FRML MDRD: 79 ML/MIN/1.73SQ M
GLUCOSE SERPL-MCNC: 115 MG/DL (ref 65–140)
HCT VFR BLD AUTO: 27.5 % (ref 34.8–46.1)
HGB BLD-MCNC: 8.9 G/DL (ref 11.5–15.4)
INR PPP: 1.53 (ref 0.85–1.19)
MCH RBC QN AUTO: 31.3 PG (ref 26.8–34.3)
MCHC RBC AUTO-ENTMCNC: 32.4 G/DL (ref 31.4–37.4)
MCV RBC AUTO: 97 FL (ref 82–98)
PLATELET # BLD AUTO: 154 THOUSANDS/UL (ref 149–390)
PMV BLD AUTO: 9.2 FL (ref 8.9–12.7)
POTASSIUM SERPL-SCNC: 4.5 MMOL/L (ref 3.5–5.3)
PROT SERPL-MCNC: 9 G/DL (ref 6.4–8.4)
PROTHROMBIN TIME: 18.9 SECONDS (ref 12.3–15)
RBC # BLD AUTO: 2.84 MILLION/UL (ref 3.81–5.12)
SODIUM SERPL-SCNC: 134 MMOL/L (ref 135–147)
WBC # BLD AUTO: 5.95 THOUSAND/UL (ref 4.31–10.16)

## 2024-10-04 PROCEDURE — 82105 ALPHA-FETOPROTEIN SERUM: CPT

## 2024-10-04 PROCEDURE — 96365 THER/PROPH/DIAG IV INF INIT: CPT

## 2024-10-04 PROCEDURE — 85610 PROTHROMBIN TIME: CPT

## 2024-10-04 PROCEDURE — 96366 THER/PROPH/DIAG IV INF ADDON: CPT

## 2024-10-04 PROCEDURE — 80053 COMPREHEN METABOLIC PANEL: CPT

## 2024-10-04 PROCEDURE — 36415 COLL VENOUS BLD VENIPUNCTURE: CPT

## 2024-10-04 PROCEDURE — 85027 COMPLETE CBC AUTOMATED: CPT

## 2024-10-04 RX ORDER — SODIUM CHLORIDE 9 MG/ML
20 INJECTION, SOLUTION INTRAVENOUS ONCE
Status: CANCELLED | OUTPATIENT
Start: 2024-10-11

## 2024-10-04 RX ORDER — SODIUM CHLORIDE 9 MG/ML
20 INJECTION, SOLUTION INTRAVENOUS ONCE
Status: COMPLETED | OUTPATIENT
Start: 2024-10-04 | End: 2024-10-04

## 2024-10-04 RX ADMIN — IRON SUCROSE 300 MG: 20 INJECTION, SOLUTION INTRAVENOUS at 13:16

## 2024-10-04 RX ADMIN — SODIUM CHLORIDE 20 ML/HR: 9 INJECTION, SOLUTION INTRAVENOUS at 13:17

## 2024-10-04 NOTE — PROGRESS NOTES
Yvette Titus  tolerated treatment well with no complications.      Yvette Titus is aware of future appt on 10/09/2024 at 1230.     AVS printed and given to Yvette Titus:No (Declined by Yvette Titus)

## 2024-10-04 NOTE — PROGRESS NOTES
Yvette Titus  tolerated treatment well with no complications.      Yvette Titus is aware of future appt on *** at ***.     AVS printed and given to Yvette Titus:  Yes ***  No (Declined by Yvette Titus) ***

## 2024-10-05 ENCOUNTER — HOSPITAL ENCOUNTER (OUTPATIENT)
Dept: CT IMAGING | Facility: HOSPITAL | Age: 54
Discharge: HOME | End: 2024-10-05
Payer: COMMERCIAL

## 2024-10-05 DIAGNOSIS — K70.30 ALCOHOLIC CIRRHOSIS OF LIVER WITHOUT ASCITES (HCC): ICD-10-CM

## 2024-10-05 PROCEDURE — 74177 CT ABD & PELVIS W/CONTRAST: CPT

## 2024-10-05 RX ADMIN — IOHEXOL 100 ML: 350 INJECTION, SOLUTION INTRAVENOUS at 10:05

## 2024-10-07 DIAGNOSIS — D64.9 ANEMIA, UNSPECIFIED TYPE: ICD-10-CM

## 2024-10-07 DIAGNOSIS — K92.2 GASTROINTESTINAL HEMORRHAGE, UNSPECIFIED GASTROINTESTINAL HEMORRHAGE TYPE: ICD-10-CM

## 2024-10-07 DIAGNOSIS — E43 SEVERE PROTEIN-CALORIE MALNUTRITION (HCC): Primary | ICD-10-CM

## 2024-10-07 RX ORDER — SODIUM CHLORIDE 9 MG/ML
20 INJECTION, SOLUTION INTRAVENOUS ONCE
Status: CANCELLED | OUTPATIENT
Start: 2024-10-09

## 2024-10-07 NOTE — RESULT ENCOUNTER NOTE
MELD 3.0: 17 at 10/4/2024 11:56 AM  MELD-Na: 17 at 10/4/2024 11:56 AM  Calculated from:  Serum Creatinine: 0.83 mg/dL (Using min of 1 mg/dL) at 10/4/2024 11:56 AM  Serum Sodium: 134 mmol/L at 10/4/2024 11:56 AM  Total Bilirubin: 2.33 mg/dL at 10/4/2024 11:56 AM  Serum Albumin: 3.2 g/dL at 10/4/2024 11:56 AM  INR(ratio): 1.53 at 10/4/2024 11:56 AM  Age at listing (hypothetical): 54 years  Sex: Female at 10/4/2024 11:56 AM

## 2024-10-08 ENCOUNTER — TELEPHONE (OUTPATIENT)
Age: 54
End: 2024-10-08

## 2024-10-09 ENCOUNTER — HOSPITAL ENCOUNTER (OUTPATIENT)
Dept: INFUSION CENTER | Facility: HOSPITAL | Age: 54
Discharge: HOME | End: 2024-10-09
Attending: INTERNAL MEDICINE
Payer: COMMERCIAL

## 2024-10-09 VITALS
DIASTOLIC BLOOD PRESSURE: 47 MMHG | HEART RATE: 64 BPM | RESPIRATION RATE: 16 BRPM | TEMPERATURE: 97.4 F | OXYGEN SATURATION: 100 % | SYSTOLIC BLOOD PRESSURE: 98 MMHG

## 2024-10-09 DIAGNOSIS — E43 SEVERE PROTEIN-CALORIE MALNUTRITION (HCC): Primary | ICD-10-CM

## 2024-10-09 DIAGNOSIS — D64.9 ANEMIA, UNSPECIFIED TYPE: ICD-10-CM

## 2024-10-09 DIAGNOSIS — K92.2 GASTROINTESTINAL HEMORRHAGE, UNSPECIFIED GASTROINTESTINAL HEMORRHAGE TYPE: ICD-10-CM

## 2024-10-09 PROCEDURE — 96365 THER/PROPH/DIAG IV INF INIT: CPT

## 2024-10-09 RX ORDER — SODIUM CHLORIDE 9 MG/ML
20 INJECTION, SOLUTION INTRAVENOUS ONCE
Status: CANCELLED | OUTPATIENT
Start: 2024-10-16

## 2024-10-09 RX ORDER — SODIUM CHLORIDE 9 MG/ML
20 INJECTION, SOLUTION INTRAVENOUS ONCE
Status: COMPLETED | OUTPATIENT
Start: 2024-10-09 | End: 2024-10-09

## 2024-10-09 RX ADMIN — SODIUM CHLORIDE 20 ML/HR: 9 INJECTION, SOLUTION INTRAVENOUS at 12:00

## 2024-10-09 RX ADMIN — IRON SUCROSE 300 MG: 20 INJECTION, SOLUTION INTRAVENOUS at 12:03

## 2024-10-09 NOTE — PROGRESS NOTES
Yvette Titus  tolerated treatment well with no complications.      Yvette Titus is aware of future appt on 10/16 at 1230.     AVS printed and given to Yvette Titus:    No (Declined by Yvette Titus)

## 2024-10-14 ENCOUNTER — PREP FOR PROCEDURE (OUTPATIENT)
Age: 54
End: 2024-10-14

## 2024-10-14 ENCOUNTER — OFFICE VISIT (OUTPATIENT)
Age: 54
End: 2024-10-14
Payer: COMMERCIAL

## 2024-10-14 VITALS
SYSTOLIC BLOOD PRESSURE: 100 MMHG | BODY MASS INDEX: 22.47 KG/M2 | DIASTOLIC BLOOD PRESSURE: 70 MMHG | TEMPERATURE: 97.8 F | OXYGEN SATURATION: 94 % | HEART RATE: 76 BPM | HEIGHT: 61 IN | WEIGHT: 119 LBS

## 2024-10-14 DIAGNOSIS — R63.4 WEIGHT LOSS, ABNORMAL: Primary | ICD-10-CM

## 2024-10-14 DIAGNOSIS — D50.9 IRON DEFICIENCY ANEMIA, UNSPECIFIED IRON DEFICIENCY ANEMIA TYPE: ICD-10-CM

## 2024-10-14 DIAGNOSIS — K76.82 HEPATIC ENCEPHALOPATHY (HCC): ICD-10-CM

## 2024-10-14 DIAGNOSIS — K70.31 ALCOHOLIC CIRRHOSIS OF LIVER WITH ASCITES (HCC): Primary | ICD-10-CM

## 2024-10-14 DIAGNOSIS — K63.9 COLONIC THICKENING: ICD-10-CM

## 2024-10-14 DIAGNOSIS — K70.11 ALCOHOLIC HEPATITIS WITH ASCITES: ICD-10-CM

## 2024-10-14 DIAGNOSIS — I85.11 ESOPHAGEAL VARICES WITH BLEEDING IN DISEASES CLASSIFIED ELSEWHERE (HCC): ICD-10-CM

## 2024-10-14 DIAGNOSIS — R63.4 WEIGHT LOSS, ABNORMAL: ICD-10-CM

## 2024-10-14 PROCEDURE — 99214 OFFICE O/P EST MOD 30 MIN: CPT | Performed by: STUDENT IN AN ORGANIZED HEALTH CARE EDUCATION/TRAINING PROGRAM

## 2024-10-14 RX ORDER — POLYETHYLENE GLYCOL 3350 17 G/17G
238 POWDER, FOR SOLUTION ORAL ONCE
Qty: 238 G | Refills: 0 | Status: SHIPPED | OUTPATIENT
Start: 2024-10-14 | End: 2024-10-14

## 2024-10-14 RX ORDER — CALCIUM CARBONATE/VITAMIN D3 600 MG-10
100 TABLET ORAL DAILY
COMMUNITY
Start: 2024-05-28

## 2024-10-14 NOTE — PATIENT INSTRUCTIONS
Scheduled date of colonoscopy (as of today): 11/15/2024  Physician performing colonoscopy: Dr. Jorge Hays  Location of colonoscopy: [facility]  Desired bowel prep reviewed with patient: Miralax/Dulcolax-proc prep instructions given by pt at 10/14 office visit  Instructions reviewed with patient by: Consuelo  Clearances:  N/A    6WK F/U (urgent appnt ok'd by Dr. Wiley) scheduled on 12/2/2024 at [address] [place].

## 2024-10-14 NOTE — PROGRESS NOTES
[facility] - Outpatient Consultation  Yvette Titus 54 y.o. female MRN: [id number]  Encounter: [id number]    PCP:  Alberto Robertson MD, [phone]  Referring Provider:  No ref. provider found,     Patient: Yvette Titus, 1970  Reason for Referral: decompensated cirrhosis     ASSESSMENT/PLAN:  54 y.o. female with  history of EtOH cirrhosis d/b EVBL with recent bleed and HE who presents for follow up evaluation. She was last seen in clinic in September 2024.     She reports longstanding history of EtOH dependence with history of complicated withdrawal and remote DUI in 2011. She reports family history of alcoholism and cites prior traumatic personal experiences as trigger for drinking. She has attempted inpatient rehabilitation multiple times with period of sustained sobriety after 2018 until the COVID pandemic. She has since had only a period of several weeks of abstinence and was recently diagnosed with cirrhosis earlier this year.      She was hospitalized in January 2023 for UGIB due to PUD and was noted to have mild EtOH hepatitis for which EtOH cessation was advised.  She had a subsequent EGD in May 2023 which showed esophageal varices. She continued to drink despite knowledge of her disease and recently was admitted for a variceal bleed requiring banding with successful eradication.      She was doing well clinically with abstinence but recently had further decompensation in the context of EtOH relapse. She was admitted to [facility] in July 2024 for acute blood loss anemia requiring transfusion and HE in the context of epistaxis, melena and hematemesis. Etiology of her bleeding was felt to be from epistaxis but concerned that she had a recurrent variceal bleed given concurrent EtOH hepatitis. She had an EGD in September 2024 which showed small varices that were not amenable to banding. She also required an LVP last month for new ascites but has since been stable on diuretics. US  abdomen was negative for PVT.     She has been abstinent from EtOH and has been engaging in behavioral counseling. She was also restarted on her psychiatric medications for mood stabilization.     She should return to liver clinic in 1 month or sooner if needed. Thank you for the opportunity to consult in her care.     1. Decompensated cirrhosis:   - Etiology: EtOH with relapse in July 2024  - Transplant: not currently a candidate given high risk EtOH use     2. Ascites  - Continue lasix 40 mg daily  - Continue aldactone 50 mg daily  - LVPs as needed for comfort     3. Hepatic encephalopathy  - Stopped taking lactulose and has not had issues with confusion or sleeping     4. Varices  - Last EGD showed small esophageal varices    -Continue nadolol 60 mg daily for secondary prophylaxis; HR not currently at goal today and so would consider switching to carvedilol     5. HCC surveillance  - No lesions on CT hepatoma 9/2024  - Due for repeat 21220 with AFP levels     6. Nutrition and sarcopenia  -  She was instructed to eat multiple small meals a day and a snack prior to bedtime to help prevent protein loss and sarcopenia     7. EtOH hepatitis  - No indication for steroids at this time as Tb appears to be resolving  - Recommend EtOH abstinence and high protein diet     8. Colonic thickening, likely portal colopathy  - COY ordered given history of COLLEEN and weight loss     9. Healthcare maintenance for patients with cirrhosis  -She was instructed to take no more than 2 grams of tylenol in 24 hours and no products containing NSAIDs, benzodiazapines, and narcotics.  -She was also instructed to avoid raw shellfish   -She should participate in daily exercise as to prevent loss of muscle mass  -She should abstain from all alcohol intake and was counseled on this.    -She is at risk for vitamin deficiencies and metabolic bone disease.  -HAV and HBV immunity will be checked and she should be vaccinated through her primary care  provider if she is not immune.  -Additionally, she should receive a yearly flu shot and the pneumonia vaccine through her primary care provider.      Karen Wiley MD  Division of Gastroenterology and Hepatology  [facility]    ============================================================================  CC/HPI: 54 y.o. female with with history of EtOH cirrhosis d/b EVBL who presents for follow up evaluation. She was last seen in clinic in September 2024.     Interval events  - Underwent paracentesis with 3.2L removed. Has been taking lasix and aldactone without recurrence of ascites   - AFP elevated to 11 but no lesions were seen on CT hepatoma   - Continues to have intermittent nausea and lost 17 pounds since her last visit  - Getting IV iron infusions for COLLEEN  - Stopped taking lactulose due to diarrhea and has not had recurrence of HE  - Restart lexapro and trazodone. Abstinent for the last 60 days and has been engaging in behavioral therapy. Thinking about pursuing a part time job when she graduates from her program     Extended liver liver history  She reports longstanding history of EtOH dependence with history of complicated withdrawal and remote DUI in 2011. She reports family history of alcoholism and cites prior traumatic personal experiences as trigger for drinking. She has attempted inpatient rehabilitation multiple times with period of sustained sobriety after 2018 until the COVID pandemic. She has since had only a period of several weeks of abstinence and was recently diagnosed with cirrhosis earlier this year.      She was hospitalized in January 2023 for UGIB due to PUD and was noted to have mild EtOH hepatitis for which EtOH cessation was advised.  She had a subsequent EGD in May 2023 which showed esophageal varices. She continued to drink despite knowledge of her disease and recently was admitted for a variceal bleed requiring banding with partial eradication. CT was negative  for PVT. She has been on nadolol 20 mg daily although HR appears to be uncontrolled today. She is scheduled for a repeat EGD next month for serial banding.        - Stopped lactulose due to diarrhea. She reports having 5+ watery BMs daily  - Reports new abdominal distension. Previously had trace perihepatic ascites on US   - Seen by her psychiatrist who recommended stopping lexapro and trazodone given her liver disease    ROS: Complete review of systems otherwise negative.     PAST MEDICAL/SURGICAL HISTORY:  Past Medical History:   Diagnosis Date    Alcoholism (HCC) 10/1/2010    Fatty liver 01/20/2023    Gallstones     Long Q-T syndrome         Past Surgical History:   Procedure Laterality Date    COLONOSCOPY  4/29/2019    Clear    IR PARACENTESIS  9/6/2024    TUBAL LIGATION  07/14/2005    UPPER GASTROINTESTINAL ENDOSCOPY  1/20/2023    Burst ulcer       FAMILY/SOCIAL HISTORY:  Family History   Problem Relation Age of Onset    Cancer Mother        Social History     Tobacco Use    Smoking status: Never     Passive exposure: Never    Smokeless tobacco: Never   Vaping Use    Vaping status: Never Used   Substance Use Topics    Alcohol use: Yes     Comment: pint of vodka and a bottle of wine    Drug use: Never       MEDICATIONS:  Current Outpatient Medications on File Prior to Visit   Medication Sig Dispense Refill    Biotin 1 MG CAPS Take by mouth      escitalopram (LEXAPRO) 20 mg tablet Take 20 mg by mouth daily DC by barbara on 1/13      folic acid (FOLVITE) 1 mg tablet TAKE 1 TABLET BY MOUTH EVERY DAY 90 tablet 1    furosemide (LASIX) 40 mg tablet Take 1 tablet (40 mg total) by mouth daily 90 tablet 0    gabapentin (NEURONTIN) 100 mg capsule Take 200 mg by mouth daily      Melatonin 10 MG TABS Take 10 mg by mouth daily at bedtime as needed      mirtazapine (REMERON) 15 mg tablet Take 15 mg by mouth daily at bedtime      Multiple Vitamins-Minerals (multivitamin with minerals) tablet Take 1 tablet by mouth daily       "nadolol (CORGARD) 20 mg tablet Take 3 tablets (60 mg total) by mouth daily 90 tablet 11    pantoprazole (PROTONIX) 40 mg tablet Take 1 tablet (40 mg total) by mouth 2 (two) times a day 30 tablet 0    spironolactone (ALDACTONE) 50 mg tablet Take 1 tablet (50 mg total) by mouth daily 90 tablet 0    Thiamine Mononitrate (B1) 100 MG TABS Take 100 mg by mouth daily      traZODone (DESYREL) 100 mg tablet Take 100 mg by mouth daily at bedtime      valsartan (DIOVAN) 40 mg tablet Take 40 mg by mouth daily      B Complex Vitamins (B Complex-B12) TABS  (Patient not taking: Reported on 10/14/2024)      magnesium Oxide (MAG-OX) 400 mg TABS TAKE 1 TABLET (400 MG TOTAL) BY MOUTH ONCE FOR 1 DOSE 30 tablet 5     No current facility-administered medications on file prior to visit.     No Known Allergies    PHYSICAL EXAM:  /70 (BP Location: Left arm, Patient Position: Sitting, Cuff Size: Adult)   Pulse 76   Temp 97.8 °F (36.6 °C) (Tympanic)   Ht 5' 1\" (1.549 m)   Wt 54 kg (119 lb)   SpO2 94%   BMI 22.48 kg/m²   GENERAL: NAD, AAO  HEENT: anicteric, OP clear, MMM  ABDOMEN: S/ND/NT, normoactive BS, no hepatomegaly, spleen not palpable  EXTREMITIES: no edema  SKIN: no rashes, no palmar erythema, no spider angiomata   NEURO: normal gait, no tremor, no asterixis     LABS/RADIOLOGY/ENDOSCOPY:  Lab Results   Component Value Date    WBC 5.95 10/04/2024    HGB 8.9 (L) 10/04/2024    HCT 27.5 (L) 10/04/2024     10/04/2024    BUN 12 10/04/2024    CREATININE 0.83 10/04/2024     09/30/2015    K 4.5 10/04/2024     10/04/2024    CO2 22 10/04/2024    PROT 8.5 (H) 09/30/2015    BILIDIR 1.32 (H) 03/05/2024    ALKPHOS 81 10/04/2024    ALT 19 10/04/2024    AST 95 (H) 10/04/2024    GLOB 5.5 (H) 08/31/2024    CALCIUM 9.2 10/04/2024    EGFR 79 10/04/2024    CHOL 199 10/01/2015    TRIG 205 (H) 03/04/2024    HDL 38 10/01/2015    INR 1.53 (H) 10/04/2024    PTT 41 (H) 03/01/2024    PTT 40 (H) 03/01/2024     US abdomen " (9/22/2024)  Hepatic cirrhosis and steatosis. No liver mass. Small volume ascites.     Normal hepatic Doppler evaluation.     Mild splenomegaly.     Gallbladder sludge. Diffuse gallbladder wall thickening is nonspecific and likely related to chronic hepatocellular disease and ascites. Negative sonographic Deras sign. If there is clinical concern for chronic gallbladder dysfunction, consider   follow-up with a HIDA scan if it would alter patient management.     EGD (9/2024)     IMPRESSION:  Grade 1 esophageal varices - flattened with insufflation.   Evidence of scarring in the lower esophagus.  Normal stomach and duodenum.     RECOMMENDATION:  Schedule repeat EGD, due: 9/23/2025      Resume home meds.  Resume previous diet.  Follow up with GI as previously scheduled.  Follow up with your primary care provider as previously scheduled.        CT A/P (10/2024)  Cirrhosis with portal hypertension. No CT evidence for hepatoma. Small volume abdominopelvic ascites.     Diffuse colonic wall thickening without associated pericolonic inflammatory change, most pronounced in the right colon, probably sequela of portal hypertensive colopathy. Colitis should be considered in the appropriate clinical setting; correlate with   patient symptomatology if any.     MELD 3.0: 17 at 10/4/2024 11:56 AM  MELD-Na: 17 at 10/4/2024 11:56 AM  Calculated from:  Serum Creatinine: 0.83 mg/dL (Using min of 1 mg/dL) at 10/4/2024 11:56 AM  Serum Sodium: 134 mmol/L at 10/4/2024 11:56 AM  Total Bilirubin: 2.33 mg/dL at 10/4/2024 11:56 AM  Serum Albumin: 3.2 g/dL at 10/4/2024 11:56 AM  INR(ratio): 1.53 at 10/4/2024 11:56 AM  Age at listing (hypothetical): 54 years  Sex: Female at 10/4/2024 11:56 AM

## 2024-10-15 ENCOUNTER — TELEPHONE (OUTPATIENT)
Dept: GASTROENTEROLOGY | Facility: CLINIC | Age: 54
End: 2024-10-15

## 2024-10-15 ENCOUNTER — NURSE TRIAGE (OUTPATIENT)
Dept: OTHER | Facility: OTHER | Age: 54
End: 2024-10-15

## 2024-10-15 DIAGNOSIS — K27.9 PEPTIC ULCER DISEASE: ICD-10-CM

## 2024-10-15 RX ORDER — PANTOPRAZOLE SODIUM 40 MG/1
40 TABLET, DELAYED RELEASE ORAL 2 TIMES DAILY
Qty: 14 TABLET | Refills: 0 | Status: SHIPPED | OUTPATIENT
Start: 2024-10-15 | End: 2024-10-24 | Stop reason: SDUPTHER

## 2024-10-15 NOTE — TELEPHONE ENCOUNTER
Pt called to r/s colonoscopy. Colonoscopy r/s to 11/18 with Dr. Carreon in [facility].

## 2024-10-15 NOTE — TELEPHONE ENCOUNTER
"Reason for Disposition  • [1] Caller requesting a prescription renewal (no refills left), no triage required, AND [2] triager able to renew prescription per department policy    Answer Assessment - Initial Assessment Questions  1. DRUG NAME: \"What medicine do you need to have refilled?\"      Protonix    2. REFILLS REMAINING: \"How many refills are remaining?\" (Note: The label on the medicine or pill bottle will show how many refills are remaining. If there are no refills remaining, then a renewal may be needed.)      0    Protocols used: Medication Refill and Renewal Call-Adult-    "

## 2024-10-15 NOTE — TELEPHONE ENCOUNTER
Medication Refill Request     Name Protonix     Dose/Frequency 40 mg tablet  Take one tablet PO two times a day  Quantity 14  Verified pharmacy   [x]  Verified ordering Provider   [x]  Does patient have enough for the next 3 days? Yes [] No [x]    Courtesy refill sent to patients pharmacy per protocol.

## 2024-10-15 NOTE — TELEPHONE ENCOUNTER
Regarding: protonix refill  ----- Message from Annel TREVIÑO sent at 10/15/2024  5:58 PM EDT -----  Name: pantoprazole (PROTONIX)  Dose/Frequency 40 mg tablet  Sig: Take 1 tablet (40 mg total) by mouth 2 (two) times a day  Quantity 30 tablet  Verified pharmacy   [x]  Verified ordering Provider   [x]  Does patient have enough for the next 3 days? Yes [ ] No [x]    [id number] - [place] - [address]   [address] [facility], [place] [address]

## 2024-10-16 ENCOUNTER — HOSPITAL ENCOUNTER (OUTPATIENT)
Dept: INFUSION CENTER | Facility: HOSPITAL | Age: 54
Discharge: HOME | End: 2024-10-16
Attending: INTERNAL MEDICINE
Payer: COMMERCIAL

## 2024-10-16 VITALS
RESPIRATION RATE: 18 BRPM | HEART RATE: 61 BPM | SYSTOLIC BLOOD PRESSURE: 100 MMHG | OXYGEN SATURATION: 100 % | DIASTOLIC BLOOD PRESSURE: 51 MMHG | TEMPERATURE: 97.1 F

## 2024-10-16 DIAGNOSIS — D64.9 ANEMIA, UNSPECIFIED TYPE: ICD-10-CM

## 2024-10-16 DIAGNOSIS — E43 SEVERE PROTEIN-CALORIE MALNUTRITION (HCC): Primary | ICD-10-CM

## 2024-10-16 DIAGNOSIS — K92.2 GASTROINTESTINAL HEMORRHAGE, UNSPECIFIED GASTROINTESTINAL HEMORRHAGE TYPE: ICD-10-CM

## 2024-10-16 PROCEDURE — 96365 THER/PROPH/DIAG IV INF INIT: CPT

## 2024-10-16 PROCEDURE — 96366 THER/PROPH/DIAG IV INF ADDON: CPT

## 2024-10-16 RX ORDER — SODIUM CHLORIDE 9 MG/ML
20 INJECTION, SOLUTION INTRAVENOUS ONCE
Status: COMPLETED | OUTPATIENT
Start: 2024-10-16 | End: 2024-10-16

## 2024-10-16 RX ORDER — SODIUM CHLORIDE 9 MG/ML
20 INJECTION, SOLUTION INTRAVENOUS ONCE
Status: CANCELLED | OUTPATIENT
Start: 2024-10-23

## 2024-10-16 RX ADMIN — SODIUM CHLORIDE 20 ML/HR: 9 INJECTION, SOLUTION INTRAVENOUS at 12:41

## 2024-10-16 RX ADMIN — IRON SUCROSE 300 MG: 20 INJECTION, SOLUTION INTRAVENOUS at 12:45

## 2024-10-16 NOTE — PROGRESS NOTES
Yvette Titus  tolerated treatment well with no complications.      Yvette Titus is aware of future appt on 10/23 at 2 pm.     AVS printed and given to Yvette Titus:  Yes

## 2024-10-17 NOTE — ADDENDUM NOTE
Encounter addended by: Jonathon Hebert, Pharmacist on: 10/17/2024 1:47 PM   Actions taken: [employer]ent created or edited

## 2024-10-23 ENCOUNTER — HOSPITAL ENCOUNTER (OUTPATIENT)
Dept: INFUSION CENTER | Facility: HOSPITAL | Age: 54
Discharge: HOME | End: 2024-10-23
Attending: INTERNAL MEDICINE
Payer: COMMERCIAL

## 2024-10-23 DIAGNOSIS — E43 SEVERE PROTEIN-CALORIE MALNUTRITION (HCC): Primary | ICD-10-CM

## 2024-10-23 DIAGNOSIS — K92.2 GASTROINTESTINAL HEMORRHAGE, UNSPECIFIED GASTROINTESTINAL HEMORRHAGE TYPE: ICD-10-CM

## 2024-10-23 DIAGNOSIS — D64.9 ANEMIA, UNSPECIFIED TYPE: ICD-10-CM

## 2024-10-23 RX ORDER — SODIUM CHLORIDE 9 MG/ML
20 INJECTION, SOLUTION INTRAVENOUS ONCE
Status: COMPLETED | OUTPATIENT
Start: 2024-10-23 | End: 2024-10-23

## 2024-10-23 RX ORDER — SODIUM CHLORIDE 9 MG/ML
20 INJECTION, SOLUTION INTRAVENOUS ONCE
Status: CANCELLED | OUTPATIENT
Start: 2024-10-23

## 2024-10-23 RX ADMIN — SODIUM CHLORIDE 20 ML/HR: 9 INJECTION, SOLUTION INTRAVENOUS at 12:31

## 2024-10-23 RX ADMIN — IRON SUCROSE 300 MG: 20 INJECTION, SOLUTION INTRAVENOUS at 12:31

## 2024-10-23 NOTE — PROGRESS NOTES
/Yvette Titus  tolerated treatment well with no complications.      Yvette Titus is aware of future appt on 1/9/25 at 1130.    AVS printed and given to Yvette Titus:  No (Declined by Yvette Titus)

## 2024-10-24 DIAGNOSIS — K27.9 PEPTIC ULCER DISEASE: ICD-10-CM

## 2024-10-24 RX ORDER — PANTOPRAZOLE SODIUM 40 MG/1
40 TABLET, DELAYED RELEASE ORAL 2 TIMES DAILY
Qty: 120 TABLET | Refills: 0 | Status: SHIPPED | OUTPATIENT
Start: 2024-10-24

## 2024-10-24 NOTE — TELEPHONE ENCOUNTER
Please call when sent to the pharmacy      Reason for call:   [x] Refill   [] Prior Auth  [] Other:     Office:   [] PCP/Provider -   [x] Specialty/Provider - Karen Wiley/ [facility] [address]         Medication: Protonix    Dose/Frequency: 40 mg     Quantity: #60    Pharmacy: [address]    Does the patient have enough for 3 days?   [] Yes   [x] No - Send as HP to POD

## 2024-11-18 ENCOUNTER — TELEPHONE (OUTPATIENT)
Age: 54
End: 2024-11-18

## 2024-11-18 ENCOUNTER — ANESTHESIA EVENT (OUTPATIENT)
Dept: GASTROENTEROLOGY | Facility: HOSPITAL | Age: 54
End: 2024-11-18
Payer: COMMERCIAL

## 2024-11-18 ENCOUNTER — ANESTHESIA (OUTPATIENT)
Dept: GASTROENTEROLOGY | Facility: HOSPITAL | Age: 54
End: 2024-11-18
Payer: COMMERCIAL

## 2024-11-18 ENCOUNTER — HOSPITAL ENCOUNTER (OUTPATIENT)
Dept: GASTROENTEROLOGY | Facility: HOSPITAL | Age: 54
Setting detail: OUTPATIENT SURGERY
Discharge: HOME | End: 2024-11-18
Attending: STUDENT IN AN ORGANIZED HEALTH CARE EDUCATION/TRAINING PROGRAM
Payer: COMMERCIAL

## 2024-11-18 VITALS
DIASTOLIC BLOOD PRESSURE: 53 MMHG | OXYGEN SATURATION: 99 % | SYSTOLIC BLOOD PRESSURE: 95 MMHG | TEMPERATURE: 98.1 F | RESPIRATION RATE: 22 BRPM | BODY MASS INDEX: 22.28 KG/M2 | WEIGHT: 118 LBS | HEIGHT: 61 IN | HEART RATE: 65 BPM

## 2024-11-18 DIAGNOSIS — D50.9 IRON DEFICIENCY ANEMIA, UNSPECIFIED IRON DEFICIENCY ANEMIA TYPE: ICD-10-CM

## 2024-11-18 DIAGNOSIS — R63.4 WEIGHT LOSS, ABNORMAL: ICD-10-CM

## 2024-11-18 PROCEDURE — 88305 TISSUE EXAM BY PATHOLOGIST: CPT | Performed by: PATHOLOGY

## 2024-11-18 PROCEDURE — 45385 COLONOSCOPY W/LESION REMOVAL: CPT | Performed by: INTERNAL MEDICINE

## 2024-11-18 RX ORDER — LIDOCAINE HYDROCHLORIDE 10 MG/ML
INJECTION, SOLUTION EPIDURAL; INFILTRATION; INTRACAUDAL; PERINEURAL AS NEEDED
Status: DISCONTINUED | OUTPATIENT
Start: 2024-11-18 | End: 2024-11-18

## 2024-11-18 RX ORDER — SODIUM CHLORIDE, SODIUM LACTATE, POTASSIUM CHLORIDE, CALCIUM CHLORIDE 600; 310; 30; 20 MG/100ML; MG/100ML; MG/100ML; MG/100ML
INJECTION, SOLUTION INTRAVENOUS CONTINUOUS PRN
Status: DISCONTINUED | OUTPATIENT
Start: 2024-11-18 | End: 2024-11-18

## 2024-11-18 RX ORDER — PROPOFOL 10 MG/ML
INJECTION, EMULSION INTRAVENOUS AS NEEDED
Status: DISCONTINUED | OUTPATIENT
Start: 2024-11-18 | End: 2024-11-18

## 2024-11-18 RX ADMIN — PROPOFOL 100 MG: 10 INJECTION, EMULSION INTRAVENOUS at 11:02

## 2024-11-18 RX ADMIN — PROPOFOL 50 MG: 10 INJECTION, EMULSION INTRAVENOUS at 11:05

## 2024-11-18 RX ADMIN — PROPOFOL 100 MG: 10 INJECTION, EMULSION INTRAVENOUS at 11:00

## 2024-11-18 RX ADMIN — PROPOFOL 20 MG: 10 INJECTION, EMULSION INTRAVENOUS at 11:07

## 2024-11-18 RX ADMIN — SODIUM CHLORIDE, SODIUM LACTATE, POTASSIUM CHLORIDE, AND CALCIUM CHLORIDE: .6; .31; .03; .02 INJECTION, SOLUTION INTRAVENOUS at 10:43

## 2024-11-18 RX ADMIN — LIDOCAINE HYDROCHLORIDE 60 MG: 10 INJECTION, SOLUTION EPIDURAL; INFILTRATION; INTRACAUDAL; PERINEURAL at 10:59

## 2024-11-18 NOTE — ANESTHESIA POSTPROCEDURE EVALUATION
Post-Op Assessment Note    CV Status:  Stable    Pain management: adequate       Mental Status:  Alert and awake   Hydration Status:  Euvolemic   PONV Controlled:  Controlled   Airway Patency:  Patent     Post Op Vitals Reviewed: Yes    No anethesia notable event occurred.        Last Filed PACU Vitals:  Vitals Value Taken Time   Temp     Pulse     BP     Resp     SpO2         Modified Marii:  No data recorded

## 2024-11-18 NOTE — ANESTHESIA PREPROCEDURE EVALUATION
Procedure:  COLONOSCOPY    Relevant Problems   CARDIO   (+) Essential hypertension   (+) Long Q-T syndrome      GI/HEPATIC   (+) Alcoholic cirrhosis (HCC)   (+) GI bleed      HEMATOLOGY   (+) Anemia   (+) Thrombocytopenia (HCC)      NEURO/PSYCH   (+) Depression      Other   (+) Transaminitis        Physical Exam    Airway    Mallampati score: II  TM Distance: >3 FB  Neck ROM: full     Dental   No notable dental hx     Cardiovascular      Pulmonary      Other Findings  post-pubertal.      Anesthesia Plan  ASA Score- 3     Anesthesia Type- IV sedation with anesthesia with ASA Monitors.         Additional Monitors:     Airway Plan:            Plan Factors-    Chart reviewed.    Patient summary reviewed.    Patient is not a current smoker.              Induction- intravenous.    Postoperative Plan-         Informed Consent- Anesthetic plan and risks discussed with patient.  I personally reviewed this patient with the CRNA. Discussed and agreed on the Anesthesia Plan with the CRNA..

## 2024-11-18 NOTE — H&P
"History and Physical -  [facility]  Yvette Titus 54 y.o. female MRN: [id number]    HPI: Yvette Titus is a 54 y.o. female who presents for colonoscopy for iron deficiency anemia, history of colopathy    REVIEW OF SYSTEMS: Per the HPI, and otherwise unremarkable.    Historical Information   Past Medical History:   Diagnosis Date    Alcoholism (HCC) 10/1/2010    Fatty liver 01/20/2023    Gallstones     Long Q-T syndrome      Past Surgical History:   Procedure Laterality Date    COLONOSCOPY  4/29/2019    Clear    IR PARACENTESIS  9/6/2024    TUBAL LIGATION  07/14/2005    UPPER GASTROINTESTINAL ENDOSCOPY  1/20/2023    Burst ulcer     Social History   Social History     Substance and Sexual Activity   Alcohol Use Yes    Comment: pint of vodka and a bottle of wine     Social History     Substance and Sexual Activity   Drug Use Never     Social History     Tobacco Use   Smoking Status Never    Passive exposure: Never   Smokeless Tobacco Never     Family History   Problem Relation Age of Onset    Cancer Mother        Meds/Allergies       Current Outpatient Medications:     Biotin 1 MG CAPS    escitalopram (LEXAPRO) 20 mg tablet    folic acid (FOLVITE) 1 mg tablet    magnesium Oxide (MAG-OX) 400 mg TABS    Melatonin 10 MG TABS    mirtazapine (REMERON) 15 mg tablet    Multiple Vitamins-Minerals (multivitamin with minerals) tablet    nadolol (CORGARD) 20 mg tablet    pantoprazole (PROTONIX) 40 mg tablet    spironolactone (ALDACTONE) 50 mg tablet    Thiamine Mononitrate (B1) 100 MG TABS    traZODone (DESYREL) 100 mg tablet    valsartan (DIOVAN) 40 mg tablet    furosemide (LASIX) 40 mg tablet    gabapentin (NEURONTIN) 100 mg capsule    polyethylene glycol (MiraLax) 17 GM/SCOOP powder    No Known Allergies    Objective     /53   Pulse 65   Temp 98.7 °F (37.1 °C) (Temporal)   Resp 18   Ht 5' 1\" (1.549 m)   Wt 53.5 kg (118 lb)   SpO2 98%   BMI 22.30 kg/m²     PHYSICAL EXAM    General Appearance: NAD, " cooperative, alert  Eyes: Anicteric  GI:  Soft, non-tender, non-distended; normal bowel sounds; no masses, no organomegaly   Rectal: Deferred until procedure  Musculoskeletal: No edema.  Skin:  No jaundice    ASSESSMENT/PLAN:  This is a 54 y.o. female here for colonoscopy, and she is stable and optimized for her procedure.

## 2024-11-18 NOTE — TELEPHONE ENCOUNTER
Pt calling to see if Dr. Wiley wants her to do blood work before her appt. I informed pt that there are no order placed for blood work but there was for stool sample. Per pt she already did those.

## 2024-11-25 PROCEDURE — 88305 TISSUE EXAM BY PATHOLOGIST: CPT | Performed by: PATHOLOGY

## 2024-11-26 ENCOUNTER — RESULTS FOLLOW-UP (OUTPATIENT)
Dept: GASTROENTEROLOGY | Facility: CLINIC | Age: 54
End: 2024-11-26

## 2024-11-28 DIAGNOSIS — K27.9 PEPTIC ULCER DISEASE: ICD-10-CM

## 2024-11-29 DIAGNOSIS — K70.31 ALCOHOLIC CIRRHOSIS OF LIVER WITH ASCITES (HCC): ICD-10-CM

## 2024-11-29 RX ORDER — PANTOPRAZOLE SODIUM 40 MG/1
40 TABLET, DELAYED RELEASE ORAL 2 TIMES DAILY
Qty: 60 TABLET | Refills: 1 | Status: SHIPPED | OUTPATIENT
Start: 2024-11-29

## 2024-11-29 RX ORDER — SPIRONOLACTONE 50 MG/1
50 TABLET, FILM COATED ORAL DAILY
Qty: 30 TABLET | Refills: 2 | Status: SHIPPED | OUTPATIENT
Start: 2024-11-29

## 2024-12-01 DIAGNOSIS — K70.31 ALCOHOLIC CIRRHOSIS OF LIVER WITH ASCITES (HCC): ICD-10-CM

## 2024-12-02 ENCOUNTER — TELEPHONE (OUTPATIENT)
Age: 54
End: 2024-12-02

## 2024-12-02 NOTE — TELEPHONE ENCOUNTER
Dahlia is unable to make it to today's 3:30 follow up visit. Next available follow-up is not until May. Dahlia is inquiring if  is ok with an appointment that far out, or, should she be seen sooner.    Pt requests call back with update. Pt notes she can only schedule office visits after 4 PM due to requiring transportation from significant other.

## 2024-12-02 NOTE — TELEPHONE ENCOUNTER
I spoke with patient and she is agreeable to virtual visit. I scheduled her for 12/3/24 virtual, since you had opening for tomorrow.

## 2024-12-03 ENCOUNTER — TELEMEDICINE (OUTPATIENT)
Dept: GASTROENTEROLOGY | Facility: AMBULARY SURGERY CENTER | Age: 54
End: 2024-12-03
Payer: COMMERCIAL

## 2024-12-03 DIAGNOSIS — K70.31 ALCOHOLIC CIRRHOSIS OF LIVER WITH ASCITES (HCC): ICD-10-CM

## 2024-12-03 DIAGNOSIS — K76.82 HEPATIC ENCEPHALOPATHY (HCC): Primary | ICD-10-CM

## 2024-12-03 DIAGNOSIS — I85.11 SECONDARY ESOPHAGEAL VARICES WITH BLEEDING (HCC): ICD-10-CM

## 2024-12-03 PROCEDURE — 99214 OFFICE O/P EST MOD 30 MIN: CPT | Performed by: STUDENT IN AN ORGANIZED HEALTH CARE EDUCATION/TRAINING PROGRAM

## 2024-12-03 RX ORDER — FUROSEMIDE 40 MG/1
40 TABLET ORAL DAILY
Qty: 90 TABLET | Refills: 1 | Status: SHIPPED | OUTPATIENT
Start: 2024-12-03

## 2024-12-03 RX ORDER — LACTULOSE 10 G/15ML
10 SOLUTION ORAL EVERY MORNING
Qty: 240 ML | Refills: 0 | Status: SHIPPED | OUTPATIENT
Start: 2024-12-03

## 2024-12-03 RX ORDER — CARVEDILOL 3.12 MG/1
3.12 TABLET ORAL 2 TIMES DAILY WITH MEALS
Qty: 180 TABLET | Refills: 3 | Status: SHIPPED | OUTPATIENT
Start: 2024-12-03 | End: 2025-12-03

## 2024-12-03 NOTE — PROGRESS NOTES
[facility] - Outpatient Consultation  Yvette Titus 54 y.o. female MRN: [id number]  Encounter: [id number]     PCP:  Alberto Robertson MD, [phone]  Referring Provider:  No ref. provider found,      Patient: Yvette Titus, 1970  Reason for Referral: decompensated cirrhosis      Virtual Regular Visit  Name: Yvette Titus      : 1970      MRN: [id number]  Encounter Provider: Karen Wiley MD  Encounter Date: 12/3/2024   Encounter department: [facility] [place]      Verification of patient location:  Patient is located at Home in the following state in which I hold an active license [place] :  Assessment & Plan      54 y.o. female with  history of EtOH cirrhosis d/b EVBL with recent bleed and HE who presents for follow up evaluation. She was last seen in clinic in 2024.     She reports longstanding history of EtOH dependence with history of complicated withdrawal and remote DUI in . She reports family history of alcoholism and cites prior traumatic personal experiences as trigger for drinking. She has attempted inpatient rehabilitation multiple times with period of sustained sobriety after 2018 until the COVID pandemic. She has since had only a period of several weeks of abstinence and was recently diagnosed with cirrhosis earlier this year.      She has had recurrent hospitalization for hepatic decompensation with variceal bleeding in the context of EtOH relapse. She had an EGD in 2024 which showed small varices that were not amenable to banding. She also required an LVP in 2024 for new ascites but has since been stable on diuretics. US abdomen was negative for PVT.      She has been abstinent from EtOH and has been engaging in behavioral counseling. She was also restarted on her psychiatric medications for mood stabilization but does report dysphoric mood and ?gait instability for which I advised to restart lactulose.      She should  return to liver clinic in 2 months by telehealth  or sooner if needed. Thank you for the opportunity to consult in her care.     1. Decompensated cirrhosis:   - Etiology: EtOH with relapse in July 2024  - Transplant: not currently a candidate given high risk EtOH use     2. Ascites  - Continue lasix 40 mg daily  - Continue aldactone 50 mg daily  - LVPs as needed for comfort     3. Hepatic encephalopathy  - Restart lactulose given concern for recurrent symptoms for goal 3-4 BMs daily    4. Varices  - Last EGD showed small esophageal varices   - Will switch to carvedilol given uncontrolled HR     5. HCC surveillance  - No lesions on CT hepatoma 9/2024  - Due for repeat 34965 with AFP levels      6. Nutrition and sarcopenia  -  She was instructed to eat multiple small meals a day and a snack prior to bedtime to help prevent protein loss and sarcopenia     7. EtOH hepatitis  - No indication for steroids at this time as Tb appears to be resolving  - Recommend EtOH abstinence and high protein diet   - Repeat MELD labs      8. Colonic thickening, likely portal colopathy  - COY without any suspicious lesions in November 2024; due in 7 years for history of polyps     9. Healthcare maintenance for patients with cirrhosis  -She was instructed to take no more than 2 grams of tylenol in 24 hours and no products containing NSAIDs, benzodiazapines, and narcotics.  -She was also instructed to avoid raw shellfish   -She should participate in daily exercise as to prevent loss of muscle mass  -She should abstain from all alcohol intake and was counseled on this.    -She is at risk for vitamin deficiencies and metabolic bone disease.  -HAV and HBV immunity will be checked and she should be vaccinated through her primary care provider if she is not immune.  -Additionally, she should receive a yearly flu shot and the pneumonia vaccine through her primary care provider.     Encounter provider Karen Wiley MD    The patient was identified  by name and date of birth. Yvette Titus was informed that this is a telemedicine visit and that the visit is being conducted through the Epic Embedded platform. She agrees to proceed..  My office door was closed. No one else was in the room.  She acknowledged consent and understanding of privacy and security of the video platform. The patient has agreed to participate and understands they can discontinue the visit at any time.    Patient is aware this is a billable service.     History of Present Illness     HPI  54 year-old female with history of EtOH cirrhosis d/b EVBL who presents for follow up evaluation. She was last seen in clinic in October 2024.     Interval events  - COY showed subCM polyp in the cecum and prolapsing hemorrhoids   - She reports dysphoric mood and fatigue and has had increased lexapro and trazodone   - Reports some disorientation and stumbling. No orthostasis.    Extended liver liver history  She reports longstanding history of EtOH dependence with history of complicated withdrawal and remote DUI in 2011. She reports family history of alcoholism and cites prior traumatic personal experiences as trigger for drinking. She has attempted inpatient rehabilitation multiple times with period of sustained sobriety after 2018 until the COVID pandemic. She has since had only a period of several weeks of abstinence and was recently diagnosed with cirrhosis earlier this year.      She was hospitalized in January 2023 for UGIB due to PUD and was noted to have mild EtOH hepatitis for which EtOH cessation was advised.  She had a subsequent EGD in May 2023 which showed esophageal varices. She continued to drink despite knowledge of her disease and recently was admitted for a variceal bleed requiring banding with partial eradication. CT was negative for PVT and suspicious lesions. She has been on nadolol for secondary prophylaxis. Last EGD was negative for varices.     She also decompensated with ascites and  required an LVP with removal of 3.2L. She has been doing well on diuretics and stopped her lactulose without recurrence of symptoms.     Review of Systems   Constitutional: Negative.    HENT: Negative.     Eyes: Negative.    Respiratory: Negative.     Cardiovascular: Negative.    Gastrointestinal: Negative.    Endocrine: Negative.    Genitourinary: Negative.    Musculoskeletal: Negative.    Skin: Negative.    Allergic/Immunologic: Negative.    Neurological: Negative.    Hematological: Negative.    Psychiatric/Behavioral:  Positive for decreased concentration, dysphoric mood and sleep disturbance.        Objective   There were no vitals taken for this visit.    Physical Exam  HENT:      Head: Normocephalic and atraumatic.      Nose: Nose normal.      Mouth/Throat:      Mouth: Mucous membranes are moist.   Eyes:      Extraocular Movements: Extraocular movements intact.      Conjunctiva/sclera: Conjunctivae normal.      Pupils: Pupils are equal, round, and reactive to light.   Neurological:      General: No focal deficit present.      Mental Status: She is alert. Mental status is at baseline. She is disoriented.         Lab Results   Component Value Date    WBC 5.95 10/04/2024    HGB 8.9 (L) 10/04/2024    HCT 27.5 (L) 10/04/2024    MCV 97 10/04/2024     10/04/2024     Lab Results   Component Value Date     09/30/2015    SODIUM 134 (L) 10/04/2024    K 4.5 10/04/2024     10/04/2024    CO2 22 10/04/2024    ANIONGAP 14 (H) 09/30/2015    AGAP 10 10/04/2024    BUN 12 10/04/2024    CREATININE 0.83 10/04/2024    GLUC 115 10/04/2024    CALCIUM 9.2 10/04/2024    AST 95 (H) 10/04/2024    ALT 19 10/04/2024    ALKPHOS 81 10/04/2024    PROT 8.5 (H) 09/30/2015    TP 9.0 (H) 10/04/2024    BILITOT 0.53 09/30/2015    TBILI 2.33 (H) 10/04/2024    EGFR 79 10/04/2024     Lab Results   Component Value Date    INR 1.53 (H) 10/04/2024    INR 1.2 03/30/2024    INR 1.36 (H) 03/07/2024    PROTIME 18.9 (H) 10/04/2024    PROTIME  12.8 (H) 03/30/2024    PROTIME 17.3 (H) 03/07/2024     MELD 3.0: 17 at 10/4/2024 11:56 AM  MELD-Na: 17 at 10/4/2024 11:56 AM  Calculated from:  Serum Creatinine: 0.83 mg/dL (Using min of 1 mg/dL) at 10/4/2024 11:56 AM  Serum Sodium: 134 mmol/L at 10/4/2024 11:56 AM  Total Bilirubin: 2.33 mg/dL at 10/4/2024 11:56 AM  Serum Albumin: 3.2 g/dL at 10/4/2024 11:56 AM  INR(ratio): 1.53 at 10/4/2024 11:56 AM  Age at listing (hypothetical): 54 years  Sex: Female at 10/4/2024 11:56 AM    COY (11/2024)  One polyp measuring smaller than 5 mm in the cecum; performed cold snare with complete en bloc removal and retrieved specimen  Internal large, prolapsing hemorrhoids    EGD (11/2024)  Grade 1 esophageal varices - flattened with insufflation.   Evidence of scarring in the lower esophagus.  Normal stomach and duodenum.    CT A/P (10/2024)  Cirrhosis with portal hypertension. No CT evidence for hepatoma. Small volume abdominopelvic ascites.     Diffuse colonic wall thickening without associated pericolonic inflammatory change, most pronounced in the right colon, probably sequela of portal hypertensive colopathy. Colitis should be considered in the appropriate clinical setting; correlate with   patient symptomatology if any.     Visit Time  Total Visit Duration: 14 min

## 2024-12-04 ENCOUNTER — TELEPHONE (OUTPATIENT)
Age: 54
End: 2024-12-04

## 2024-12-04 NOTE — TELEPHONE ENCOUNTER
Pt stated Oncology Provider: KAREN Drummond    Actionable item: Call back to patient regarding Vitamin B1 prescription    What is the reason for the call/chief complaint?  Patient called about her Vitamin B1 prescription, which she has completely run out of. She was unable to get it OTC as the pharmacist said it has to be prescribed. She was unsure who last filled it, and review of notes seems to be her PCP.     Patient will like to confirm from [place] if she needs to continue taking the medication, and if so, is requesting that it be prescribed to her pharmacy for her.     She has return visit on 1/9/25 and will get blood work prior to the visit.

## 2024-12-04 NOTE — TELEPHONE ENCOUNTER
Patients GI provider:  Dr. Carreon    Number to return call: [phone]    Reason for call: Pt calling to have script for Thiamine Mononitrate (B1) 100 MG tablet. Pt would like a call back.    Scheduled procedure/appointment date if applicable: Apt 12/12/24

## 2024-12-05 DIAGNOSIS — E53.8 VITAMIN B12 DEFICIENCY: Primary | ICD-10-CM

## 2024-12-05 RX ORDER — CALCIUM CARBONATE/VITAMIN D3 600 MG-10
100 TABLET ORAL DAILY
Qty: 90 TABLET | Refills: 1 | Status: SHIPPED | OUTPATIENT
Start: 2024-12-05

## 2024-12-05 NOTE — TELEPHONE ENCOUNTER
PA for Carvedilol 3.125 BID    SUBMITTED to [facility]    via    [x]CMM-KEY: [id number]  Waiting for next steps/questions to complete prior auth.

## 2024-12-05 NOTE — TELEPHONE ENCOUNTER
Call out to patient in regard to request. Per KAREN Zarate this is not prescribed by her. Recommended to reach out to PCP. Left vm with recommendations and call back number.

## 2024-12-07 ENCOUNTER — APPOINTMENT (OUTPATIENT)
Dept: LAB | Facility: HOSPITAL | Age: 54
End: 2024-12-07
Payer: COMMERCIAL

## 2024-12-07 DIAGNOSIS — D50.0 IRON DEFICIENCY ANEMIA SECONDARY TO BLOOD LOSS (CHRONIC): ICD-10-CM

## 2024-12-07 DIAGNOSIS — D50.0 IRON DEFICIENCY ANEMIA DUE TO CHRONIC BLOOD LOSS: ICD-10-CM

## 2024-12-07 LAB
ALBUMIN SERPL BCG-MCNC: 4.2 G/DL (ref 3.5–5)
ALP SERPL-CCNC: 86 U/L (ref 34–104)
ALT SERPL W P-5'-P-CCNC: 17 U/L (ref 7–52)
ANION GAP SERPL CALCULATED.3IONS-SCNC: 8 MMOL/L (ref 4–13)
AST SERPL W P-5'-P-CCNC: 40 U/L (ref 13–39)
BASOPHILS # BLD AUTO: 0.04 THOUSANDS/ÂΜL (ref 0–0.1)
BASOPHILS NFR BLD AUTO: 1 % (ref 0–1)
BILIRUB SERPL-MCNC: 1.68 MG/DL (ref 0.2–1)
BUN SERPL-MCNC: 30 MG/DL (ref 5–25)
CALCIUM SERPL-MCNC: 9.7 MG/DL (ref 8.4–10.2)
CHLORIDE SERPL-SCNC: 98 MMOL/L (ref 96–108)
CO2 SERPL-SCNC: 29 MMOL/L (ref 21–32)
CREAT SERPL-MCNC: 1.08 MG/DL (ref 0.6–1.3)
EOSINOPHIL # BLD AUTO: 0.11 THOUSAND/ÂΜL (ref 0–0.61)
EOSINOPHIL NFR BLD AUTO: 2 % (ref 0–6)
ERYTHROCYTE [DISTWIDTH] IN BLOOD BY AUTOMATED COUNT: 13.3 % (ref 11.6–15.1)
GFR SERPL CREATININE-BSD FRML MDRD: 57 ML/MIN/1.73SQ M
GLUCOSE SERPL-MCNC: 84 MG/DL (ref 65–140)
HCT VFR BLD AUTO: 34.6 % (ref 34.8–46.1)
HGB BLD-MCNC: 11.2 G/DL (ref 11.5–15.4)
IMM GRANULOCYTES # BLD AUTO: 0.01 THOUSAND/UL (ref 0–0.2)
IMM GRANULOCYTES NFR BLD AUTO: 0 % (ref 0–2)
INR PPP: 1.33 (ref 0.85–1.19)
LYMPHOCYTES # BLD AUTO: 1.69 THOUSANDS/ÂΜL (ref 0.6–4.47)
LYMPHOCYTES NFR BLD AUTO: 30 % (ref 14–44)
MCH RBC QN AUTO: 32.2 PG (ref 26.8–34.3)
MCHC RBC AUTO-ENTMCNC: 32.4 G/DL (ref 31.4–37.4)
MCV RBC AUTO: 99 FL (ref 82–98)
MONOCYTES # BLD AUTO: 0.42 THOUSAND/ÂΜL (ref 0.17–1.22)
MONOCYTES NFR BLD AUTO: 7 % (ref 4–12)
NEUTROPHILS # BLD AUTO: 3.41 THOUSANDS/ÂΜL (ref 1.85–7.62)
NEUTS SEG NFR BLD AUTO: 60 % (ref 43–75)
NRBC BLD AUTO-RTO: 0 /100 WBCS
PLATELET # BLD AUTO: 111 THOUSANDS/UL (ref 149–390)
PMV BLD AUTO: 9.8 FL (ref 8.9–12.7)
POTASSIUM SERPL-SCNC: 4.2 MMOL/L (ref 3.5–5.3)
PROT SERPL-MCNC: 9.1 G/DL (ref 6.4–8.4)
PROTHROMBIN TIME: 16.9 SECONDS (ref 12.3–15)
RBC # BLD AUTO: 3.48 MILLION/UL (ref 3.81–5.12)
SODIUM SERPL-SCNC: 135 MMOL/L (ref 135–147)
WBC # BLD AUTO: 5.68 THOUSAND/UL (ref 4.31–10.16)

## 2024-12-07 PROCEDURE — 80053 COMPREHEN METABOLIC PANEL: CPT

## 2024-12-07 PROCEDURE — 36415 COLL VENOUS BLD VENIPUNCTURE: CPT

## 2024-12-07 PROCEDURE — 83918 ORGANIC ACIDS TOTAL QUANT: CPT

## 2024-12-07 PROCEDURE — 85025 COMPLETE CBC W/AUTO DIFF WBC: CPT

## 2024-12-07 PROCEDURE — 85610 PROTHROMBIN TIME: CPT

## 2024-12-11 ENCOUNTER — RESULTS FOLLOW-UP (OUTPATIENT)
Age: 54
End: 2024-12-11

## 2024-12-11 ENCOUNTER — TELEPHONE (OUTPATIENT)
Age: 54
End: 2024-12-11

## 2024-12-11 DIAGNOSIS — K70.31 ALCOHOLIC CIRRHOSIS OF LIVER WITH ASCITES (HCC): Primary | ICD-10-CM

## 2024-12-11 NOTE — TELEPHONE ENCOUNTER
Left message on patient's voice mail. Labs show normal liver tests and chemistries but renal function is slightly worse above baseline. Dr. Wiley recommends repeating labs in one month. Please hydrate (drink water) prior to blood test.

## 2024-12-12 ENCOUNTER — TELEPHONE (OUTPATIENT)
Age: 54
End: 2024-12-12

## 2024-12-12 LAB — METHYLMALONATE SERPL-SCNC: 268 NMOL/L (ref 0–378)

## 2024-12-29 LAB
ALBUMIN SERPL-MCNC: 3.6 G/DL (ref 3.8–4.9)
ALP SERPL-CCNC: 79 IU/L (ref 44–121)
ALT SERPL-CCNC: 18 IU/L (ref 0–32)
AST SERPL-CCNC: 55 IU/L (ref 0–40)
BASOPHILS # BLD AUTO: 0 X10E3/UL (ref 0–0.2)
BASOPHILS NFR BLD AUTO: 0 %
BILIRUB SERPL-MCNC: 2.1 MG/DL (ref 0–1.2)
BUN SERPL-MCNC: 53 MG/DL (ref 6–24)
BUN/CREAT SERPL: 31 (ref 9–23)
CALCIUM SERPL-MCNC: 9.3 MG/DL (ref 8.7–10.2)
CHLORIDE SERPL-SCNC: 96 MMOL/L (ref 96–106)
CO2 SERPL-SCNC: 21 MMOL/L (ref 20–29)
CREAT SERPL-MCNC: 1.71 MG/DL (ref 0.57–1)
EGFR: 35 ML/MIN/1.73
EOSINOPHIL # BLD AUTO: 0 X10E3/UL (ref 0–0.4)
EOSINOPHIL NFR BLD AUTO: 1 %
ERYTHROCYTE [DISTWIDTH] IN BLOOD BY AUTOMATED COUNT: 11.6 % (ref 11.7–15.4)
GLOBULIN SER-MCNC: 4.3 G/DL (ref 1.5–4.5)
GLUCOSE SERPL-MCNC: 117 MG/DL (ref 70–99)
HCT VFR BLD AUTO: 22.7 % (ref 34–46.6)
HGB BLD-MCNC: 8 G/DL (ref 11.1–15.9)
IMM GRANULOCYTES # BLD: 0 X10E3/UL (ref 0–0.1)
IMM GRANULOCYTES NFR BLD: 1 %
INR PPP: 1.4 (ref 0.9–1.2)
LYMPHOCYTES # BLD AUTO: 0.6 X10E3/UL (ref 0.7–3.1)
LYMPHOCYTES NFR BLD AUTO: 11 %
MCH RBC QN AUTO: 32 PG (ref 26.6–33)
MCHC RBC AUTO-ENTMCNC: 35.2 G/DL (ref 31.5–35.7)
MCV RBC AUTO: 91 FL (ref 79–97)
MONOCYTES # BLD AUTO: 0.5 X10E3/UL (ref 0.1–0.9)
MONOCYTES NFR BLD AUTO: 10 %
NEUTROPHILS # BLD AUTO: 4.3 X10E3/UL (ref 1.4–7)
NEUTROPHILS NFR BLD AUTO: 77 %
PLATELET # BLD AUTO: 143 X10E3/UL (ref 150–450)
POTASSIUM SERPL-SCNC: 3.9 MMOL/L (ref 3.5–5.2)
PROT SERPL-MCNC: 7.9 G/DL (ref 6–8.5)
PROTHROMBIN TIME: 14.6 SEC (ref 9.1–12)
RBC # BLD AUTO: 2.5 X10E6/UL (ref 3.77–5.28)
SODIUM SERPL-SCNC: 133 MMOL/L (ref 134–144)
WBC # BLD AUTO: 5.5 X10E3/UL (ref 3.4–10.8)

## 2024-12-30 ENCOUNTER — TELEPHONE (OUTPATIENT)
Age: 54
End: 2024-12-30

## 2024-12-30 DIAGNOSIS — D50.0 IRON DEFICIENCY ANEMIA DUE TO CHRONIC BLOOD LOSS: ICD-10-CM

## 2024-12-30 DIAGNOSIS — N18.9 CHRONIC KIDNEY DISEASE, UNSPECIFIED CKD STAGE: ICD-10-CM

## 2024-12-30 DIAGNOSIS — D64.9 ANEMIA, UNSPECIFIED TYPE: ICD-10-CM

## 2024-12-30 DIAGNOSIS — D64.9 ANEMIA, UNSPECIFIED TYPE: Primary | ICD-10-CM

## 2024-12-30 NOTE — TELEPHONE ENCOUNTER
Spoke with patient who had labs completed for upcoming appointment with KAREN Zarate scheduled for 1/9. Her hepatologist, Dr. Wiley, reviewed results and recommended reaching out to our office given RBC 2.5. I informed patient that she does need to have her iron panel completed but I will review with KAREN Drummond to see if any additional lab orders are recommended to have done prior to her appt and we will give her a callback.     Best callback number: [phone]

## 2024-12-30 NOTE — TELEPHONE ENCOUNTER
Spoke with patient and she states she has not had any abnormal bleeding episodes recently. Patient states in the past, she had an episode of vomiting with blood, but that was about a month ago. Patient also states that she had bloody noses in the past that would take hours to stop. Patient states that these events have not occurred in the last month. Patient advised to go to the ED if this happens again and verbalized understanding. I also informed her that more labs have been added for her to get done prior to her visit on 1/9. Patient will go to [facility] to get these drawn and I advised her that I will fax them over. Patient verbalized understanding and is in agreement with the plan.

## 2024-12-31 DIAGNOSIS — K27.9 PEPTIC ULCER DISEASE: ICD-10-CM

## 2024-12-31 RX ORDER — PANTOPRAZOLE SODIUM 40 MG/1
40 TABLET, DELAYED RELEASE ORAL 2 TIMES DAILY
Qty: 60 TABLET | Refills: 5 | Status: SHIPPED | OUTPATIENT
Start: 2024-12-31

## 2024-12-31 NOTE — TELEPHONE ENCOUNTER
Reason for call:   [x] Refill   [] Prior Auth  [] Other:     Office:   [] PCP/Provider -   [x] Specialty/Provider - [facility] - Karen Wiley MD     Medication:  pantoprazole (PROTONIX) 40 mg tablet    Dose/Frequency:  TAKE 1 TABLET BY MOUTH TWICE A DAY     Quantity: 60 tablet     Pharmacy: [facility] #[id number] - [place] - [id number] [phone]    Does the patient have enough for 3 days?   [] Yes   [x] No - Send as HP to POD

## 2025-01-04 ENCOUNTER — APPOINTMENT (OUTPATIENT)
Dept: LAB | Facility: HOSPITAL | Age: 55
End: 2025-01-04
Payer: COMMERCIAL

## 2025-01-04 DIAGNOSIS — D50.0 IRON DEFICIENCY ANEMIA DUE TO CHRONIC BLOOD LOSS: ICD-10-CM

## 2025-01-04 DIAGNOSIS — N18.9 CHRONIC KIDNEY DISEASE, UNSPECIFIED CKD STAGE: ICD-10-CM

## 2025-01-04 DIAGNOSIS — D50.0 IRON DEFICIENCY ANEMIA SECONDARY TO BLOOD LOSS (CHRONIC): ICD-10-CM

## 2025-01-04 DIAGNOSIS — D64.9 ANEMIA, UNSPECIFIED TYPE: ICD-10-CM

## 2025-01-04 LAB
ALBUMIN SERPL BCG-MCNC: 3.4 G/DL (ref 3.5–5)
ALP SERPL-CCNC: 74 U/L (ref 34–104)
ALT SERPL W P-5'-P-CCNC: 23 U/L (ref 7–52)
ANION GAP SERPL CALCULATED.3IONS-SCNC: 6 MMOL/L (ref 4–13)
AST SERPL W P-5'-P-CCNC: 62 U/L (ref 13–39)
BASOPHILS # BLD AUTO: 0.02 THOUSANDS/ΜL (ref 0–0.1)
BASOPHILS NFR BLD AUTO: 0 % (ref 0–1)
BILIRUB SERPL-MCNC: 2.54 MG/DL (ref 0.2–1)
BLD SMEAR INTERP: NORMAL
BUN SERPL-MCNC: 21 MG/DL (ref 5–25)
CALCIUM ALBUM COR SERPL-MCNC: 9.7 MG/DL (ref 8.3–10.1)
CALCIUM SERPL-MCNC: 9.2 MG/DL (ref 8.4–10.2)
CHLORIDE SERPL-SCNC: 104 MMOL/L (ref 96–108)
CO2 SERPL-SCNC: 24 MMOL/L (ref 21–32)
CREAT SERPL-MCNC: 1.07 MG/DL (ref 0.6–1.3)
EOSINOPHIL # BLD AUTO: 0.04 THOUSAND/ΜL (ref 0–0.61)
EOSINOPHIL NFR BLD AUTO: 1 % (ref 0–6)
ERYTHROCYTE [DISTWIDTH] IN BLOOD BY AUTOMATED COUNT: 12.9 % (ref 11.6–15.1)
GFR SERPL CREATININE-BSD FRML MDRD: 58 ML/MIN/1.73SQ M
GLUCOSE SERPL-MCNC: 98 MG/DL (ref 65–140)
HCT VFR BLD AUTO: 26.9 % (ref 34.8–46.1)
HGB BLD-MCNC: 8.6 G/DL (ref 11.5–15.4)
IGA SERPL-MCNC: 1313 MG/DL (ref 66–433)
IGG SERPL-MCNC: 2306 MG/DL (ref 635–1741)
IGM SERPL-MCNC: 183 MG/DL (ref 45–281)
IMM GRANULOCYTES # BLD AUTO: 0.05 THOUSAND/UL (ref 0–0.2)
IMM GRANULOCYTES NFR BLD AUTO: 1 % (ref 0–2)
IRON SATN MFR SERPL: 58 % (ref 15–50)
IRON SERPL-MCNC: 110 UG/DL (ref 50–212)
LDH SERPL-CCNC: 126 U/L (ref 140–271)
LYMPHOCYTES # BLD AUTO: 1.18 THOUSANDS/ΜL (ref 0.6–4.47)
LYMPHOCYTES NFR BLD AUTO: 19 % (ref 14–44)
MCH RBC QN AUTO: 31.7 PG (ref 26.8–34.3)
MCHC RBC AUTO-ENTMCNC: 32 G/DL (ref 31.4–37.4)
MCV RBC AUTO: 99 FL (ref 82–98)
MONOCYTES # BLD AUTO: 0.39 THOUSAND/ΜL (ref 0.17–1.22)
MONOCYTES NFR BLD AUTO: 6 % (ref 4–12)
NEUTROPHILS # BLD AUTO: 4.59 THOUSANDS/ΜL (ref 1.85–7.62)
NEUTS SEG NFR BLD AUTO: 73 % (ref 43–75)
NRBC BLD AUTO-RTO: 0 /100 WBCS
PLATELET # BLD AUTO: 92 THOUSANDS/UL (ref 149–390)
PLATELET BLD QL SMEAR: ABNORMAL
PMV BLD AUTO: 9.4 FL (ref 8.9–12.7)
POTASSIUM SERPL-SCNC: 4.4 MMOL/L (ref 3.5–5.3)
PROT SERPL-MCNC: 8.5 G/DL (ref 6.4–8.4)
RBC # BLD AUTO: 2.71 MILLION/UL (ref 3.81–5.12)
RBC MORPH BLD: NORMAL
RETICS # AUTO: NORMAL 10*3/UL (ref 14097–95744)
RETICS # CALC: 0.58 % (ref 0.37–1.87)
SODIUM SERPL-SCNC: 134 MMOL/L (ref 135–147)
TIBC SERPL-MCNC: 190.4 UG/DL (ref 250–450)
TRANSFERRIN SERPL-MCNC: 136 MG/DL (ref 203–362)
UIBC SERPL-MCNC: 80 UG/DL (ref 155–355)
WBC # BLD AUTO: 6.27 THOUSAND/UL (ref 4.31–10.16)

## 2025-01-04 PROCEDURE — 80053 COMPREHEN METABOLIC PANEL: CPT

## 2025-01-04 PROCEDURE — 85025 COMPLETE CBC W/AUTO DIFF WBC: CPT

## 2025-01-04 PROCEDURE — 84165 PROTEIN E-PHORESIS SERUM: CPT

## 2025-01-04 PROCEDURE — 36415 COLL VENOUS BLD VENIPUNCTURE: CPT

## 2025-01-04 PROCEDURE — 83615 LACTATE (LD) (LDH) ENZYME: CPT

## 2025-01-04 PROCEDURE — 85045 AUTOMATED RETICULOCYTE COUNT: CPT

## 2025-01-04 PROCEDURE — 82784 ASSAY IGA/IGD/IGG/IGM EACH: CPT

## 2025-01-04 PROCEDURE — 86334 IMMUNOFIX E-PHORESIS SERUM: CPT

## 2025-01-04 PROCEDURE — 82728 ASSAY OF FERRITIN: CPT

## 2025-01-04 PROCEDURE — 83010 ASSAY OF HAPTOGLOBIN QUANT: CPT

## 2025-01-04 PROCEDURE — 83521 IG LIGHT CHAINS FREE EACH: CPT

## 2025-01-04 PROCEDURE — 83540 ASSAY OF IRON: CPT

## 2025-01-04 PROCEDURE — 83550 IRON BINDING TEST: CPT

## 2025-01-05 LAB
FERRITIN SERPL-MCNC: 551 NG/ML (ref 11–307)
HAPTOGLOB SERPL-MCNC: 91 MG/DL (ref 33–346)

## 2025-01-06 NOTE — ASSESSMENT & PLAN NOTE
Secondary to EtOH cirrhosis  Follows closely with hepatology.  Platelet count stable 92  Continue to monitor    Orders:    CBC and differential; Future    Comprehensive metabolic panel; Future    Iron Panel (Includes Ferritin, Iron Sat%, Iron, and TIBC); Future    Vitamin B12; Future    Folate; Future    Methylmalonic acid, serum; Future

## 2025-01-06 NOTE — PROGRESS NOTES
Name: Yvette Titus      : 1970      MRN: [id number]  Encounter Provider: KAREN Barraza  Encounter Date: 2025   Encounter department: [facility]  :  Assessment & Plan  Thrombocytopenia (HCC)  Secondary to EtOH cirrhosis  Follows closely with hepatology.  Platelet count stable 92  Continue to monitor    Orders:    CBC and differential; Future    Comprehensive metabolic panel; Future    Iron Panel (Includes Ferritin, Iron Sat%, Iron, and TIBC); Future    Vitamin B12; Future    Folate; Future    Methylmalonic acid, serum; Future    Macrocytic anemia  Multifactorial and secondary to bone marrow suppression from alcohol abuse, malnutrition from lack of eating, GI blood loss  She has been treated with parenteral iron and B12 replacement intermittently as needed  She did have a relapse in her alcohol use disorder and spent per her report 1 day drinking last month.  Denies any recent or recurrent alcohol consumption    Recent hematologic workup negative for monoclonal gammopathy, hemolysis.  Last treated with IV Venofer 10/2024.  No evidence of persistent iron deficiency on most recent labs    Recommend she continue to take daily supplementation, B12, folate  Hemoglobin is slightly improved at 8.6.  Her baseline has been running between 8 and 10    No evidence of active bleeding at this time.  Patient encouraged to maintain her sobriety.  I will repeat blood work in 3 months and see her back to review.  She is in agreement with this plan of care.  She knows to call anytime with questions or concerns    Orders:    CBC and differential; Future    Comprehensive metabolic panel; Future    Iron Panel (Includes Ferritin, Iron Sat%, Iron, and TIBC); Future    Vitamin B12; Future    Folate; Future    Methylmalonic acid, serum; Future        History of Present Illness   Chief Complaint   Patient presents with    Follow-up   Yvette Titus is a 53-year-old female  with a history of alcohol abuse, depression, hypertension, PUD, GI bleed, esophageal varices, cirrhosis, anemia, thrombocytopenia  She has been treated with parenteral iron and B12 replacement    Pertinent Medical History   01/09/25: Dahlia is being seen virtually today for follow-up.  She completed a course of IV Venofer 300 mg on 10/23/2024  She has not had any recent hospitalizations.  Continues to follow closely with hepatology.  She does endorse having 1 day last month where she did drink for the day.  Denies any ongoing alcohol use    She feels punky today.  She did test positive for COVID last week and has ongoing symptoms of feeling fatigued and rundown.  Feels depressed.  PCP recently added Wellbutrin to her meds.  Denies any abnormal bleeding.  No melena, hematochezia, hematemesis.    12/7/24 hemoglobin improved 11.2 after completion of iron infusions    12/28/24 hemoglobin dropped to 8, MCV 91    1/4/25 hemoglobin up to 8.6, MCV 99, platelets 92  Additional workup negative for hemolysis, monoclonal gammopathy.  Serum IgA and IgG are elevated.  Retic count normal.  Haptoglobin normal  Iron panel shows elevated serum ferritin, 551    Taking B and folic acid       Review of Systems   Constitutional:  Positive for fatigue.   Psychiatric/Behavioral:  Positive for dysphoric mood.    All other systems reviewed and are negative.    Medical History Reviewed by provider this encounter.      Objective   There were no vitals taken for this visit.    Physical Exam  Constitutional:       General: She is not in acute distress.  HENT:      Head: Normocephalic and atraumatic.      Mouth/Throat:      Pharynx: No oropharyngeal exudate.   Eyes:      General: No scleral icterus.  Pulmonary:      Effort: Pulmonary effort is normal. No respiratory distress.   Musculoskeletal:      Cervical back: Normal range of motion.   Skin:     Coloration: Skin is pale.   Neurological:      General: No focal deficit present.      Mental Status:  She is alert and oriented to person, place, and time.   Psychiatric:         Mood and Affect: Mood normal.         Behavior: Behavior normal.         Labs: I have reviewed the following labs:  Results for orders placed or performed in visit on 01/04/25   Result Value Ref Range    Hemolysis Smear No Schistocytes or Helmet Cells noted    CBC and differential   Result Value Ref Range    WBC 6.27 4.31 - 10.16 Thousand/uL    RBC 2.71 (L) 3.81 - 5.12 Million/uL    Hemoglobin 8.6 (L) 11.5 - 15.4 g/dL    Hematocrit 26.9 (L) 34.8 - 46.1 %    MCV 99 (H) 82 - 98 fL    MCH 31.7 26.8 - 34.3 pg    MCHC 32.0 31.4 - 37.4 g/dL    RDW 12.9 11.6 - 15.1 %    MPV 9.4 8.9 - 12.7 fL    Platelets 92 (L) 149 - 390 Thousands/uL    nRBC 0 /100 WBCs    Segmented % 73 43 - 75 %    Immature Grans % 1 0 - 2 %    Lymphocytes % 19 14 - 44 %    Monocytes % 6 4 - 12 %    Eosinophils Relative 1 0 - 6 %    Basophils Relative 0 0 - 1 %    Absolute Neutrophils 4.59 1.85 - 7.62 Thousands/µL    Absolute Immature Grans 0.05 0.00 - 0.20 Thousand/uL    Absolute Lymphocytes 1.18 0.60 - 4.47 Thousands/µL    Absolute Monocytes 0.39 0.17 - 1.22 Thousand/µL    Eosinophils Absolute 0.04 0.00 - 0.61 Thousand/µL    Basophils Absolute 0.02 0.00 - 0.10 Thousands/µL   Comprehensive metabolic panel   Result Value Ref Range    Sodium 134 (L) 135 - 147 mmol/L    Potassium 4.4 3.5 - 5.3 mmol/L    Chloride 104 96 - 108 mmol/L    CO2 24 21 - 32 mmol/L    ANION GAP 6 4 - 13 mmol/L    BUN 21 5 - 25 mg/dL    Creatinine 1.07 0.60 - 1.30 mg/dL    Glucose 98 65 - 140 mg/dL    Calcium 9.2 8.4 - 10.2 mg/dL    Corrected Calcium 9.7 8.3 - 10.1 mg/dL    AST 62 (H) 13 - 39 U/L    ALT 23 7 - 52 U/L    Alkaline Phosphatase 74 34 - 104 U/L    Total Protein 8.5 (H) 6.4 - 8.4 g/dL    Albumin 3.4 (L) 3.5 - 5.0 g/dL    Total Bilirubin 2.54 (H) 0.20 - 1.00 mg/dL    eGFR 58 ml/min/1.73sq m   Protein electrophoresis, serum   Result Value Ref Range    A/G Ratio 0.75 (L) 1.10 - 1.80    Albumin  Electrophoresis 43.0 (L) 48.0 - 70.0 %    Albumin CONC 3.61 3.20 - 5.10 g/dl    Alpha 1 2.4 1.8 - 7.0 %    ALPHA 1 CONC 0.20 0.15 - 0.47 g/dL    Alpha 2 7.4 5.9 - 14.9 %    ALPHA 2 CONC 0.62 0.42 - 1.04 g/dL    Beta-1 5.3 4.7 - 7.7 %    BETA 1 CONC 0.45 0.31 - 0.57 g/dL    Beta-2 9.4 (H) 3.1 - 7.9 %    BETA 2 CONC 0.79 (H) 0.20 - 0.58 g/dL    Gamma Globulin 32.5 (H) 6.9 - 22.3 %    GAMMA CONC 2.73 (H) 0.40 - 1.66 g/dL    Total Protein 8.4 (H) 6.4 - 8.2 g/dL    SPEP Interpretation See Comment    Immunoglobulin free LT chains blood   Result Value Ref Range    Ig Kappa Free Light Chain 112.8 (H) 3.3 - 19.4 mg/L    Ig Lambda Free Light Chain 85.2 (H) 5.7 - 26.3 mg/L    Kappa/Lambda FluidC Ratio 1.32 0.26 - 1.65   IgG, IgA, IgM   Result Value Ref Range    IGA 1,313 (H) 66 - 433 mg/dL    IGG 2,306 (H) 635 - 1,741 mg/dL     45 - 281 mg/dL   Result Value Ref Range    Haptoglobin 91 33 - 346 mg/dL   LD,Blood   Result Value Ref Range     (L) 140 - 271 U/L   Retic Count   Result Value Ref Range    Retic Ct Abs 15,700 14,097 - 95,744    Retic Ct Pct 0.58 0.37 - 1.87 %   Immunofixation, Serum(Reflex Only-Do Not Order)   Result Value Ref Range    Immunofixation Interpretation See Comment    Smear Review(Phlebs Do Not Order)   Result Value Ref Range    RBC Morphology Normal     Platelet Estimate Decreased (A) Adequate             Administrative Statements   Encounter provider AKREN Barraza    The Patient is located at Home and in the following state in which I hold an active license [place].    The patient was identified by name and date of birth. Yvette Titus was informed that this is a telemedicine visit and that the visit is being conducted through the Epic Embedded platform. She agrees to proceed..  My office door was closed. No one else was in the room.  She acknowledged consent and understanding of privacy and security of the video platform. The patient has agreed to participate and understands they can  discontinue the visit at any time.    I have spent a total time of 25 minutes in caring for this patient on the day of the visit/encounter including Diagnostic results, Instructions for management, Documenting in the medical record, Reviewing / ordering tests, medicine, procedures  , and Obtaining or reviewing history  .

## 2025-01-07 ENCOUNTER — TELEPHONE (OUTPATIENT)
Age: 55
End: 2025-01-07

## 2025-01-07 LAB
ALBUMIN SERPL ELPH-MCNC: 3.61 G/DL (ref 3.2–5.1)
ALBUMIN SERPL ELPH-MCNC: 43 % (ref 48–70)
ALPHA1 GLOB SERPL ELPH-MCNC: 0.2 G/DL (ref 0.15–0.47)
ALPHA1 GLOB SERPL ELPH-MCNC: 2.4 % (ref 1.8–7)
ALPHA2 GLOB SERPL ELPH-MCNC: 0.62 G/DL (ref 0.42–1.04)
ALPHA2 GLOB SERPL ELPH-MCNC: 7.4 % (ref 5.9–14.9)
BETA GLOB ABNORMAL SERPL ELPH-MCNC: 0.45 G/DL (ref 0.31–0.57)
BETA1 GLOB SERPL ELPH-MCNC: 5.3 % (ref 4.7–7.7)
BETA2 GLOB SERPL ELPH-MCNC: 9.4 % (ref 3.1–7.9)
BETA2+GAMMA GLOB SERPL ELPH-MCNC: 0.79 G/DL (ref 0.2–0.58)
GAMMA GLOB ABNORMAL SERPL ELPH-MCNC: 2.73 G/DL (ref 0.4–1.66)
GAMMA GLOB SERPL ELPH-MCNC: 32.5 % (ref 6.9–22.3)
IGG/ALB SER: 0.75 {RATIO} (ref 1.1–1.8)
INTERPRETATION UR IFE-IMP: NORMAL
KAPPA LC FREE SER-MCNC: 112.8 MG/L (ref 3.3–19.4)
KAPPA LC FREE/LAMBDA FREE SER: 1.32 {RATIO} (ref 0.26–1.65)
LAMBDA LC FREE SERPL-MCNC: 85.2 MG/L (ref 5.7–26.3)
PROT PATTERN SERPL ELPH-IMP: ABNORMAL
PROT SERPL-MCNC: 8.4 G/DL (ref 6.4–8.2)

## 2025-01-07 PROCEDURE — 84165 PROTEIN E-PHORESIS SERUM: CPT | Performed by: STUDENT IN AN ORGANIZED HEALTH CARE EDUCATION/TRAINING PROGRAM

## 2025-01-07 PROCEDURE — 86334 IMMUNOFIX E-PHORESIS SERUM: CPT | Performed by: STUDENT IN AN ORGANIZED HEALTH CARE EDUCATION/TRAINING PROGRAM

## 2025-01-07 NOTE — TELEPHONE ENCOUNTER
Patient calling in, tested postive for covid on 1/1, stated she is still not feeling great and has to drive far for the appt but wanted to check in to see if it would be okay to have a virtual or if she should reschedule. Patient would like a call back at [phone]

## 2025-01-08 ENCOUNTER — TELEPHONE (OUTPATIENT)
Age: 55
End: 2025-01-08

## 2025-01-08 NOTE — TELEPHONE ENCOUNTER
Called to inform patient that Nicole is okay with her appointment being virtual. Patient is understanding on how to access the virtual appt.

## 2025-01-09 ENCOUNTER — TELEPHONE (OUTPATIENT)
Age: 55
End: 2025-01-09

## 2025-01-09 ENCOUNTER — TELEMEDICINE (OUTPATIENT)
Age: 55
End: 2025-01-09
Payer: COMMERCIAL

## 2025-01-09 DIAGNOSIS — D69.6 THROMBOCYTOPENIA (HCC): Primary | ICD-10-CM

## 2025-01-09 DIAGNOSIS — D64.9 ANEMIA, UNSPECIFIED TYPE: ICD-10-CM

## 2025-01-09 DIAGNOSIS — D53.9 MACROCYTIC ANEMIA: ICD-10-CM

## 2025-01-09 PROCEDURE — 98005 SYNCH AUDIO-VIDEO EST LOW 20: CPT | Performed by: NURSE PRACTITIONER

## 2025-01-09 RX ORDER — BUPROPION HYDROCHLORIDE 100 MG/1
TABLET, EXTENDED RELEASE ORAL
COMMUNITY
Start: 2024-12-30

## 2025-01-09 NOTE — TELEPHONE ENCOUNTER
Spoke with pt regarding follow up to virtual appt today.  Next appt scheduled for 4/9/25 @ 11:30 AM in [facility] with labs completed week prior.

## 2025-01-10 ENCOUNTER — NURSE TRIAGE (OUTPATIENT)
Age: 55
End: 2025-01-10

## 2025-01-10 NOTE — TELEPHONE ENCOUNTER
"Please advise  Last OV: 10/14/24   Hx: cirrhosis     Pt calling in, reports lower abdominal pain right above pelvis that is a constant pain- 8/10 and nausea. She will eat something in the morning and within 15 mins has feeling she has to have a BM and like there are \"rocks in stomach\". She had a BM this morning and does not have feeling of incomplete evacuation. When pressing on area increases the pain.     Pt mentions she had covid end of December and had abdominal pain, nausea, coughing and overall not feeling well. But all other symptoms are gone except the nausea and pain.     Pt is concerned due to her liver disease if symptoms are from that. I advised ED precautions and would defer to provider.     Answer Assessment - Initial Assessment Questions  1. LOCATION: \"Where does it hurt?\"       Lower abdomen right above pelvis   2. RADIATION: \"Does the pain shoot anywhere else?\" (e.g., chest, back)      No   3. ONSET: \"When did the pain begin?\" (e.g., minutes, hours or days ago)       End of December   4. SUDDEN: \"Gradual or sudden onset?\"        5. PATTERN \"Does the pain come and go, or is it constant?\"      Constant   6. SEVERITY: \"How bad is the pain?\"  (e.g., Scale 1-10; mild, moderate, or severe)      8/10   7. RECURRENT SYMPTOM: \"Have you ever had this type of stomach pain before?\" If Yes, ask: \"When was the last time?\" and \"What happened that time?\"       No   8. CAUSE: \"What do you think is causing the stomach pain?\"      Unsure   9. RELIEVING/AGGRAVATING FACTORS: \"What makes it better or worse?\" (e.g., antacids, bending or twisting motion, bowel movement)        10. OTHER SYMPTOMS: \"Do you have any other symptoms?\" (e.g., back pain, diarrhea, fever, urination pain, vomiting)  No    Protocols used: Abdominal Pain - Female-Adult-[place]    "

## 2025-01-10 NOTE — TELEPHONE ENCOUNTER
LVM advising pt DR Wiley agrees with going to ED for 8/10 pain. Advised pt of OV appt on 2/24/25.

## 2025-01-14 ENCOUNTER — TELEPHONE (OUTPATIENT)
Age: 55
End: 2025-01-14

## 2025-01-14 NOTE — TELEPHONE ENCOUNTER
Pt calling in reporting that she was d/c from the hgb and her values had dropped. Pt states she would like to know if Nicole thinks she should repeat labs or if she needs a blood transfusion. Pt states she is feeling well overall, not fatigued more than usual.

## 2025-01-14 NOTE — TELEPHONE ENCOUNTER
Spoke with patient who stated she was recently admitted at [place] [facility] with UTI and her hgb was 7.4 while inpatient. She denies SOB, dizziness, lightheadedness, worsening fatigue or feeling faint.     [facility], request form has to be faxed to medical records at [phone] before they will send. Patient will send lab results copy via [employer].    She would like to know if KAREN Drummond would like any repeat lab work completed    Best callback number: [phone]

## 2025-02-14 LAB
ALBUMIN SERPL-MCNC: 3.8 G/DL (ref 3.8–4.9)
ALP SERPL-CCNC: 84 IU/L (ref 44–121)
ALT SERPL-CCNC: 41 IU/L (ref 0–32)
AST SERPL-CCNC: 93 IU/L (ref 0–40)
BILIRUB SERPL-MCNC: 1.7 MG/DL (ref 0–1.2)
BUN SERPL-MCNC: 10 MG/DL (ref 6–24)
BUN/CREAT SERPL: 13 (ref 9–23)
CALCIUM SERPL-MCNC: 9.1 MG/DL (ref 8.7–10.2)
CHLORIDE SERPL-SCNC: 103 MMOL/L (ref 96–106)
CO2 SERPL-SCNC: 24 MMOL/L (ref 20–29)
CREAT SERPL-MCNC: 0.79 MG/DL (ref 0.57–1)
EGFR: 88 ML/MIN/1.73
ERYTHROCYTE [DISTWIDTH] IN BLOOD BY AUTOMATED COUNT: 14.1 % (ref 11.7–15.4)
GLOBULIN SER-MCNC: 4.2 G/DL (ref 1.5–4.5)
GLUCOSE SERPL-MCNC: 101 MG/DL (ref 70–99)
HCT VFR BLD AUTO: 31.5 % (ref 34–46.6)
HGB BLD-MCNC: 10.8 G/DL (ref 11.1–15.9)
INR PPP: 1.3 (ref 0.9–1.2)
MCH RBC QN AUTO: 32.6 PG (ref 26.6–33)
MCHC RBC AUTO-ENTMCNC: 34.3 G/DL (ref 31.5–35.7)
MCV RBC AUTO: 95 FL (ref 79–97)
MORPHOLOGY BLD-IMP: NORMAL
PLATELET # BLD AUTO: 74 X10E3/UL (ref 150–450)
POTASSIUM SERPL-SCNC: 4.3 MMOL/L (ref 3.5–5.2)
PROT SERPL-MCNC: 8 G/DL (ref 6–8.5)
PROTHROMBIN TIME: 14.4 SEC (ref 9.1–12)
RBC # BLD AUTO: 3.31 X10E6/UL (ref 3.77–5.28)
SODIUM SERPL-SCNC: 138 MMOL/L (ref 134–144)
WBC # BLD AUTO: 4.1 X10E3/UL (ref 3.4–10.8)

## 2025-02-16 ENCOUNTER — RESULTS FOLLOW-UP (OUTPATIENT)
Age: 55
End: 2025-02-16

## 2025-02-24 NOTE — TELEPHONE ENCOUNTER
Pt calling to change apt to virtual due to transport. Checked w/Mitchell at [facility] QU clerical advised ok. Changed apt to virtual and advised pt this was updated to virtual on our end.

## 2025-03-03 DIAGNOSIS — D64.9 ANEMIA, UNSPECIFIED TYPE: ICD-10-CM

## 2025-03-03 RX ORDER — FOLIC ACID 1 MG/1
1000 TABLET ORAL DAILY
Qty: 90 TABLET | Refills: 1 | Status: SHIPPED | OUTPATIENT
Start: 2025-03-03

## 2025-03-09 DIAGNOSIS — E83.42 HYPOMAGNESEMIA: ICD-10-CM

## 2025-03-10 RX ORDER — LANOLIN ALCOHOL/MO/W.PET/CERES
400 CREAM (GRAM) TOPICAL ONCE
Qty: 30 TABLET | Refills: 5 | Status: SHIPPED | OUTPATIENT
Start: 2025-03-10 | End: 2025-03-10

## 2025-03-18 RX ORDER — TRAZODONE HYDROCHLORIDE 150 MG/1
TABLET ORAL
Qty: 30 TABLET | OUTPATIENT
Start: 2025-03-18

## 2025-04-08 ENCOUNTER — TELEPHONE (OUTPATIENT)
Age: 55
End: 2025-04-08

## 2025-04-08 NOTE — TELEPHONE ENCOUNTER
Left message reminding patient to have labs completed for upcoming appointment 4/9/25. If labs are not completed the office would need to reschedule appointment.  Provided call back number for any questions/concerns [phone].

## 2025-04-09 ENCOUNTER — TELEPHONE (OUTPATIENT)
Age: 55
End: 2025-04-09

## 2025-04-09 NOTE — TELEPHONE ENCOUNTER
Left message for patient to return call to the office to reschedule her appointment today due to labs not being completed. Provided call back number [phone].